# Patient Record
Sex: FEMALE | Race: WHITE | HISPANIC OR LATINO | Employment: OTHER | URBAN - METROPOLITAN AREA
[De-identification: names, ages, dates, MRNs, and addresses within clinical notes are randomized per-mention and may not be internally consistent; named-entity substitution may affect disease eponyms.]

---

## 2017-02-23 ENCOUNTER — MEDICAL CORRESPONDENCE (OUTPATIENT)
Dept: HEALTH INFORMATION MANAGEMENT | Facility: CLINIC | Age: 72
End: 2017-02-23

## 2017-09-28 ENCOUNTER — OFFICE VISIT (OUTPATIENT)
Dept: ENDOCRINOLOGY | Facility: CLINIC | Age: 72
End: 2017-09-28

## 2017-09-28 DIAGNOSIS — E11.69 TYPE 2 DIABETES MELLITUS WITH DIABETIC FOOT DEFORMITY (H): ICD-10-CM

## 2017-09-28 DIAGNOSIS — M20.42 HAMMER TOES OF BOTH FEET: ICD-10-CM

## 2017-09-28 DIAGNOSIS — M21.969 TYPE 2 DIABETES MELLITUS WITH DIABETIC FOOT DEFORMITY (H): ICD-10-CM

## 2017-09-28 DIAGNOSIS — L60.2 ONYCHAUXIS: Primary | ICD-10-CM

## 2017-09-28 DIAGNOSIS — B35.1 DERMATOPHYTOSIS OF NAIL: ICD-10-CM

## 2017-09-28 DIAGNOSIS — M20.41 HAMMER TOES OF BOTH FEET: ICD-10-CM

## 2017-09-28 RX ORDER — CICLOPIROX OLAMINE 7.7 MG/G
CREAM TOPICAL 2 TIMES DAILY
Qty: 90 G | Refills: 6 | Status: ON HOLD | OUTPATIENT
Start: 2017-09-28 | End: 2019-04-07

## 2017-09-28 NOTE — MR AVS SNAPSHOT
After Visit Summary   2017    Angelica James    MRN: [id number]           Patient Information     Date Of Birth          1945        Visit Information        Provider Department      2017 3:00 PM Courtney Rebollar; Adrian Vasquez DPM [facility]         Follow-ups after your visit        Your next 10 appointments already scheduled     2018  3:00 PM CST   (Arrive by 2:45 PM)   Return Visit with JITENDRA Francisco [facility] End[facility] ([facility])    [address] [place] [id number] [phone]              Who to contact     Please call your clinic at [phone] to:    Ask questions about your health    Make or cancel appointments    Discuss your medicines    Learn about your test results    Speak to your doctor   If you have compliments or concerns about an experience at your clinic, or if you wish to file a complaint, please contact [facility] Patient Relations at [phone] or email us at [email or web address]         Additional Information About Your Visit        MyChart Information     [employer] is an electronic gateway that provides easy, online access to your medical records. With [employer] you can request a clinic appointment, read your test results, renew a prescription or communicate with your care team.     To sign up for [employer] visit the website at [email or web address]   You will be asked to enter the access code listed below, as well as some personal information. Please follow the directions to create your username and password.     Your access code is: [email or web address]  Expires: 2017  3:37 PM     Your access code will  in 90 days. If you need help or a new code, please contact your [facility] or call [phone] for assistance.        Care EveryWhere ID     This is your Care EveryWhere ID. This could be  used by other organizations to access your [facility] medical records  [id number]         Blood Pressure from Last 3 Encounters:   11/11/14 126/75    Weight from Last 3 Encounters:   11/11/14 61.7 kg (136 lb)              Today, you had the following     No orders found for display       Primary Care Provider Office Phone # Fax #    Kamini Michael [phone]       [facility] [address] [facility] [address]        Equal Access to Services     EVA MURPHY : Hadii aad ku hadasho Soomaali, waaxda luqadaha, qaybta kaalmada adeegyada, waxay idiin hayaan adeeg kharaserena lademetrio . So [facility] [phone].    ATENCIÓN: Si habla español, tiene a villagomez disposición servicios gratuitos de asistencia lingüística. Ll[facility] [phone].    We comply with applicable federal civil rights laws and [place] laws. We do not discriminate on the basis of race, color, national origin, age, disability sex, sexual orientation or gender identity.            Thank you!     Thank you for choosing [facility]  for your care. Our goal is always to provide you with excellent care. Hearing back from our patients is one way we can continue to improve our services. Please take a few minutes to complete the written survey that you may receive in the mail after your visit with us. Thank you!             Your Updated Medication List - Protect others around you: Learn how to safely use, store and throw away your medicines at www.disposemymeds.org.      Notice  As of 9/28/2017  3:37 PM    You have not been prescribed any medications.

## 2017-09-28 NOTE — LETTER
9/28/2017       RE: Angelica James  [address]  [place] [address]     Dear Colleague,    Thank you for referring your patient, Angelica James, to the [facility] ENDOCRINOLOG[facility] at [facility]. Please see a copy of my visit note below.    Past Medical History:   Diagnosis Date     Diabetes (H)      Hypertension      Schizophrenia (H)      There is no problem list on file for this patient.    No past surgical history on file.  Social History     Social History     Marital status:      Spouse name: N/A     Number of children: N/A     Years of education: N/A     Occupational History     Not on file.     Social History Main Topics     Smoking status: Never Smoker     Smokeless tobacco: Not on file     Alcohol use No     Drug use: No     Sexual activity: Not on file     Other Topics Concern     Not on file     Social History Narrative     No narrative on file     No family history on file.  SUBJECTIVE:  A 72-year-old female presents from Kamini Michael NP, for diabetic toenail care.  She presents with her daughter and .  She relates no numbness or tingling in her feet.  No ulcers or sores in the past.  She does not wear diabetic shoes.  The toenails do not hurt.        PAST MEDICAL HISTORY:  Diabetes and hypertension.        PAST SURGICAL HISTORY:  Unremarkable.        REVIEW OF SYSTEMS:  No GI ulcers in the past.  No problems with Nsaids in the past.  No kidney disease and no liver disease.        FAMILY HISTORY:  Diabetes.      OBJECTIVE:  DP and PT 2/4 bilaterally.  She has a flexible flat foot, left greater than right.  She has dorsally contracted digits 1-5 bilaterally.  She has laterally deviated hallux bilaterally.  She has hyperkeratotic tissue buildup plantar medial hallux bilaterally with some cracking and drying.  There is no erythema, no edema, no drainage, no odor, no calor bilaterally.  Deep tendon reflexes are intact bilaterally.   Sharp dull is intact with 5.07 [place]-Nai monofilament bilaterally.  CFT is less than 3 seconds bilaterally.  She has incurvated long nails with some thickening and discoloration 1-5 bilaterally to differing degrees.  There are no gross tendon voids bilaterally.  Muscle strength is intact.        ASSESSMENT/PLAN:  Onychauxis with some onychomycosis changes bilaterally.  She is diabetic.  She has hammertoes present and a flexible flat foot.  Diagnosis and treatment options discussed with her.  All the nails were reduced bilaterally upon consent.  Prescription for diabetic shoes and insoles given.  She was given the phone number and address for the orthotics and prosthetics lab to pick those up.  Prescription for econazole cream given and use discussed with her.  She will return to clinic and see me in about 3-4 months.       Cream was switched to Loprox due to insurance coverage.         Again, thank you for allowing me to participate in the care of your patient.      Sincerely,    Adrian Vasquez DPM

## 2017-09-28 NOTE — PROGRESS NOTES
Past Medical History:   Diagnosis Date     Diabetes (H)      Hypertension      Schizophrenia (H)      There is no problem list on file for this patient.    No past surgical history on file.  Social History     Social History     Marital status:      Spouse name: N/A     Number of children: N/A     Years of education: N/A     Occupational History     Not on file.     Social History Main Topics     Smoking status: Never Smoker     Smokeless tobacco: Not on file     Alcohol use No     Drug use: No     Sexual activity: Not on file     Other Topics Concern     Not on file     Social History Narrative     No narrative on file     No family history on file.  SUBJECTIVE:  A 72-year-old female presents from Kamini Michael NP, for diabetic toenail care.  She presents with her daughter and .  She relates no numbness or tingling in her feet.  No ulcers or sores in the past.  She does not wear diabetic shoes.  The toenails do not hurt.        PAST MEDICAL HISTORY:  Diabetes and hypertension.        PAST SURGICAL HISTORY:  Unremarkable.        REVIEW OF SYSTEMS:  No GI ulcers in the past.  No problems with Nsaids in the past.  No kidney disease and no liver disease.        FAMILY HISTORY:  Diabetes.      OBJECTIVE:  DP and PT 2/4 bilaterally.  She has a flexible flat foot, left greater than right.  She has dorsally contracted digits 1-5 bilaterally.  She has laterally deviated hallux bilaterally.  She has hyperkeratotic tissue buildup plantar medial hallux bilaterally with some cracking and drying.  There is no erythema, no edema, no drainage, no odor, no calor bilaterally.  Deep tendon reflexes are intact bilaterally.  Sharp dull is intact with 5.07 [place]-Nai monofilament bilaterally.  CFT is less than 3 seconds bilaterally.  She has incurvated long nails with some thickening and discoloration 1-5 bilaterally to differing degrees.  There are no gross tendon voids bilaterally.  Muscle strength is  intact.        ASSESSMENT/PLAN:  Onychauxis with some onychomycosis changes bilaterally.  She is diabetic.  She has hammertoes present and a flexible flat foot.  Diagnosis and treatment options discussed with her.  All the nails were reduced bilaterally upon consent.  Prescription for diabetic shoes and insoles given.  She was given the phone number and address for the orthotics and prosthetics lab to pick those up.  Prescription for econazole cream given and use discussed with her.  She will return to clinic and see me in about 3-4 months.       Cream was switched to Loprox due to insurance coverage.

## 2018-11-06 ENCOUNTER — TRANSFERRED RECORDS (OUTPATIENT)
Dept: HEALTH INFORMATION MANAGEMENT | Facility: CLINIC | Age: 73
End: 2018-11-06

## 2018-11-23 ENCOUNTER — TRANSFERRED RECORDS (OUTPATIENT)
Dept: HEALTH INFORMATION MANAGEMENT | Facility: CLINIC | Age: 73
End: 2018-11-23

## 2018-11-29 ENCOUNTER — PATIENT OUTREACH (OUTPATIENT)
Dept: CARE COORDINATION | Facility: CLINIC | Age: 73
End: 2018-11-29

## 2018-12-07 ENCOUNTER — OFFICE VISIT (OUTPATIENT)
Dept: ORTHOPEDICS | Facility: CLINIC | Age: 73
End: 2018-12-07
Payer: MEDICARE

## 2018-12-07 ENCOUNTER — OFFICE VISIT (OUTPATIENT)
Dept: NEUROLOGY | Facility: CLINIC | Age: 73
End: 2018-12-07
Payer: MEDICARE

## 2018-12-07 ENCOUNTER — RADIANT APPOINTMENT (OUTPATIENT)
Dept: GENERAL RADIOLOGY | Facility: CLINIC | Age: 73
End: 2018-12-07
Attending: FAMILY MEDICINE
Payer: MEDICARE

## 2018-12-07 VITALS
DIASTOLIC BLOOD PRESSURE: 67 MMHG | BODY MASS INDEX: 21.09 KG/M2 | OXYGEN SATURATION: 95 % | TEMPERATURE: 96.5 F | WEIGHT: 119 LBS | HEIGHT: 63 IN | HEART RATE: 85 BPM | SYSTOLIC BLOOD PRESSURE: 109 MMHG

## 2018-12-07 VITALS
BODY MASS INDEX: 21.09 KG/M2 | HEART RATE: 85 BPM | DIASTOLIC BLOOD PRESSURE: 67 MMHG | HEIGHT: 63 IN | SYSTOLIC BLOOD PRESSURE: 109 MMHG | WEIGHT: 119 LBS

## 2018-12-07 DIAGNOSIS — R41.3 MEMORY LOSS: Primary | ICD-10-CM

## 2018-12-07 DIAGNOSIS — M25.561 CHRONIC PAIN OF RIGHT KNEE: ICD-10-CM

## 2018-12-07 DIAGNOSIS — M17.11 PRIMARY OSTEOARTHRITIS OF RIGHT KNEE: Primary | ICD-10-CM

## 2018-12-07 DIAGNOSIS — M25.561 RIGHT KNEE PAIN: ICD-10-CM

## 2018-12-07 DIAGNOSIS — G89.29 CHRONIC PAIN OF RIGHT KNEE: ICD-10-CM

## 2018-12-07 DIAGNOSIS — R41.3 MEMORY LOSS: ICD-10-CM

## 2018-12-07 LAB
HCV AB SERPL QL IA: NONREACTIVE
HIV 1+2 AB+HIV1 P24 AG SERPL QL IA: NONREACTIVE
T PALLIDUM AB SER QL: NONREACTIVE
T4 FREE SERPL-MCNC: 1.09 NG/DL (ref 0.76–1.46)
TSH SERPL DL<=0.005 MIU/L-ACNC: 2.52 MU/L (ref 0.4–4)
VIT B12 SERPL-MCNC: 949 PG/ML (ref 193–986)

## 2018-12-07 PROCEDURE — 86803 HEPATITIS C AB TEST: CPT | Performed by: PSYCHIATRY & NEUROLOGY

## 2018-12-07 PROCEDURE — 86780 TREPONEMA PALLIDUM: CPT | Performed by: PSYCHIATRY & NEUROLOGY

## 2018-12-07 PROCEDURE — 87389 HIV-1 AG W/HIV-1&-2 AB AG IA: CPT | Performed by: PSYCHIATRY & NEUROLOGY

## 2018-12-07 RX ORDER — FERROUS SULFATE 325(65) MG
TABLET ORAL
Refills: 2 | COMMUNITY
Start: 2018-10-07 | End: 2021-07-31

## 2018-12-07 RX ORDER — LISINOPRIL 10 MG/1
10 TABLET ORAL DAILY
Refills: 3 | Status: ON HOLD | COMMUNITY
Start: 2018-11-06 | End: 2019-07-31

## 2018-12-07 RX ORDER — ACETAMINOPHEN 160 MG
TABLET,DISINTEGRATING ORAL
Refills: 3 | COMMUNITY
Start: 2018-10-08 | End: 2021-07-31

## 2018-12-07 RX ORDER — PAROXETINE 40 MG/1
40 TABLET, FILM COATED ORAL EVERY EVENING
Refills: 5 | Status: ON HOLD | COMMUNITY
Start: 2018-10-29 | End: 2019-08-14

## 2018-12-07 RX ORDER — RISPERIDONE 1 MG/1
TABLET ORAL
Refills: 5 | COMMUNITY
Start: 2018-09-09 | End: 2021-07-31

## 2018-12-07 RX ORDER — RISPERIDONE 4 MG/1
TABLET ORAL
Refills: 5 | COMMUNITY
Start: 2018-11-11 | End: 2021-07-31

## 2018-12-07 ASSESSMENT — PAIN SCALES - GENERAL: PAINLEVEL: MODERATE PAIN (5)
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
## 2018-12-07 NOTE — MR AVS SNAPSHOT
After Visit Summary   12/7/2018    Angelica James    MRN: [id number]           Patient Information     Date Of Birth          1945        Visit Information        Provider Department      12/7/2018 8:15 AM Vandana Espana; Ad Arroyo MD [facility] Neurology        Today's Diagnoses     Memory loss    -  1    Chronic pain of right knee           Follow-ups after your visit        Additional Services     OCCUPATIONAL THERAPY REFERRAL       If you have not heard from the scheduling office within 2 business days, please call [phone] for all locations, with the exception of [place], please call [phone] and [place], please call [phone].safety [email or web address]    Please be aware that coverage of these services is subject to the terms and limitations of your health insurance plan.  Call member services at your health plan with any benefit or coverage questions.            ORTHOPEDICS ADULT REFERRAL       Your provider has referred you to: [facility]: [facility] [phone]   [email or web address]    Please be aware that coverage of these services is subject to the terms and limitations of your health insurance plan.  Call member services at your health plan with any benefit or coverage questions.      Please bring the following to your appointment:    >>   Any x-rays, CTs or MRIs which have been performed.  Contact the facility where they were done to arrange for  prior to your scheduled appointment.    >>   List of current medications   >>   This referral request   >>   Any documents/labs given to you for this referral                  Your next 10 appointments already scheduled     Dec 07, 2018 11:15 AM CST   LAB with  LAB    [facility] ([facility])    [address] [place] [id number] [phone]           Please do not eat 10-12 hours before your appointment if you  are coming in fasting for labs on lipids, cholesterol, or glucose (sugar). This does not apply to pregnant women. Water, hot tea and black coffee (with nothing added) are okay. Do not drink other fluids, diet soda or chew gum.            2019  3:30 PM CST   (Arrive by 3:15 PM)   Return Visit with Ad Arroyo MD   [facility] Neurology ([facility] [place])    [address] [place] [id number] [phone]              Future tests that were ordered for you today     Open Future Orders        Priority Expected Expires Ordered    Hepatitis C antibody Routine 2018    HIV Antigen Antibody Combo Routine 2018    T4 free Routine 2018    Vitamin B12 Routine 2018    Methylmalonic acid Routine 2018    TSH Routine 2018    Treponema Abs w Reflex to RPR and Titer Routine 2018    OCCUPATIONAL THERAPY REFERRAL Routine  2019            Who to contact     Please call your clinic at [phone] to:    Ask questions about your health    Make or cancel appointments    Discuss your medicines    Learn about your test results    Speak to your doctor            Additional Information About Your Visit        MyChart Information     [employer]x Therapeuticst is an electronic gateway that provides easy, online access to your medical records. With Arzeda, you can request a clinic appointment, read your test results, renew a prescription or communicate with your care team.     To sign up for [employer] visit the website at [email or web address]   You will be asked to enter the access code listed below, as well as some personal information. Please follow the directions to create your username and password.     Your access code is: [email or web address]  Expires: 2019  6:30 AM     Your access code will  in 90  "days. If you need help or a new code, please contact your [facility] or call [phone] for assistance.        Care EveryWhere ID     This is your Care EveryWhere ID. This could be used by other organizations to access your [place] medical records  [id number]        Your Vitals Were     Pulse Temperature Height Pulse Oximetry BMI (Body Mass Index)       85 96.5  F (35.8  C) (Oral) 1.6 m (5' 3\") 95% 21.08 kg/m2        Blood Pressure from Last 3 Encounters:   12/07/18 109/67   11/11/14 126/75    Weight from Last 3 Encounters:   12/07/18 54 kg (119 lb)   11/11/14 61.7 kg (136 lb)              We Performed the Following     ORTHOPEDICS ADULT REFERRAL        Primary Care Provider Office Phone # Fax #    Kamini MichaelSARTHAK [phone]       [facility] [address] [place] [address]        Equal Access to Services     EVA MURPHY : Hadii aad ku hadasho Soomaali, waaxda luqadaha, qaybta kaalmada adeegyada, waxay idiin hayaan noemyeg brittneyaraserena olivo . So [facility] [phone].    ATENCIÓN: Si habla español, tiene a villagomez disposición servicios gratuitos de asistencia lingüística. Llame al [phone].    We comply with applicable federal civil rights laws and [place] laws. We do not discriminate on the basis of race, color, national origin, age, disability, sex, sexual orientation, or gender identity.            Thank you!     Thank you for choosing [facility] NEUROLOGY  for your care. Our goal is always to provide you with excellent care. Hearing back from our patients is one way we can continue to improve our services. Please take a few minutes to complete the written survey that you may receive in the mail after your visit with us. Thank you!             Your Updated Medication List - Protect others around you: Learn how to safely use, store and throw away your medicines at www.disposemymeds.org.          This list is accurate as of 12/7/18 10:24 AM.  Always " use your most recent med list.                   Brand Name Dispense Instructions for use Diagnosis    calcium carbonate 500 MG tablet   Generic drug:  calcium carbonate 500 mg (elemental)      TAKE 1 TABLET BY MOUTH TWICE A DAY    Memory loss       ciclopirox 0.77 % cream    LOPROX    90 g    Apply topically 2 times daily To feet and toenails.    Dermatophytosis of nail       ferrous sulfate 325 (65 Fe) MG tablet    FEROSUL     TAKE 1 TABLET BY MOUTH TWO TIMES DAILY    Memory loss       lisinopril 10 MG tablet    PRINIVIL/ZESTRIL     Take 10 mg by mouth daily    Memory loss       metFORMIN 500 MG tablet    GLUCOPHAGE     TAKE 1 TABLET BY MOUTH DAILY.    Memory loss       PARoxetine 40 MG tablet    PAXIL     Take 40 mg by mouth daily    Memory loss       * risperiDONE 1 MG tablet    risperDAL     TAKE ONE-HALF TABLET BY MOUTH EVERY MORNING AND EARLY AFTERNOON.    Memory loss       * risperiDONE 4 MG tablet    risperDAL     TAKE 1 TABLET BY MOUTH NIGHTLY AT BEDTIME    Memory loss       vitamin D3 2000 units Caps      TAKE 2 CAPSULES BY MOUTH EVERY DAY    Memory loss       * Notice:  This list has 2 medication(s) that are the same as other medications prescribed for you. Read the directions carefully, and ask your doctor or other care provider to review them with you.

## 2018-12-07 NOTE — NURSING NOTE
[address] [address]  Dept: [phone]  ______________________________________________________________________________    Patient: Angelica James   : 1945   MRN: [id number]   2018    INVASIVE PROCEDURE SAFETY CHECKLIST    Date: 18   Procedure:Right knee kenalog injection  Patient Name: Angelica James  MRN: [id number]  YOB: 1945    Action: Complete sections as appropriate. Any discrepancy results in a HARD COPY until resolved.     PRE PROCEDURE:  Patient ID verified with 2 identifiers (name and  or MRN): Yes  Procedure and site verified with patient/designee (when able): Yes  Accurate consent documentation in medical record: Yes  H&P (or appropriate assessment) documented in medical record: Yes  H&P must be up to 20 days prior to procedure and updates within 24 hours of procedure as applicable: NA  Relevant diagnostic and radiology test results appropriately labeled and displayed as applicable: Yes  Procedure site(s) marked with provider initials: NA    TIMEOUT:  Time-Out performed immediately prior to starting procedure, including verbal and active participation of all team members addressing the following:Yes  * Correct patient identify  * Confirmed that the correct side and site are marked  * An accurate procedure consent form  * Agreement on the procedure to be done  * Correct patient position  * Relevant images and results are properly labeled and appropriately displayed  * The need to administer antibiotics or fluids for irrigation purposes during the procedure as applicable   * Safety precautions based on patient history or medication use    DURING PROCEDURE: Verification of correct person, site, and procedures any time the responsibility for care of the patient is transferred to another member of the care team.     The following medication was given:     MEDICATION:  Lidocaine without  epinephrine  ROUTE: intra-articular  SITE: right Knee  DOSE: 4 mL  LOT #: [id number]  : [employer]  EXPIRATION DATE: 06/22  NDC#: 74727-408-10   Was there drug waste? Yes  Amount of drug waste (mL): 26.  Reason for waste:  Single use vial    MEDICATION:  Kenalog 40 mg  ROUTE: intra-articular  SITE: right Knee  DOSE: 1 mL  LOT #: [id number]  : [employer]  EXPIRATION DATE: 04/2020  NDC#: [address]-9   Was there drug waste? No    Kyra Giles, JACK  December 7, 2018

## 2018-12-07 NOTE — MR AVS SNAPSHOT
After Visit Summary   12/7/2018    Angelica James    MRN: [id number]           Patient Information     Date Of Birth          1945        Visit Information        Provider Department      12/7/2018 11:10 AM Jaime Valencia MD [facility] Orthopa[facility]        Today's Diagnoses     Right knee pain    -  1      Care Instructions    Ice to knee today and then daily as needed  May use tylenol as needed  No pools or hot tubs for 48 hours  Follow up as needed  Return to clinic with severe pain, warmth from the joint, or redness, as these may be signs of infection after your injection.               Follow-ups after your visit        Your next 10 appointments already scheduled     Jan 18, 2019  3:30 PM CST   (Arrive by 3:15 PM)   Return Visit with Ad Arroyo MD   [facility] Neurology ([facility] [place])    [address] [place] [id number] [phone]              Future tests that were ordered for you today     Open Future Orders        Priority Expected Expires Ordered    OCCUPATIONAL THERAPY REFERRAL Routine  12/7/2019 12/7/2018            Who to contact     Please call your clinic at [phone] to:    Ask questions about your health    Make or cancel appointments    Discuss your medicines    Learn about your test results    Speak to your doctor            Additional Information About Your Visit        MyChart Information     BioHorizonst is an electronic gateway that provides easy, online access to your medical records. With [employer], you can request a clinic appointment, read your test results, renew a prescription or communicate with your care team.     To sign up for [employer] visit the website at [email or web address]   You will be asked to enter the access code listed below, as well as some personal information. Please follow the directions to create your username and password.     Your access code is:  [email or web address]  Expires: 2019  6:30 AM     Your access code will  in 90 days. If you need help or a new code, please contact your [facility] or call [phone] for assistance.        Care EveryWhere ID     This is your Care EveryWhere ID. This could be used by other organizations to access your [place] medical records  [id number]        Your Vitals Were     Pulse Height BMI (Body Mass Index)             85 1.6 m (5' 3\") 21.08 kg/m2          Blood Pressure from Last 3 Encounters:   18 109/67   18 109/67   14 126/75    Weight from Last 3 Encounters:   18 54 kg (119 lb)   18 54 kg (119 lb)   14 61.7 kg (136 lb)               Primary Care Provider Office Phone # Fax #    Kamini Valdovinos SARTHAK Michael [phone]       [facility]  [place] [address]        Equal Access to Services     [facility] : Hadii aad ku hadasho Soomaali, waaxda luqadaha, qaybta kaalmada adeegyada, waxay idiin hayezio olivo . So [facility] [phone].    ATENCIÓN: Si habla español, tiene a villagomez disposición servicios gratuitos de asistencia lingüística. Neyame al [phone].    We comply with applicable federal civil rights laws and [place] laws. We do not discriminate on the basis of race, color, national origin, age, disability, sex, sexual orientation, or gender identity.            Thank you!     Thank you for choosing [facility] SPORTS AND ORTHOPAEDIC WALK IN CLINIC  for your care. Our goal is always to provide you with excellent care. Hearing back from our patients is one way we can continue to improve our services. Please take a few minutes to complete the written survey that you may receive in the mail after your visit with us. Thank you!             Your Updated Medication List - Protect others around you: Learn how to safely use, store and throw away your medicines at www.disposemymeds.org.          This list is " accurate as of 12/7/18  1:57 PM.  Always use your most recent med list.                   Brand Name Dispense Instructions for use Diagnosis    calcium carbonate 500 MG tablet   Generic drug:  calcium carbonate 500 mg (elemental)      TAKE 1 TABLET BY MOUTH TWICE A DAY    Memory loss       ciclopirox 0.77 % cream    LOPROX    90 g    Apply topically 2 times daily To feet and toenails.    Dermatophytosis of nail       ferrous sulfate 325 (65 Fe) MG tablet    FEROSUL     TAKE 1 TABLET BY MOUTH TWO TIMES DAILY    Memory loss       lisinopril 10 MG tablet    PRINIVIL/ZESTRIL     Take 10 mg by mouth daily    Memory loss       metFORMIN 500 MG tablet    GLUCOPHAGE     TAKE 1 TABLET BY MOUTH DAILY.    Memory loss       PARoxetine 40 MG tablet    PAXIL     Take 40 mg by mouth daily    Memory loss       * risperiDONE 1 MG tablet    risperDAL     TAKE ONE-HALF TABLET BY MOUTH EVERY MORNING AND EARLY AFTERNOON.    Memory loss       * risperiDONE 4 MG tablet    risperDAL     TAKE 1 TABLET BY MOUTH NIGHTLY AT BEDTIME    Memory loss       vitamin D3 2000 units Caps      TAKE 2 CAPSULES BY MOUTH EVERY DAY    Memory loss       * Notice:  This list has 2 medication(s) that are the same as other medications prescribed for you. Read the directions carefully, and ask your doctor or other care provider to review them with you.

## 2018-12-07 NOTE — PROGRESS NOTES
[facility]  Orthopedics  Jaime Valencia MD  2018     Name: Angelica James  MRN: [id number]  Age: 73 year old  : 1945  Referring provider: No ref. provider found     Chief Complaint: Pain of the Right Knee     Date of Injury: approximately 5 days ago    History of Present Illness:   Angelica James is a 73 year old, female who presents with an  today for evaluation of right knee pain. The patient reports experiencing constant, aching pain in the posterior aspect of the right knee that onset approximately 5 days ago with no specific incident. Her pain is exacerbated with walking and her pain is alleviated with ice therapy and tylenol. She denies any radiation of pain, numbness or tingling. She voices no further concerns at this time.     Review of Systems:   A 10-point review of systems was obtained and is negative except for as noted in the HPI.     Medications:   Current Outpatient Prescriptions:      CALCIUM CARBONATE 500 MG tablet, TAKE 1 TABLET BY MOUTH TWICE A DAY, Disp: , Rfl: 5     Cholecalciferol (VITAMIN D3) 2000 units CAPS, TAKE 2 CAPSULES BY MOUTH EVERY DAY, Disp: , Rfl: 3     ciclopirox (LOPROX) 0.77 % cream, Apply topically 2 times daily To feet and toenails., Disp: 90 g, Rfl: 6     ferrous sulfate (FEROSUL) 325 (65 Fe) MG tablet, TAKE 1 TABLET BY MOUTH TWO TIMES DAILY, Disp: , Rfl: 2     lisinopril (PRINIVIL/ZESTRIL) 10 MG tablet, Take 10 mg by mouth daily, Disp: , Rfl: 3     metFORMIN (GLUCOPHAGE) 500 MG tablet, TAKE 1 TABLET BY MOUTH DAILY., Disp: , Rfl: 3     PARoxetine (PAXIL) 40 MG tablet, Take 40 mg by mouth daily, Disp: , Rfl: 5     risperiDONE (RISPERDAL) 1 MG tablet, TAKE ONE-HALF TABLET BY MOUTH EVERY MORNING AND EARLY AFTERNOON., Disp: , Rfl: 5     risperiDONE (RISPERDAL) 4 MG tablet, TAKE 1 TABLET BY MOUTH NIGHTLY AT BEDTIME, Disp: , Rfl: 5    Allergies:  Allergen Reactions     Penicillins Hives     Past Medical History:  Past Medical History:   Diagnosis Date      "Diabetes (H)      Hypertension      Schizophrenia (H)      Past Surgical History:  No past surgical history on file.     Social History:  Patient is retired. She denies smoking or alcohol use.     Social History     Marital status:      Spouse name: N/A     Number of children: N/A     Years of education: N/A     Social History Main Topics     Smoking status: Never Smoker     Smokeless tobacco: Never Used     Alcohol use No     Drug use: No     Sexual activity: Not on file     Family History:  No family history on file.    Physical Examination:  Blood pressure 109/67, pulse 85, height 1.6 m (5' 3\"), weight 54 kg (119 lb).  Patient is alert, No acute distress, pleasant and conversational.    Gait: nonantalgic. Normal heel toe gait.    Patient is able to perform two legged squat without difficulty.    right knee:   Skin intact. No erythema or ecchymosis.  No effusion or soft tissue swelling.    AROM: Zero to approximately 110  with pain on full flexion    Palpation: No medial or lateral facet joint tenderness.  No posterior medial or posterior lateral joint line tenderness.  Tenderness to palpation in the popliteal area.      Special Tests:  Negative bounce test, negative forced flexion and negative Melvin's.  No ligamentous laxity or pain with valgus or varus stress.  Negative Lachman's, Anterior Drawer and Posterior Drawer     Full Isometric quad strength, extensor mechanism in place     Neurovascularly intact in the lower extremity    Hip and Ankle with full AROM and nontender    Imaging:   Radiographs of the right knee - 3 views (12/7/2018)  Advanced osteoarthritis, worse in patellofemoral compartment. No acute fracture     I have independently reviewed the above imaging studies; the results were discussed with the patient.     Assessment:   73 year old, female with osteoarthritis of the right knee.     Plan:   1. Patient obtained an intraarticular right knee injection in clinic today  2. Patient will " start bracing her knee with the brace she owns at home.  3. Recommended she continue ice therapy and tylenol  4. Follow up with me PRN or if symptoms do not improve.     Procedure:  Right knee injection   The patient was apprised of the risks and the benefits of the procedure written consent was signed by the patient.   Landmarks were located on the anterior lateral knee and the area was marked and cleaned with chlorhexadine swab.   Using no touch technique, the skin was anesthetized with ethyl chloride spray, 40 mg of triamcinolone along with 4 mL of 1% lidocaine  was injected easily through a 1.5-inch 22-gauge needle in the knee.   There were no complications. The patient tolerated the procedure well. There was minimal bleeding.   The patient was instructed to ice the knee upon leaving clinic and refrain from overuse over the next 2 days.   The patient was instructed to go to the emergency room with any unusual pain, swelling, or redness occurred in the injected area.   Jaime Valencia MD          Scribe Disclosure:   IArthur, am serving as a scribe to document services personally performed by Jaime Valencia MD at this visit, based upon the provider's statements to me. All documentation has been reviewed by the aforementioned provider prior to being entered into the official medical record.

## 2018-12-07 NOTE — PROGRESS NOTES
SPORTS & ORTHOPEDIC WALK-IN VISIT 12/7/2018    Primary Care Physician: Dr. Michael    Reason for visit:     What part of your body is injured / painful?  right knee    What caused the injury /pain? No inciting event     How long ago did your injury occur or pain begin? a week ago    What are your most bothersome symptoms? Pain    How would you characterize your symptom?  aching    What makes your symptoms better? Rest, Ice and Tylenol    What makes your symptoms worse? Walking    Have you been previously seen for this problem? No    Medical History:    Any recent changes to your medical history? No    Any new medication prescribed since last visit? No    Have you had surgery on this body part before? No    Social History:    Occupation: Retired    Handedness: Right    Exercise: 2-3 days/week    Review of Systems:    Do you have fever, chills, weight loss? No    Do you have any vision problems? No    Do you have any chest pain or edema? No    Do you have any shortness of breath or wheezing?  No    Do you have stomach problems? No    Do you have any numbness or focal weakness? No    Do you have diabetes? Yes, Type 2    Do you have problems with bleeding or clotting? No    Do you have an rashes or other skin lesions? No

## 2018-12-07 NOTE — PATIENT INSTRUCTIONS
Ice to knee today and then daily as needed  May use tylenol as needed  No pools or hot tubs for 48 hours  Follow up as needed  Return to clinic with severe pain, warmth from the joint, or redness, as these may be signs of infection after your injection.

## 2018-12-07 NOTE — NURSING NOTE
Chief Complaint   Patient presents with     RECHECK     DEMENTIA       Preventive Care:    Colorectal Cancer Screening: During our visit today, we discussed that it is recommended you receive colorectal cancer screening. Please call or make an appointment with your primary care provider to discuss this. You may also call the [facility] scheduling line ([phone]) to set up a colonoscopy appointment.      Ceci Campbell MA

## 2018-12-09 LAB — METHYLMALONATE SERPL-SCNC: 0.17 UMOL/L (ref 0–0.4)
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
## 2018-12-11 ENCOUNTER — DOCUMENTATION ONLY (OUTPATIENT)
Dept: NEUROLOGY | Facility: CLINIC | Age: 73
End: 2018-12-11

## 2018-12-14 ENCOUNTER — TELEPHONE (OUTPATIENT)
Dept: NEUROLOGY | Facility: CLINIC | Age: 73
End: 2018-12-14

## 2018-12-14 NOTE — TELEPHONE ENCOUNTER
----- Message from Ad Arroyo MD sent at 12/14/2018 11:10 AM CST -----  Tell daughter all blood tests are fine.

## 2018-12-14 NOTE — TELEPHONE ENCOUNTER
Talked with patients daughter on the phone and told them the results that  Dr. Arroyo sent me via EPIC message.

## 2018-12-19 RX ORDER — TRIAMCINOLONE ACETONIDE 40 MG/ML
40 INJECTION, SUSPENSION INTRA-ARTICULAR; INTRAMUSCULAR ONCE
Qty: 1 ML | Refills: 0 | Status: ON HOLD | OUTPATIENT
Start: 2018-12-19 | End: 2019-04-07

## 2019-01-18 ENCOUNTER — OFFICE VISIT (OUTPATIENT)
Dept: NEUROLOGY | Facility: CLINIC | Age: 74
End: 2019-01-18
Payer: MEDICARE

## 2019-01-18 VITALS — OXYGEN SATURATION: 96 % | HEART RATE: 78 BPM | SYSTOLIC BLOOD PRESSURE: 126 MMHG | DIASTOLIC BLOOD PRESSURE: 83 MMHG

## 2019-01-18 DIAGNOSIS — R41.3 MEMORY LOSS: Primary | ICD-10-CM

## 2019-01-18 ASSESSMENT — PAIN SCALES - GENERAL: PAINLEVEL: NO PAIN (0)

## 2019-01-18 NOTE — LETTER
2019       RE: Angelica James  [address]  [place] [address]     Dear Colleague,    Thank you for referring your patient, Angelica James, to the [facility] NEUROLOGY at [facility]. Please see a copy of my visit note below.    Service Date: 2019      RE: Angelica James   MRN: [id number]   : 1945        Dear Dr. Michael:      This is in regard to followup on Angelica James.  The patient returned today with a chief complaint of dementia.      The patient was accompanied by her daughter, Анна, who is an excellent historian and also her .  The patient's daughter was able to get records from your office and I reviewed them with her.  I found these quite helpful from 12/10/2018.  The patient subsequently has been referred back to her psychiatrist according to her daughter and is scheduled to have what sounds like psychometric testing.  Unfortunately, the patient continues to wander.  Her daughter reviewed with me again her prior history of paranoid schizophrenia and episodes of juanito.  She said that it would be awful if her psychosis returned.  The patient and her daughter did review with me medications for memory loss including donepezil, rivastigmine, and Namenda.  At this point, I would be reluctant to recommend these medicines because of the risk of increased confusion and reactivation of her psychosis.  I did ask that if they wanted to consider them to review this with her psychiatrist.  The patient and her daughter also were apprised of the risk of nausea, dyspepsia and diarrhea up to 5%-10% and approximately 1% rate of bradycardia again.  I went over with them her tests that I have ordered.  I made sure they had copies.  She had nonreactive serology and her B12 level was quite adequate at 949 picograms and her MMA was 0.17.  Her TSH was 1.09.      I did talk with the patient's daughter again about her wandering.  I  "think this is extremely dangerous and I am quite concerned about the risk of injury including freezing.  She does have a monitor, but she does know how to use it.  The patient's daughter did discuss with me difficulties that they have had with trying to get someone to watch her.  She does need to have adult .  Unfortunately, she is not a [place] citizen and you did bring up issues related to this as she is then not eligible for senior  or assisted living until she becomes a citizen.  Her daughter said that in February they will be starting application process.  I have asked that they review this issue with her psychiatrist, but also for the patient's daughter to call the [facility] here in [place].  I think she hopefully will get some benefit from their advice.  I did suggest that they also see her .  The patient's daughter should buy the book called \"The 36-Hour Day\".  I reviewed the probable diagnosis of Alzheimer disease but also pointed out to them again that patients with schizophrenia do have an increased risk of dementia.  I think as to causation I am not certain of the etiology.  I am not certain there is much else I can offer her.  I told them that I would be happy to see her in the future here at [facility].  I have not made arrangements for neurologic followup now.  Today the patient was extremely pleasant and I did suggest to her that she should not go to the coffee shop.  She most recently became lost, according to her daughter.  I did point out to her daughter the importance of having some adult with her constantly.        I spent 20 minutes with the patient and her daughter today.  Over 50% of the time this involved counseling and coordination of care.         D: 2019   T: 2019   MT: AKA      Name:     LUDA BOSWELL   MRN:      [id number]        Account:      [id number]   :      1945           Service Date: 2019      Document: " [id number]       Again, thank you for allowing me to participate in the care of your patient.      Sincerely,    Ad Arroyo MD    CC:  Kamini Michael NP   [facility]    [address] [place] [id number]

## 2019-01-18 NOTE — NURSING NOTE
Chief Complaint   Patient presents with     RECHECK     [facility] RETURN 1 MO F/U POST LAB       Yelitza Souza, EMT

## 2019-01-19 NOTE — PROGRESS NOTES
Service Date: 2019      Kamini Michael NP   [facility] [place] [address]      RE: Angelica James   MRN: [id number]   : 1945        Dear Dr. Michael:      This is in regard to followup on Angelica James.  The patient returned today with a chief complaint of dementia.      The patient was accompanied by her daughter, Анна, who is an excellent historian and also her .  The patient's daughter was able to get records from your office and I reviewed them with her.  I found these quite helpful from 12/10/2018.  The patient subsequently has been referred back to her psychiatrist according to her daughter and is scheduled to have what sounds like psychometric testing.  Unfortunately, the patient continues to wander.  Her daughter reviewed with me again her prior history of paranoid schizophrenia and episodes of juanito.  She said that it would be awful if her psychosis returned.  The patient and her daughter did review with me medications for memory loss including donepezil, rivastigmine, and Namenda.  At this point, I would be reluctant to recommend these medicines because of the risk of increased confusion and reactivation of her psychosis.  I did ask that if they wanted to consider them to review this with her psychiatrist.  The patient and her daughter also were apprised of the risk of nausea, dyspepsia and diarrhea up to 5%-10% and approximately 1% rate of bradycardia again.  I went over with them her tests that I have ordered.  I made sure they had copies.  She had nonreactive serology and her B12 level was quite adequate at 949 picograms and her MMA was 0.17.  Her TSH was 1.09.      I did talk with the patient's daughter again about her wandering.  I think this is extremely dangerous and I am quite concerned about the risk of injury including freezing.  She does have a monitor, but she does know how to use it.  The patient's daughter did discuss  "with me difficulties that they have had with trying to get someone to watch her.  She does need to have adult .  Unfortunately, she is not a [place] citizen and you did bring up issues related to this as she is then not eligible for senior  or assisted living until she becomes a citizen.  Her daughter said that in February they will be starting application process.  I have asked that they review this issue with her psychiatrist, but also for the patient's daughter to call the [facility] here in [place].  I think she hopefully will get some benefit from their advice.  I did suggest that they also see her .  The patient's daughter should buy the book called \"The 36-Hour Day\".  I reviewed the probable diagnosis of Alzheimer disease but also pointed out to them again that patients with schizophrenia do have an increased risk of dementia.  I think as to causation I am not certain of the etiology.  I am not certain there is much else I can offer her.  I told them that I would be happy to see her in the future here at [facility].  I have not made arrangements for neurologic followup now.  Today the patient was extremely pleasant and I did suggest to her that she should not go to the coffee shop.  She most recently became lost, according to her daughter.  I did point out to her daughter the importance of having some adult with her constantly.      Thank you for allowing me to see this patient.       Sincerely yours,       Lesvia Arroyo MD      I spent 20 minutes with the patient and her daughter today.  Over 50% of the time this involved counseling and coordination of care.         LESVIA ARROYO MD             D: 2019   T: 2019   MT: AKA      Name:     LUDA BOSWELL   MRN:      [id number]        Account:      [id number]   :      1945           Service Date: 2019      Document: [id number]    "

## 2019-01-29 ENCOUNTER — HOSPITAL ENCOUNTER (OUTPATIENT)
Dept: OCCUPATIONAL THERAPY | Facility: CLINIC | Age: 74
Setting detail: THERAPIES SERIES
End: 2019-01-29
Attending: PSYCHIATRY & NEUROLOGY
Payer: MEDICARE

## 2019-01-29 PROCEDURE — 97166 OT EVAL MOD COMPLEX 45 MIN: CPT | Mod: GO | Performed by: OCCUPATIONAL THERAPIST

## 2019-01-29 PROCEDURE — 97535 SELF CARE MNGMENT TRAINING: CPT | Mod: GO | Performed by: OCCUPATIONAL THERAPIST

## 2019-01-29 PROCEDURE — 96125 COGNITIVE TEST BY HC PRO: CPT | Mod: GO | Performed by: OCCUPATIONAL THERAPIST

## 2019-01-29 NOTE — PROGRESS NOTES
01/29/19 0800   Quick Adds   Quick Adds Certification   Type of Visit Initial Outpatient Occupational Therapy Evaluation       Present Yes   Language Danish   General Information   Start Of Care Date 01/29/19   Referring Physician Dr. Ad Arroyo   Orders Evaluate and treat as indicated   Orders Date 12/07/19   Medical Diagnosis Memory Loss   Onset of Illness/Injury or Date of Surgery 12/07/18   Special Instructions Cognitive Assessment   Surgical/Medical History Reviewed Yes   Additional Occupational Profile Info/Pertinent History of Current Problem Patient referred to outpatient OT for cognitive assessment.  Please refer to Dr. Arroyo note on 12/7/18 and 1/19/19.  Patient recently got lost after walking home from the coffee shop, police found her and she did not recall where she lived or knew what information to give them (ID/information that she had on her).  Daughter also reports that she will come home from the coffee shop with more money than she left with.  She has some concerns with wandering at night   Patient does go to adult day care 1x/week.  She has life alert with GPS (but does not know how to use it).  Patient denies changes with her hearing but some changes with her vision (eyes watering, burning, and fatigue).  PmHx: paranoid schizophrenia and episodes of juanito   Comments/Observations Daughter present for session.  Patient unable to recall what she had for breakfast just an hour prior to appointment today.     Role/Living Environment   Current Community Support Emergency call system;Family/friend caregiver  (sometimes will not know what/how to use her life alert)   Patient role/Employment history Retired   Community/Avocational Activities Goes to coffee shop almost daily (2 blocks away), enjoys watching TV, listening to music   Current Living Environment Apartment/condo   Home/Community Accessibility Comments Patient walks to local coffee shop almost daily.  She does  "not drive.   Prior Level - Transfers Independent   Prior Level - Ambulation Independent   Prior Level - ADLS Assistive person  (reminders)   Prior Responsibilities - IADL Housekeeping   Prior Level Comments Daughter provides assistance with all IADLs, hands/gives patient her medications 2x/day (and hides the medications so patient does not take on her own).   Current Assistive Devices - Mobility (none)   Role/Living Environment Comments Patient has lived with her daughter for the past 12 years.  Patient is home alone during the day while her daughter works.  She will occasionally walk to the coffee shop 2 blocks away.  Otherwise, she will clean a bit and listen music/watch TV.   Patient/family Goals Statement \"I want to stay in my house and get a cup of coffee every once and a while.\"   Pain   Patient currently in pain No   Fall Risk Screen   Have you fallen 2 or more times in the past year? No   Have you fallen and had an injury in the past year? No   Cognitive Status Examination   Orientation Person   Level of Consciousness Alert;Confused   Follows Commands and Answers Questions 75% of the time;Able to follow single-step instructions   Personal Safety and Judgment At risk behaviors demonstrated  (impaired insight/awareness into her deficits)   Memory Impaired   Memory Comments Severe short term memory impairments, unable to recall what she ate, time of day it is, etc.   Attention Alternating attention impaired, difficulty shifting between tasks;Divided attention impaired, difficulty with simultaneous tasks   Organization/Problem Solving Problem solving impaired;Sequencing impaired;Categorization of information impaired;Prioritizing of information/tasks impaired   Executive Function Working memory impaired, decreased storage of information for performing tasks;Self awareness/monitoring impaired;Planning ability impaired   Cognitive Comment Wears her ID and life line around her neck.  See seperate note for Cognitive " "Performance Testing.   Visual Perception   Visual Perception No deficits were identified   Transfer Skill   Level of [place]: Transfers independent   Toilet Transfer   Toilet Transfer Comments Independent   Tub/Shower Transfer   Tub/Shower Transfer Comments Tub/shower transfer, no difficulties.  Daughter states that she does not recall when she has shower.  \"she thinks she has a routine but she doesn't do it.\"  Family has to \"force her\" to shower.   Bathing   Level of [place] - Bathing stand-by assist   Physical Assist/Nonphysical Assist - Bathing supervision   Upper Body Dressing   Level of [place]: Dress Upper Body independent   Upper Body Dressing Comments Will wear the same clothes for 3 days.  Will urinate in her clothes, dry them and then wear them again.  Occasionally will wear things inside out.     Lower Body Dressing   Level of [place]: Dress Lower Body independent   Lower Body Dressing Comments See above.   Toileting   Level of [place]: Toilet independent   Grooming   Grooming Comments Per daughter, patient forgets to brush her teeth, needs reminders to wash hands.  Daughter has tried checklists at home but patient refuses and does not follow.   Eating/Self-Feeding   Level of [place]: Eating independent   Physical Assist/Nonphysical Assist: Eating set-up required   Eating/Self Feeding Comments Has difficulities remembering if she ate and when she ate.  Daughter has lunch made in the fridge and patient does not eat it/forgets to eat.   Activity Tolerance   Activity Tolerance Patient reports sleeping well at night, will occasionally nap.  Daughter reports occasional nights that she does not sleep.  Daughter reports disorientation of day/night occasionally.    Planned Therapy Interventions   Planned Therapy Interventions Cognitive performance testing;Self care/Home management   Intervention Comments Cognitive Performance Testing recommended-see seperate report.   Adult OT Eval " Goals   OT Eval Goals (Adult) 1   OT Goal 1   Goal Identifier 1-CPT   Goal Description Patient and family to verbalize understanding of Cognitive Performance Test results and identify 3 strategies to increase patient's safety and independence in the home setting.   Target Date 01/29/19   Clinical Impression   Criteria for Skilled Therapeutic Interventions Met Yes, treatment indicated   OT Diagnosis decreased safety and independence with ADL/IADLs   Influenced by the following impairments severe cognitive impairments   Assessment of Occupational Performance 1-3 Performance Deficits   Identified Performance Deficits affects ADL/IADL, household/community mobility, recreational activities   Clinical Decision Making (Complexity) Moderate complexity   Therapy Frequency Eval and one treatment (completion of CPT and recommendations)   Risks and Benefits of Treatment have been explained. Yes   Patient, Family & other staff in agreement with plan of care Yes   Clinical Impression Comments Patient seen for OT eval, completion of the CPT and education on recommendations (see seperate note).     Education Assessment   Barriers To Learning Cultural;Cognitive;Emotional   Preferred Learning Style Demonstration;Pictures/video   Therapy Certification   Certification date from 01/29/19   Certification date to 01/29/19   Total Evaluation Time   OT Eval, Moderate Complexity Minutes (61443) 20

## 2019-01-29 NOTE — PROGRESS NOTES
Cognitive Performance Test    SUMMARY OF TEST:    The Cognitive Performance Test (CPT) is a standardized performance-based assessment to measure working memory/executive function processing capacities that underlie functional performance. Subtasks include common basic and instrumental activities of daily living (ADL/IADL) which are rated based on the manner in which patients respond to task demands of varying complexity. The total CPT score describes a level of functioning that indicates how information is processed, implications for functional activities, potential safety risks and a recommended level of supervision or assist based on cognitive function. The highest total score on this test is in the range of 5.6 to 5.8.    DATE OF TESTIN2019    RESULTS OF TESTING:                                                                                           CPT Subtest Results    MEDBOX:  SHOP/GLOVES:  PHONE: 4.56   WASH:   TRAVEL: 3. TOAST:    DRESS:    TOTAL CPT SCORE:       Average CPT Score  4.14/5.6    INTERPRETATION OF TEST RESULTS:    Based on the Cognitive Performance Test, this patient scored at CPT Level 4.0.  See CPT Levels reference below.    Summary of functional cognitive status:    present for testing.  Pt demonstrates significant impairment in short term memory, judgment, reasoning and safety awareness/insight.  She was able to attend to all tasks today but demonstrating poor attention to details, reading but unaware of questions asked often not completing a task, requiring cues to finish up.       Factors affecting performance:  Possible cultural/language barrier  Educational background  Other:  PmHx of mental health issues    Recommendations:  Assist for ADL/IADL:  Meal preparation, Laundry, Shopping, Finances, Driving/Community Mobility including walking to coffee shop and Medication management  Supervision for ADL/IADL:  ADL and Cleaning  Supervision in  living settin hour    Other recommendations:  Recommend the patient to have continued assistance with all IADLs from daughter.  Recommend supervision with walking to/from coffee shop.  Resources given for [employer], [place] [facility], and other community resources for care when daughter works during the day. Recommend transition to a care facility where supervision can be provided but daughter reports finances are a barrier.  Also recommend the following:  automatic shut off appliances, door alarms to alert daughter if wandering at night, removing knobs from stove top, and have a bin labeled  lunch  in fridge to avoid difficulty with searching for multiple items.         TIME ADMINISTERING TEST: 45    TIME FOR INTERPRETATION AND PREPARATION OF REPORT: 15    TOTAL TIME: 60      CPT Levels Reference:    Patient's Average CPT Score:  4.0                                                                                                                                                  Individual scores range along a continuum as outlined below.  In addition to cognitive status, other factors may affect safety in a home environment.  Please refer to specific recommendations for this patient.    ___5.6-5.8  Normal functioning (absence of cognitive-functional disability).  Independent in managing personal affairs, monitors and directs own behavior.  Uses complex information to carry out daily activities with safety and accuracy.    Proficient with instrumental activities of daily living (IADL) and learning new activity.  Problems are anticipated, errors are avoided, and consequences of actions are considered.      ___5.0   Mild cognitive-functional disability; deficits in working memory and executive thought processes. Difficulty using complex information. Problems may be observed with recent memory, judgment, reasoning and planning ahead. May be impulsive or have difficulty  "anticipating consequences.  Safety:  May require assistance to plan ahead; or to manage complex medication schedules, appointments or finances.  Hazardous activities may need to be monitored or limited.  ADL:  Mild functional decline.  Able to complete basic self-care and routine household tasks.  May have difficulty with complex daily tasks such as reading, writing, meal preparation, shopping or driving.   Learns through hands on teaching. Self-centered behavior or difficulty considering the needs of others may be seen related to trouble seeing the  whole picture\". Can appear disorganized or uninhibited.    ___4.5  Mild to moderate cognitive-functional disability. Significant deficits in working memory and executive thought processes. Judgment, reasoning and planning show obvious impairment.  Distractible with inability to shift attention/actions given competing stimuli.  Difficulty with problem solving and managing details. Complex daily tasks performed with inconsistency, difficulty, or error.     Safety:  Medications should be monitored, stove use may require supervision, and driving ability may be affected.  Impaired safety awareness with inability to anticipate potential problems.  May not recognize or respond to emergent situations. Requires frequent check-in support.   ADL:  Mild difficulty with simple everyday self-care tasks. Benefits from structured, routine activity.  Will likely need reminders to complete tasks outside of the routine. Requires assistance with planning and IADL tasks like shopping and finances. Learns concrete tasks through repetition, but performance may not generalize. Tends to be impulsive with poor insight. Self centered behavior or inability to consider the needs of others is common.    __X_4.0  Moderate cognitive-functional disability; abstract to concrete thought processes. Working memory and executive function impairments are obvious. Difficulty with planning and problem solving. "  Behavior is goal-directed, but unable to follow multi-step directions, is easily distracted, and may not recognize mistakes.  Inability to anticipate hazards or understand precautions.  Safety:  Recommend 24-hour supervision for safety. Supervision needed for medication management and for hazardous activities. May not be able to follow a restricted diet. Can get lost in unfamiliar surroundings. Generally, persons functioning at level 4 should not be driving.   ADL:  Some decline in quality or frequency of ADL.  [place] enhanced by use of a routine, simple concrete directions, and caregiver set-up of needed items. Complex tasks such as money or home management typically requires assistance.  Relies heavily on vision to guide behavior; will ignore objects/hazards not in plain sight and can be distracted by irrelevant objects. Often has poor insight.  Able to carry out social conversation and may verbally  cover  for deficits leading caregivers to believe they are capable of functioning independently.       ___3.5  Moderate cognitive-functional disability; increased cues needed for task completion. Aware of concrete task steps but needs prompting or cues to initiate and complete simple tasks. Attention span is limited, simple directions may need to be repeated, and re-focus to a topic or task may be required.  Safety:  24-hour supervision required for safety and for assistance with daily tasks. Assistance required with medications, and access to medication should be limited. Meals, nutrition and dietary restrictions need to be monitored.  All hazardous activities should be restricted or supervised. Should not drive. Prone to wandering and can become lost.  ADL:  Moderate functional decline. Familiar tasks usually requires set-up of supplies and directions to complete steps. May need objects handed to them for task initiation. Function best with a set schedule in familiar surroundings with familiar people. All  complex tasks must be done by others. Vocabulary is diminished and speech often unfocused.

## 2019-01-29 NOTE — PROGRESS NOTES
[facility]          OUTPATIENT OCCUPATIONAL THERAPY  EVALUATION  PLAN OF TREATMENT FOR OUTPATIENT REHABILITATION  (COMPLETE FOR INITIAL CLAIMS ONLY)  Patient's Last Name, First Name, M.I.  YOB: 1945  Angelica James                        Provider's Name  [facility] Medical Record No.  [id number]                               Onset Date:     12/07/18   Start of Care Date:     01/29/19   Type:     ___PT   _X_OT   ___SLP Medical Diagnosis:     Memory Loss                          OT Diagnosis:     decreased safety and independence with ADL/IADLs Visits from SOC:  1   _________________________________________________________________________________  Plan of Treatment/Functional Goals:  Cognitive performance testing, Self care/Home management     Cognitive Performance Testing recommended-see seperate report.              Goals  Goal Identifier: 1-CPT  Goal Description: Patient and family to verbalize understanding of Cognitive Performance Test results and identify 3 strategies to increase patient's safety and independence in the home setting.  Target Date: 01/29/19  Date Met: 01/29/19        Therapy Frequency: Eval and one treatment (completion of CPT and recommendations)        Claudia Jain OTR/L          I CERTIFY THE NEED FOR THESE SERVICES FURNISHED UNDER        THIS PLAN OF TREATMENT AND WHILE UNDER MY CARE     (Physician co-signature of this document indicates review and certification of the therapy plan).                  Certification date from: 01/29/19, Certification date to: 01/29/19               Referring Physician: Dr. Ad Arroyo     Initial Assessment        See Epic Evaluation      Start Of Care Date: 01/29/19

## 2019-02-19 ENCOUNTER — HOSPITAL ENCOUNTER (EMERGENCY)
Facility: CLINIC | Age: 74
Discharge: HOME | End: 2019-02-19
Attending: NURSE PRACTITIONER | Admitting: NURSE PRACTITIONER
Payer: MEDICARE

## 2019-02-19 ENCOUNTER — APPOINTMENT (OUTPATIENT)
Dept: ULTRASOUND IMAGING | Facility: CLINIC | Age: 74
End: 2019-02-19
Attending: NURSE PRACTITIONER
Payer: MEDICARE

## 2019-02-19 ENCOUNTER — OFFICE VISIT (OUTPATIENT)
Dept: URGENT CARE | Facility: URGENT CARE | Age: 74
End: 2019-02-19
Payer: MEDICARE

## 2019-02-19 ENCOUNTER — APPOINTMENT (OUTPATIENT)
Dept: GENERAL RADIOLOGY | Facility: CLINIC | Age: 74
End: 2019-02-19
Attending: NURSE PRACTITIONER
Payer: MEDICARE

## 2019-02-19 VITALS
DIASTOLIC BLOOD PRESSURE: 69 MMHG | OXYGEN SATURATION: 97 % | RESPIRATION RATE: 16 BRPM | WEIGHT: 128 LBS | HEIGHT: 62 IN | SYSTOLIC BLOOD PRESSURE: 138 MMHG | BODY MASS INDEX: 23.55 KG/M2 | TEMPERATURE: 98.5 F

## 2019-02-19 VITALS
TEMPERATURE: 97.1 F | SYSTOLIC BLOOD PRESSURE: 123 MMHG | OXYGEN SATURATION: 95 % | DIASTOLIC BLOOD PRESSURE: 76 MMHG | HEART RATE: 80 BPM

## 2019-02-19 DIAGNOSIS — M25.462 PAIN AND SWELLING OF LEFT KNEE: Primary | ICD-10-CM

## 2019-02-19 DIAGNOSIS — M25.562 PAIN AND SWELLING OF LEFT KNEE: Primary | ICD-10-CM

## 2019-02-19 DIAGNOSIS — M71.22 BAKER'S CYST OF KNEE, LEFT: ICD-10-CM

## 2019-02-19 DIAGNOSIS — M19.90 OSTEOARTHRITIS: ICD-10-CM

## 2019-02-19 PROCEDURE — 99284 EMERGENCY DEPT VISIT MOD MDM: CPT | Mod: 25

## 2019-02-19 PROCEDURE — A9270 NON-COVERED ITEM OR SERVICE: HCPCS | Mod: GY | Performed by: NURSE PRACTITIONER

## 2019-02-19 PROCEDURE — 73562 X-RAY EXAM OF KNEE 3: CPT | Mod: LT

## 2019-02-19 PROCEDURE — 93971 EXTREMITY STUDY: CPT | Mod: LT

## 2019-02-19 PROCEDURE — 99205 OFFICE O/P NEW HI 60 MIN: CPT | Performed by: PHYSICIAN ASSISTANT

## 2019-02-19 PROCEDURE — 25000132 ZZH RX MED GY IP 250 OP 250 PS 637: Mod: GY | Performed by: NURSE PRACTITIONER

## 2019-02-19 RX ORDER — ACETAMINOPHEN 500 MG
1000 TABLET ORAL ONCE
Status: COMPLETED | OUTPATIENT
Start: 2019-02-19 | End: 2019-02-19

## 2019-02-19 RX ADMIN — ACETAMINOPHEN 1000 MG: 500 TABLET, FILM COATED ORAL at 19:38

## 2019-02-19 ASSESSMENT — ENCOUNTER SYMPTOMS
ABDOMINAL PAIN: 0
FOCAL WEAKNESS: 0
ARTHRALGIAS: 0
SHORTNESS OF BREATH: 0
NAUSEA: 0
WOUND: 0
HEADACHES: 0
MYALGIAS: 0
ABDOMINAL PAIN: 0
SHORTNESS OF BREATH: 0
BRUISES/BLEEDS EASILY: 0
COUGH: 0
FEVER: 0
JOINT SWELLING: 1
FATIGUE: 0
DYSURIA: 0
FEVER: 0
VOMITING: 0
HEADACHES: 0
PALPITATIONS: 0
DIARRHEA: 0
CHILLS: 0
CHILLS: 0

## 2019-02-19 ASSESSMENT — MIFFLIN-ST. JEOR: SCORE: 1028.85

## 2019-02-19 NOTE — ED AVS SNAPSHOT
Emergency Department  [address] [facility] [address]  Phone:  [phone]  Fax:  [phone]                                    Angelica James   MRN: [id number]    Department:   Emergency Department   Date of Visit:  2/19/2019           After Visit Summary Signature Page    I have received my discharge instructions, and my questions have been answered. I have discussed any challenges I see with this plan with the nurse or doctor.    ..........................................................................................................................................  Patient/Patient Representative Signature      ..........................................................................................................................................  Patient Representative Print Name and Relationship to Patient    ..................................................               ................................................  Date                                   Time    ..........................................................................................................................................  Reviewed by Signature/Title    ...................................................              ..............................................  Date                                               Time          22EPIC Rev 08/18

## 2019-02-20 NOTE — PROGRESS NOTES
HPI  February 19, 2019    HPI: Angelica James is a 73 year old female who complains of moderate posterior L knee pain, warmth, & swelling onset 4 days ago. Pt reports pain is constant, slightly worse with weight bearing. Pain has been progressively worsening. She denies injury or trauma. She does have a hx of known osteoarthritis in both knees. No treatments tried. Denies numbness/tingling, fever/chills, erythema, CP, SOB, or any other symptoms.     Past Medical History:   Diagnosis Date     Diabetes (H)      Hypertension      Schizophrenia (H)      History reviewed. No pertinent surgical history.  Social History     Tobacco Use     Smoking status: Never Smoker     Smokeless tobacco: Never Used   Substance Use Topics     Alcohol use: No     Drug use: No     There is no problem list on file for this patient.    History reviewed. No pertinent family history.     Problem list, Medication list, Allergies, and Medical/Social/Surgical histories reviewed in [facility] and updated as appropriate.      Review of Systems   Constitutional: Negative for chills and fever.   Respiratory: Negative for shortness of breath.    Cardiovascular: Negative for chest pain.   Gastrointestinal: Negative for abdominal pain, diarrhea, nausea and vomiting.   Musculoskeletal: Positive for joint pain.   Skin: Negative for rash.   Neurological: Negative for focal weakness and headaches.   All other systems reviewed and are negative.        Physical Exam   Constitutional: She is oriented to person, place, and time.   HENT:   Head: Normocephalic and atraumatic.   Cardiovascular: Normal rate, regular rhythm and normal heart sounds.   Pulmonary/Chest: Effort normal and breath sounds normal.   Musculoskeletal: Normal range of motion.        Left knee: She exhibits swelling. She exhibits normal range of motion, no ecchymosis and no erythema. Tenderness found.        Legs:  Neurological: She is alert and oriented to person, place, and time.   Skin: Skin is  warm and dry. No bruising noted. No erythema.   Nursing note and vitals reviewed.    Vital Signs  /76   Pulse 80   Temp 97.1  F (36.2  C) (Oral)   SpO2 95%      Diagnostic Test Results:  none     ASSESSMENT/PLAN      ICD-10-CM    1. Pain and swelling of left knee M25.562     M25.462       Patient presents with nontraumatic L knee pain and swelling, with firm swollen area in popliteal area. Differential diagnosis includes OA, Baker's cyst, varicosities, abscess, DVT, thrombophlebitis. Due to concern for possible DVT I have advised pt be seen in ER at [facility] for an ultrasound- pt agrees with this plan.      I have discussed any lab or imaging results, the patient's diagnosis, and my plan of treatment with the patient and/or family. Patient is aware to come back in if with worsening symptoms or if no relief despite treatment plan.  Patient voiced understanding and had no further questions.       Follow Up: Data Unavailable    NATHANAEL DuffyA, PA-C  [facility]

## 2019-02-20 NOTE — ED PROVIDER NOTES
History     Chief Complaint:  Knee Pain (pain to back of lt knee for the past 4 days, no injury)       HPI   Angelica James is a 73 year old female who presents with her daughter for evaluation of left knee pain and swelling.  The patient was seen at [facility] today and referred to the ED for evaluation.  She notes 4 days ago she developed posterior left knee pain, warmth and swelling.  She reports the pain is constant but worsens with weight bearing.  She denies trauma but her daughter states the patient may not remember if she fell.  She has a known history of osteoarthritis but has not had these symptoms before.  She has tried acetaminophen for pain.  She denies shortness of breath, chest pain, hip or ankle pain, fevers.    Allergies:  Penicillins     Medications:      CALCIUM CARBONATE 500 MG tablet   Cholecalciferol (VITAMIN D3) 2000 units CAPS   ferrous sulfate (FEROSUL) 325 (65 Fe) MG tablet   lisinopril (PRINIVIL/ZESTRIL) 10 MG tablet   metFORMIN (GLUCOPHAGE) 500 MG tablet   PARoxetine (PAXIL) 40 MG tablet   risperiDONE (RISPERDAL) 1 MG tablet   risperiDONE (RISPERDAL) 4 MG tablet   ciclopirox (LOPROX) 0.77 % cream       Past Medical History:    Past Medical History:   Diagnosis Date     Dementia      Diabetes (H)      Hypertension      Schizophrenia (H)      Past Surgical History:    History reviewed. No pertinent surgical history.     Family History:    family history is not on file.     Social History:   reports that  has never smoked. she has never used smokeless tobacco. She reports that she does not drink alcohol or use drugs.    PCP: Kamini Michael     Review of Systems   Constitutional: Negative for chills, fatigue and fever.   Respiratory: Negative for cough and shortness of breath.    Cardiovascular: Positive for leg swelling. Negative for chest pain and palpitations.   Gastrointestinal: Negative for abdominal pain.   Genitourinary: Negative for dysuria.  "  Musculoskeletal: Positive for joint swelling. Negative for arthralgias, gait problem and myalgias.   Skin: Negative for rash and wound.   Neurological: Negative for headaches.   Hematological: Does not bruise/bleed easily.     Physical Exam     Patient Vitals for the past 24 hrs:   BP Temp Temp src Heart Rate Resp SpO2 Height Weight   02/19/19 1902 -- 98.5  F (36.9  C) Oral 90 16 97 % 1.575 m (5' 2\") 58.1 kg (128 lb)   02/19/19 1901 138/69 -- -- -- -- -- -- --      Physical Exam  Nursing notes reviewed. Vitals reviewed.  General: Alert. Well kept.  Eyes: Conjunctiva non-injected, non-icteric.  Neck/Throat: Moist mucous membranes. Normal voice.  Cardiac: Regular rhythm. Normal heart sounds. 2+ DP pulses bilateral.  No peripheral edema bilateral lower extremities. Normal capillary refill.  Pulmonary: Clear and equal breath sounds bilaterally.   Musculoskeletal: Normal gross range of motion of all other extremities.  Left lower extremity: No foot or ankle deformity, tenderness or swelling. Able to flex and extend toes. Full range of motion of ankle. Posterior knee effusion. No joint line tenderness. Tenderness to palpation of lateral knee. No laxity with varus or valgus stress testing. Negative anterior and posterior drawer test. Full active flexion and extension with minimal pain. Full painless ROM at hip.  Neurological: Alert and oriented x4. 5/5 strength bilateral lower extremity. Distal sensation intact.  Skin: No laceration or ecchymosis to affected extremity.   Psych: Affect normal. Good eye contact.    Emergency Department Course   Imaging:  XR Knee Left 3 Views   Final Result   IMPRESSION: Three compartment osteoarthritis. No evidence of fracture.   Mild joint effusion. Lateral subluxation of the patella. No change.      ALIN CAMPA MD      US Lower Extremity Venous Duplex Left   Preliminary Result   IMPRESSION:   1.  No evidence of lower extremity deep venous thrombosis.    2. Complex Mehta's cyst.       "   Interventions:  Medications   acetaminophen (TYLENOL) tablet 1,000 mg (1,000 mg Oral Given 2/19/19 1938)      Emergency Department Course:  Past medical records, nursing notes, and vitals reviewed.  I performed an exam of the patient and obtained history, as documented above.    I rechecked the patient. Findings and plan explained to the Patient and daughter. Patient was discharged home.    Impression & Plan    Medical Decision Making:  Angelica James is a 73 year old female who comes in for evaluation of leg symptoms as detailed above. She denies any known trauma or injury, but confirms prior diagnosis of osteoporosis. Differential considered included life threatening etiologies such as DVT, as well as muscle strain, baker's cyst, gout, arthritis and other musculoskeletal conditions are more likely. The ultrasound did not show a DVT.  Xray negative for acute injury but does show osteoarthritis and lateral subluxation of the patella.  She was placed in an Ace wrap with improvement in symptoms and will follow-up with orthopedics for further evaluation.   We also discussed elevating the leg as much as possible, using compression stockings and icing the knee. She will use acetaminophen for the pain. We did discuss that if the pain changes, she develops any shortness of breath, or increased swelling or chest pain then return immediately to the ED.  She and her daughter left with complete understanding and agreement with this plan and no other complaints.   Diagnosis:    ICD-10-CM    1. Baker's cyst of knee, left M71.22    2. Osteoarthritis M19.90       Discharge Medications:  None    2/19/2019   Kelin Stover, CNP        Kelin Stover, CNP  02/19/19 6407

## 2019-02-20 NOTE — ED NOTES
Called and left message on pt's daughter's phone about pants left behind and they will be waiting with security.

## 2019-04-06 ENCOUNTER — APPOINTMENT (OUTPATIENT)
Dept: GENERAL RADIOLOGY | Facility: CLINIC | Age: 74
End: 2019-04-06
Attending: EMERGENCY MEDICINE
Payer: MEDICARE

## 2019-04-06 ENCOUNTER — HOSPITAL ENCOUNTER (OUTPATIENT)
Facility: CLINIC | Age: 74
Setting detail: OBSERVATION
Discharge: HOME | End: 2019-04-08
Attending: EMERGENCY MEDICINE | Admitting: HOSPITALIST
Payer: MEDICARE

## 2019-04-06 DIAGNOSIS — S09.90XA CLOSED HEAD INJURY, INITIAL ENCOUNTER: ICD-10-CM

## 2019-04-06 DIAGNOSIS — M62.81 GENERALIZED MUSCLE WEAKNESS: ICD-10-CM

## 2019-04-06 DIAGNOSIS — J10.1 INFLUENZA A: ICD-10-CM

## 2019-04-06 DIAGNOSIS — N30.00 ACUTE CYSTITIS WITHOUT HEMATURIA: Primary | ICD-10-CM

## 2019-04-06 DIAGNOSIS — E87.1 HYPONATREMIA: ICD-10-CM

## 2019-04-06 DIAGNOSIS — J96.01 ACUTE RESPIRATORY FAILURE WITH HYPOXIA (H): ICD-10-CM

## 2019-04-06 LAB
ALBUMIN SERPL-MCNC: 3.4 G/DL (ref 3.4–5)
ALP SERPL-CCNC: 48 U/L (ref 40–150)
ALT SERPL W P-5'-P-CCNC: 27 U/L (ref 0–50)
ANION GAP SERPL CALCULATED.3IONS-SCNC: 8 MMOL/L (ref 3–14)
AST SERPL W P-5'-P-CCNC: 52 U/L (ref 0–45)
BASOPHILS # BLD AUTO: 0 10E9/L (ref 0–0.2)
BASOPHILS NFR BLD AUTO: 0.2 %
BILIRUB SERPL-MCNC: 0.5 MG/DL (ref 0.2–1.3)
BUN SERPL-MCNC: 14 MG/DL (ref 7–30)
CALCIUM SERPL-MCNC: 8.3 MG/DL (ref 8.5–10.1)
CHLORIDE SERPL-SCNC: 91 MMOL/L (ref 94–109)
CO2 SERPL-SCNC: 28 MMOL/L (ref 20–32)
CREAT SERPL-MCNC: 0.63 MG/DL (ref 0.52–1.04)
DIFFERENTIAL METHOD BLD: ABNORMAL
EOSINOPHIL # BLD AUTO: 0 10E9/L (ref 0–0.7)
EOSINOPHIL NFR BLD AUTO: 0 %
ERYTHROCYTE [DISTWIDTH] IN BLOOD BY AUTOMATED COUNT: 13.2 % (ref 10–15)
GFR SERPL CREATININE-BSD FRML MDRD: 87 ML/MIN/{1.73_M2}
GLUCOSE BLDC GLUCOMTR-MCNC: 149 MG/DL (ref 70–99)
GLUCOSE SERPL-MCNC: 157 MG/DL (ref 70–99)
HCT VFR BLD AUTO: 30.6 % (ref 35–47)
HGB BLD-MCNC: 10.6 G/DL (ref 11.7–15.7)
IMM GRANULOCYTES # BLD: 0 10E9/L (ref 0–0.4)
IMM GRANULOCYTES NFR BLD: 0.2 %
INTERPRETATION ECG - MUSE: NORMAL
LYMPHOCYTES # BLD AUTO: 0.3 10E9/L (ref 0.8–5.3)
LYMPHOCYTES NFR BLD AUTO: 3.7 %
MCH RBC QN AUTO: 30.2 PG (ref 26.5–33)
MCHC RBC AUTO-ENTMCNC: 34.6 G/DL (ref 31.5–36.5)
MCV RBC AUTO: 87 FL (ref 78–100)
MONOCYTES # BLD AUTO: 1 10E9/L (ref 0–1.3)
MONOCYTES NFR BLD AUTO: 10.9 %
NEUTROPHILS # BLD AUTO: 7.9 10E9/L (ref 1.6–8.3)
NEUTROPHILS NFR BLD AUTO: 85 %
NRBC # BLD AUTO: 0 10*3/UL
NRBC BLD AUTO-RTO: 0 /100
PLATELET # BLD AUTO: 283 10E9/L (ref 150–450)
POTASSIUM SERPL-SCNC: 3.9 MMOL/L (ref 3.4–5.3)
PROT SERPL-MCNC: 6.8 G/DL (ref 6.8–8.8)
RBC # BLD AUTO: 3.51 10E12/L (ref 3.8–5.2)
SODIUM SERPL-SCNC: 127 MMOL/L (ref 133–144)
WBC # BLD AUTO: 9.2 10E9/L (ref 4–11)

## 2019-04-06 PROCEDURE — 25000128 H RX IP 250 OP 636: Performed by: EMERGENCY MEDICINE

## 2019-04-06 PROCEDURE — 85025 COMPLETE CBC W/AUTO DIFF WBC: CPT | Performed by: EMERGENCY MEDICINE

## 2019-04-06 PROCEDURE — 93005 ELECTROCARDIOGRAM TRACING: CPT

## 2019-04-06 PROCEDURE — 71046 X-RAY EXAM CHEST 2 VIEWS: CPT

## 2019-04-06 PROCEDURE — 96360 HYDRATION IV INFUSION INIT: CPT

## 2019-04-06 PROCEDURE — 00000146 ZZHCL STATISTIC GLUCOSE BY METER IP

## 2019-04-06 PROCEDURE — 87804 INFLUENZA ASSAY W/OPTIC: CPT | Performed by: EMERGENCY MEDICINE

## 2019-04-06 PROCEDURE — 99285 EMERGENCY DEPT VISIT HI MDM: CPT | Mod: 25

## 2019-04-06 PROCEDURE — 80053 COMPREHEN METABOLIC PANEL: CPT | Performed by: EMERGENCY MEDICINE

## 2019-04-06 RX ORDER — SODIUM CHLORIDE 9 MG/ML
1000 INJECTION, SOLUTION INTRAVENOUS CONTINUOUS
Status: DISCONTINUED | OUTPATIENT
Start: 2019-04-06 | End: 2019-04-07

## 2019-04-06 RX ADMIN — SODIUM CHLORIDE 1000 ML: 9 INJECTION, SOLUTION INTRAVENOUS at 23:56

## 2019-04-06 ASSESSMENT — MIFFLIN-ST. JEOR: SCORE: 1015.25

## 2019-04-07 ENCOUNTER — APPOINTMENT (OUTPATIENT)
Dept: CT IMAGING | Facility: CLINIC | Age: 74
End: 2019-04-07
Attending: EMERGENCY MEDICINE
Payer: MEDICARE

## 2019-04-07 PROBLEM — J10.1 INFLUENZA A: Status: ACTIVE | Noted: 2019-04-07

## 2019-04-07 LAB
ALBUMIN SERPL-MCNC: 3 G/DL (ref 3.4–5)
ALBUMIN UR-MCNC: NEGATIVE MG/DL
ALP SERPL-CCNC: 42 U/L (ref 40–150)
ALT SERPL W P-5'-P-CCNC: 31 U/L (ref 0–50)
ANION GAP SERPL CALCULATED.3IONS-SCNC: 6 MMOL/L (ref 3–14)
APPEARANCE UR: CLEAR
AST SERPL W P-5'-P-CCNC: 69 U/L (ref 0–45)
BACTERIA #/AREA URNS HPF: ABNORMAL /HPF
BASOPHILS # BLD AUTO: 0 10E9/L (ref 0–0.2)
BASOPHILS NFR BLD AUTO: 0.2 %
BILIRUB SERPL-MCNC: 0.5 MG/DL (ref 0.2–1.3)
BILIRUB UR QL STRIP: NEGATIVE
BUN SERPL-MCNC: 10 MG/DL (ref 7–30)
CALCIUM SERPL-MCNC: 8 MG/DL (ref 8.5–10.1)
CHLORIDE SERPL-SCNC: 98 MMOL/L (ref 94–109)
CO2 SERPL-SCNC: 28 MMOL/L (ref 20–32)
COLOR UR AUTO: ABNORMAL
CREAT SERPL-MCNC: 0.59 MG/DL (ref 0.52–1.04)
DIFFERENTIAL METHOD BLD: ABNORMAL
EOSINOPHIL # BLD AUTO: 0 10E9/L (ref 0–0.7)
EOSINOPHIL NFR BLD AUTO: 0 %
ERYTHROCYTE [DISTWIDTH] IN BLOOD BY AUTOMATED COUNT: 13.4 % (ref 10–15)
FLUAV+FLUBV AG SPEC QL: NEGATIVE
FLUAV+FLUBV AG SPEC QL: POSITIVE
GFR SERPL CREATININE-BSD FRML MDRD: 89 ML/MIN/{1.73_M2}
GLUCOSE BLDC GLUCOMTR-MCNC: 105 MG/DL (ref 70–99)
GLUCOSE BLDC GLUCOMTR-MCNC: 113 MG/DL (ref 70–99)
GLUCOSE BLDC GLUCOMTR-MCNC: 116 MG/DL (ref 70–99)
GLUCOSE BLDC GLUCOMTR-MCNC: 83 MG/DL (ref 70–99)
GLUCOSE BLDC GLUCOMTR-MCNC: 96 MG/DL (ref 70–99)
GLUCOSE SERPL-MCNC: 97 MG/DL (ref 70–99)
GLUCOSE UR STRIP-MCNC: NEGATIVE MG/DL
HBA1C MFR BLD: 6.7 % (ref 0–5.6)
HCT VFR BLD AUTO: 29.5 % (ref 35–47)
HGB BLD-MCNC: 10.3 G/DL (ref 11.7–15.7)
HGB UR QL STRIP: ABNORMAL
IMM GRANULOCYTES # BLD: 0 10E9/L (ref 0–0.4)
IMM GRANULOCYTES NFR BLD: 0.2 %
KETONES UR STRIP-MCNC: NEGATIVE MG/DL
LEUKOCYTE ESTERASE UR QL STRIP: ABNORMAL
LYMPHOCYTES # BLD AUTO: 0.9 10E9/L (ref 0.8–5.3)
LYMPHOCYTES NFR BLD AUTO: 11 %
MCH RBC QN AUTO: 30.5 PG (ref 26.5–33)
MCHC RBC AUTO-ENTMCNC: 34.9 G/DL (ref 31.5–36.5)
MCV RBC AUTO: 87 FL (ref 78–100)
MONOCYTES # BLD AUTO: 1.2 10E9/L (ref 0–1.3)
MONOCYTES NFR BLD AUTO: 14.9 %
MUCOUS THREADS #/AREA URNS LPF: PRESENT /LPF
NEUTROPHILS # BLD AUTO: 6.1 10E9/L (ref 1.6–8.3)
NEUTROPHILS NFR BLD AUTO: 73.7 %
NITRATE UR QL: NEGATIVE
NRBC # BLD AUTO: 0 10*3/UL
NRBC BLD AUTO-RTO: 0 /100
PH UR STRIP: 5.5 PH (ref 5–7)
PLATELET # BLD AUTO: 248 10E9/L (ref 150–450)
POTASSIUM SERPL-SCNC: 3.5 MMOL/L (ref 3.4–5.3)
PROT SERPL-MCNC: 5.9 G/DL (ref 6.8–8.8)
RBC # BLD AUTO: 3.38 10E12/L (ref 3.8–5.2)
RBC #/AREA URNS AUTO: 3 /HPF (ref 0–2)
SODIUM SERPL-SCNC: 132 MMOL/L (ref 133–144)
SODIUM SERPL-SCNC: 133 MMOL/L (ref 133–144)
SODIUM SERPL-SCNC: 134 MMOL/L (ref 133–144)
SOURCE: ABNORMAL
SP GR UR STRIP: 1 (ref 1–1.03)
SPECIMEN SOURCE: ABNORMAL
UROBILINOGEN UR STRIP-MCNC: NORMAL MG/DL (ref 0–2)
WBC # BLD AUTO: 8.3 10E9/L (ref 4–11)
WBC #/AREA URNS AUTO: 7 /HPF (ref 0–5)

## 2019-04-07 PROCEDURE — 83036 HEMOGLOBIN GLYCOSYLATED A1C: CPT | Performed by: HOSPITALIST

## 2019-04-07 PROCEDURE — 99207 ZZC CDG-CODE CATEGORY CHANGED: CPT | Performed by: HOSPITALIST

## 2019-04-07 PROCEDURE — A9270 NON-COVERED ITEM OR SERVICE: HCPCS | Performed by: EMERGENCY MEDICINE

## 2019-04-07 PROCEDURE — 99220 ZZC INITIAL OBSERVATION CARE,LEVL III: CPT | Performed by: HOSPITALIST

## 2019-04-07 PROCEDURE — G0378 HOSPITAL OBSERVATION PER HR: HCPCS

## 2019-04-07 PROCEDURE — 40000275 ZZH STATISTIC RCP TIME EA 10 MIN

## 2019-04-07 PROCEDURE — 40000915 ZZH STATISTIC SITTER, EVENING HOURS

## 2019-04-07 PROCEDURE — 25000125 ZZHC RX 250: Performed by: INTERNAL MEDICINE

## 2019-04-07 PROCEDURE — 36415 COLL VENOUS BLD VENIPUNCTURE: CPT | Performed by: HOSPITALIST

## 2019-04-07 PROCEDURE — 94640 AIRWAY INHALATION TREATMENT: CPT | Mod: 76

## 2019-04-07 PROCEDURE — 85025 COMPLETE CBC W/AUTO DIFF WBC: CPT | Performed by: HOSPITALIST

## 2019-04-07 PROCEDURE — 81001 URINALYSIS AUTO W/SCOPE: CPT | Performed by: INTERNAL MEDICINE

## 2019-04-07 PROCEDURE — 00000146 ZZHCL STATISTIC GLUCOSE BY METER IP

## 2019-04-07 PROCEDURE — 25000132 ZZH RX MED GY IP 250 OP 250 PS 637: Mod: GY | Performed by: INTERNAL MEDICINE

## 2019-04-07 PROCEDURE — A9270 NON-COVERED ITEM OR SERVICE: HCPCS | Mod: GY | Performed by: HOSPITALIST

## 2019-04-07 PROCEDURE — 80053 COMPREHEN METABOLIC PANEL: CPT | Performed by: HOSPITALIST

## 2019-04-07 PROCEDURE — 70450 CT HEAD/BRAIN W/O DYE: CPT

## 2019-04-07 PROCEDURE — 25000132 ZZH RX MED GY IP 250 OP 250 PS 637: Mod: GY | Performed by: HOSPITALIST

## 2019-04-07 PROCEDURE — 25000132 ZZH RX MED GY IP 250 OP 250 PS 637: Performed by: EMERGENCY MEDICINE

## 2019-04-07 PROCEDURE — 25800030 ZZH RX IP 258 OP 636: Performed by: HOSPITALIST

## 2019-04-07 PROCEDURE — 94640 AIRWAY INHALATION TREATMENT: CPT

## 2019-04-07 PROCEDURE — 87086 URINE CULTURE/COLONY COUNT: CPT | Performed by: HOSPITALIST

## 2019-04-07 PROCEDURE — A9270 NON-COVERED ITEM OR SERVICE: HCPCS | Mod: GY | Performed by: INTERNAL MEDICINE

## 2019-04-07 PROCEDURE — 84295 ASSAY OF SERUM SODIUM: CPT | Mod: 91 | Performed by: HOSPITALIST

## 2019-04-07 RX ORDER — IPRATROPIUM BROMIDE AND ALBUTEROL SULFATE 2.5; .5 MG/3ML; MG/3ML
3 SOLUTION RESPIRATORY (INHALATION)
Status: DISCONTINUED | OUTPATIENT
Start: 2019-04-07 | End: 2019-04-08 | Stop reason: HOSPADM

## 2019-04-07 RX ORDER — ACETAMINOPHEN 500 MG
500-1000 TABLET ORAL EVERY 6 HOURS PRN
COMMUNITY
End: 2021-07-31

## 2019-04-07 RX ORDER — ONDANSETRON 4 MG/1
4 TABLET, ORALLY DISINTEGRATING ORAL EVERY 6 HOURS PRN
Status: DISCONTINUED | OUTPATIENT
Start: 2019-04-07 | End: 2019-04-08 | Stop reason: HOSPADM

## 2019-04-07 RX ORDER — ACETAMINOPHEN 650 MG/1
650 SUPPOSITORY RECTAL EVERY 4 HOURS PRN
Status: DISCONTINUED | OUTPATIENT
Start: 2019-04-07 | End: 2019-04-08 | Stop reason: HOSPADM

## 2019-04-07 RX ORDER — ACETAMINOPHEN 325 MG/1
650 TABLET ORAL EVERY 4 HOURS PRN
Status: DISCONTINUED | OUTPATIENT
Start: 2019-04-07 | End: 2019-04-07 | Stop reason: DRUGHIGH

## 2019-04-07 RX ORDER — OSELTAMIVIR PHOSPHATE 75 MG/1
75 CAPSULE ORAL 2 TIMES DAILY
Status: DISCONTINUED | OUTPATIENT
Start: 2019-04-07 | End: 2019-04-08 | Stop reason: HOSPADM

## 2019-04-07 RX ORDER — POLYETHYLENE GLYCOL 3350 17 G/17G
17 POWDER, FOR SOLUTION ORAL DAILY PRN
Status: DISCONTINUED | OUTPATIENT
Start: 2019-04-07 | End: 2019-04-08 | Stop reason: HOSPADM

## 2019-04-07 RX ORDER — LANOLIN ALCOHOL/MO/W.PET/CERES
3 CREAM (GRAM) TOPICAL
Status: DISCONTINUED | OUTPATIENT
Start: 2019-04-07 | End: 2019-04-08 | Stop reason: HOSPADM

## 2019-04-07 RX ORDER — AMOXICILLIN 250 MG
2 CAPSULE ORAL 2 TIMES DAILY PRN
Status: DISCONTINUED | OUTPATIENT
Start: 2019-04-07 | End: 2019-04-08 | Stop reason: HOSPADM

## 2019-04-07 RX ORDER — NITROFURANTOIN 25; 75 MG/1; MG/1
100 CAPSULE ORAL EVERY 12 HOURS SCHEDULED
Status: DISCONTINUED | OUTPATIENT
Start: 2019-04-07 | End: 2019-04-08 | Stop reason: HOSPADM

## 2019-04-07 RX ORDER — AMOXICILLIN 250 MG
1 CAPSULE ORAL 2 TIMES DAILY PRN
Status: DISCONTINUED | OUTPATIENT
Start: 2019-04-07 | End: 2019-04-08 | Stop reason: HOSPADM

## 2019-04-07 RX ORDER — OSELTAMIVIR PHOSPHATE 75 MG/1
75 CAPSULE ORAL ONCE
Status: COMPLETED | OUTPATIENT
Start: 2019-04-07 | End: 2019-04-07

## 2019-04-07 RX ORDER — ACETAMINOPHEN 500 MG
500-1000 TABLET ORAL EVERY 6 HOURS PRN
Status: DISCONTINUED | OUTPATIENT
Start: 2019-04-07 | End: 2019-04-08 | Stop reason: HOSPADM

## 2019-04-07 RX ORDER — NALOXONE HYDROCHLORIDE 0.4 MG/ML
.1-.4 INJECTION, SOLUTION INTRAMUSCULAR; INTRAVENOUS; SUBCUTANEOUS
Status: DISCONTINUED | OUTPATIENT
Start: 2019-04-07 | End: 2019-04-08 | Stop reason: HOSPADM

## 2019-04-07 RX ORDER — SODIUM CHLORIDE 9 MG/ML
INJECTION, SOLUTION INTRAVENOUS CONTINUOUS
Status: DISCONTINUED | OUTPATIENT
Start: 2019-04-07 | End: 2019-04-07

## 2019-04-07 RX ORDER — BISACODYL 10 MG
10 SUPPOSITORY, RECTAL RECTAL DAILY PRN
Status: DISCONTINUED | OUTPATIENT
Start: 2019-04-07 | End: 2019-04-08 | Stop reason: HOSPADM

## 2019-04-07 RX ORDER — ONDANSETRON 2 MG/ML
4 INJECTION INTRAMUSCULAR; INTRAVENOUS EVERY 6 HOURS PRN
Status: DISCONTINUED | OUTPATIENT
Start: 2019-04-07 | End: 2019-04-08 | Stop reason: HOSPADM

## 2019-04-07 RX ORDER — LIDOCAINE 40 MG/G
CREAM TOPICAL
Status: DISCONTINUED | OUTPATIENT
Start: 2019-04-07 | End: 2019-04-08 | Stop reason: HOSPADM

## 2019-04-07 RX ORDER — DEXTROSE MONOHYDRATE 25 G/50ML
25-50 INJECTION, SOLUTION INTRAVENOUS
Status: DISCONTINUED | OUTPATIENT
Start: 2019-04-07 | End: 2019-04-08 | Stop reason: HOSPADM

## 2019-04-07 RX ORDER — NICOTINE POLACRILEX 4 MG
15-30 LOZENGE BUCCAL
Status: DISCONTINUED | OUTPATIENT
Start: 2019-04-07 | End: 2019-04-08 | Stop reason: HOSPADM

## 2019-04-07 RX ADMIN — OSELTAMIVIR PHOSPHATE 75 MG: 75 CAPSULE ORAL at 05:39

## 2019-04-07 RX ADMIN — SODIUM CHLORIDE: 9 INJECTION, SOLUTION INTRAVENOUS at 05:40

## 2019-04-07 RX ADMIN — OSELTAMIVIR PHOSPHATE 75 MG: 75 CAPSULE ORAL at 09:27

## 2019-04-07 RX ADMIN — ACETAMINOPHEN 650 MG: 325 TABLET, FILM COATED ORAL at 06:00

## 2019-04-07 RX ADMIN — IPRATROPIUM BROMIDE AND ALBUTEROL SULFATE 3 ML: .5; 3 SOLUTION RESPIRATORY (INHALATION) at 15:44

## 2019-04-07 RX ADMIN — IPRATROPIUM BROMIDE AND ALBUTEROL SULFATE 3 ML: .5; 3 SOLUTION RESPIRATORY (INHALATION) at 19:57

## 2019-04-07 RX ADMIN — OSELTAMIVIR PHOSPHATE 75 MG: 75 CAPSULE ORAL at 22:30

## 2019-04-07 RX ADMIN — NITROFURANTOIN MONOHYDRATE/MACROCRYSTALLINE 100 MG: 25; 75 CAPSULE ORAL at 18:37

## 2019-04-07 ASSESSMENT — ENCOUNTER SYMPTOMS
ABDOMINAL PAIN: 0
COUGH: 1
VOMITING: 0
HEADACHES: 1
DIARRHEA: 0
FEVER: 0

## 2019-04-07 ASSESSMENT — ACTIVITIES OF DAILY LIVING (ADL): ADLS_ACUITY_SCORE: 11

## 2019-04-07 NOTE — PLAN OF CARE
DATE & TIME: 4/7/2019 1900  ORIENTATION: A&O x2, disoriented to time and situation.  Hx dementia. Impulsive.  ABNL VS/O2: VSS on RA  MOBILITY: SBA with gait belt  PAIN MANAGMENT: Denies pain  DIET: Mod carb  BOWEL/BLADDER: Continent,urinary frequency. UA positive. ABX started.   ABNL LAB/BG:   DRAIN/DEVICES: PIV SL  TELEMETRY RHYTHM: NSR  SKIN: Bruised, bump on forehead from fall  D/C DAY/GOALS/PLACE: Discharge pending once stable  OTHER IMPORTANT INFO: Tunisian speaking. LS coarse crackles

## 2019-04-07 NOTE — PROGRESS NOTES
SPIRITUAL HE[facility] SERVICES Progress Note  [facility]    Visited per request.     Interpretor present of conversation. SH introduced themselves and oriented Pt with SH services. Pt requested to see a .     SH will put in request for .     SH is available if further needs arise.      Garcia Stevenson  Chaplain Resident

## 2019-04-07 NOTE — DOWNTIME EVENT NOTE
The EMR was down for 3 hours on 4/7/2019.    Deonte MARTINS was responsible for completing the paper charting during this time period.     The following information was re-entered into the system by Deonte Tanner:    The following information will remain in the paper chart:     Deonte LLAMAS  4/7/2019

## 2019-04-07 NOTE — PROGRESS NOTES
Care Coordination:    Patient changed to observation status.  Met with patient with help of .  Reviewed observation status and brochure.  Pt voiced understanding.  Wanted me to update her dtr Jacky.  Attempted to call her but no answer.  Pt voices she lives with her daughter but is Indep in cares.  She hopes to return home.  PT/OT to see.        Yesica Cheng RN BSN  Care Coordinator

## 2019-04-07 NOTE — PROVIDER NOTIFICATION
MD Notification    Notified Person: MD    Notified Person Name: Mariela    Notification Date/Time: 4/7/2019 1443    Notification Interaction: Text    Purpose of Notification: Pt urinating every 10 minutes. Talked to daughter and he says patient gets UTIs frequently.    Orders Received: Need UA    Comments:

## 2019-04-07 NOTE — ED PROVIDER NOTES
History     Chief Complaint:  Fall    HPI:   The history is provided by a relative.      Angelica James is a 73 year old female with history of hypertension, type 2 diabetes, schizophrenia, and dementia who presents for evaluation after 2 falls today. The patient resides with her daughter, who states that she has been sick with congestion and cough for the past 2 days. The daughter notes that today the patient has been breathing heavier than normal and seemed weaker than usual. The patient had a fall this evening next to her bed, and the daughter states that she helped her up and she seemed fine. However, the daughter states that the patient then had an additional fall and bumped her head causing abrasions to her forehead. The daughter states the patient seemed weaker after this second fall and had a difficult time getting up even with help, prompting their evaluation. The daughter states that patient was able to ambulate to the car, but was much slower and had difficulty getting into the car, which is unusual for her. Here, the patient reports forehead pain and leg pain. The patient denies abdominal pain.      Allergies:  Penicillins      Medications:    Paxil  Nitroglycerin   Aspirin 81 mg  Simvastatin   Ferrous gluconate   Lisinopril   Metformin   Risperdal     Past Medical History:    Dementia  Type 2 diabetes  Hypertension  Peptic ulcer disease  Iron deficiency anemia  Schizophrenia   Generalized anxiety  Major depressive disorder    Past Surgical History:    Uterine myomas removal     Family History:    Diabetes: father     Social History:  The patient is accompanied to the ED by daughter  Resides at daughter's home  PCP: Kamini Michael   Marital Status:    Smoking status: never  Alcohol use: No      Review of Systems   Constitutional: Negative for fever.   HENT: Positive for congestion.    Respiratory: Positive for cough.    Gastrointestinal: Negative for abdominal pain, diarrhea and  "vomiting.   Musculoskeletal:        Leg pain bilateral   Neurological: Positive for headaches.   All other systems reviewed and are negative.      Physical Exam     Patient Vitals for the past 24 hrs:   BP Temp Temp src Pulse Resp SpO2 Height Weight   04/07/19 0500 -- -- -- -- -- 91 % -- --   04/07/19 0430 145/80 99  F (37.2  C) Oral 80 20 -- -- --   04/06/19 2316 138/68 -- -- 92 -- -- -- --   04/06/19 2235 -- 98.9  F (37.2  C) Oral -- -- -- -- --   04/06/19 2234 132/62 -- Oral 99 23 92 % -- --   04/06/19 2233 -- -- Oral -- -- -- 1.575 m (5' 2\") 56.7 kg (125 lb)        Physical Exam  General:  Elderly woman lying supine with increased work of breathing but otherwise alert and cooperative.      Eye:  Pupils are equal, round, and reactive.  Extraocular movements intact.    ENT:  No rhinorrhea.  Moist mucus membranes.  Normal tongue and tonsil.    Cardiac:  Regular rate and rhythm.  No murmurs, gallops, or rubs.    Pulmonary: Infrequent dry cough.  Respiratory rate in the low 20s with oxygenation in the low 90s.    Abdomen:  Positive bowel sounds.  Abdomen is soft and non-distended, without focal tenderness.    Musculoskeletal:  Normal movement of all extremities without evidence for deficit.  No midline tenderness to the neck or back.  No evidence of extremity trauma.  There is bruising to the right forehead, but no evidence of depression.    Skin:  Warm and dry without rashes.  Bruising noted to the right forehead with small hematoma.  No bleeding or suturable laceration.    Neurologic: Cranial nerves II through XII intact.  Non-focal exam without asymmetric weakness or numbness.     Psychiatric:  Normal affect with appropriate interaction with examiner, at baseline per daughter.      Emergency Department Course       Imaging:  Radiographic findings were communicated with the patient who voiced understanding of the findings.    Head CT w/o contrast  Impression:   1. No evidence of acute intracranial trauma.  2. A " contusion and small hematoma are present in the scalp overlying  the right frontal bone.  As read by Radiology.     XR Chest 2 Views  Impression: No evidence of active cardiopulmonary disease.  As read by Radiology.     Laboratory:  Influenza A/B antigen: A Positive  CBC: HGB 10.6, o/w WNL (WBC 9.2, )   CMP: , chloride 91, glucose 157, calcium 8.3, AST 52, o/w WNL (Creatinine 0.63)  Glucose by meter: 149    Interventions:  2332: NS 1L IV Bolus   0400: Tamiflu 75 mg, PO    Emergency Department Course:  Past medical records, nursing notes, and vitals reviewed.  2325: I performed an exam of the patient and obtained history, as documented above.  IV started and blood drawn for laboratory testing. Results are as above.    The patient was sent for CT and X-ray imaging while in the emergency department, findings above.       0155: I discussed the patient with Dr. Reyes, who will accept the patient for admission.    0215: I rechecked the patient. Explained findings to the patient.     Findings and plan explained to the Patient who consents to admission.     Discussed the patient with Dr. Reyes, who will admit the patient to a med bed for further monitoring, evaluation, and treatment.      Impression & Plan      Medical Decision Making:  This delightful elderly woman presents with her daughter after suffering 2 falls today.  Family noted that she was coughing the last 2 days and had increased work of breathing with any exertion.  With her increased weakness tonight and 2 falls, they brought her in for evaluation.  I did notice her tachypnea and cough and immediately ordered an x-ray, flu swab, and labs.  Her influenza is positive.  Chest x-ray is clear.  CT of the head was obtained because of her age and clear evidence of closed head injury and this is negative.  She has not been eating and drinking well the last 2 days with this illness and has become significantly hyponatremic.  She is tachypneic at baseline  with oxygen levels that will drop into the 80s.  With all of these issues, she will require admission to the hospital.  I spoke with Dr. Ortega of the hospitalist service who agrees to accept care of the patient to a medical inpatient bed for resuscitation and continued cares for her influenza related issues.    Diagnosis:    ICD-10-CM    1. Hyponatremia E87.1    2. Acute respiratory failure with hypoxia (H) J96.01    3. Influenza A J10.1    4. Closed head injury, initial encounter S09.90XA    5. Generalized muscle weakness M62.81        Disposition:  Admitted to hospital, med bed    I, Megan Beh, am serving as a scribe at 11:25 PM on 4/6/2019 to document services personally performed by Trierweiler, Chad A, MD based on my observations and the provider's statements to me.      Megan Beh  4/6/2019    EMERGENCY DEPARTMENT       Trierweiler, Chad A, MD  04/07/19 0719

## 2019-04-07 NOTE — PROGRESS NOTES
Patient is primarily Ukrainian speaking. A&O self and place. History of dementia. VSS on RA. Denies pain. Up SBA. Mod carb diet. Tolerating well. PIV SL. . LS coarse crackles. Infrequent productive cough. Nebs ordered. Tele NSR. BS active, + Flatus. Voiding frequently. UA obtained and positive for UTI. ABX ordered. Patient is very impulsive. Sitter at bedside. VPM ordered. Progressing toward plan of care.

## 2019-04-07 NOTE — PHARMACY-ADMISSION MEDICATION HISTORY
Admission medication history interview status for the 4/6/2019  admission is complete. See EPIC admission navigator for prior to admission medications     Medication history source reliability:Good    Actions taken by pharmacist (provider contacted, etc): Chart review and discussed with daughter     Additional medication history information not noted on PTA med list :None    Medication reconciliation/reorder completed by provider prior to medication history? Yes    Time spent in this activity: 15 minutes    Prior to Admission medications    Medication Sig Last Dose Taking? Auth Provider   acetaminophen (TYLENOL) 500 MG tablet Take 500-1,000 mg by mouth every 6 hours as needed for mild pain prn Yes Unknown, Entered By History   CALCIUM CARBONATE 500 MG tablet TAKE 1 TABLET BY MOUTH TWICE A DAY 4/6/2019 at Unknown time Yes Reported, Patient   Cholecalciferol (VITAMIN D3) 2000 units CAPS TAKE 2 CAPSULES BY MOUTH EVERY DAY 4/6/2019 at Unknown time Yes Reported, Patient   ferrous sulfate (FEROSUL) 325 (65 Fe) MG tablet TAKE 1 TABLET BY MOUTH TWO TIMES DAILY 4/5/2019 Yes Reported, Patient   lisinopril (PRINIVIL/ZESTRIL) 10 MG tablet Take 10 mg by mouth daily 4/6/2019 at Unknown time Yes Reported, Patient   metFORMIN (GLUCOPHAGE) 500 MG tablet TAKE 1 TABLET BY MOUTH DAILY. 4/6/2019 at Unknown time Yes Reported, Patient   PARoxetine (PAXIL) 40 MG tablet Take 40 mg by mouth daily 4/6/2019 at Unknown time Yes Reported, Patient   risperiDONE (RISPERDAL) 1 MG tablet TAKE ONE-HALF TABLET BY MOUTH EVERY MORNING AND EARLY AFTERNOON. 4/6/2019 at Unknown time Yes Reported, Patient   risperiDONE (RISPERDAL) 4 MG tablet TAKE 1 TABLET BY MOUTH NIGHTLY AT BEDTIME 4/6/2019 at Unknown time Yes Reported, Patient

## 2019-04-07 NOTE — PROGRESS NOTES
Pt seen and examined. Seen by my colleague earlier in the morning. H/P, labs, vital signs and orders reviewed. Agree with his A/P.  Patient presenting with productive cough, weakness and falls and was found to have influenza A.  Also complaining of increased frequency of urination, check urine     On Exam  Awake alert not in any distress.  Has bruise on her face and arms secondary to recent fall  Chest bilateral equal air entry however coarse sounds and wheezes heard  -No edema  Plan    Continue Tamiflu, breathing treatments   Macrobid initiated for normal urine, cultures pending  PT OT evaluation and treatment    Reassess in AM

## 2019-04-07 NOTE — UTILIZATION REVIEW
"  Admission Status; Secondary Review Determination         Under the authority of the Utilization Management Committee, the utilization review process indicated a secondary review on the above patient.  The review outcome is based on review of the medical records, discussions with staff, and applying clinical experience noted on the date of the review.          (x) Observation Status Appropriate - This patient does not meet hospital inpatient criteria and is placed in observation status. If this patient's primary payer is Medicare and was admitted as an inpatient, Condition Code 44 should be used and patient status changed to \"observation\".     RATIONALE FOR DETERMINATION   73 year old woman with chronic dementia, chronic paranoid schizoaffective disorder, Type 2 Diabetes mellitus and PUD who presents with productive cough, weakness and falls and is found to have symptomatic hyponatremia due to acute Influenza A. Admission sodium was 127, repeat this morning 132, there is no evidence of severe sepsis, pneumonia requiring inpatient care, or severe hypoxia.  The severity of illness, intensity of service provided, expected LOS and risk for adverse outcome make the care appropriate for further observation; however, doesn't meet criteria for hospital inpatient admission. Dr Sierra notified of this determination.    This document was produced using voice recognition software.      The information on this document is developed by the utilization review team in order for the business office to ensure compliance.  This only denotes the appropriateness of proper admission status and does not reflect the quality of care rendered.         The definitions of Inpatient Status and Observation Status used in making the determination above are those provided in the CMS Coverage Manual, Chapter 1 and Chapter 6, section 70.4.      Sincerely,     SINAN DEMARCO MD    System Medical Director  Utilization Management  [facility] " Services.

## 2019-04-07 NOTE — PLAN OF CARE
OT and PT: Patient just admitted, dx with influenza A. Holding evals today to allow for medications to take effect, allow time for natural recovery and improve performance before assessing for discharge.

## 2019-04-07 NOTE — PLAN OF CARE
DATE & TIME: 4/7/2019 1417  ORIENTATION: A&O x2, disoriented to time and situation, impulsive, confused, hx of dementia  BEHAVIOR & AGGRESSION TOOL COLOR: Green  ABNL VS/O2: Weaned from 2L NC to RA sating 94%  MOBILITY: SBA with gait belt  PAIN MANAGMENT: Denies pain  DIET: Mod carb  BOWEL/BLADDER: Continent, uses bathroom frequently, dribbles  ABNL LAB/BG: BGM 83, 105. Na 132  DRAIN/DEVICES: IVF running at 125mL/hr  TELEMETRY RHYTHM: NSR  SKIN: Bruised, bump on forehead from fall  D/C DAY/GOALS/PLACE: Discharge pending once stable  OTHER IMPORTANT INFO: Yi speaking,  present, LS crackles with expiratory wheezes, up to chair for meals

## 2019-04-07 NOTE — H&P
[facility]    History and Physical  Hospitalist       Date of Admission:  4/6/2019  Date of Service (when I saw the patient): 04/07/19    ASSESSMENT  Ms. James is a markedly pleasant 73 year old woman with chronic dementia, chronic paranoid schizoaffective disorder, Type 2 Diabetes mellitus and PUD who presents with productive cough, weakness and falls and is found to have symptomatic hyponatremia due to acute Influenza A.     PLAN    1) Symptomatic hyponatremia due to dehydration and acute Influenza A:     -- Inpatient.  ml/hour IV fluid; q 6 Na checks. Switch to 1/2 NS or D5W if over-correction occurs (aiming for Na 131 to 133 in the next 24 hours, then normalized 24 hours after that)     -- Tamiflu ordered. Monitor oxygenation. Robitussin prn.    2) Chronic anemia: Monitor while hospitalized; resume iron at discharge    3) Chronic dementia and schizoaffective disorder: Resume Risperdal and Paxil when verified. Consult Psychiatry if she develops delirium or agitation    4) Diabetes: Hold Metformin and use ISS insulin while hospitalized     5) PUD: Reportedly esophagitis seen on EGD in 2010. Monitor while hospitalized    Chief Complaint   Cough    History is obtained from the patient, her daughter at the bedside, and the ED physician whom I have spoken with    History of Present Illness   Ms. James is a markedly pleasant 73 year old Pashto speaking woman who wishes her daughter at the bedside to interpret for her; she presents for evaluation of 2-3 days of worsening productive cough and chest congestion, associated with progressive generalized weakness which led this evening to two falls at home; on the latter she bruised her face and elbows. She denies noticing fever at home, or chills, or sweats, or abdominal pain, diarrhea, nausea, or other acute complaints.     In the ED, T 98.9, pulse 92, /68, sats as low as 88% on room air.     CBC notable for HGB 10.6 (9.4 in 2011). CMP was done  "and notable for Na 127, Cl 91, Glucose 157, and AST 52 with remainder of her labs within normal reference range. Influenza A was positive. CXR did not show acute pathology, nor did ead CT without contrast reportedly. She was given 2 liters IV fluid.    PHYSICAL EXAM  Blood pressure 138/68, pulse 92, temperature 98.9  F (37.2  C), temperature source Oral, resp. rate 23, height 1.575 m (5' 2\"), weight 56.7 kg (125 lb), SpO2 92 %.  Constitutional: Sleepy but arousable; no apparent distress  HEENT: ecchymosis over the forehead, moist mucus membranes  Respiratory: lungs have minimal inspiratory crackles to auscultation bilaterally  Cardiovascular: regular S1 S2 3/6 ramy at upper sternum  GI: abdomen soft non tender non distended bowel sounds positive  Lymph/Hematologic: pale, no cervical lymphadenopathy  Skin: no rash, good turgor  Musculoskeletal: no clubbing, cyanosis or edema  Neurologic: extra-ocular muscles intact; moves all four extremities     DVT Prophylaxis: Pneumatic Compression Devices  Code Status: Full Code    Disposition: Expected discharge in 2-3 days    Greg Ortega MD    Past Medical History    I have reviewed this patient's medical history and updated it with pertinent information if needed.   Past Medical History:   Diagnosis Date     Dementia      Diabetes (H)      Hypertension      Iron deficiency anemia      PUD (peptic ulcer disease)      Schizophrenia (H)        Past Surgical History   I have reviewed this patient's surgical history and updated it with pertinent information if needed.  Past Surgical History:   Procedure Laterality Date     colonoscopy       UPPER GI ENDOSCOPY         Prior to Admission Medications   Prior to Admission Medications   Prescriptions Last Dose Informant Patient Reported? Taking?   CALCIUM CARBONATE 500 MG tablet   Yes No   Sig: TAKE 1 TABLET BY MOUTH TWICE A DAY   Cholecalciferol (VITAMIN D3) 2000 units CAPS   Yes No   Sig: TAKE 2 CAPSULES BY MOUTH EVERY DAY "   PARoxetine (PAXIL) 40 MG tablet   Yes No   Sig: Take 40 mg by mouth daily   ciclopirox (LOPROX) 0.77 % cream   No No   Sig: Apply topically 2 times daily To feet and toenails.   ferrous sulfate (FEROSUL) 325 (65 Fe) MG tablet   Yes No   Sig: TAKE 1 TABLET BY MOUTH TWO TIMES DAILY   lisinopril (PRINIVIL/ZESTRIL) 10 MG tablet   Yes No   Sig: Take 10 mg by mouth daily   metFORMIN (GLUCOPHAGE) 500 MG tablet   Yes No   Sig: TAKE 1 TABLET BY MOUTH DAILY.   risperiDONE (RISPERDAL) 1 MG tablet   Yes No   Sig: TAKE ONE-HALF TABLET BY MOUTH EVERY MORNING AND EARLY AFTERNOON.   risperiDONE (RISPERDAL) 4 MG tablet   Yes No   Sig: TAKE 1 TABLET BY MOUTH NIGHTLY AT BEDTIME   triamcinolone (KENALOG) 40 MG/ML injection   No No   Si mL (40 mg) by INTRA-ARTICULAR route once for 1 dose      Facility-Administered Medications: None     Allergies   Allergies   Allergen Reactions     Penicillins Hives       Social History   I have reviewed this patient's social history and updated it with pertinent information if needed. Angelica James  reports that she has never smoked. She has never used smokeless tobacco. She reports that she does not drink alcohol or use drugs.    Family History   Family history assessed and, except as above, is non-contributory.    Review of Systems   The 10 point Review of Systems is negative other than noted in the HPI or here.     Primary Care Physician   Kamini Michael    Data   Labs Ordered and Resulted from Time of ED Arrival Up to the Time of Departure from the ED   CBC WITH PLATELETS DIFFERENTIAL - Abnormal; Notable for the following components:       Result Value    RBC Count 3.51 (*)     Hemoglobin 10.6 (*)     Hematocrit 30.6 (*)     Absolute Lymphocytes 0.3 (*)     All other components within normal limits   COMPREHENSIVE METABOLIC PANEL - Abnormal; Notable for the following components:    Sodium 127 (*)     Chloride 91 (*)     Glucose 157 (*)     Calcium 8.3 (*)     AST 52 (*)     All  other components within normal limits   GLUCOSE BY METER - Abnormal; Notable for the following components:    Glucose 149 (*)     All other components within normal limits   INFLUENZA A/B ANTIGEN - Abnormal; Notable for the following components:    Influenza A Positive (*)     All other components within normal limits   CARDIAC CONTINUOUS MONITORING   PULSE OXIMETRY NURSING   PERIPHERAL IV CATHETER       Data reviewed today:  I personally reviewed the chest x-ray image(s) showing no acute pathology.    Recent Results (from the past 24 hour(s))   XR Chest 2 Views    Narrative    CHEST 2 VIEWS   4/6/2019 11:00 PM     HISTORY: Cough.    COMPARISON: None.    FINDINGS: The lungs are clear. Normal size cardiac silhouette.  Atherosclerotic calcification in the thoracic aorta. Moderate size  hiatal hernia.      Impression    IMPRESSION: No evidence of active cardiopulmonary disease.    BHASKAR ELDER MD

## 2019-04-07 NOTE — PLAN OF CARE
Pt A&O to self, place, situation, hx of dementia, forgetful. Cameroonian speaker, communicates ok in english, daughter requests an  for more complex communications. VSS on 2L of O2, cough and congestion, LS coarse. Pt is Ax1 to get out of bed but ambulates SBA, voiding in BR. Dribbles and replaces briefs. IVF running but IV in AC, pt not remembering to keep her arm straight--will attempt to place a different IV. Contusion and ecchymosis on the forehead from her fall yesterday (at home). Bed alarm on. Pt on droplet precautions, she tested positive for Flu A. Pt c/o pain in her neck/shoulders, gave PRN tylenol and heat pack. Discharge TBD.

## 2019-04-08 ENCOUNTER — APPOINTMENT (OUTPATIENT)
Dept: PHYSICAL THERAPY | Facility: CLINIC | Age: 74
End: 2019-04-08
Attending: INTERNAL MEDICINE
Payer: MEDICARE

## 2019-04-08 VITALS
HEIGHT: 62 IN | OXYGEN SATURATION: 95 % | DIASTOLIC BLOOD PRESSURE: 42 MMHG | WEIGHT: 114.64 LBS | HEART RATE: 90 BPM | RESPIRATION RATE: 18 BRPM | BODY MASS INDEX: 21.1 KG/M2 | TEMPERATURE: 98.4 F | SYSTOLIC BLOOD PRESSURE: 142 MMHG

## 2019-04-08 LAB
BACTERIA SPEC CULT: NORMAL
GLUCOSE BLDC GLUCOMTR-MCNC: 100 MG/DL (ref 70–99)
GLUCOSE BLDC GLUCOMTR-MCNC: 161 MG/DL (ref 70–99)
GLUCOSE BLDC GLUCOMTR-MCNC: 189 MG/DL (ref 70–99)
GLUCOSE BLDC GLUCOMTR-MCNC: 99 MG/DL (ref 70–99)
Lab: NORMAL
SODIUM SERPL-SCNC: 135 MMOL/L (ref 133–144)
SODIUM SERPL-SCNC: 136 MMOL/L (ref 133–144)
SODIUM SERPL-SCNC: 138 MMOL/L (ref 133–144)
SPECIMEN SOURCE: NORMAL

## 2019-04-08 PROCEDURE — 40000275 ZZH STATISTIC RCP TIME EA 10 MIN

## 2019-04-08 PROCEDURE — 25000125 ZZHC RX 250: Performed by: INTERNAL MEDICINE

## 2019-04-08 PROCEDURE — G0378 HOSPITAL OBSERVATION PER HR: HCPCS

## 2019-04-08 PROCEDURE — 00000146 ZZHCL STATISTIC GLUCOSE BY METER IP

## 2019-04-08 PROCEDURE — 97161 PT EVAL LOW COMPLEX 20 MIN: CPT | Mod: GP

## 2019-04-08 PROCEDURE — 97110 THERAPEUTIC EXERCISES: CPT | Mod: GP

## 2019-04-08 PROCEDURE — 99217 ZZC OBSERVATION CARE DISCHARGE: CPT | Performed by: HOSPITALIST

## 2019-04-08 PROCEDURE — 94640 AIRWAY INHALATION TREATMENT: CPT | Mod: 76

## 2019-04-08 PROCEDURE — A9270 NON-COVERED ITEM OR SERVICE: HCPCS | Mod: GY | Performed by: HOSPITALIST

## 2019-04-08 PROCEDURE — 99207 ZZC CDG-CODE CATEGORY CHANGED: CPT | Performed by: HOSPITALIST

## 2019-04-08 PROCEDURE — A9270 NON-COVERED ITEM OR SERVICE: HCPCS | Mod: GY | Performed by: INTERNAL MEDICINE

## 2019-04-08 PROCEDURE — 36415 COLL VENOUS BLD VENIPUNCTURE: CPT | Performed by: HOSPITALIST

## 2019-04-08 PROCEDURE — 25000132 ZZH RX MED GY IP 250 OP 250 PS 637: Mod: GY | Performed by: HOSPITALIST

## 2019-04-08 PROCEDURE — 84295 ASSAY OF SERUM SODIUM: CPT | Performed by: HOSPITALIST

## 2019-04-08 PROCEDURE — 94640 AIRWAY INHALATION TREATMENT: CPT

## 2019-04-08 PROCEDURE — 25000132 ZZH RX MED GY IP 250 OP 250 PS 637: Mod: GY | Performed by: INTERNAL MEDICINE

## 2019-04-08 RX ORDER — NITROFURANTOIN 25; 75 MG/1; MG/1
100 CAPSULE ORAL EVERY 12 HOURS
Qty: 6 CAPSULE | Refills: 0 | Status: SHIPPED | OUTPATIENT
Start: 2019-04-08 | End: 2019-04-11

## 2019-04-08 RX ORDER — OSELTAMIVIR PHOSPHATE 75 MG/1
75 CAPSULE ORAL 2 TIMES DAILY
Qty: 8 CAPSULE | Refills: 0 | Status: SHIPPED | OUTPATIENT
Start: 2019-04-08 | End: 2019-04-12

## 2019-04-08 RX ADMIN — IPRATROPIUM BROMIDE AND ALBUTEROL SULFATE 3 ML: .5; 3 SOLUTION RESPIRATORY (INHALATION) at 07:50

## 2019-04-08 RX ADMIN — OSELTAMIVIR PHOSPHATE 75 MG: 75 CAPSULE ORAL at 09:00

## 2019-04-08 RX ADMIN — NITROFURANTOIN MONOHYDRATE/MACROCRYSTALLINE 100 MG: 25; 75 CAPSULE ORAL at 17:04

## 2019-04-08 RX ADMIN — IPRATROPIUM BROMIDE AND ALBUTEROL SULFATE 3 ML: .5; 3 SOLUTION RESPIRATORY (INHALATION) at 11:31

## 2019-04-08 RX ADMIN — IPRATROPIUM BROMIDE AND ALBUTEROL SULFATE 3 ML: .5; 3 SOLUTION RESPIRATORY (INHALATION) at 15:05

## 2019-04-08 RX ADMIN — NITROFURANTOIN MONOHYDRATE/MACROCRYSTALLINE 100 MG: 25; 75 CAPSULE ORAL at 07:01

## 2019-04-08 ASSESSMENT — MIFFLIN-ST. JEOR: SCORE: 968.25

## 2019-04-08 NOTE — PROGRESS NOTES
Discharge    Patient discharged to home via car with daughter    Listed belongings gathered and returned to patient. Yes  Care Plan and Patient education resolved: Yes  Prescriptions if needed, hard copies sent with patient  NA  Home and hospital acquired medications returned to patient: Yes  Medication Bin checked and emptied on discharge Yes  Follow up appointment made for patient: No

## 2019-04-08 NOTE — PLAN OF CARE
04/07/19 2113 Anamaria Guillen, RN - Registered Nurse]   DATE & TIME: 4/7/2019 2300  ORIENTATION: A&O x2, disoriented to time and situation, impulsive, confused, hx of dementia  BEHAVIOR & AGGRESSION TOOL COLOR: Green  ABNL VS/O2:  RA sating 94%-96%  MOBILITY: SBA with gait belt  PAIN MANAGMENT: Denies pain  DIET: Mod carb  BOWEL/BLADDER: Continent, uses bathroom frequently, dribbles  ABNL LAB/BG: BGM  Na 132  DRAIN/DEVICES: SL  TELEMETRY RHYTHM: D/C  SKIN: Bruised, bump on forehead from fall  D/C DAY/GOALS/PLACE: Discharge pending once stable  OTHER IMPORTANT INFO: Belarusian speaking, bedside attendant +VPM

## 2019-04-08 NOTE — PLAN OF CARE
MD Notification: Jacinda    Notified Person: MD    Notified Person Name:     Notification Date/Time:    Notification Interaction: on call MD    Purpose of Notification: tele order clerify    Orders Received: yes    Comments: disconnect tele

## 2019-04-08 NOTE — PLAN OF CARE
PT:  Discharge Planner PT   Patient plan for discharge: Return home with daughter  Current status: Orders received, eval completed, treatment initiated.  present. Pt admitted under observation status with hyponatremia, influenza A and falls at home. Prior to admit pt was living with her daughter in an apartment, no AD use and is independent with mobility. Daughter does the cooking, cleaning, med set up and driving but pt is independent with basic bathroom ADLs, showering and dressing. Currently requires SBA for gait of 200 ft with no AD with steady balance, is independent with bed mobility and transfers. Pt states she feels weak and like walking is more difficult right now. Pt demonstrates dec strength and difficulty ambulating and would benefit from skilled PT services in order to improve this.  Barriers to return to prior living situation: None  Recommendations for discharge: Return home with home PT  Rationale for recommendations: Pt is moving well and safe to return home with her daughter. Would benefit from home PT for strengthening but pt refusing this. Agreeable to LE strengthening home exercise program and this was issued to patient and explained through .       Entered by: Laura Barber 04/08/2019 11:06 AM     Pt has met PT goals, will discharge from PT.    Physical Therapy Discharge Summary    Reason for therapy discharge:    All goals and outcomes met, no further needs identified.    Progress towards therapy goal(s). See goals on Care Plan [facility] electronic health record for goal details.  Goals met    Therapy recommendation(s):    Continued therapy is recommended.  Rationale/Recommendations:  home PT for strengthening, pt refusing this.

## 2019-04-08 NOTE — PLAN OF CARE
PT alert oriented Malay speaking vss on RA up with 1 impulsive at times VPM+  bedside attendant VSS denies pain LS crackle BS present frequent voiding on abx for UTI, paged MD to clarify tele order pass the report to night RN to follow up,  plan continue to monitor

## 2019-04-08 NOTE — PLAN OF CARE
DATE & TIME: 4/8/2019 1310  ORIENTATION: A&O x2, disoriented to time and situation, confused, dementia  BEHAVIOR & AGGRESSION TOOL COLOR: Green  ABNL VS/O2: VSS on RA  MOBILITY: SBA with gait belt and walker  PAIN MANAGMENT: Denies pain  DIET: Mod carb diet  BOWEL/BLADDER: Incontinent at times, dribbling  ABNL LAB/BG: , 161  DRAIN/DEVICES: IV SL  SKIN: Bruised, bump on forehead from fall at home  D/C DAY/GOALS/PLACE: Discharging home with daughter @ 1800, discharge meds are in discharge medication drawer  OTHER IMPORTANT INFO: LS diminished, on scheduled nebs, urinating frequently, walked in henry with staff. Droplet precautions in place for positive influenza, on Tamiflu

## 2019-04-08 NOTE — PROGRESS NOTES
"   04/08/19 1056   Quick Adds   Type of Visit Initial PT Evaluation       Present yes   Language Djiboutian   Living Environment   Lives With child(yahir), adult  (daughter)   Living Arrangements apartment   Home Accessibility stairs to enter home   Number of Stairs, Main Entrance   (\"a few\")   Stair Railings, Main Entrance none   Self-Care   Usual Activity Tolerance moderate   Current Activity Tolerance moderate   Regular Exercise No   Equipment Currently Used at Home none   Activity/Exercise/Self-Care Comment Daughter drives, cooks, cleans, does med set up. Pt is able to shower and dress herself, do basic bathroom ADLs independently.    Functional Level Prior   Ambulation 0-->independent   Transferring 0-->independent   Fall history within last six months yes   Number of times patient has fallen within last six months 2   General Information   Onset of Illness/Injury or Date of Surgery - Date 04/07/19   Referring Physician Chiara Sierra MD   Patient/Family Goals Statement Return home with daughter   Pertinent History of Current Problem (include personal factors and/or comorbidities that impact the POC) Pt admitted under observation status with hyponatremia, influenza A and falls at home. PMH: chronic anemia, dementia, schizoaffective disorder, DM2.   Precautions/Limitations fall precautions   Weight-Bearing Status - LLE full weight-bearing   Weight-Bearing Status - RLE full weight-bearing   General Observations sitter and VPM present   Cognitive Status Examination   Orientation person;place   Level of Consciousness alert   Follows Commands and Answers Questions 100% of the time;able to follow single-step instructions   Pain Assessment   Patient Currently in Pain No   Posture    Posture Forward head position;Protracted shoulders   Range of Motion (ROM)   ROM Quick Adds No deficits were identified   Strength   Strength Comments B hip flex 3+/5, knee ext 4+/5, DF 4/5   Bed Mobility   Bed Mobility " "Comments Supine to/from sit independent   Transfer Skills   Transfer Comments Sit to/from stand with SBA   Gait   Gait Comments Pt amb 200 ft with no AD and SBA, steady balance. Pt denies feeling off balance but states she is feeling weaker and it is harder to walk than normal.   Balance   Balance Comments Balance steady with gait and transfers   Sensory Examination   Sensory Perception Comments Denies any numbness or tingling   General Therapy Interventions   Planned Therapy Interventions strengthening   Clinical Impression   Criteria for Skilled Therapeutic Intervention yes, treatment indicated   PT Diagnosis Difficulty ambulating   Influenced by the following impairments Dec strength, activity tolerance   Functional limitations due to impairments Difficulty ambulating   Clinical Presentation Stable/Uncomplicated   Clinical Presentation Rationale medically stable   Clinical Decision Making (Complexity) Low complexity   Therapy Frequency` daily   Predicted Duration of Therapy Intervention (days/wks) 1 day   Anticipated Discharge Disposition Home with Home Therapy   Risk & Benefits of therapy have been explained Yes   Patient, Family & other staff in agreement with plan of care Yes   [facility] TM \"6 Clicks\"   2016, Trustees of [facility], under license to boarding pass.  All rights reserved.   6 Clicks Short Forms Basic Mobility Inpatient Short Form   [facility]  \"6 Clicks\" V.2 Basic Mobility Inpatient Short Form   1. Turning from your back to your side while in a flat bed without using bedrails? 4 - None   2. Moving from lying on your back to sitting on the side of a flat bed without using bedrails? 4 - None   3. Moving to and from a bed to a chair (including a wheelchair)? 4 - None   4. Standing up from a chair using your arms (e.g., wheelchair, or bedside chair)? 4 - None   5. To walk in hospital room? 4 - None   6. Climbing 3-5 steps with a railing? 4 - None   Basic Mobility " Raw Score (Score out of 24.Lower scores equate to lower levels of function) 24

## 2019-04-08 NOTE — PLAN OF CARE
Discharge Planner OT   Patient plan for discharge: Return home with daughter  Current status: OT orders received, chart reviewed, spoke with PT. Pt admitted under observation status and is planning to discharge home today. PT reports Pt is I with basic ADL and has assistance from her daughter with IADL. No acute IP OT needs identified. Will complete OT orders.  Barriers to return to prior living situation: None  Recommendations for discharge: Return home with home PT   Rationale for recommendations: Per PT recommendations        Entered by: Julia Pierson 04/08/2019 11:24 AM

## 2019-04-08 NOTE — PLAN OF CARE
DATE & TIME: 4/7/19, 9208- 2710  ORIENTATION: A&O x 2. Disoriented to time and situation. Nauruan speaking but understands and speak some English  BEHAVIOR & AGGRESSION TOOL COLOR: Green  CIWA SCORE: N/a  ABNL VS/O2: VSS on room air  MOBILITY: Up with SBA and gait belt  PAIN MANAGMENT: Denied  DIET: Mod carb  BOWEL/BLADDER: Continent with urinary frequency  ABNL LAB/BG: N/a  DRAIN/DEVICES: PIV SL  TELEMETRY RHYTHM: Tele was discontinued  SKIN: Bruises, abrasion, hematoma to forehead  TESTS/PROCEDURES: Discharge pending progress  D/C DAY/GOALS/PLACE: Sitter present at bedside. VPM on at bedside.  OTHER IMPORTANT INFO:

## 2019-04-08 NOTE — DISCHARGE SUMMARY
[facility]  Hospitalist Discharge Summary       Date of Admission:  4/6/2019  Date of Discharge:  4/8/2019  Discharging Provider: Felix Duong MD      Discharge Diagnoses   1.  Influenza A  2.  Hyponatremia  3.  UTI    Follow-ups Needed After Discharge   Follow-up Appointments     Follow-up and recommended labs and tests       Follow up with primary care provider, Kamini Michael, within 7 days   to evaluate medication change, to evaluate treatment change, for hospital   follow- up and to follow up on results.  The following labs/tests are   recommended: BMP.         {Additional follow-up instructions/to-do's for PCP: Repeat BMP    Unresulted Labs Ordered in the Past 30 Days of this Admission     Date and Time Order Name Status Description    4/7/2019 1540 Urine Culture Aerobic Bacterial Preliminary           Discharge Disposition   Discharged to home with daughter  Condition at discharge: Stable    Hospital Course   Ms. James is a markedly pleasant 73 year old woman with chronic dementia, chronic paranoid schizoaffective disorder, Type 2 Diabetes mellitus and PUD who presents with productive cough, weakness and falls and is found to have symptomatic hyponatremia due to acute Influenza A.      Symptomatic hyponatremia due to dehydration:   Patient is admitted as an inpatient.  She was initiated on normal saline.  Her sodium improved with IV hydration.  Suspect secondary to dehydration as already noted.  Patient will be encouraged to take in good p.o.  Recommend follow-up BMP with primary care physician.    Influenza A infection: Patient initiated on Tamiflu.  Patient has had a total of one day treatment.  Will complete treatment with for further days.  Patient was weaned off of oxygen and had tolerated well on discharge.  Did not qualify for home oxygen.    Urinary tract infection: Urinalysis with positive leukocyte esterase low number of WBCs in the context of generalized weakness,  concern for possible infection.  Initiated on Macrobid.  Urine cultures pending.  She is clinically improving.  We'll treat for three further days.  This can be followed up in clinic.    Generalized weakness: Secondary to above reasons.  Patient has done well in the hospital.  She has 24-hour care at home between  in with her daughter at night.  Likely unable to go back to  over the next two days or if any were not for the daughter to help take care of her mother.    Chronic anemia: Continue empiric treatment apparent.    Chronic dementia and schizoaffective disorder: Baseline.  Current sitter here but has 24-hour care at home.  We'll continue on PTA treatment with Risperdal and Paxil.  She is at baseline.    Diabetes: Held metformin in the hospital continue her on discharge.  Number next      Consultations This Hospital Stay   PHYSICAL THERAPY ADULT IP CONSULT  OCCUPATIONAL THERAPY ADULT IP CONSULT    Code Status   Full Code    Time Spent on this Encounter   I, Felix Duong, personally saw the patient today and spent less than or equal to 30 minutes discharging this patient.       Felix Duong MD  [facility]  ______________________________________________________________________    Physical Exam   Vital Signs: Temp: 98.4  F (36.9  C) Temp src: Oral BP: 138/79 Pulse: 76   Resp: 18 SpO2: 91 % O2 Device: None (Room air)    Weight: 114 lbs 10.23 oz    Constitutional: alert,  HEENT: ecchymosis over the forehead, moist mucus membranes  Respiratory: lungs have minimal inspiratory crackles to auscultation bilaterally  Cardiovascular: regular S1 S2 3/6 ramy at upper sternum  GI: abdomen soft non tender non distended bowel sounds positive  Lymph/Hematologic: pale, no cervical lymphadenopathy  Skin: no rash, good turgor  Musculoskeletal: no clubbing, cyanosis or edema  Neurologic: extra-ocular muscles intact; moves all four extremities               Primary Care Physician   Kamini  Aruna Michael    Immunizations       Discharge Orders      Reason for your hospital stay    He was admitted for influenza a and low sodium.  He has been initiated on Tamiflu which she will continue for another four days.  Please push fluids to keep your sodium at appropriate levels.  I would like you to follow-up with her primary care physician as well.  We'll send you an antibiotic for suspected urinary tract infection pending urine culture.     Follow-up and recommended labs and tests     Follow up with primary care provider, Kamini Michael, within 7 days to evaluate medication change, to evaluate treatment change, for hospital follow- up and to follow up on results.  The following labs/tests are recommended: BMP.     Activity    Your activity upon discharge: activity as tolerated     Full Code     Diet    Follow this diet upon discharge: Orders Placed This Encounter      Combination Diet Regular Diet Adult; 4308-0396 Calories: Moderate Consistent CHO (4-6 CHO units/meal)       Significant Results and Procedures   Most Recent 3 BMP's:  Recent Labs   Lab Test 04/08/19  0550 04/08/19  0014 04/07/19  1755  04/07/19  0637 04/06/19  2249    135 133   < > 132* 127*   POTASSIUM  --   --   --   --  3.5 3.9   CHLORIDE  --   --   --   --  98 91*   CO2  --   --   --   --  28 28   BUN  --   --   --   --  10 14   CR  --   --   --   --  0.59 0.63   ANIONGAP  --   --   --   --  6 8   SHERI  --   --   --   --  8.0* 8.3*   GLC  --   --   --   --  97 157*    < > = values in this interval not displayed.     CHEST 2 VIEWS   4/6/2019 11:00 PM      HISTORY: Cough.     COMPARISON: None.     FINDINGS: The lungs are clear. Normal size cardiac silhouette.  Atherosclerotic calcification in the thoracic aorta. Moderate size  hiatal hernia.                                                                      IMPRESSION: No evidence of active cardiopulmonary disease.       CT HEAD WITHOUT CONTRAST   4/7/2019 4:26 AM      HISTORY:  Closed head injury.     COMPARISON: 11/2/2015.     TECHNIQUE: Without intravenous contrast, helical sections were  acquired through the brain. Coronal reconstructions were generated.  Radiation dose for this scan was reduced using automated exposure  control, adjustment of the mA and/or kV according to the patient's  size, or iterative reconstruction technique.     FINDINGS: Moderate to severe diffuse cerebral atrophy. Marked  periventricular white matter low attenuation, likely relating to  chronic small vessel ischemic disease. No intra-axial mass, mass  effect or midline shift. Normal gray-white matter differentiation. No  visualized acute intra-axial hemorrhage. The cerebral ventricles are  normal in caliber. The basal cisterns are patent. No extra-axial fluid  collection. The visualized portions of the paranasal sinuses and  mastoid air cells are unremarkable. A contusion and 1.2 cm hematoma  are present in the scalp overlying the right frontal bone.                                                                      IMPRESSION:   1. No evidence of acute intracranial trauma.  2. A contusion and small hematoma are present in the scalp overlying  the right frontal bone.         Discharge Medications   Current Discharge Medication List      START taking these medications    Details   nitroFURantoin macrocrystal-monohydrate (MACROBID) 100 MG capsule Take 1 capsule (100 mg) by mouth every 12 hours for 3 days  Qty: 6 capsule, Refills: 0    Associated Diagnoses: Acute cystitis without hematuria      oseltamivir (TAMIFLU) 75 MG capsule Take 1 capsule (75 mg) by mouth 2 times daily for 4 days  Qty: 8 capsule, Refills: 0    Associated Diagnoses: Influenza A         CONTINUE these medications which have NOT CHANGED    Details   acetaminophen (TYLENOL) 500 MG tablet Take 500-1,000 mg by mouth every 6 hours as needed for mild pain      CALCIUM CARBONATE 500 MG tablet TAKE 1 TABLET BY MOUTH TWICE A DAY  Refills: 5     Associated Diagnoses: Memory loss      Cholecalciferol (VITAMIN D3) 2000 units CAPS TAKE 2 CAPSULES BY MOUTH EVERY DAY  Refills: 3    Associated Diagnoses: Memory loss      ferrous sulfate (FEROSUL) 325 (65 Fe) MG tablet TAKE 1 TABLET BY MOUTH TWO TIMES DAILY  Refills: 2    Associated Diagnoses: Memory loss      lisinopril (PRINIVIL/ZESTRIL) 10 MG tablet Take 10 mg by mouth daily  Refills: 3    Associated Diagnoses: Memory loss      metFORMIN (GLUCOPHAGE) 500 MG tablet TAKE 1 TABLET BY MOUTH DAILY.  Refills: 3    Associated Diagnoses: Memory loss      PARoxetine (PAXIL) 40 MG tablet Take 40 mg by mouth daily  Refills: 5    Associated Diagnoses: Memory loss      !! risperiDONE (RISPERDAL) 1 MG tablet TAKE ONE-HALF TABLET BY MOUTH EVERY MORNING AND EARLY AFTERNOON.  Refills: 5    Associated Diagnoses: Memory loss      !! risperiDONE (RISPERDAL) 4 MG tablet TAKE 1 TABLET BY MOUTH NIGHTLY AT BEDTIME  Refills: 5    Associated Diagnoses: Memory loss       !! - Potential duplicate medications found. Please discuss with provider.        Allergies   Allergies   Allergen Reactions     Penicillins Hives

## 2019-04-10 NOTE — LETTER
[facility] [address] [facility]  [address]  [place] [address]  [phone]          April 8, 2019    RE:  Angelica James                                                                                                                                                       [address]   [place] [address]            To whom it may concern:    Angelica James is under my professional care for multiple medical diagnosis.      Her daughter needs to take care of her. Please excuse as need over the next couple of days (4/9/2018 and 4/10/2019) for medical assistance.  Thank-you.        Sincerely,           Felix Duong MD     Palliative Care Outpatient Clinic Consultation Note    Patient:  Juan Choudhary    O2 88% on room air while at rest    Chief Complaint:   Juan Choudhary 72 year old male who is presenting to the palliative medicine clinic today at the request of Dr Girish Pelayo for a palliative care consultation secondary to non-small cell lung cancer.   The patient's primary care provider is:  Andrea Chau.     History of Present Illness:  This pleasant gentleman presented to clinic along with his wife.  He has been dealing with lung cancer since 2011.  He has been through multiple treatment programs.  He says his cancer has been steadily progressing throughout that time.  He has now exhausted all significant treatments and has no curative treatments planned.  Has been told that he may have 6-12 months to live and that the cancer will progress.    Distressing Symptom/s:  His only significant symptom now is dyspnea with exertion.  He is using supplemental oxygen which helps.  He is having very minimal pain, not enough to even use any medications.    Patient's Disease Understanding: He understands he has a terminal disease    Coping: He is coping quite well though he is a bit frustrated that he can get chores done and feels he has a lot to do and the shortness of breath limits his ability.    Social History  Living Situation: He lives in a split-level home and [place] with his wife  Children: 4 grown children, 2 live nearby  Actual/Potential Caregiver(s): Wife  Support System: Family and some friends  Occupation: Retired   Hobbies: Motorcycle racing  Substance Use/History of misuse: None  Financial Concerns: None  Spiritual Background:   Spiritual Concerns/Needs:   Social History     Tobacco Use     Smoking status: Never Smoker     Smokeless tobacco: Never Used   Substance Use Topics     Alcohol use: Yes     Comment: socially     Drug use: No       Family History  Family History   Problem Relation Age of Onset      Cancer Mother         lung     Heart Disease Father      Heart Disease Sister      Prostate Cancer Brother      Melanoma No family hx of      Patient's Involvement with Prior History of Serious Illness in Family:     Advance Care Planning:  Advance Directive:      Where is written copy located:   Health Care Agent Contact Information:   POLST:       Allergies   Allergen Reactions     Azithromycin Hives and Diarrhea     Sulfa Drugs Other (See Comments)     Over used in childhood     Penicillins Rash     Mild rash, cephlaosporins okay     Current Outpatient Medications   Medication Sig Dispense Refill     albuterol (PROAIR HFA/PROVENTIL HFA/VENTOLIN HFA) 108 (90 Base) MCG/ACT inhaler Inhale 2 puffs into the lungs every 6 hours 8.5 g 11     ASPIRIN EC PO Take 81 mg by mouth daily       Cholecalciferol (VITAMIN D PO) Take 1,000 Units by mouth daily        Cyanocobalamin (B-12) 1000 MCG CAPS Take 1,000 mcg by mouth daily       dexamethasone (DECADRON) 2 MG tablet Take 2 mg by mouth daily.       Multiple Vitamins-Minerals (CENTRUM SILVER) per tablet Take 1 tablet by mouth daily (with lunch)        NEW MED Take 1 oz by mouth daily (with lunch) Tart cherry juice  - mixed with water       Omega-3-acid Ethyl Esters (FISH OIL OMEGA-3 FATTY ACID) 320MG/ML oral suspension        omeprazole (PRILOSEC) 20 MG DR capsule Take 20 mg by mouth daily       predniSONE 5 MG/5ML solution Take 10 mg by mouth daily       Past Medical History:   Diagnosis Date     Esophageal reflux     Gastroesophageal reflux     Past Surgical History:   Procedure Laterality Date     HERNIA REPAIR, INGUINAL RT/LT  2004     LOBECTOMY  2/11    right lower lobe, for adenoca     SURGICAL HISTORY OF -   1/11    Bronchoscopy ([facility])     SURGICAL HISTORY OF -   2/10    Bronchoscopy [facility]     VASECTOMY  1980       REVIEW OF SYSTEMS:   ROS: 10 point ROS neg other than the symptoms noted above in the HPI and here:  Palliative Symptom Review (0=no symptom/no concern,  "1=mild, 2=moderate, 3=severe):      Pain: 0      Fatigue: 0      Nausea: 0      Constipation: 0      Diarrhea: 0      Depressive Symptoms: 0      Anxiety: 0      Drowsiness: 0      Poor Appetite: 0      Shortness of Breath: 2      Insomnia: 0      Other: 0      Overall (0 good/no concerns, 3 very poor):  0      /80 (BP Location: Right arm, Patient Position: Sitting, Cuff Size: Adult Regular)   Pulse 100   Temp 96.8  F (36  C) (Tympanic)   Resp 24   Ht 1.88 m (6' 2.02\")   Wt 81 kg (178 lb 9.6 oz)   SpO2 (P) 92%   BMI 22.92 kg/m    LUNGS: Scattered rhonchi, worse on the left than the right  CV: RRR without murmur  ABD: BS+, soft, nontender, no masses, no hepatosplenomegaly  EXTREMITIES: without joint tenderness, swelling or erythema.  No muscle tenderness or abnormality.  SKIN: No rashes or abnormalities  NEURO:non focal exam      Impressions:  Palliative Performance Score:  90  Decision Making Capacity:  intact        Recommendations & Counseling:    Adenocarcinoma of lung, right (H)    We discussed his palliative care needs and possible future needs.  Right now he is doing well with supplemental oxygen and inhalers.    He will recheck here in 6-8 weeks, sooner if new symptoms develop    We did discuss hospice and I explained to him that he is eligible for that and he feels he is not mentally ready for that at this point but he is aware of that option.        "

## 2019-07-22 ENCOUNTER — OFFICE VISIT (OUTPATIENT)
Dept: URGENT CARE | Facility: URGENT CARE | Age: 74
End: 2019-07-22
Payer: MEDICARE

## 2019-07-22 VITALS
HEART RATE: 73 BPM | TEMPERATURE: 97.2 F | SYSTOLIC BLOOD PRESSURE: 127 MMHG | DIASTOLIC BLOOD PRESSURE: 81 MMHG | OXYGEN SATURATION: 96 % | WEIGHT: 114.9 LBS | BODY MASS INDEX: 21.02 KG/M2

## 2019-07-22 DIAGNOSIS — R82.90 NONSPECIFIC FINDING ON EXAMINATION OF URINE: ICD-10-CM

## 2019-07-22 DIAGNOSIS — N10 PYELONEPHRITIS, ACUTE: Primary | ICD-10-CM

## 2019-07-22 DIAGNOSIS — R50.9 FEVER AND CHILLS: ICD-10-CM

## 2019-07-22 DIAGNOSIS — M54.50 ACUTE LEFT-SIDED LOW BACK PAIN WITHOUT SCIATICA: ICD-10-CM

## 2019-07-22 DIAGNOSIS — R53.83 OTHER FATIGUE: ICD-10-CM

## 2019-07-22 LAB
ALBUMIN UR-MCNC: NEGATIVE MG/DL
ANION GAP SERPL CALCULATED.3IONS-SCNC: 4 MMOL/L (ref 3–14)
APPEARANCE UR: CLEAR
BASOPHILS # BLD AUTO: 0 10E9/L (ref 0–0.2)
BASOPHILS NFR BLD AUTO: 0.3 %
BILIRUB UR QL STRIP: NEGATIVE
BUN SERPL-MCNC: 14 MG/DL (ref 7–30)
CALCIUM SERPL-MCNC: 8.7 MG/DL (ref 8.5–10.1)
CHLORIDE SERPL-SCNC: 102 MMOL/L (ref 94–109)
CO2 SERPL-SCNC: 33 MMOL/L (ref 20–32)
COLOR UR AUTO: YELLOW
CREAT SERPL-MCNC: 0.67 MG/DL (ref 0.52–1.04)
DIFFERENTIAL METHOD BLD: ABNORMAL
EOSINOPHIL # BLD AUTO: 0 10E9/L (ref 0–0.7)
EOSINOPHIL NFR BLD AUTO: 0.4 %
ERYTHROCYTE [DISTWIDTH] IN BLOOD BY AUTOMATED COUNT: 13.7 % (ref 10–15)
GFR SERPL CREATININE-BSD FRML MDRD: 85 ML/MIN/{1.73_M2}
GLUCOSE SERPL-MCNC: 111 MG/DL (ref 70–99)
GLUCOSE UR STRIP-MCNC: NEGATIVE MG/DL
HCT VFR BLD AUTO: 34 % (ref 35–47)
HGB BLD-MCNC: 11.3 G/DL (ref 11.7–15.7)
HGB UR QL STRIP: ABNORMAL
KETONES UR STRIP-MCNC: NEGATIVE MG/DL
LEUKOCYTE ESTERASE UR QL STRIP: ABNORMAL
LYMPHOCYTES # BLD AUTO: 1.3 10E9/L (ref 0.8–5.3)
LYMPHOCYTES NFR BLD AUTO: 17.9 %
MCH RBC QN AUTO: 29.4 PG (ref 26.5–33)
MCHC RBC AUTO-ENTMCNC: 33.2 G/DL (ref 31.5–36.5)
MCV RBC AUTO: 89 FL (ref 78–100)
MONOCYTES # BLD AUTO: 0.7 10E9/L (ref 0–1.3)
MONOCYTES NFR BLD AUTO: 9.3 %
NEUTROPHILS # BLD AUTO: 5.4 10E9/L (ref 1.6–8.3)
NEUTROPHILS NFR BLD AUTO: 72.1 %
NITRATE UR QL: NEGATIVE
PH UR STRIP: 6.5 PH (ref 5–7)
PLATELET # BLD AUTO: 292 10E9/L (ref 150–450)
POTASSIUM SERPL-SCNC: 3.7 MMOL/L (ref 3.4–5.3)
RBC # BLD AUTO: 3.84 10E12/L (ref 3.8–5.2)
RBC #/AREA URNS AUTO: ABNORMAL /HPF
SODIUM SERPL-SCNC: 139 MMOL/L (ref 133–144)
SOURCE: ABNORMAL
SP GR UR STRIP: 1.01 (ref 1–1.03)
UROBILINOGEN UR STRIP-ACNC: 0.2 EU/DL (ref 0.2–1)
WBC # BLD AUTO: 7.4 10E9/L (ref 4–11)
WBC #/AREA URNS AUTO: ABNORMAL /HPF

## 2019-07-22 PROCEDURE — 81001 URINALYSIS AUTO W/SCOPE: CPT | Performed by: FAMILY MEDICINE

## 2019-07-22 PROCEDURE — 87086 URINE CULTURE/COLONY COUNT: CPT | Performed by: FAMILY MEDICINE

## 2019-07-22 PROCEDURE — 36415 COLL VENOUS BLD VENIPUNCTURE: CPT | Performed by: FAMILY MEDICINE

## 2019-07-22 PROCEDURE — 80048 BASIC METABOLIC PNL TOTAL CA: CPT | Performed by: FAMILY MEDICINE

## 2019-07-22 PROCEDURE — 85025 COMPLETE CBC W/AUTO DIFF WBC: CPT | Performed by: FAMILY MEDICINE

## 2019-07-22 PROCEDURE — 99214 OFFICE O/P EST MOD 30 MIN: CPT | Performed by: FAMILY MEDICINE

## 2019-07-22 RX ORDER — CEFDINIR 300 MG/1
300 CAPSULE ORAL 2 TIMES DAILY
Qty: 20 CAPSULE | Refills: 0 | Status: ON HOLD | OUTPATIENT
Start: 2019-07-22 | End: 2019-08-02

## 2019-07-22 NOTE — PROGRESS NOTES
SUBJECTIVE: Angelica James is a 74 year old female presenting with a chief complaint of feeling warm this am and fatigue and left sided low back pain.  Onset of symptoms was this am.  Course of illness is same.    Severity moderate  Current and Associated symptoms: none  Treatment measures tried include None tried.  Predisposing factors include None.    Past Medical History:   Diagnosis Date     Dementia      Diabetes (H)      Hypertension      Iron deficiency anemia      PUD (peptic ulcer disease)      Schizophrenia (H)        Past Surgical History:   Procedure Laterality Date     colonoscopy       UPPER GI ENDOSCOPY         No family history on file.    Social History     Tobacco Use     Smoking status: Never Smoker     Smokeless tobacco: Never Used   Substance Use Topics     Alcohol use: No        Allergies   Allergen Reactions     Penicillins Hives     ROS:  SKIN: no rash  No cough/st  GI: no vomiting/diarrhea/abd pain    OBJECTIVE:  /81   Pulse 73   Temp 97.2  F (36.2  C) (Oral)   Wt 52.1 kg (114 lb 14.4 oz)   SpO2 96%   BMI 21.02 kg/m     GENERAL APPEARANCE: healthy, alert and no distress  EYES: EOMI,  PERRL, conjunctiva clear  HENT: ear canals and TM's normal.  Nose and mouth without ulcers, erythema or lesions  NECK: supple, nontender, no lymphadenopathy  RESP: lungs clear to auscultation - no rales, rhonchi or wheezes  CV: regular rates and rhythm, normal S1 S2, no murmur noted  ABDOMEN:  soft, nontender, no HSM or masses and bowel sounds normal  SKIN: no suspicious lesions or rashes  CVA pain      ICD-10-CM    1. Pyelonephritis, acute N10 cefdinir (OMNICEF) 300 MG capsule   2. Fever and chills R50.9 CBC with platelets differential     UA with Microscopic reflex to Culture     Basic metabolic panel   3. Acute left-sided low back pain without sciatica M54.5 CBC with platelets differential     UA with Microscopic reflex to Culture     Basic metabolic panel   4. Other fatigue R53.83 CBC with  platelets differential     UA with Microscopic reflex to Culture     Basic metabolic panel   5. Nonspecific finding on examination of urine R82.90 Urine Culture Aerobic Bacterial       Fluids/Rest, f/u if worse/not any better

## 2019-07-23 LAB
BACTERIA SPEC CULT: NORMAL
SPECIMEN SOURCE: NORMAL

## 2019-07-30 ENCOUNTER — HOSPITAL ENCOUNTER (OUTPATIENT)
Dept: GENERAL RADIOLOGY | Facility: CLINIC | Age: 74
Discharge: HOME | DRG: 371 | End: 2019-07-30
Attending: NURSE PRACTITIONER | Admitting: NURSE PRACTITIONER
Payer: MEDICARE

## 2019-07-30 DIAGNOSIS — R58 ECCHYMOSIS: ICD-10-CM
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
## 2019-07-30 DIAGNOSIS — R52 PAIN: ICD-10-CM

## 2019-07-30 PROCEDURE — 73560 X-RAY EXAM OF KNEE 1 OR 2: CPT | Mod: RT

## 2019-07-31 ENCOUNTER — HOSPITAL ENCOUNTER (INPATIENT)
Facility: CLINIC | Age: 74
LOS: 24 days | Discharge: NURSING, REHAB OR LONG-TERM CARE | DRG: 371 | End: 2019-08-24
Attending: HOSPITALIST | Admitting: INTERNAL MEDICINE
Payer: MEDICARE

## 2019-07-31 DIAGNOSIS — F33.1 MODERATE EPISODE OF RECURRENT MAJOR DEPRESSIVE DISORDER (H): Primary | ICD-10-CM

## 2019-07-31 DIAGNOSIS — F03.91 DEMENTIA WITH BEHAVIORAL DISTURBANCE, UNSPECIFIED DEMENTIA TYPE: ICD-10-CM

## 2019-07-31 DIAGNOSIS — F51.05 INSOMNIA DUE TO OTHER MENTAL DISORDER: ICD-10-CM

## 2019-07-31 DIAGNOSIS — A04.72 COLITIS DUE TO CLOSTRIDIUM DIFFICILE: ICD-10-CM

## 2019-07-31 DIAGNOSIS — F99 INSOMNIA DUE TO OTHER MENTAL DISORDER: ICD-10-CM

## 2019-07-31 PROBLEM — R19.7 DIARRHEA: Status: ACTIVE | Noted: 2019-07-31

## 2019-07-31 LAB
ALBUMIN SERPL-MCNC: 3 G/DL (ref 3.4–5)
ALBUMIN UR-MCNC: 10 MG/DL
ALP SERPL-CCNC: 50 U/L (ref 40–150)
ALT SERPL W P-5'-P-CCNC: 132 U/L (ref 0–50)
ANION GAP SERPL CALCULATED.3IONS-SCNC: 6 MMOL/L (ref 3–14)
APPEARANCE UR: ABNORMAL
AST SERPL W P-5'-P-CCNC: 295 U/L (ref 0–45)
BACTERIA #/AREA URNS HPF: ABNORMAL /HPF
BILIRUB SERPL-MCNC: 0.3 MG/DL (ref 0.2–1.3)
BILIRUB UR QL STRIP: NEGATIVE
BUN SERPL-MCNC: 10 MG/DL (ref 7–30)
CALCIUM SERPL-MCNC: 8.4 MG/DL (ref 8.5–10.1)
CAOX CRY #/AREA URNS HPF: ABNORMAL /HPF
CHLORIDE SERPL-SCNC: 102 MMOL/L (ref 94–109)
CK SERPL-CCNC: 8770 U/L (ref 30–225)
CO2 SERPL-SCNC: 30 MMOL/L (ref 20–32)
COLOR UR AUTO: YELLOW
CREAT SERPL-MCNC: 0.61 MG/DL (ref 0.52–1.04)
ERYTHROCYTE [DISTWIDTH] IN BLOOD BY AUTOMATED COUNT: 13.6 % (ref 10–15)
GFR SERPL CREATININE-BSD FRML MDRD: 88 ML/MIN/{1.73_M2}
GLUCOSE BLDC GLUCOMTR-MCNC: 101 MG/DL (ref 70–99)
GLUCOSE SERPL-MCNC: 117 MG/DL (ref 70–99)
GLUCOSE UR STRIP-MCNC: NEGATIVE MG/DL
HCT VFR BLD AUTO: 33.1 % (ref 35–47)
HGB BLD-MCNC: 10.4 G/DL (ref 11.7–15.7)
HGB UR QL STRIP: ABNORMAL
KETONES UR STRIP-MCNC: NEGATIVE MG/DL
LACTATE BLD-SCNC: 1.1 MMOL/L (ref 0.7–2)
LEUKOCYTE ESTERASE UR QL STRIP: ABNORMAL
MAGNESIUM SERPL-MCNC: 1.9 MG/DL (ref 1.6–2.3)
MCH RBC QN AUTO: 28.2 PG (ref 26.5–33)
MCHC RBC AUTO-ENTMCNC: 31.4 G/DL (ref 31.5–36.5)
MCV RBC AUTO: 90 FL (ref 78–100)
MUCOUS THREADS #/AREA URNS LPF: PRESENT /LPF
NITRATE UR QL: NEGATIVE
PH UR STRIP: 6 PH (ref 5–7)
PHOSPHATE SERPL-MCNC: 3.4 MG/DL (ref 2.5–4.5)
PLATELET # BLD AUTO: 277 10E9/L (ref 150–450)
POTASSIUM SERPL-SCNC: 2.8 MMOL/L (ref 3.4–5.3)
PROT SERPL-MCNC: 6.4 G/DL (ref 6.8–8.8)
RBC # BLD AUTO: 3.69 10E12/L (ref 3.8–5.2)
RBC #/AREA URNS AUTO: 18 /HPF (ref 0–2)
SODIUM SERPL-SCNC: 139 MMOL/L (ref 133–144)
SOURCE: ABNORMAL
SP GR UR STRIP: 1.01 (ref 1–1.03)
SQUAMOUS #/AREA URNS AUTO: 2 /HPF (ref 0–1)
UROBILINOGEN UR STRIP-MCNC: NORMAL MG/DL (ref 0–2)
WBC # BLD AUTO: 12.2 10E9/L (ref 4–11)
WBC #/AREA URNS AUTO: 57 /HPF (ref 0–5)

## 2019-07-31 PROCEDURE — 82550 ASSAY OF CK (CPK): CPT | Performed by: PHYSICIAN ASSISTANT

## 2019-07-31 PROCEDURE — 87186 SC STD MICRODIL/AGAR DIL: CPT | Performed by: INTERNAL MEDICINE

## 2019-07-31 PROCEDURE — 40000141 ZZH STATISTIC PERIPHERAL IV START W/O US GUIDANCE

## 2019-07-31 PROCEDURE — 83735 ASSAY OF MAGNESIUM: CPT | Performed by: PHYSICIAN ASSISTANT

## 2019-07-31 PROCEDURE — 80053 COMPREHEN METABOLIC PANEL: CPT | Performed by: PHYSICIAN ASSISTANT

## 2019-07-31 PROCEDURE — 84100 ASSAY OF PHOSPHORUS: CPT | Performed by: PHYSICIAN ASSISTANT

## 2019-07-31 PROCEDURE — 87086 URINE CULTURE/COLONY COUNT: CPT | Performed by: INTERNAL MEDICINE

## 2019-07-31 PROCEDURE — 99207 ZZC APP CREDIT; MD BILLING SHARED VISIT: CPT | Performed by: PHYSICIAN ASSISTANT

## 2019-07-31 PROCEDURE — 12000001 ZZH R&B MED SURG/OB UMMC

## 2019-07-31 PROCEDURE — 00000146 ZZHCL STATISTIC GLUCOSE BY METER IP

## 2019-07-31 PROCEDURE — 87088 URINE BACTERIA CULTURE: CPT | Performed by: INTERNAL MEDICINE

## 2019-07-31 PROCEDURE — 83605 ASSAY OF LACTIC ACID: CPT

## 2019-07-31 PROCEDURE — 25800030 ZZH RX IP 258 OP 636: Performed by: PHYSICIAN ASSISTANT

## 2019-07-31 PROCEDURE — 36415 COLL VENOUS BLD VENIPUNCTURE: CPT | Performed by: HOSPITALIST

## 2019-07-31 PROCEDURE — 83605 ASSAY OF LACTIC ACID: CPT | Performed by: HOSPITALIST

## 2019-07-31 PROCEDURE — 36415 COLL VENOUS BLD VENIPUNCTURE: CPT | Performed by: PHYSICIAN ASSISTANT

## 2019-07-31 PROCEDURE — 99223 1ST HOSP IP/OBS HIGH 75: CPT | Mod: AI | Performed by: INTERNAL MEDICINE

## 2019-07-31 PROCEDURE — 85027 COMPLETE CBC AUTOMATED: CPT | Performed by: PHYSICIAN ASSISTANT

## 2019-07-31 PROCEDURE — 25000128 H RX IP 250 OP 636: Performed by: PHYSICIAN ASSISTANT

## 2019-07-31 PROCEDURE — 81001 URINALYSIS AUTO W/SCOPE: CPT | Performed by: PHYSICIAN ASSISTANT

## 2019-07-31 PROCEDURE — 25000132 ZZH RX MED GY IP 250 OP 250 PS 637: Mod: GY | Performed by: PHYSICIAN ASSISTANT

## 2019-07-31 RX ORDER — ONDANSETRON 2 MG/ML
4 INJECTION INTRAMUSCULAR; INTRAVENOUS EVERY 6 HOURS PRN
Status: DISCONTINUED | OUTPATIENT
Start: 2019-07-31 | End: 2019-08-24 | Stop reason: HOSPADM

## 2019-07-31 RX ORDER — POTASSIUM CHLORIDE 1.5 G/1.58G
20-40 POWDER, FOR SOLUTION ORAL
Status: DISCONTINUED | OUTPATIENT
Start: 2019-07-31 | End: 2019-08-01

## 2019-07-31 RX ORDER — POTASSIUM CHLORIDE 7.45 MG/ML
10 INJECTION INTRAVENOUS
Status: DISCONTINUED | OUTPATIENT
Start: 2019-07-31 | End: 2019-08-01

## 2019-07-31 RX ORDER — MULTIVITAMIN,THERAPEUTIC
1 TABLET ORAL EVERY EVENING
COMMUNITY

## 2019-07-31 RX ORDER — LIDOCAINE 40 MG/G
CREAM TOPICAL
Status: DISCONTINUED | OUTPATIENT
Start: 2019-07-31 | End: 2019-08-24 | Stop reason: HOSPADM

## 2019-07-31 RX ORDER — ACETAMINOPHEN 500 MG
500-1000 TABLET ORAL EVERY 6 HOURS PRN
Status: DISCONTINUED | OUTPATIENT
Start: 2019-07-31 | End: 2019-07-31

## 2019-07-31 RX ORDER — POTASSIUM CHLORIDE 750 MG/1
20-40 TABLET, EXTENDED RELEASE ORAL
Status: DISCONTINUED | OUTPATIENT
Start: 2019-07-31 | End: 2019-08-01

## 2019-07-31 RX ORDER — RISPERIDONE 0.5 MG/1
1 TABLET ORAL EVERY MORNING
Status: DISCONTINUED | OUTPATIENT
Start: 2019-08-01 | End: 2019-08-24 | Stop reason: HOSPADM

## 2019-07-31 RX ORDER — POTASSIUM CHLORIDE 29.8 MG/ML
20 INJECTION INTRAVENOUS
Status: DISCONTINUED | OUTPATIENT
Start: 2019-07-31 | End: 2019-08-01

## 2019-07-31 RX ORDER — POLYETHYLENE GLYCOL 3350 17 G/17G
17 POWDER, FOR SOLUTION ORAL DAILY PRN
Status: DISCONTINUED | OUTPATIENT
Start: 2019-07-31 | End: 2019-08-24 | Stop reason: HOSPADM

## 2019-07-31 RX ORDER — RISPERIDONE 2 MG/1
4 TABLET ORAL AT BEDTIME
Status: DISCONTINUED | OUTPATIENT
Start: 2019-07-31 | End: 2019-08-08

## 2019-07-31 RX ORDER — NALOXONE HYDROCHLORIDE 0.4 MG/ML
.1-.4 INJECTION, SOLUTION INTRAMUSCULAR; INTRAVENOUS; SUBCUTANEOUS
Status: DISCONTINUED | OUTPATIENT
Start: 2019-07-31 | End: 2019-08-24 | Stop reason: HOSPADM

## 2019-07-31 RX ORDER — POTASSIUM CL/LIDO/0.9 % NACL 10MEQ/0.1L
10 INTRAVENOUS SOLUTION, PIGGYBACK (ML) INTRAVENOUS
Status: DISCONTINUED | OUTPATIENT
Start: 2019-07-31 | End: 2019-08-01

## 2019-07-31 RX ORDER — CEFTRIAXONE 1 G/1
1 INJECTION, POWDER, FOR SOLUTION INTRAMUSCULAR; INTRAVENOUS EVERY 24 HOURS
Status: COMPLETED | OUTPATIENT
Start: 2019-07-31 | End: 2019-08-02

## 2019-07-31 RX ORDER — ONDANSETRON 4 MG/1
4 TABLET, ORALLY DISINTEGRATING ORAL EVERY 6 HOURS PRN
Status: DISCONTINUED | OUTPATIENT
Start: 2019-07-31 | End: 2019-08-24 | Stop reason: HOSPADM

## 2019-07-31 RX ORDER — SODIUM CHLORIDE 9 MG/ML
INJECTION, SOLUTION INTRAVENOUS CONTINUOUS
Status: DISCONTINUED | OUTPATIENT
Start: 2019-07-31 | End: 2019-07-31

## 2019-07-31 RX ORDER — SODIUM CHLORIDE, SODIUM LACTATE, POTASSIUM CHLORIDE, CALCIUM CHLORIDE 600; 310; 30; 20 MG/100ML; MG/100ML; MG/100ML; MG/100ML
INJECTION, SOLUTION INTRAVENOUS CONTINUOUS
Status: DISCONTINUED | OUTPATIENT
Start: 2019-07-31 | End: 2019-08-02

## 2019-07-31 RX ORDER — PAROXETINE 40 MG/1
40 TABLET, FILM COATED ORAL DAILY
Status: DISCONTINUED | OUTPATIENT
Start: 2019-08-01 | End: 2019-08-06

## 2019-07-31 RX ADMIN — RISPERIDONE 4 MG: 2 TABLET ORAL at 23:22

## 2019-07-31 RX ADMIN — CEFTRIAXONE 1 G: 1 INJECTION, POWDER, FOR SOLUTION INTRAMUSCULAR; INTRAVENOUS at 22:14

## 2019-07-31 RX ADMIN — SODIUM CHLORIDE, POTASSIUM CHLORIDE, SODIUM LACTATE AND CALCIUM CHLORIDE: 600; 310; 30; 20 INJECTION, SOLUTION INTRAVENOUS at 23:21

## 2019-07-31 RX ADMIN — POTASSIUM CHLORIDE 40 MEQ: 10 TABLET, EXTENDED RELEASE ORAL at 23:23

## 2019-07-31 ASSESSMENT — ACTIVITIES OF DAILY LIVING (ADL)
TRANSFERRING: 1-->ASSISTIVE EQUIPMENT
WHICH_OF_THE_ABOVE_FUNCTIONAL_RISKS_HAD_A_RECENT_ONSET_OR_CHANGE?: COGNITION;FALL HISTORY
COGNITION: 1 - ATTENTION OR MEMORY DEFICITS
FALL_HISTORY_WITHIN_LAST_SIX_MONTHS: YES
DRESS: 0-->INDEPENDENT
BATHING: 1-->ASSISTIVE EQUIPMENT
SWALLOWING: 0-->SWALLOWS FOODS/LIQUIDS WITHOUT DIFFICULTY
TOILETING: 1-->ASSISTIVE EQUIPMENT
RETIRED_EATING: 0-->INDEPENDENT
AMBULATION: 1-->ASSISTIVE EQUIPMENT
RETIRED_COMMUNICATION: 2-->DIFFICULTY UNDERSTANDING (NOT RELATED TO LANGUAGE BARRIER)
NUMBER_OF_TIMES_PATIENT_HAS_FALLEN_WITHIN_LAST_SIX_MONTHS: 3

## 2019-07-31 NOTE — LETTER
Health Information Management Services               Recipient:  [facility]        Sender:  HAN Olguin, [facility]  5th Floor   Pager [phone]          Date: August 24, 2019  Patient Name:  Angelica James  Routing Message:    Discharge orders        The documents accompanying this notice contain confidential information belonging to the sender.  This information is intended only for the use of the individual or entity named above.  The authorized recipient of this information is prohibited from disclosing this information to any other party and is required to destroy the information after its stated need has been fulfilled, unless otherwise required by state law.      If you are not the intended recipient, you are hereby notified that any disclosure, copy, distribution or action taken in reliance on the contents of these documents is strictly prohibited.  If you have received this document in error, please return it by fax to [phone] with a note on the cover sheet explaining why you are returning it (e.g. not your patient, not your provider, etc.).  If you need further assistance, please call [employer] Centralized Transcription at [phone].  Documents may also be returned by mail to [employer], , [facility] [address], [place], [place] [address].

## 2019-07-31 NOTE — PROGRESS NOTES
[facility]  Transfer Triage Note    Date of call: 07/31/19  Time of call: 1:58 PM    Reason for Transfer:Procedure can be done here and not at referring hospital  Diagnosis: Failure to Thrive    Outside Records: Not available  Additional records requested to be faxed to [phone].    Stability of Patient: Patient is vitally stable, with no critical labs, and will likely remain stable throughout the transfer process    Expected Time of Arrival for Transfer: 0-8 hours    Recommendations for Management and Stabilization: Not needed    Additional Comments   PMhx:  Dementia, Mild anemia, Schizophrenia- Risperidal    Reason for admission: FTT  Recently was seen in urgent  7/22/19  Last week, Kidney infection-- Cefdinir  for 10days  7/29- called the [facility] clinic with diarrhea  7/30- Was seen in the clinic and did not get checked for C.diff as the diarrhea was better    Labs done on 7/30   CBC:Hgb 11.8, WBC-10.5, platelets-304  BMP: Na 137  K 2.9- Creat 0.69   (new)/ ALT 79, albumin 3.2  UA/Ucx pending- Nitrate +, granular casts in urine  TSH- normal  A1c 5.9  Weight trend according to [facility]   Dec 120lbs June 112lbs, July 109lbs    Requesting admission for FTT   Plan  - Patient will be admitted to medicine team.   - Upon arrival, she will need labs with special attention to K, C.Diff given her recent hx of Abx and diarrhea  - Keep pt on tele for hypokalemia  - Advised referring provider to send pt to ED if urgent eval is deemed necessary as we have no ETA on bed availability. Pt is currently at home    Please call triage MD listed on [facility]COM. Writer might have changed shift    Gino Monte MD       13-Apr-2017 05:26 13-Apr-2017 08:46 13-Apr-2017 10:12

## 2019-08-01 ENCOUNTER — APPOINTMENT (OUTPATIENT)
Dept: OCCUPATIONAL THERAPY | Facility: CLINIC | Age: 74
DRG: 371 | End: 2019-08-01
Attending: PHYSICIAN ASSISTANT
Payer: MEDICARE

## 2019-08-01 ENCOUNTER — OFFICE VISIT (OUTPATIENT)
Dept: INTERPRETER SERVICES | Facility: CLINIC | Age: 74
End: 2019-08-01

## 2019-08-01 ENCOUNTER — APPOINTMENT (OUTPATIENT)
Dept: PHYSICAL THERAPY | Facility: CLINIC | Age: 74
DRG: 371 | End: 2019-08-01
Attending: PHYSICIAN ASSISTANT
Payer: MEDICARE

## 2019-08-01 PROBLEM — A04.72 COLITIS DUE TO CLOSTRIDIUM DIFFICILE: Status: ACTIVE | Noted: 2019-08-01

## 2019-08-01 PROBLEM — F03.918 DEMENTIA WITH BEHAVIORAL DISTURBANCE (H): Status: ACTIVE | Noted: 2019-08-01

## 2019-08-01 PROBLEM — R62.7 FAILURE TO THRIVE IN ADULT: Status: ACTIVE | Noted: 2019-08-01

## 2019-08-01 LAB
ALBUMIN SERPL-MCNC: 2.8 G/DL (ref 3.4–5)
ALP SERPL-CCNC: 49 U/L (ref 40–150)
ALT SERPL W P-5'-P-CCNC: 130 U/L (ref 0–50)
ANION GAP SERPL CALCULATED.3IONS-SCNC: 7 MMOL/L (ref 3–14)
AST SERPL W P-5'-P-CCNC: 264 U/L (ref 0–45)
BILIRUB SERPL-MCNC: 0.5 MG/DL (ref 0.2–1.3)
BUN SERPL-MCNC: 9 MG/DL (ref 7–30)
C DIFF TOX B STL QL: POSITIVE
CALCIUM SERPL-MCNC: 8.2 MG/DL (ref 8.5–10.1)
CHLORIDE SERPL-SCNC: 107 MMOL/L (ref 94–109)
CK SERPL-CCNC: 7577 U/L (ref 30–225)
CO2 SERPL-SCNC: 29 MMOL/L (ref 20–32)
CREAT SERPL-MCNC: 0.58 MG/DL (ref 0.52–1.04)
ERYTHROCYTE [DISTWIDTH] IN BLOOD BY AUTOMATED COUNT: 13.6 % (ref 10–15)
GFR SERPL CREATININE-BSD FRML MDRD: 89 ML/MIN/{1.73_M2}
GLUCOSE BLDC GLUCOMTR-MCNC: 137 MG/DL (ref 70–99)
GLUCOSE SERPL-MCNC: 84 MG/DL (ref 70–99)
HCT VFR BLD AUTO: 33 % (ref 35–47)
HGB BLD-MCNC: 10.2 G/DL (ref 11.7–15.7)
MCH RBC QN AUTO: 27.9 PG (ref 26.5–33)
MCHC RBC AUTO-ENTMCNC: 30.9 G/DL (ref 31.5–36.5)
MCV RBC AUTO: 90 FL (ref 78–100)
PLATELET # BLD AUTO: 288 10E9/L (ref 150–450)
POTASSIUM SERPL-SCNC: 4 MMOL/L (ref 3.4–5.3)
PROT SERPL-MCNC: 6 G/DL (ref 6.8–8.8)
RBC # BLD AUTO: 3.66 10E12/L (ref 3.8–5.2)
SODIUM SERPL-SCNC: 143 MMOL/L (ref 133–144)
SPECIMEN SOURCE: ABNORMAL
WBC # BLD AUTO: 11.9 10E9/L (ref 4–11)

## 2019-08-01 PROCEDURE — 25000128 H RX IP 250 OP 636: Performed by: INTERNAL MEDICINE

## 2019-08-01 PROCEDURE — 99233 SBSQ HOSP IP/OBS HIGH 50: CPT | Performed by: INTERNAL MEDICINE

## 2019-08-01 PROCEDURE — 97162 PT EVAL MOD COMPLEX 30 MIN: CPT | Mod: GP | Performed by: PHYSICAL THERAPIST

## 2019-08-01 PROCEDURE — 97530 THERAPEUTIC ACTIVITIES: CPT | Mod: GO

## 2019-08-01 PROCEDURE — 25000132 ZZH RX MED GY IP 250 OP 250 PS 637: Mod: GY | Performed by: HOSPITALIST

## 2019-08-01 PROCEDURE — 25800030 ZZH RX IP 258 OP 636: Performed by: PHYSICIAN ASSISTANT

## 2019-08-01 PROCEDURE — 97165 OT EVAL LOW COMPLEX 30 MIN: CPT | Mod: GO

## 2019-08-01 PROCEDURE — 97535 SELF CARE MNGMENT TRAINING: CPT | Mod: GO

## 2019-08-01 PROCEDURE — 82550 ASSAY OF CK (CPK): CPT | Performed by: PHYSICIAN ASSISTANT

## 2019-08-01 PROCEDURE — 25000132 ZZH RX MED GY IP 250 OP 250 PS 637: Mod: GY | Performed by: INTERNAL MEDICINE

## 2019-08-01 PROCEDURE — 97116 GAIT TRAINING THERAPY: CPT | Mod: GP | Performed by: PHYSICAL THERAPIST

## 2019-08-01 PROCEDURE — 85027 COMPLETE CBC AUTOMATED: CPT | Performed by: PHYSICIAN ASSISTANT

## 2019-08-01 PROCEDURE — 97530 THERAPEUTIC ACTIVITIES: CPT | Mod: GP | Performed by: PHYSICAL THERAPIST

## 2019-08-01 PROCEDURE — 80053 COMPREHEN METABOLIC PANEL: CPT | Performed by: PHYSICIAN ASSISTANT

## 2019-08-01 PROCEDURE — 25000132 ZZH RX MED GY IP 250 OP 250 PS 637: Mod: GY | Performed by: PHYSICIAN ASSISTANT

## 2019-08-01 PROCEDURE — 87493 C DIFF AMPLIFIED PROBE: CPT | Performed by: PHYSICIAN ASSISTANT

## 2019-08-01 PROCEDURE — 00000146 ZZHCL STATISTIC GLUCOSE BY METER IP

## 2019-08-01 PROCEDURE — 12000001 ZZH R&B MED SURG/OB UMMC

## 2019-08-01 PROCEDURE — 36415 COLL VENOUS BLD VENIPUNCTURE: CPT | Performed by: PHYSICIAN ASSISTANT

## 2019-08-01 PROCEDURE — T1013 SIGN LANG/ORAL INTERPRETER: HCPCS | Mod: U3

## 2019-08-01 RX ORDER — PHENAZOPYRIDINE HYDROCHLORIDE 100 MG/1
100 TABLET, FILM COATED ORAL
Status: DISCONTINUED | OUTPATIENT
Start: 2019-08-01 | End: 2019-08-04

## 2019-08-01 RX ORDER — VANCOMYCIN HYDROCHLORIDE 50 MG/ML
125 KIT ORAL 4 TIMES DAILY
Status: DISCONTINUED | OUTPATIENT
Start: 2019-08-01 | End: 2019-08-14

## 2019-08-01 RX ADMIN — VANCOMYCIN HYDROCHLORIDE 125 MG: KIT at 10:50

## 2019-08-01 RX ADMIN — PAROXETINE HYDROCHLORIDE 40 MG: 40 TABLET, FILM COATED ORAL at 10:48

## 2019-08-01 RX ADMIN — SODIUM CHLORIDE, POTASSIUM CHLORIDE, SODIUM LACTATE AND CALCIUM CHLORIDE: 600; 310; 30; 20 INJECTION, SOLUTION INTRAVENOUS at 19:00

## 2019-08-01 RX ADMIN — VANCOMYCIN HYDROCHLORIDE 125 MG: KIT at 13:35

## 2019-08-01 RX ADMIN — Medication 1 MG: at 01:11

## 2019-08-01 RX ADMIN — VANCOMYCIN HYDROCHLORIDE 125 MG: KIT at 19:46

## 2019-08-01 RX ADMIN — VANCOMYCIN HYDROCHLORIDE 125 MG: KIT at 16:24

## 2019-08-01 RX ADMIN — RISPERIDONE 1 MG: 0.5 TABLET ORAL at 10:48

## 2019-08-01 RX ADMIN — POTASSIUM CHLORIDE 40 MEQ: 10 TABLET, EXTENDED RELEASE ORAL at 01:10

## 2019-08-01 RX ADMIN — PHENAZOPYRIDINE HYDROCHLORIDE 100 MG: 100 TABLET, FILM COATED ORAL at 13:35

## 2019-08-01 RX ADMIN — SODIUM CHLORIDE, POTASSIUM CHLORIDE, SODIUM LACTATE AND CALCIUM CHLORIDE: 600; 310; 30; 20 INJECTION, SOLUTION INTRAVENOUS at 13:35

## 2019-08-01 RX ADMIN — PHENAZOPYRIDINE HYDROCHLORIDE 100 MG: 100 TABLET, FILM COATED ORAL at 19:46

## 2019-08-01 RX ADMIN — Medication 2.5 MG: at 06:17

## 2019-08-01 RX ADMIN — CEFTRIAXONE 1 G: 1 INJECTION, POWDER, FOR SOLUTION INTRAMUSCULAR; INTRAVENOUS at 22:04

## 2019-08-01 RX ADMIN — RISPERIDONE 4 MG: 2 TABLET ORAL at 22:04

## 2019-08-01 ASSESSMENT — ACTIVITIES OF DAILY LIVING (ADL)
ADLS_ACUITY_SCORE: 19
ADLS_ACUITY_SCORE: 12.5
ADLS_ACUITY_SCORE: 23
PREVIOUS_RESPONSIBILITIES: MEAL PREP;HOUSEKEEPING
ADLS_ACUITY_SCORE: 22
ADLS_ACUITY_SCORE: 22
ADLS_ACUITY_SCORE: 19

## 2019-08-01 NOTE — PROGRESS NOTES
08/01/19 1600   Quick Adds   Type of Visit Initial Occupational Therapy Evaluation   Living Environment   Lives With child(yahir), adult   Living Arrangements apartment   Home Accessibility stairs to enter home   Transportation Anticipated family or friend will provide   Living Environment Comment Per PT, pt daughter works during the day with pt home alone throughout the day. Tub shower at home.   Self-Care   Usual Activity Tolerance moderate   Current Activity Tolerance fair   Equipment Currently Used at Home shower chair   Activity/Exercise/Self-Care Comment Pt reports that she uses a shower chair at home.   Functional Level   Ambulation 0-->independent   Transferring 0-->independent   Toileting 0-->independent   Bathing 1-->assistive equipment   Dressing 0-->independent   Eating 0-->independent   Communication 2-->difficulty understanding (not related to language barrier)   Swallowing 0-->swallows foods/liquids without difficulty   Cognition 1 - attention or memory deficits   Fall history within last six months yes   Number of times patient has fallen within last six months 3   Which of the above functional risks had a recent onset or change? ambulation;transferring;toileting;bathing;dressing;fall history   Prior Functional Level Comment Pt reports that she is IND at baseline with ADLs. Unsure how IND 2/2 to pt baseline dementia.       Present yes   General Information   Onset of Illness/Injury or Date of Surgery - Date 07/31/19   Referring Physician Montserrat Beckham PA-C   Additional Occupational Profile Info/Pertinent History of Current Problem 75 YO woman with bipolar disorder, paranoid schizophrenia, dementia who was admitted with mild rhabdomyolysis and C Diff diarrhea.    Precautions/Limitations fall precautions   Cognitive Status Examination   Orientation place;person   Level of Consciousness alert   Follows Commands (Cognition) 50-74% accuracy;follows one step  commands;delayed response/completion;increased processing time needed;verbal cues/prompting required;physical/tactile prompts required   Memory impaired   Cognitive Comment Pt stating that it is April and that Darrius is president   Visual Perception   Visual Perception No deficits were identified   Posture   Posture forward head position   Range of Motion (ROM)   ROM Comment WFL   Strength   Strength Comments WFL   Transfer Skill: Sit to Stand   Level of [place]: Sit/Stand contact guard   Transfer Skill: Toilet Transfer   Level of [place]: Toilet contact guard   Lower Body Dressing   Level of [place]: Dress Lower Body stand-by assist   Toileting   Level of [place]: Toilet contact guard   Grooming   Level of [place]: Grooming stand-by assist   Instrumental Activities of Daily Living (IADL)   Previous Responsibilities meal prep;housekeeping   IADL Comments Pt reports that she A with cooking and cleaning at baseline with daughter A with the rest of IADLs.   Activities of Daily Living Analysis   Impairments Contributing to Impaired Activities of Daily Living balance impaired;cognition impaired;strength decreased   General Therapy Interventions   Planned Therapy Interventions ADL retraining;IADL retraining;strengthening;transfer training;risk factor education;progressive activity/exercise   Clinical Impression   Criteria for Skilled Therapeutic Interventions Met yes, treatment indicated   OT Diagnosis Decreased abiltiy to safely complete ADLs   Influenced by the following impairments cognition, medical status, weakness   Assessment of Occupational Performance 3-5 Performance Deficits   Identified Performance Deficits bathing, dressing, toileting, transfers,amb   Clinical Decision Making (Complexity) Low complexity   Therapy Frequency 5x/week   Predicted Duration of Therapy Intervention (days/wks) 1 week   Anticipated Discharge Disposition Transitional Care Facility   Risks and Benefits of  "Treatment have been explained. Yes   Patient, Family & other staff in agreement with plan of care Other (comments)  (Pt with baseline dementia and unsure pt understanding of POC)   Clinical Impression Comments Pt would benefit from continued skilled OT to progress IND with ADLs   [facility] AM-PAC  \"6 Clicks\" Daily Activity Inpatient Short Form   1. Putting on and taking off regular lower body clothing? 3 - A Little   2. Bathing (including washing, rinsing, drying)? 3 - A Little   3. Toileting, which includes using toilet, bedpan or urinal? 3 - A Little   4. Putting on and taking off regular upper body clothing? 3 - A Little   5. Taking care of personal grooming such as brushing teeth? 4 - None   6. Eating meals? 4 - None   Daily Activity Raw Score (Score out of 24.Lower scores equate to lower levels of function) 20   Total Evaluation Time   Total Evaluation Time (Minutes) 5     "

## 2019-08-01 NOTE — PROVIDER NOTIFICATION
Aundrea MOSER at [address] 5[address]224 EUNICE Veliz RN [phone]  at bedside until 1pm for MD rounds.     MD called writer back and said he already rounded on pt-cont with current poc. Writer informed MD pt declined lovenox inj this am despite pt education. Pt has been up and walking with PT/OT and sitting in chair.

## 2019-08-01 NOTE — PROGRESS NOTES
[facility], [place]    Medicine Progress Note - Hospitalist Service, Gold Nesha       Date of Admission:  7/31/2019    73 YO woman with bipolar disorder, paranoid schizophrenia, dementia who was admitted with mild rhabdomyolysis and C Diff diarrhea.     Assessment & Plan     #. Rhabdomyolysis  #. Falls  #. Failure to Thrive  #. Paranoid schizophrenia  #. Dementia  - Admitted after being found on the ground by family and multiple falls.   - Per daughter, patient requires 24/7 care beyond what family can provide at this point.   [] PT/OT/nutrition consults pending, anticipated acute rehab or long term placement  [] Repeat CK tomorrow, continue with IVF hydration w/ LR at 150 ml/hour  [] Continue with paxil 40 mg qday, Risperdal 0.5 mg qam and qnoon, 4 mg at bedtime    #C Diff Diarrhea  - Admitted with several days of diarrhea after antibiotic  - C Diff assay (+) with mild leukocytosis   [] Continue with PO Vancomycin for 14 days (07/)    #. Hypokalemia: Normalized after replacement, discontinued telemetry     #. Abnormal transaminases:  - Admitted with mildly elevated enzymes. Previous US 2014 was unremarkable.   [] Repeat LFT tomorrow  [] Holding on US for now. -     #DM2: PTA maintained on metformin 500 mg qday, daughter notes NP at clinic told her to hold. A1C 6.7 in 04/2019.   [] Agree with holding metformin while inpatient; consider discontinuing metformin at discharge  [] BG BID       #HTN: Not on meds PTA. BP stable on admission.   #CALROS: PTA maintained on iron BID. Hgb stabel at 10.4 w/ MCV of 90. Hold iron for now.   . Daughter denies recent mood change. Continue home medications.   #PUD: [facility] EGD in 2010 w/ small healing ulcer in gastric antrum, no stigmata of recent bleeding. Hgb stable, not on PPI. Continue OP f/u.     Diet: Regular Diet Adult  Snacks/Supplements Adult: Magic Cup; Between Meals    DVT Prophylaxis: Enoxaparin (Lovenox) SQ  Darby Catheter: not present  Code  Status: Full Code      Disposition Plan   Expected discharge: 2 - 3 days, recommended to transitional care unit once mental status at baseline.  Entered: Salas Beckford MD 08/01/2019, 2:37 PM       The patient's care was discussed with the Patient's Family (Daugther).    Salas Beckford MD  Hospitalist Service, [address], [place]  Pager: [phone]  Please see sticky note for cross cover information  ______________________________________________________________________    Interval History   Patient reports diarrhea has improved without abdominal pain, fever/chills, shortness of breath    Data reviewed today: I reviewed all medications, new labs and imaging results over the last 24 hours. I personally reviewed no images or EKG's today.    Physical Exam   Vital Signs: Temp: 98.4  F (36.9  C) Temp src: Oral BP: (!) 144/64 Pulse: 88   Resp: 20 SpO2: 93 % O2 Device: None (Room air)    Weight: 0 lbs 0 oz  General: nontoxic  HEENT: no scleral icterus, normal conjunctiva, no facial rash  Chest: normal effort, clear lungs to auscultation  Cardiac: regula rhythm, no appreciable murmur  Abdomen: non-distended, nontender  Extremities: no lower extremity edema  Skin: no rash  Neuro: no facial droop, normal speech, no focal deficitis  Psych: alert, cooperative      Data   Recent Labs   Lab 08/01/19 0527 07/31/19 2013   WBC 11.9* 12.2*   HGB 10.2* 10.4*   MCV 90 90    277    139   POTASSIUM 4.0 2.8*   CHLORIDE 107 102   CO2 29 30   BUN 9 10   CR 0.58 0.61   ANIONGAP 7 6   SHERI 8.2* 8.4*   GLC 84 117*   ALBUMIN 2.8* 3.0*   PROTTOTAL 6.0* 6.4*   BILITOTAL 0.5 0.3   ALKPHOS 49 50   * 132*   * 295*   CK>7500

## 2019-08-01 NOTE — SIGNIFICANT EVENT
"SPIRITUAL HEALTH SERVICES  SPIRITUAL ASSESSMENT Progress Note  [facility] ([place]) 5B     REFERRAL SOURCE: Consult    Through an  I visited with pt.    The pt said, \"I am [employer]ah[employer]ism. I like to pray.\"     She wanted to talk, pray and receive Communion. She was also open to anointing.    \"I have a daughter and 3 grandchildren\" who live locally.      PATIENT ANOINTED and given Communion by Father Sukh Goetz 8/1/2019.     I told her I would try to visit her once per week.    PLAN: Will visit weekly.    Sukh Goetz M.Div.     Pager [phone]    "

## 2019-08-01 NOTE — PROGRESS NOTES
"CLINICAL NUTRITION SERVICES - ASSESSMENT NOTE     Nutrition Prescription    RECOMMENDATIONS FOR MDs/PROVIDERS TO ORDER:  Encourage meal ordering and small frequent meals     Malnutrition Status:    Severe malnutrition in the context of chronic illness    Recommendations already ordered by Registered Dietitian (RD):  Magic cup (vanilla) at 2 pm and HS    Future/Additional Recommendations:  1. Monitor acceptance of nutrition supplements  2. Monitor PO and wt trends   3. Continue to encourage small frequent meals      REASON FOR ASSESSMENT  Angelica James is a/an 74 year old female assessed by the dietitian for Admission Nutrition Risk Screen for unintentional loss of 10# or more in the past two months and Provider Order - poor PO intake, concern for weight loss, optimize diet    PMH: dementia, paranoid schizophrenia, HTN, DM2, CARLOS admitted on 7/31/2019 for FTT, recent UTI, concern for diarrhea    NUTRITION HISTORY  Angelica reports having a good appetite and that she ate most of her breakfast. Pt unable to recall how her appetite and intake were prior to admission.     GI: C. Diff positive     CURRENT NUTRITION ORDERS  Diet: Regular  Intake/Tolerance: Reports that she ate her breakfast but not lunch yet. Said she was feeling hungry, writer ordered lunch for pt.     LABS  Labs reviewed  K 4 (WNL- improved w/ replacement)   BG 7/31-8/1:   Ketones 7/31: Negative    MEDICATIONS  Medications reviewed    ANTHROPOMETRICS  Height: 5' 2\"  Most Recent Weight:  Wt Readings from Last 1 Encounters:   07/22/19 52.1 kg (114 lb 14.4 oz)   IBW: 50 kg (104%)   BMI: Normal BMI  Weight History: Wt loss of 6 kg (10%) over the past 5 months.   Wt Readings from Last 5 Encounters:   07/22/19 52.1 kg (114 lb 14.4 oz)   04/08/19 52 kg (114 lb 10.2 oz)   02/19/19 58.1 kg (128 lb)   12/07/18 54 kg (119 lb)   12/07/18 54 kg (119 lb)     Dosing Weight: 52 kg (actual)     ASSESSED NUTRITION NEEDS  Estimated Energy Needs: 2900-1547 kcals/day " (30 - 35 kcals/kg )  Justification: Repletion  Estimated Protein Needs: 63-78 grams protein/day (1.2 - 1.5 grams of pro/kg)  Justification: Repletion  Estimated Fluid Needs: (1 mL/kcal)   Justification: Maintenance    PHYSICAL FINDINGS  See malnutrition section below.    MALNUTRITION  % Intake: Unable to assess- pt unable to provide reliable history   % Weight Loss: > 10% in 6 months (severe)  Subcutaneous Fat Loss: Facial region:  Mild  Muscle Loss: Temporal:  Mild, Upper leg (quadricep, hamstring):  Moderate and Posterior calf:  Moderate   Fluid Accumulation/Edema: None noted  Malnutrition Diagnosis: Severe malnutrition in the context of chronic illness    NUTRITION DIAGNOSIS  Unintended weight loss related to likely poor PO intake 2/2 dementia and now C. Diff positive as evidenced by wt loss of 10% over the past 5 months.       INTERVENTIONS  Implementation  Nutrition Education: Discussed current intake and appetite, pt said she thought her intake was currently good. Encouraged adding high calorie snacks to increase normal intake. Provided handout- High-Calorie, High Protein Nutrition Therapy (Ugandan).    Ordered lunch for pt: Chicken, mashed potatoes, gravy, wheat roll, butter, green tea, and vanilla ice cream.   Medical food supplement therapy- Trial of vanilla magic cup      Goals  Patient to consume % of nutritionally adequate meal trays TID, or the equivalent with supplements/snacks.     Monitoring/Evaluation  Progress toward goals will be monitored and evaluated per protocol.    Clarissa Lee RD, LD  5A/5B RD pager [phone]

## 2019-08-01 NOTE — PLAN OF CARE
PT 5B: Evaluation completed, treatment initiated.     Discharge Planner PT   Patient plan for discharge: not discussed today  Current status: Oriented to self only. Required min assist to transfer in/out of bed.  Ambulated 80' with 1 hand on IV pole and min assist.  Mobility limited by pain and impaired balance.   Barriers to return to prior living situation: assistance needed for safe mobility, falls risk.   Recommendations for discharge: TCU  Rationale for recommendations: dependence with functional mobility.        Entered by: Jose Alberto Jones 08/01/2019 12:21 PM

## 2019-08-01 NOTE — SUMMARY OF CARE
Patient arrives to the unit 5B with the following belongings: clothing, shoes, and a lifeline call necklace.

## 2019-08-01 NOTE — PROGRESS NOTES
08/01/19 1000   Quick Adds   Type of Visit Initial PT Evaluation       Present yes   Language Greek   Living Environment   Lives With child(yahir), adult   Living Arrangements apartment   Home Accessibility stairs to enter home   Number of Stairs, Main Entrance other (see comments)  (> 10)   Stair Railings, Main Entrance other (see comments)  (1 rail)   Transportation Anticipated family or friend will provide   Living Environment Comment daughter works during the day, patient states she is alone during that time   Self-Care   Usual Activity Tolerance moderate   Current Activity Tolerance fair   Equipment Currently Used at Home none   Functional Level Prior   Ambulation 0-->independent   Transferring 0-->independent   Toileting 0-->independent   Fall history within last six months yes   Number of times patient has fallen within last six months 3   Prior Functional Level Comment independent with functional mobility, patient states she was able to walk community distances prior to admit   General Information   Onset of Illness/Injury or Date of Surgery - Date 07/31/19   Referring Physician Montserrat Ro PA-C   Patient/Family Goals Statement return home   Pertinent History of Current Problem (include personal factors and/or comorbidities that impact the POC) Angelica James is a 74 year old female with a past medical history of dementia, paranoid schizophrenia, HTN, DM2, CARLOS admitted on 7/31/2019 for FTT, recent UTI, concern for diarrhea.    Cognitive Status Examination   Orientation person   Level of Consciousness alert   Follows Commands and Answers Questions 100% of the time   Personal Safety and Judgment intact   Memory impaired   Pain Assessment   Patient Currently in Pain Yes, see Vital Sign flowsheet   Integumentary/Edema   Integumentary/Edema Comments visible bruising R knee due to recent fall   Posture    Posture Forward head position;Protracted shoulders   Range of Motion  "(ROM)   ROM Comment B LE grossly WNL   Strength   Strength Comments B LE grossly 4/5   Bed Mobility   Bed Mobility Comments supine > sit with min assist   Transfer Skills   Transfer Comments sit > stand with FWW and min assist.   Gait   Gait Comments Ambulated in room with 2 hands on IV pole and min assist.    Balance   Balance Comments required UE support to maintain standing balance   Sensory Examination   Sensory Perception no deficits were identified   General Therapy Interventions   Planned Therapy Interventions balance training;bed mobility training;gait training;transfer training;progressive activity/exercise   Clinical Impression   Criteria for Skilled Therapeutic Intervention yes, treatment indicated   PT Diagnosis impaired functional mobility in setting of UTI and s/p fall   Influenced by the following impairments pain, impaired balance, decreased activity tolerance   Functional limitations due to impairments impaired bed mobility, impaired transfers, impaired gait   Clinical Presentation Evolving/Changing   Clinical Presentation Rationale comorbidities   Clinical Decision Making (Complexity) Moderate complexity   Therapy Frequency 5x/week   Predicted Duration of Therapy Intervention (days/wks) 1 week   Anticipated Discharge Disposition Transitional Care Facility   Risk & Benefits of therapy have been explained Yes   Patient, Family & other staff in agreement with plan of care Yes   [facility] AM-PAC  \"6 Clicks\" V.2 Basic Mobility Inpatient Short Form   1. Turning from your back to your side while in a flat bed without using bedrails? 4 - None   2. Moving from lying on your back to sitting on the side of a flat bed without using bedrails? 4 - None   3. Moving to and from a bed to a chair (including a wheelchair)? 3 - A Little   4. Standing up from a chair using your arms (e.g., wheelchair, or bedside chair)? 3 - A Little   5. To walk in hospital room? 3 - A Little   6. Climbing 3-5 steps with a " railing? 2 - A Lot   Basic Mobility Raw Score (Score out of 24.Lower scores equate to lower levels of function) 19   Total Evaluation Time   Total Evaluation Time (Minutes) 9

## 2019-08-01 NOTE — PLAN OF CARE
Pt d/o to time but otherwise alert and active-walked in [place] with PT and OT with gait belt and walker, SBA. Denies pain. Right knee with abrasions and bruising from fall at home.  Forgetful, VPM and bed/chair alarm in place for safety. Urinary frequency.  Pyridium given for bladder spasms. Tele discontinued. Cont to monitor and with poc.

## 2019-08-01 NOTE — PROVIDER NOTIFICATION
Paged gold MD at [address] [facility] 5B 5-224 I.KINA Veliz RN [id number]  at bedside until 11am for MD rounds.

## 2019-08-01 NOTE — PLAN OF CARE
Discharge Planner OT   Patient plan for discharge: Did not state  Current status: OT eval completed and tx initiated. SBA for sindhu cares and LB dressing, VC/TC throughout for pt to use 2WW, CGA toilet transfer, oriented to self and place, amb ~50ft with 2WW and CGA  Barriers to return to prior living situation: baseline dementia, medical status  Recommendations for discharge: TCU  Rationale for recommendations: Pt would benefit from TCU stay to safely progress IND with ADLs and functional mobility.        Entered by: Heather Lomax 08/01/2019 4:44 PM

## 2019-08-01 NOTE — PROVIDER NOTIFICATION
Provider pager #[phone] notified regarding pt complaint of pain behind left knee, nodule also noted on palpation. Requesting pain medication from provider. Will continue to monitor & follow POC.     Real JIMENEZ returned page, will order pain medication. Will continue to monitor and follow POC.     @[phone] provider pager #[phone] notified w/ critical lab result - pt tested positive for c-diff.   **provider returned page, will start on vancomycin.**    @[phone] provider pager #[phone] notified regarding pt's post void residual of 11cc.Requesting medication that can help. Will continue to monitor & follow POC.

## 2019-08-01 NOTE — PROVIDER NOTIFICATION
Paged gold cross cover MD at [address]: 5B 5-224 [place] RN [phone] FYI Critical high result CK= 7,577 per lab.       Aware of result. Mitch Beckford MD

## 2019-08-01 NOTE — PLAN OF CARE
Admitted to unit 5B from home. Arrived after 1930. Pt ambulates short distances with SBA and walker. C/o right knee pain. Right knee with abrasions and bruising from fall at home. Pt alert but confused. Unaware of time, situation. Forgetful. Inconsistently follows commands. Urinary frequency. Smear of BM. Stool appears dark brown/black. Potassium replacement per protocol. Needs additional 40mEq 0100 and then recheck level per protocol. Needs telemetry set up. Bed alarm engaged. NPO after midnight for abdominal US. Collect stool sample when available.

## 2019-08-01 NOTE — PLAN OF CARE
Reason for admission: FTT, UTI, Diarrhea  Status: VPM monitoring started as pt will attempt to get out of bed without assistance. Bed alarm also on for safety. Potassium replacement finished overnight, recheck this AM.   VS: VSS.   Pain: C/o bilateral knee pain. One time dose of oxycodone ordered & administered w/ some relief.   Neuro: Pt only oriented to self. Is very forgetful & has baseline dementia.   Respiratory: Breathing adequately on RA. Denies SOB/CP.   Cardiac: Ont tele for hypokalemia, NSR.   Skin: Blanchable redness on bottom. Abrasion on right knee due to fall prior to admission. Cool extremities noted.   IV/Drains: PIV infusing LR at 125cc/hr.   GI/: 3 loose BMs overnight. Stool sample sent to lab, pt tested positive for c-diff. Denies abdominal pain. Pt also has urinary frequency/urgency. Pt up to void about q20 minutes when awake, only voiding very small amts. Pt reports feeling constant sensation of having to pee. Bladder scan revealed 11. Pt compliant w/ using commode overnight, but prefers to ambulate to bathroom w/ walker.   Diet: Regular diet, denies nausea. Has been NPO (besides water w/ meds) since midnight for potential abdominal US today.    Activity: Up w/ ax1. Up very frequently overnight to commode/bathroom.   Plan of care: Pt to start vanco today for c-diff. Follow up w/ provider about potential medication to help w/ urinary frequency/urgency. Pt to have abdominal US today. Continue to monitor and follow POC.       Problem: Adult Inpatient Plan of Care  Goal: Optimal Comfort and Wellbeing  8/1/2019 0619 by Korina Kilpatrick, RN  Outcome: Declining  8/1/2019 0014 by Celina Connors, ELIEZER  Outcome: No Change     Problem: Memory Impairment Comorbidity  Goal: Memory Impairment Comorbidity  Description  Patient comorbidity will be monitored for signs and symptoms of Memory Impairment condition.  Problems will be absent, minimized or managed by discharge/transition of care.  8/1/2019 0619 by  Korina Kilpatrick, RN  Outcome: Declining  8/1/2019 0014 by Celina Connors, ELIEZER  Outcome: No Change

## 2019-08-01 NOTE — H&P
General acute hospital, [place]    History and Physical - Hospitalist Service, [facility] Evening       Date of Admission:  7/31/2019    Assessment & Plan   Angelica James is a 74 year old female with a past medical history of dementia, paranoid schizophrenia, HTN, DM2, CARLOS admitted on 7/31/2019 for FTT, recent UTI, concern for diarrhea.     #Rhabdomyolysis  #Falls  #Weakness  #Leukocytosis, w/ concern for UTI, c diff  Per daughter, multiple falls since Saturday requiring EMS assistance to get patient up. Patient w/o current complaints but difficult to interview due to dementia, poor cognition. Per prior provider, UA at OSH w/ nitrites, granular casts, recent culture w/ 50-100k mixed urogenital melany. Diarrhea x4 days w/ leukocytosis. Renal function stable. CK elevated at 8770. DDx includes rhabdo, also considering UTI, Cdiff, hypokalemia, progressive dementia.   -PT/OT/nutrition consults  -SW/CC consults  -IVF hydration w/ LR at 150 ml/hour  -UA ordered, Rocephin tonight given positive nitrites on UA at OSH  -Cdiff  -Replace K  -Repeat CMP & CK in AM  Wt Readings from Last 4 Encounters:   07/22/19 52.1 kg (114 lb 14.4 oz)   04/08/19 52 kg (114 lb 10.2 oz)   02/19/19 58.1 kg (128 lb)   12/07/18 54 kg (119 lb)     #Diarrhea: 4 days, stooling 4x daily, usually immediately after meals per daughter. Recently on cefdinir for UTI. Denies abdominal pain, afebrile but noted leukocytosis.   -Cdiff  -Consider enteric panel if diarrhea persists & c diff is negative  -IVF hydration w/ NS at 75 ml/hour    #Hypokalemia: K of 2.8, poor PO intake and noted diarrhea.  -Replace per protocol  -Tele    #Abnormal transaminases: ALT of 132, AST of 295 w/ normal T bili and alk phos. Denies abdominal pain. H/O transaminitis- US in 11/2014 w/ normal sonographic evaluation of the liver without focal hepatic mass- the previously characterized lesion in the right hepatic lobe is not visualized. Daughter denies significant tylenol  ingestion- using PRN, hides so would not have access to it.  -CK   -Repeat in AM  -Hold tylenol  -RUQ US    #DM2: PTA maintained on metformin 500 mg qday, daughter notes NP at clinic told her to hold. A1C 6.7 in 04/2019.   -Agree with holding metformin while inpatient  -BG BID     #HTN: Not on meds PTA. BP stable on admission.   #CARLOS: PTA maintained on iron BID. Hgb stabel at 10.4 w/ MCV of 90. Hold iron for now.   #Paranoid schizophrenia: PTA maintained on paxil 40 mg qday, Risperdal 0.5 mg qam and qnoon, 4 mg at bedtime. Daughter denies recent mood change. Continue home medications.   #PUD: [facility] EGD in 2010 w/ small healing ulcer in gastric antrum, no stigmata of recent bleeding. Hgb stable, not on PPI. Continue OP f/u.  #Dementia: Evaluated by neuro 01/2019. MMA, B12, TSH normal in 12/2018. No further recs. OT consult.      Diet: Regular  DVT Prophylaxis: Pneumatic Compression Devices and Ambulate every shift  Darby Catheter: not present  Code Status: FULL tonight- will need to discuss with  and daughter in AM    Disposition Plan   Expected discharge: Tomorrow vs friday, recommended to prior living arrangement vs TCU once safe disposition plan/ TCU bed available and electrolytes stable, diarrhea improved.  Entered: Montserrat Ro PA-C 07/31/2019, 7:33 PM     The patient's care was discussed with the Attending Physician, Dr. Ken, Bedside Nurse and Patient.    Montserrat Ro PA-C  [facility], [place]  Pager: [phone]  Please see sticky note for cross cover information  ______________________________________________________________________    Chief Complaint   Weakness    History is obtained from the patient, daughter    History of Present Illness   Angelica James is a 74 year old female with a past medical history of dementia, paranoid schizophrenia, HTN, DM2, CARLOS admitted on 7/31/2019 for FTT, recent UTI, concern for diarrhea.     Per daughter, patient  has been declining for quite some time with cognition and dementia. She is usually never alone, goes to adult day program when daughter is at work. Last Monday was seen in  for not feeling well, left sided low back pain- diagnosed with pyelo and sent home on cefdinir. Daughter notes poor hygiene after toileting. On Saturday was left alone for some time, daughter came home to find mom on ground. Unsure how long she had been down, took assist of 2 to get her up. Since then, has been more weak, not eating much, poor PO intake. For last 4 days she has been having at least 4 loose BMs/day, usually right after eating per daughter. She was brought in yesterday to re [facility]. NP at clinic noted low k and planned for admit. Presents today for admission. Patient currently without complaints, feels hungry. Notes knee pain. No other pain, no fevers.     Review of Systems    The 10 point Review of Systems is negative other than noted in the HPI or here.     Past Medical History    I have reviewed this patient's medical history and updated it with pertinent information if needed.   Past Medical History:   Diagnosis Date     Dementia      Diabetes (H)      Hypertension      Iron deficiency anemia      PUD (peptic ulcer disease)      Schizophrenia (H)        Past Surgical History   I have reviewed this patient's surgical history and updated it with pertinent information if needed.  Past Surgical History:   Procedure Laterality Date     colonoscopy       UPPER GI ENDOSCOPY         Social History   I have reviewed this patient's social history and updated it with pertinent information if needed.  Social History     Tobacco Use     Smoking status: Never Smoker     Smokeless tobacco: Never Used   Substance Use Topics     Alcohol use: No     Drug use: No   Lives in home with daughter & granddaughter.    Family History   I have reviewed this patient's family history and updated it with pertinent information if needed.     Prior to  Admission Medications   Prior to Admission Medications   Prescriptions Last Dose Informant Patient Reported? Taking?   CALCIUM CARBONATE 500 MG tablet  Daughter Yes No   Sig: TAKE 1 TABLET BY MOUTH TWICE A DAY   Cholecalciferol (VITAMIN D3) 2000 units CAPS  Daughter Yes No   Sig: TAKE 2 CAPSULES BY MOUTH EVERY DAY   PARoxetine (PAXIL) 40 MG tablet  Daughter Yes No   Sig: Take 40 mg by mouth daily   acetaminophen (TYLENOL) 500 MG tablet  Daughter Yes No   Sig: Take 500-1,000 mg by mouth every 6 hours as needed for mild pain   cefdinir (OMNICEF) 300 MG capsule   No No   Sig: Take 1 capsule (300 mg) by mouth 2 times daily for 10 days   ferrous sulfate (FEROSUL) 325 (65 Fe) MG tablet  Daughter Yes No   Sig: TAKE 1 TABLET BY MOUTH TWO TIMES DAILY   lisinopril (PRINIVIL/ZESTRIL) 10 MG tablet  Daughter Yes No   Sig: Take 10 mg by mouth daily   metFORMIN (GLUCOPHAGE) 500 MG tablet  Daughter Yes No   Sig: TAKE 1 TABLET BY MOUTH DAILY.   risperiDONE (RISPERDAL) 1 MG tablet  Daughter Yes No   Sig: TAKE ONE-HALF TABLET BY MOUTH EVERY MORNING AND EARLY AFTERNOON.   risperiDONE (RISPERDAL) 4 MG tablet  Daughter Yes No   Sig: TAKE 1 TABLET BY MOUTH NIGHTLY AT BEDTIME      Facility-Administered Medications: None     Allergies   Allergies   Allergen Reactions     Penicillins Hives       Physical Exam   Blood pressure 130/73, pulse 88, temperature 98.1  F (36.7  C), temperature source Oral, resp. rate 20, SpO2 94 %.  GENERAL: Alert, lying comfortably in bed.   HEENT: Anicteric sclera. Mucous membranes dry and without lesions.   CV: RRR. S1, S2. No murmurs appreciated.   RESPIRATORY: Effort normal on RA. Lungs CTAB with no wheezing, rales, rhonchi.   GI: Abdomen soft and non distended, bowel sounds present. No tenderness, rebound, guarding.   MUSCULOSKELETAL: No joint swelling or tenderness. Moves all extremities.   NEUROLOGICAL: No focal deficits.   EXTREMITIES: No peripheral edema.  SKIN: No jaundice. No rashes.       Data   Data  reviewed today: I reviewed all medications, new labs and imaging results over the last 24 hours. I personally reviewed no images or EKG's today.    Reviewed no labs, reviewed triage note w/ OSH labs

## 2019-08-02 ENCOUNTER — OFFICE VISIT (OUTPATIENT)
Dept: INTERPRETER SERVICES | Facility: CLINIC | Age: 74
End: 2019-08-02
Payer: MEDICARE

## 2019-08-02 ENCOUNTER — APPOINTMENT (OUTPATIENT)
Dept: OCCUPATIONAL THERAPY | Facility: CLINIC | Age: 74
DRG: 371 | End: 2019-08-02
Attending: HOSPITALIST
Payer: MEDICARE

## 2019-08-02 ENCOUNTER — APPOINTMENT (OUTPATIENT)
Dept: PHYSICAL THERAPY | Facility: CLINIC | Age: 74
DRG: 371 | End: 2019-08-02
Attending: HOSPITALIST
Payer: MEDICARE

## 2019-08-02 LAB
ALBUMIN SERPL-MCNC: 2.6 G/DL (ref 3.4–5)
ALP SERPL-CCNC: 47 U/L (ref 40–150)
ALT SERPL W P-5'-P-CCNC: 121 U/L (ref 0–50)
ANION GAP SERPL CALCULATED.3IONS-SCNC: 5 MMOL/L (ref 3–14)
AST SERPL W P-5'-P-CCNC: 182 U/L (ref 0–45)
BILIRUB DIRECT SERPL-MCNC: <0.1 MG/DL (ref 0–0.2)
BILIRUB SERPL-MCNC: 0.3 MG/DL (ref 0.2–1.3)
BUN SERPL-MCNC: 9 MG/DL (ref 7–30)
CALCIUM SERPL-MCNC: 8.1 MG/DL (ref 8.5–10.1)
CHLORIDE SERPL-SCNC: 105 MMOL/L (ref 94–109)
CK SERPL-CCNC: 4095 U/L (ref 30–225)
CO2 SERPL-SCNC: 32 MMOL/L (ref 20–32)
CREAT SERPL-MCNC: 0.49 MG/DL (ref 0.52–1.04)
GFR SERPL CREATININE-BSD FRML MDRD: >90 ML/MIN/{1.73_M2}
GLUCOSE BLDC GLUCOMTR-MCNC: 120 MG/DL (ref 70–99)
GLUCOSE BLDC GLUCOMTR-MCNC: 134 MG/DL (ref 70–99)
GLUCOSE BLDC GLUCOMTR-MCNC: 166 MG/DL (ref 70–99)
GLUCOSE SERPL-MCNC: 96 MG/DL (ref 70–99)
POTASSIUM SERPL-SCNC: 3.4 MMOL/L (ref 3.4–5.3)
PROT SERPL-MCNC: 5.8 G/DL (ref 6.8–8.8)
SODIUM SERPL-SCNC: 141 MMOL/L (ref 133–144)

## 2019-08-02 PROCEDURE — 82550 ASSAY OF CK (CPK): CPT | Performed by: INTERNAL MEDICINE

## 2019-08-02 PROCEDURE — 80048 BASIC METABOLIC PNL TOTAL CA: CPT | Performed by: INTERNAL MEDICINE

## 2019-08-02 PROCEDURE — 97530 THERAPEUTIC ACTIVITIES: CPT | Mod: GP

## 2019-08-02 PROCEDURE — 97116 GAIT TRAINING THERAPY: CPT | Mod: GP

## 2019-08-02 PROCEDURE — 25000132 ZZH RX MED GY IP 250 OP 250 PS 637: Mod: GY | Performed by: INTERNAL MEDICINE

## 2019-08-02 PROCEDURE — 97110 THERAPEUTIC EXERCISES: CPT | Mod: GP

## 2019-08-02 PROCEDURE — 12000001 ZZH R&B MED SURG/OB UMMC

## 2019-08-02 PROCEDURE — 25000128 H RX IP 250 OP 636: Performed by: INTERNAL MEDICINE

## 2019-08-02 PROCEDURE — 40000556 ZZH STATISTIC PERIPHERAL IV START W US GUIDANCE

## 2019-08-02 PROCEDURE — T1013 SIGN LANG/ORAL INTERPRETER: HCPCS | Mod: U3

## 2019-08-02 PROCEDURE — 80076 HEPATIC FUNCTION PANEL: CPT | Performed by: INTERNAL MEDICINE

## 2019-08-02 PROCEDURE — 36415 COLL VENOUS BLD VENIPUNCTURE: CPT | Performed by: INTERNAL MEDICINE

## 2019-08-02 PROCEDURE — 25000132 ZZH RX MED GY IP 250 OP 250 PS 637: Mod: GY | Performed by: PHYSICIAN ASSISTANT

## 2019-08-02 PROCEDURE — 00000146 ZZHCL STATISTIC GLUCOSE BY METER IP

## 2019-08-02 PROCEDURE — 97110 THERAPEUTIC EXERCISES: CPT | Mod: GO

## 2019-08-02 PROCEDURE — 99232 SBSQ HOSP IP/OBS MODERATE 35: CPT | Performed by: INTERNAL MEDICINE

## 2019-08-02 RX ORDER — CALCIUM CARBONATE 500(1250)
500 TABLET ORAL DAILY
Status: DISCONTINUED | OUTPATIENT
Start: 2019-08-02 | End: 2019-08-24 | Stop reason: HOSPADM

## 2019-08-02 RX ADMIN — CEFTRIAXONE 1 G: 1 INJECTION, POWDER, FOR SOLUTION INTRAMUSCULAR; INTRAVENOUS at 21:42

## 2019-08-02 RX ADMIN — MELATONIN 1000 UNITS: at 18:16

## 2019-08-02 RX ADMIN — ENOXAPARIN SODIUM 30 MG: 30 INJECTION SUBCUTANEOUS at 11:54

## 2019-08-02 RX ADMIN — VANCOMYCIN HYDROCHLORIDE 125 MG: KIT at 11:51

## 2019-08-02 RX ADMIN — PHENAZOPYRIDINE HYDROCHLORIDE 100 MG: 100 TABLET, FILM COATED ORAL at 11:52

## 2019-08-02 RX ADMIN — RISPERIDONE 4 MG: 2 TABLET ORAL at 21:46

## 2019-08-02 RX ADMIN — VANCOMYCIN HYDROCHLORIDE 125 MG: KIT at 16:20

## 2019-08-02 RX ADMIN — VANCOMYCIN HYDROCHLORIDE 125 MG: KIT at 14:15

## 2019-08-02 RX ADMIN — CALCIUM 500 MG: 500 TABLET ORAL at 18:16

## 2019-08-02 RX ADMIN — PHENAZOPYRIDINE HYDROCHLORIDE 100 MG: 100 TABLET, FILM COATED ORAL at 18:17

## 2019-08-02 RX ADMIN — RISPERIDONE 1 MG: 0.5 TABLET ORAL at 11:52

## 2019-08-02 RX ADMIN — PAROXETINE HYDROCHLORIDE 40 MG: 40 TABLET, FILM COATED ORAL at 11:52

## 2019-08-02 RX ADMIN — Medication 1 MG: at 02:52

## 2019-08-02 RX ADMIN — VANCOMYCIN HYDROCHLORIDE 125 MG: KIT at 20:26

## 2019-08-02 ASSESSMENT — ACTIVITIES OF DAILY LIVING (ADL)
ADLS_ACUITY_SCORE: 12.5

## 2019-08-02 NOTE — PROGRESS NOTES
[facility] Progress Note - Hospitalist Service, [facility] [place]       Date of Admission:  7/31/2019    75 YO woman with bipolar disorder, paranoid schizophrenia, dementia who was admitted with mild rhabdomyolysis and C Diff diarrhea.     Assessment & Plan     #. Rhabdomyolysis  #. Falls  #. Failure to Thrive  #. Paranoid schizophrenia  #. Dementia  - Admitted after being found on the ground by family and multiple falls. CK on admission >7000, now down to 4K after 48 hours of fluids  - Per daughter, patient requires 24/7 care beyond what family can provide at this point.   [] Appreciated PT/OT consults, sending referral to acute rehab and/or long term care  [] Deescalate sitter to video monitoring  [] Stopping IVF and CK checks given downtrend without adequate PO intake  [] Continue with paxil 40 mg qday, Risperdal 0.5 mg qam and qnoon, 4 mg at bedtime    #. C Diff Diarrhea  #. Multiorganism UTI  - Admitted with several days of diarrhea after antibiotic  - C Diff assay (+) with mild leukocytosis   - Positive UA with UCx growing out enterobacter and pseudomonas  [] Continue with PO Vancomycin for 14 days (07/)  [] Following Urine CX for sensitivities, treating empirically with IV ceftriaxone     #. Abnormal transaminases:  - Admitted with mildly elevated enzymes. Previous US 2014 was unremarkable. Stable without abdominal pain or change in tbili to suggest obstruction  [] Holding on US for now. Repeat CMP if any clinical change.      #. DM2: PTA maintained on metformin 500 mg qday, daughter notes NP at clinic told her to hold. A1C 6.7 in 04/2019.   [] Agree with holding metformin while inpatient; consider discontinuing metformin at discharge  [] BG BID        #. Hypokalemia: Normalized after replacement, discontinued telemetry   #. HTN: Not on meds PTA. BP stable on admission.   #. IronDA: PTA maintained on iron BID. Hgb stabel at 10.4 w/ MCV of 90. Hold iron for now.   .  Daughter denies recent mood change. Continue home medications.   #PUD: [facility] EGD in 2010 w/ small healing ulcer in gastric antrum, no stigmata of recent bleeding. Hgb stable, not on PPI.      Diet: Regular Diet Adult  Snacks/Supplements Adult: Magic Cup; Between Meals    DVT Prophylaxis: Enoxaparin (Lovenox) SQ  Darby Catheter: not present  Code Status: Full Code      Disposition Plan   Expected discharge: 2 - 3 days, recommended to transitional care unit once mental status at baseline.  Entered: Salas Beckford MD 08/02/2019, 5:48 PM       The patient's care was discussed with the Patient's Family (Kirkther).    Salas Beckford MD  Hospitalist Service, [address], [place]  Pager: [phone]  Please see sticky note for cross cover information  ______________________________________________________________________    Interval History   Patient reports diarrhea has improved without abdominal pain, fever/chills, shortness of breath    Data reviewed today: I reviewed all medications, new labs and imaging results over the last 24 hours. I personally reviewed no images or EKG's today.    Physical Exam   Vital Signs: Temp: 99.2  F (37.3  C) Temp src: Oral BP: (!) 141/84 Pulse: 100   Resp: 16 SpO2: 96 % O2 Device: None (Room air)    Weight: 0 lbs 0 oz  General: nontoxic  HEENT: no scleral icterus, normal conjunctiva, no facial rash  Chest: normal effort, clear lungs to auscultation  Cardiac: regula rhythm, no appreciable murmur  Abdomen: non-distended, nontender  Extremities: no lower extremity edema  Skin: no rash  Neuro: no facial droop, normal speech, no focal deficitis  Psych: alert, cooperative      Data   Recent Labs   Lab 08/02/19 0531 08/01/19 0527 07/31/19 2013   WBC  --  11.9* 12.2*   HGB  --  10.2* 10.4*   MCV  --  90 90   PLT  --  288 277    143 139   POTASSIUM 3.4 4.0 2.8*   CHLORIDE 105 107 102   CO2 32 29 30   BUN 9 9 10   CR 0.49* 0.58 0.61   ANIONGAP 5 7 6   SHERI  8.1* 8.2* 8.4*   GLC 96 84 117*   ALBUMIN 2.6* 2.8* 3.0*   PROTTOTAL 5.8* 6.0* 6.4*   BILITOTAL 0.3 0.5 0.3   ALKPHOS 47 49 50   * 130* 132*   * 264* 295*   CK>7500

## 2019-08-02 NOTE — PLAN OF CARE
Discharge Planner OT   Patient plan for discharge: Home  Current status:  c/o of fatigue throughout session, SBA for supine <> sit and CGA/SBA for amb to chair with 2WW completed 1 x 10 of 3 BUE exercises with pt perseverating on that she would only be in the hospital x1 day and her fatigue, session ended early 2/2 to lack of participation  Barriers to return to prior living situation: baseline dementia, weakness, medical status  Recommendations for discharge: AISHA  Rationale for recommendations: Pt appears to be doing well with ADLs and per PT, pt is at baseline for functional mobility. Due to pt cognitive deficits, pt would benefit from A[facility]HA to ensure safety.        Entered by: Heather Lomax 08/02/2019 11:06 AM

## 2019-08-02 NOTE — PHARMACY-ADMISSION MEDICATION HISTORY
Admission medication history interview status for the 7/31/2019 admission is complete. See Epic admission navigator for allergy information, pharmacy, prior to admission medications and immunization status.     Medication history interview sources: Patient and reviewed outside med list on her chart.    Changes made to PTA medication list (reason)  Added: None  Deleted: None  Changed: None    Additional medication history information   -The patients daughter help the patient with her medication.    -The patient is not taking lisinopril 20 mg tablet ( her provider stopped)    Prior to Admission medications    Medication Sig Last Dose Taking? Auth Provider   acetaminophen (TYLENOL) 500 MG tablet Take 500-1,000 mg by mouth every 6 hours as needed for mild pain 7/30/2019 at PM Yes Unknown, Entered By History   CALCIUM CARBONATE 500 MG tablet TAKE 1 TABLET BY MOUTH TWICE A DAY 7/31/2019 at Unknown time Yes Reported, Patient   Cholecalciferol (VITAMIN D3) 2000 units CAPS TAKE 2 CAPSULES BY MOUTH EVERY DAY 7/31/2019 at Unknown time Yes Reported, Patient   ferrous sulfate (FEROSUL) 325 (65 Fe) MG tablet TAKE 1 TABLET BY MOUTH TWO TIMES DAILY 7/31/2019 at Unknown time Yes Reported, Patient   metFORMIN (GLUCOPHAGE) 500 MG tablet TAKE 1 TABLET BY MOUTH DAILY. 7/31/2019 at Unknown time Yes Reported, Patient   multivitamin, therapeutic (THERA-VIT) TABS tablet Take 1 tablet by mouth daily 7/31/2019 at Unknown time Yes Reported, Patient   PARoxetine (PAXIL) 40 MG tablet Take 40 mg by mouth every evening  7/30/2019 at Unknown time Yes Reported, Patient   risperiDONE (RISPERDAL) 1 MG tablet TAKE ONE TABLET BY MOUTH EVERY MORNING 7/31/2019 at Unknown  Reported, Patient   risperiDONE (RISPERDAL) 4 MG tablet TAKE 1 TABLET BY MOUTH NIGHTLY AT BEDTIME 7/30/2019 at Unknown  Reported, Patient         Medication history completed by: Shoaib Velasquez, student pharmacist    I attest that the information provided in this note by the pharmacy  student is complete and accurate    Ru Moya, PharmD  PGY-1 Resident Pharmacist

## 2019-08-02 NOTE — PLAN OF CARE
Reason for admission: Rhabdomyolysis, C-diff   Status: Sitter continues w/ pt due to pt impulsivity/frequency of getting out of bed & pt being a fall risk. No c/o pain. Pt disoriented to time. Hx of baseline dementia. Breathing adequately on RA. Abrasion on knee from previous fall, bilateral knees noted to be swollen. PIV infusing LR at 150cc/hr. Infusion stopped for about 2 hours as pt lost IV access and was waiting for new PIV. New PIV in place and infusing in right arm. Up to void about every 20 minutes when awake. Regular diet, denies nausea. Up w/ ax1.   Plan of care: Continue w/ IVF and encourage oral intake. Continue to monitor and follow POC.

## 2019-08-02 NOTE — PROGRESS NOTES
[id number]    D: Admitted 7/31 for failure to thrive and UTI.  PMH of dementia, schizophrenia, HTN, DMII, and anemia.     I/A: A0x4. VSS. RA. Afebrile. Urinating adequately. Bedside sitter remains for high fall risk and impulsiveness, assist x1. Regular diet. Deneis pain, SOB, N/V. Yakut speaking. Urine continues to be orange from medication.     P: Continue to monitor Pt status and report changes to Gold 8. Continue antibiotics.

## 2019-08-02 NOTE — PLAN OF CARE
BP (!) 154/66 (BP Location: Right arm)   Pulse 85   Temp 96.7  F (35.9  C) (Oral)   Resp 18   SpO2 97%     Pt VSS on room air, BP has been little high, continue to check as needed. Pt is A&Ox3 (disoriented to time) and is confused. Pt is up to the bathroom every 5 minutes or less. She urinates frequently in small amounts, large watery bowel movements. Pt moves well but needs help ambulating due to confusion. Sitter in the room due to frequency of urine and stool. Continue with plan of care.

## 2019-08-02 NOTE — PLAN OF CARE
Discharge Planner PT 5B  Patient plan for discharge: Home  Current status: Pt ambulated 2x80 ft with FWW and SBA with a rest break between walks. Pt completed several sit to stands IND throughout therapy. Pt used bathroom with min A for managing robe. Seated LE strengthening exercises completed.  Barriers to return to prior living situation: Cognition, limited support at home  Recommendations for discharge: Home with 24-hour assist, HH therapy vs. memory care  Rationale for recommendations: Functional mobility is sufficient for navigating home, but pt not safe to be home alone due to poor cognition.       Entered by: Gabriella Mehta 08/02/2019 12:00 PM

## 2019-08-02 NOTE — PLAN OF CARE
Writer assumed cares from [id number]. Pt continues to have bedside attendant impulsivity/frequency of getting out of bed & pt being a fall risk. No c/o pain.  MIVF discontinued. Regular diet, denies nausea. Up w/ assist of 1. Worked with PT/OT today. Cont with poc.

## 2019-08-02 NOTE — PROGRESS NOTES
Social Work: Assessment with Discharge Plan    Patient Name:  Angelica James  :  1945  Age:  74 year old  MRN:  [id number]  Risk/Complexity Score:  Filed Complexity Screen Score: 7  Completed assessment with:  Pt's shannon Jj via phone    Presenting Information   Reason for Referral:  Discharge plan  Date of Intake:  2019  Referral Source:  Chart Review  Decision Maker:  Pt at baseline though significant cognitive deficits noted  Alternate Decision Maker:  NOK - Pt's shannon Jj  Living Situation:  Apartment w/ 1 flight of stairs.  Previous Functional Status:  Assistance with all IADLs including meals, medications, cooking, housekeeping, finances, laundry.   Patient and family understanding of hospitalization:  Pt's daughter reports Pt had multiple falls at home and is unsafe at home.  Cultural/Language/Spiritual Considerations:  Pt is a 73 yo Iranian speaking female with cognitive deficits.  Adjustment to Illness:  Not assessed.     Physical Health  Reason for Admission:  No diagnosis found.  Services Needed/Recommended:  TCU vs LTC vs Memory Care penitentiary.    Mental Health/Chemical Dependency  Diagnosis:  Dementia with behavioral disturbance F03.91  Support/Services in Place:  Unclear if on psychotropic medications  Services Needed/Recommended:  Controlled environment with supportive services recommended.    Support System  Significant relationship at present time:  Pt's shannon Jj  Family of origin is available for support:  Pt lives w/ shannon Jj  Other support available:  [facility] services (Adult Day Care M-F)  Gaps in support system:  Pt requiring 24/7 care and Pt does not have this at home.   Patient is caregiver to:  None     Provider Information   Primary Care Physician:  Kamini Michael   [phone]   Clinic:  [facility]  [facility] [address]      :  Alternative Care Waiver Israel Coker (P: [phone])    Financial    Income Source:  Social Security  Financial Concerns:  Pt is in need of Medical Assistance for LTC vs Memory Care AISHA as home is not safe for Patient.   Insurance:    Payor/Plan Subscriber Name Rel Member # Group #   MEDICARE - MEDICARE ANGELICA BOSWELL  [id number]       ATTN CLAIMS, [address]       Discharge Plan   Patient and family discharge goal:  Per PT/OT rec and dtr's assessment, Pt is unsafe at home and requires 24/7 that dtr cannot provide.   Provided education on discharge plan:  YES  Patient agreeable to discharge plan:  YES  A list of Medicare Certified Facilities was provided to the patient and/or family to encourage patient choice. Patient's choices for facility are:    -[facility]  -[facility]  -[facility]  -[facility]  -[facility] provide Skilled rehabilitation or complex medical:  YES  General information regarding anticipated insurance coverage and possible out of pocket cost was discussed. Patient and patient's family are aware patient may incur the cost of transportation to the facility, pending insurance payment: YES  Barriers to discharge:  Medical stability, MA application, Pt must be off sitter(1:1) 24 hrs prior to discharge to SNF    Discharge Recommendations   Anticipated Disposition:  Facility:  TBD  Transportation Needs:  Medical:  Wheelchair  Name of Transportation Company and Phone:  "[employer] Transportation (Ph: [phone]) if needed.    Additional comments   Angelica Boswell is a 74 year old female with a past medical history of dementia, paranoid schizophrenia, HTN, DM2, CARLOS admitted on 7/31/2019 for FTT, recent UTI, concern for diarrhea.     SW spoke w/ Pt's dtr Анна via phone. Patient has been living with her dtr and granddaughters with the support of [facility] via the Alternative Care Waiver (Pt went to Adult Day Care M-F). Pt's daughter reports she cannot provide  24/7 care for her mother as she is caring for her own children. Pt's daughter expressed significant concern with her mother being home alone and does not feel that the ACS supportive services are enough care to meet her mom's needs in the community. SW discussed 24/7 care recommendation and TCU vs LTC vs Memory Care correction. Pt's dtr reports Pt's ACS  recommended Pt's daughter apply for Medical Assistance. Dtr reports Pt does not have any assets, only receives social security (directly deposited into the dtr's acct) and is familiar with the spenddown process as Pt has one currently. SW made referral to  Financial Counseling to assist Pt's daughter (will follow up Monday). SW discussed the Medicare.gov resource and reviewed TCUs per geographical area of preference (would like to try to have Pt as close to home as possible to visit). Pt's daughter gave SW consent to send referrals to the below TCUs and she will review Medicare.gov for further preferences. SW left VM for Pt's ACS  and discussed overall dispo plan with Pt's primary team as well. Pt currently has a sitter/1:1 for impulsivity, Pt must be stable off sitter for 24 hrs prior to discharge - SW to send referrals when Pt is stable off of the sitter.     TCU/SNF:   -[facility]  -[facility]  -[facility]  -[facility]    HAN Ag, STEPHANESW  7C Surgical/Oncology Unit   Phone: [phone]  Pager: [phone]

## 2019-08-03 ENCOUNTER — OFFICE VISIT (OUTPATIENT)
Dept: INTERPRETER SERVICES | Facility: CLINIC | Age: 74
End: 2019-08-03
Payer: MEDICARE

## 2019-08-03 PROBLEM — N30.00 ACUTE CYSTITIS WITHOUT HEMATURIA: Status: ACTIVE | Noted: 2019-08-03

## 2019-08-03 LAB
GLUCOSE BLDC GLUCOMTR-MCNC: 113 MG/DL (ref 70–99)
GLUCOSE BLDC GLUCOMTR-MCNC: 120 MG/DL (ref 70–99)
GLUCOSE BLDC GLUCOMTR-MCNC: 120 MG/DL (ref 70–99)

## 2019-08-03 PROCEDURE — 00000146 ZZHCL STATISTIC GLUCOSE BY METER IP

## 2019-08-03 PROCEDURE — T1013 SIGN LANG/ORAL INTERPRETER: HCPCS | Mod: U3

## 2019-08-03 PROCEDURE — 25000132 ZZH RX MED GY IP 250 OP 250 PS 637: Mod: GY | Performed by: INTERNAL MEDICINE

## 2019-08-03 PROCEDURE — 99232 SBSQ HOSP IP/OBS MODERATE 35: CPT | Performed by: INTERNAL MEDICINE

## 2019-08-03 PROCEDURE — 25000132 ZZH RX MED GY IP 250 OP 250 PS 637: Mod: GY | Performed by: PHYSICIAN ASSISTANT

## 2019-08-03 PROCEDURE — 12000001 ZZH R&B MED SURG/OB UMMC

## 2019-08-03 RX ORDER — CIPROFLOXACIN 500 MG/1
500 TABLET, FILM COATED ORAL EVERY 12 HOURS SCHEDULED
Status: COMPLETED | OUTPATIENT
Start: 2019-08-03 | End: 2019-08-06

## 2019-08-03 RX ADMIN — RISPERIDONE 4 MG: 2 TABLET ORAL at 21:57

## 2019-08-03 RX ADMIN — VANCOMYCIN HYDROCHLORIDE 125 MG: KIT at 16:28

## 2019-08-03 RX ADMIN — PHENAZOPYRIDINE HYDROCHLORIDE 100 MG: 100 TABLET, FILM COATED ORAL at 10:31

## 2019-08-03 RX ADMIN — CIPROFLOXACIN HYDROCHLORIDE 500 MG: 500 TABLET, FILM COATED ORAL at 20:05

## 2019-08-03 RX ADMIN — VANCOMYCIN HYDROCHLORIDE 125 MG: KIT at 20:05

## 2019-08-03 RX ADMIN — PHENAZOPYRIDINE HYDROCHLORIDE 100 MG: 100 TABLET, FILM COATED ORAL at 18:19

## 2019-08-03 RX ADMIN — MELATONIN 1000 UNITS: at 10:31

## 2019-08-03 RX ADMIN — RISPERIDONE 1 MG: 0.5 TABLET ORAL at 10:31

## 2019-08-03 RX ADMIN — CALCIUM 500 MG: 500 TABLET ORAL at 10:31

## 2019-08-03 RX ADMIN — PAROXETINE HYDROCHLORIDE 40 MG: 40 TABLET, FILM COATED ORAL at 10:31

## 2019-08-03 RX ADMIN — VANCOMYCIN HYDROCHLORIDE 125 MG: KIT at 10:30

## 2019-08-03 ASSESSMENT — ACTIVITIES OF DAILY LIVING (ADL)
ADLS_ACUITY_SCORE: 19
ADLS_ACUITY_SCORE: 12.5
ADLS_ACUITY_SCORE: 19

## 2019-08-03 NOTE — PLAN OF CARE
Problem: Adult Inpatient Plan of Care  Goal: Plan of Care Review  8/2/2019 2206 by Leanne Kelly, RN  Outcome: No Change      Temp: 99.2  F (37.3  C) Temp src: Oral BP: (!) 141/84 Pulse: 100   Resp: 16 SpO2: 96 % O2 Device: None (Room air)          -disoriented to time  -frequent urination and impulsiveness  -urine is orange in color; on scheduled Pyridium  -also on scheduled Ceftriaxone  -has a 1:1 sitter  -tolerating Regular diet with good appetite  -denies pain  -speaks Greenlandic but knows some English    Continue with plan of care

## 2019-08-03 NOTE — PROGRESS NOTES
[id number] Shift  Temp: 97.7  F (36.5  C) Temp src: Oral BP: (!) 157/82 Pulse: 84   Resp: 16 SpO2: 96 % O2 Device: None (Room air)           -disoriented to time and place  -frequent urination and impulsiveness  -urine is orange in color; on scheduled Pyridium, LBM 8/2  -also on scheduled Ceftriaxone  -has a 1:1 sitter  -0200   -denies pain  -speaks Zimbabwean but knows some English     Continue with plan of care

## 2019-08-03 NOTE — PROGRESS NOTES
[facility], [facility] Progress Note - Hospitalist Service, Gold 8       Date of Admission:  7/31/2019    73 YO woman with bipolar disorder, paranoid schizophrenia, dementia who was admitted with mild rhabdomyolysis and C Diff diarrhea.     Assessment & Plan     #. Rhabdomyolysis  #. Falls  #. Failure to Thrive  #. Paranoid schizophrenia  #. Dementia  - Admitted after being found on the ground by family and multiple falls. CK on admission >7000, now down to 4K after 48 hours of fluids  - Per daughter, patient requires 24/7 care beyond what family can provide at this point.   [] Appreciated PT/OT consults, sending referral to acute rehab and/or long term care  [] Deescalate sitter to video monitoring when appropriate  [] Stopping IVF and CK checks given downtrend without adequate PO intake  [] Continue with paxil 40 mg qday, Risperdal 0.5 mg qam and qnoon, 4 mg at bedtime    #. C Diff Diarrhea  #. Multiorganism UTI  - Admitted with several days of diarrhea after antibiotic  - C Diff assay (+) with mild leukocytosis   - Positive UA with UCx growing out enterobacter and pseudomonas resistant to ceftriaxone administered (7/31-8/2)  [] Continue with PO Vancomycin for 14 days (07/)  [] Switching to ciprofloxacin 500 mg BID for 5 days     #. Abnormal transaminases:  - Admitted with mildly elevated enzymes. Previous US 2014 was unremarkable. Stable without abdominal pain or change in tbili to suggest obstruction  [] Holding on US for now. Repeat CMP if any clinical change.      #. DM2: PTA maintained on metformin 500 mg qday, daughter notes NP at clinic told her to hold. A1C 6.7 in 04/2019.   [] Agree with holding metformin while inpatient; consider discontinuing metformin at discharge  [] BG BID      #. Hypokalemia: Normalized after replacement, discontinued telemetry   #. HTN: Not on meds PTA. BP stable on admission.   #. IronDA: PTA maintained on iron BID. Hgb stabel at 10.4 w/  MCV of 90. Hold iron for now.   . Daughter denies recent mood change. Continue home medications.   #PUD[place] [facility] [place] [place] w/ small healing ulcer in gastric antrum, no stigmata of recent bleeding. Hgb stable, not on PPI.      Diet: Regular Diet Adult  Snacks/Supplements Adult: Magic Cup; Between Meals    DVT Prophylaxis: Enoxaparin (Lovenox) SQ  Darby Catheter: not present  Code Status: Full Code      Disposition Plan   Expected discharge: 2 - 3 days, recommended to transitional care unit once mental status at baseline.  Entered: Salas Beckford MD 08/03/2019, 2:28 PM       The patient's care was discussed with the Patient and Patient's Family (Daugther).    Salas Beckford MD  Hospitalist Service, [address], [place]  Pager: [phone]  Please see sticky note for cross cover information  ______________________________________________________________________    Interval History   Patient reports diarrhea has improved without abdominal pain, fever/chills, shortness of breath    Data reviewed today: I reviewed all medications, new labs and imaging results over the last 24 hours. I personally reviewed no images or EKG's today.    Physical Exam   Vital Signs: Temp: 96.5  F (35.8  C) Temp src: Oral BP: (!) 158/87 Pulse: 96   Resp: 16 SpO2: 94 % O2 Device: None (Room air)    Weight: 117 lbs 3.2 oz  General: nontoxic  HEENT: no scleral icterus, normal conjunctiva, no facial rash  Chest: normal effort, clear lungs to auscultation  Cardiac: regula rhythm, no appreciable murmur  Abdomen: non-distended, nontender  Extremities: no lower extremity edema  Skin: no rash  Neuro: no facial droop, normal speech, no focal deficitis  Psych: alert, cooperative      Data   Recent Labs   Lab 08/02/19 0531 08/01/19  0527 07/31/19 2013   WBC  --  11.9* 12.2*   HGB  --  10.2* 10.4*   MCV  --  90 90   PLT  --  288 277    143 139   POTASSIUM 3.4 4.0 2.8*   CHLORIDE 105 107 102   CO2 32 29 30   BUN 9  9 10   CR 0.49* 0.58 0.61   ANIONGAP 5 7 6   SHERI 8.1* 8.2* 8.4*   GLC 96 84 117*   ALBUMIN 2.6* 2.8* 3.0*   PROTTOTAL 5.8* 6.0* 6.4*   BILITOTAL 0.3 0.5 0.3   ALKPHOS 47 49 50   * 130* 132*   * 264* 295*   CK>7500

## 2019-08-03 NOTE — PLAN OF CARE
Disoriented to time. Bedside attendant continues for frequent urination and impulsiveness. Urine is orange in color; on scheduled Pyridium. Pt declined afternoon pyridium and vanco stating she will take it when her daughter comes. Tolerating Regular diet with good appetite. denies pain. Speaks enough english to understand nursing cares. Continue with plan of care

## 2019-08-04 ENCOUNTER — OFFICE VISIT (OUTPATIENT)
Dept: INTERPRETER SERVICES | Facility: CLINIC | Age: 74
End: 2019-08-04
Payer: MEDICARE

## 2019-08-04 PROBLEM — N32.89 BLADDER SPASM: Status: ACTIVE | Noted: 2019-08-04

## 2019-08-04 LAB
BACTERIA SPEC CULT: ABNORMAL
BACTERIA SPEC CULT: ABNORMAL
SPECIMEN SOURCE: ABNORMAL

## 2019-08-04 PROCEDURE — 25000132 ZZH RX MED GY IP 250 OP 250 PS 637: Mod: GY | Performed by: INTERNAL MEDICINE

## 2019-08-04 PROCEDURE — T1013 SIGN LANG/ORAL INTERPRETER: HCPCS | Mod: U3

## 2019-08-04 PROCEDURE — 25000132 ZZH RX MED GY IP 250 OP 250 PS 637: Mod: GY | Performed by: PHYSICIAN ASSISTANT

## 2019-08-04 PROCEDURE — 12000001 ZZH R&B MED SURG/OB UMMC

## 2019-08-04 PROCEDURE — 99232 SBSQ HOSP IP/OBS MODERATE 35: CPT | Performed by: INTERNAL MEDICINE

## 2019-08-04 RX ORDER — OXYBUTYNIN CHLORIDE 5 MG/1
5 TABLET ORAL 3 TIMES DAILY
Status: DISCONTINUED | OUTPATIENT
Start: 2019-08-04 | End: 2019-08-05

## 2019-08-04 RX ORDER — LANOLIN ALCOHOL/MO/W.PET/CERES
3 CREAM (GRAM) TOPICAL
Status: DISCONTINUED | OUTPATIENT
Start: 2019-08-04 | End: 2019-08-24 | Stop reason: HOSPADM

## 2019-08-04 RX ADMIN — OXYBUTYNIN CHLORIDE 5 MG: 5 TABLET ORAL at 13:05

## 2019-08-04 RX ADMIN — VANCOMYCIN HYDROCHLORIDE 125 MG: KIT at 13:05

## 2019-08-04 RX ADMIN — CALCIUM 500 MG: 500 TABLET ORAL at 07:57

## 2019-08-04 RX ADMIN — MELATONIN TAB 3 MG 3 MG: 3 TAB at 21:49

## 2019-08-04 RX ADMIN — OXYBUTYNIN CHLORIDE 5 MG: 5 TABLET ORAL at 20:32

## 2019-08-04 RX ADMIN — VANCOMYCIN HYDROCHLORIDE 125 MG: KIT at 20:32

## 2019-08-04 RX ADMIN — CIPROFLOXACIN HYDROCHLORIDE 500 MG: 500 TABLET, FILM COATED ORAL at 07:57

## 2019-08-04 RX ADMIN — PAROXETINE HYDROCHLORIDE 40 MG: 40 TABLET, FILM COATED ORAL at 07:57

## 2019-08-04 RX ADMIN — OXYBUTYNIN CHLORIDE 5 MG: 5 TABLET ORAL at 17:13

## 2019-08-04 RX ADMIN — VANCOMYCIN HYDROCHLORIDE 125 MG: KIT at 17:14

## 2019-08-04 RX ADMIN — VANCOMYCIN HYDROCHLORIDE 125 MG: KIT at 07:57

## 2019-08-04 RX ADMIN — CIPROFLOXACIN HYDROCHLORIDE 500 MG: 500 TABLET, FILM COATED ORAL at 20:32

## 2019-08-04 RX ADMIN — RISPERIDONE 1 MG: 0.5 TABLET ORAL at 07:57

## 2019-08-04 RX ADMIN — MELATONIN 1000 UNITS: at 07:57

## 2019-08-04 RX ADMIN — RISPERIDONE 4 MG: 2 TABLET ORAL at 21:49

## 2019-08-04 ASSESSMENT — ACTIVITIES OF DAILY LIVING (ADL)
ADLS_ACUITY_SCORE: 19

## 2019-08-04 NOTE — PROGRESS NOTES
[facility], [facility] Progress Note - Hospitalist Service, Gold Nesha       Date of Admission:  7/31/2019    75 YO woman with bipolar disorder, paranoid schizophrenia, dementia who was admitted with mild rhabdomyolysis and C Diff diarrhea.     Assessment & Plan     #. Rhabdomyolysis  #. Falls  #. Failure to Thrive  #. Paranoid schizophrenia  #. Dementia  - Admitted after being found on the ground by family and multiple falls. CK on admission >7000, now down to 4K after 48 hours of fluids  - Per daughter, patient requires 24/7 care beyond what family can provide at this point.   - Patient attempts to get up to bathroom near hourly  [] Deescalate sitter to video monitoring daily; barrier to discharge, ~24 hours without monitoring before she could be accepted at nursing facility  [] Bladder control below  [] Continue with paxil 40 mg qday, Risperdal 0.5 mg qam and qnoon, 4 mg at bedtime    #. C Diff Diarrhea  #. Multiorganism UTI  #. Dysuria  - Admitted with several days of diarrhea after antibiotic  - C Diff assay (+) with mild leukocytosis   - Positive UA with UCx growing out enterobacter and pseudomonas resistant to ceftriaxone administered (7/31-8/2)  - Still with frequent dysuria and polyuria (low amount)  [] Continue with PO Vancomycin for 14 days (07/)  [] Switching to ciprofloxacin 500 mg BID for 5 days (8/02-8/06)  [] Stopped pyridium due to ineffectiveness, switching to oxybutynin 5 mg TID  [] Bladder scan     #. Abnormal transaminases:  - Admitted with mildly elevated enzymes. Previous US 2014 was unremarkable. Stable without abdominal pain or change in tbili to suggest obstruction  [] Holding on US for now. Repeat CMP if any clinical change.      #. DM2: PTA maintained on metformin 500 mg qday, daughter notes NP at clinic told her to hold. A1C 6.7 in 04/2019.   [] Agree with holding metformin while inpatient; consider discontinuing metformin at discharge  [] BG BID       #. Hypokalemia: Normalized after replacement, discontinued telemetry   #. HTN: Not on meds PTA. BP stable on admission.   #. IronDA: PTA maintained on iron BID. Hgb stabel at 10.4 w/ MCV of 90. Hold iron for now.   . Daughter denies recent mood change. Continue home medications.   #PUD[place] [facility] [place] [place] w/ small healing ulcer in gastric antrum, no stigmata of recent bleeding. Hgb stable, not on PPI.      Diet: Regular Diet Adult  Snacks/Supplements Adult: Magic Cup; Between Meals    DVT Prophylaxis: Enoxaparin (Lovenox) SQ  Darby Catheter: not present  Code Status: Full Code      Disposition Plan   Expected discharge: 2 - 3 days, recommended to transitional care unit once mental status at baseline.  Entered: Salas Beckford MD 08/04/2019, 8:49 AM       The patient's care was discussed with the Patient and Patient's Family (Daugther).    aSlas Beckford MD  Hospitalist Service, [address]  Pager: [phone]  Please see sticky note for cross cover information  ______________________________________________________________________    Interval History   Patient reports diarrhea has improved without abdominal pain, fever/chills, shortness of breath    Data reviewed today: I reviewed all medications, new labs and imaging results over the last 24 hours. I personally reviewed no images or EKG's today.    Physical Exam   Vital Signs: Temp: 97.8  F (36.6  C) Temp src: Oral BP: 139/67 Pulse: 77   Resp: 16 SpO2: 96 % O2 Device: None (Room air)    Weight: 118 lbs 0 oz  General: nontoxic  HEENT: no scleral icterus, normal conjunctiva, no facial rash  Chest: normal effort, clear lungs to auscultation  Cardiac: regula rhythm, no appreciable murmur  Abdomen: non-distended, nontender  Extremities: no lower extremity edema  Skin: no rash  Neuro: no facial droop, normal speech, no focal deficitis  Psych: alert, cooperative      Data   Recent Labs   Lab 08/02/19  0531 08/01/19  0527  07/31/19 2013   WBC  --  11.9* 12.2*   HGB  --  10.2* 10.4*   MCV  --  90 90   PLT  --  288 277    143 139   POTASSIUM 3.4 4.0 2.8*   CHLORIDE 105 107 102   CO2 32 29 30   BUN 9 9 10   CR 0.49* 0.58 0.61   ANIONGAP 5 7 6   SHERI 8.1* 8.2* 8.4*   GLC 96 84 117*   ALBUMIN 2.6* 2.8* 3.0*   PROTTOTAL 5.8* 6.0* 6.4*   BILITOTAL 0.3 0.5 0.3   ALKPHOS 47 49 50   * 130* 132*   * 264* 295*   CK>7500

## 2019-08-04 NOTE — PLAN OF CARE
VSS on RA. A&Ox3, disoriented to time. Denies pain. Denies nausea. Appetite fair. No BM on shift. Voiding adequately w/ some episodes of urgency and frequency. Attendant present at bedside throughout shift. Rested well w/ no acute events. Will continue to monitor and follow POC.

## 2019-08-04 NOTE — PLAN OF CARE
D/o to time. Bedside attendant discontinued this am and VPM in place for confusion. Pt 1 assist to bathroom for frequent voiding. Pyridium discontinued and oxybutynin started to help  urine frequency. Declined lovenox injection this am stating she doesn't need despite pt eduction that lovenox prevents blood clots. Barrier cream to coccyx for blanchable redness. Voiding frequently. Soft stool X1.  Cont to monitor and with poc.

## 2019-08-05 ENCOUNTER — APPOINTMENT (OUTPATIENT)
Dept: PHYSICAL THERAPY | Facility: CLINIC | Age: 74
DRG: 371 | End: 2019-08-05
Attending: HOSPITALIST
Payer: MEDICARE

## 2019-08-05 ENCOUNTER — APPOINTMENT (OUTPATIENT)
Dept: OCCUPATIONAL THERAPY | Facility: CLINIC | Age: 74
DRG: 371 | End: 2019-08-05
Attending: HOSPITALIST
Payer: MEDICARE

## 2019-08-05 LAB
GLUCOSE BLDC GLUCOMTR-MCNC: 108 MG/DL (ref 70–99)
GLUCOSE BLDC GLUCOMTR-MCNC: 143 MG/DL (ref 70–99)
GLUCOSE BLDC GLUCOMTR-MCNC: 167 MG/DL (ref 70–99)

## 2019-08-05 PROCEDURE — 97535 SELF CARE MNGMENT TRAINING: CPT | Mod: GO

## 2019-08-05 PROCEDURE — 00000146 ZZHCL STATISTIC GLUCOSE BY METER IP

## 2019-08-05 PROCEDURE — 97116 GAIT TRAINING THERAPY: CPT | Mod: GP

## 2019-08-05 PROCEDURE — 25000132 ZZH RX MED GY IP 250 OP 250 PS 637: Mod: GY | Performed by: PHYSICIAN ASSISTANT

## 2019-08-05 PROCEDURE — 97530 THERAPEUTIC ACTIVITIES: CPT | Mod: GP

## 2019-08-05 PROCEDURE — 25000132 ZZH RX MED GY IP 250 OP 250 PS 637: Mod: GY | Performed by: INTERNAL MEDICINE

## 2019-08-05 PROCEDURE — 12000001 ZZH R&B MED SURG/OB UMMC

## 2019-08-05 PROCEDURE — 99232 SBSQ HOSP IP/OBS MODERATE 35: CPT | Performed by: INTERNAL MEDICINE

## 2019-08-05 RX ORDER — OXYBUTYNIN CHLORIDE 5 MG/1
10 TABLET ORAL 3 TIMES DAILY
Status: DISCONTINUED | OUTPATIENT
Start: 2019-08-05 | End: 2019-08-07

## 2019-08-05 RX ADMIN — PAROXETINE HYDROCHLORIDE 40 MG: 40 TABLET, FILM COATED ORAL at 09:36

## 2019-08-05 RX ADMIN — MELATONIN 1000 UNITS: at 09:37

## 2019-08-05 RX ADMIN — OXYBUTYNIN CHLORIDE 10 MG: 5 TABLET ORAL at 19:21

## 2019-08-05 RX ADMIN — CIPROFLOXACIN HYDROCHLORIDE 500 MG: 500 TABLET, FILM COATED ORAL at 09:35

## 2019-08-05 RX ADMIN — VANCOMYCIN HYDROCHLORIDE 125 MG: KIT at 16:11

## 2019-08-05 RX ADMIN — RISPERIDONE 1 MG: 0.5 TABLET ORAL at 09:38

## 2019-08-05 RX ADMIN — OXYBUTYNIN CHLORIDE 10 MG: 5 TABLET ORAL at 14:43

## 2019-08-05 RX ADMIN — CALCIUM 500 MG: 500 TABLET ORAL at 09:37

## 2019-08-05 RX ADMIN — CIPROFLOXACIN HYDROCHLORIDE 500 MG: 500 TABLET, FILM COATED ORAL at 19:22

## 2019-08-05 RX ADMIN — VANCOMYCIN HYDROCHLORIDE 125 MG: KIT at 19:21

## 2019-08-05 RX ADMIN — VANCOMYCIN HYDROCHLORIDE 125 MG: KIT at 09:34

## 2019-08-05 RX ADMIN — RISPERIDONE 4 MG: 2 TABLET ORAL at 22:33

## 2019-08-05 RX ADMIN — VANCOMYCIN HYDROCHLORIDE 125 MG: KIT at 13:05

## 2019-08-05 RX ADMIN — OXYBUTYNIN CHLORIDE 5 MG: 5 TABLET ORAL at 09:39

## 2019-08-05 ASSESSMENT — ACTIVITIES OF DAILY LIVING (ADL)
ADLS_ACUITY_SCORE: 19
ADLS_ACUITY_SCORE: 20
ADLS_ACUITY_SCORE: 19

## 2019-08-05 NOTE — PLAN OF CARE
Discharge Planner PT 5B  Patient plan for discharge: Home  Current status: Pt completed transfers with SBA. Ambulated 2 x 250 ft with CGA and ascended/descended 2 x 3 stairs with CGA and railing on the L with hand hold on the R. VSS on RA.   Barriers to return to prior living situation: medical status, cognition, stairs  Recommendations for discharge: Home with 24/7 care vs memory care  Rationale for recommendations: Pt is close to baseline for mobility but is not safe to be home alone with cognitive status.       Entered by: Gabriella Mehta 08/05/2019 2:27 PM

## 2019-08-05 NOTE — PLAN OF CARE
VSS on room air. Alert and oriented to person & place. Indonesian speaking, interpretor present for much of morning. Patient worked w/ PT & OT. Good appetite eating majority of meal trays. BG check 143. Patient continues w/ urinary frequency & urgency. PVR= 4 ml. Frequently setting off bed/chair alarm & VPM. Education provided regarding utilizing call light. Will report to oncoming RN.

## 2019-08-05 NOTE — PLAN OF CARE
VSS on RA. A&Ox3, disoriented to time. Denies pain. Denies nausea. No BM on shift, but presents w/ frequency and urgency of urination. Out of bed frequently and not calling appropriately for help. VPM and bed alarm in place. Will continue to monitor and follow POC.

## 2019-08-05 NOTE — PLAN OF CARE
Discharge Planner OT   Patient plan for discharge: did not discuss  Current status: SBA for amb to/from bathroom with intermittent cues for use of 2WW, IND with sindhu cares, SBA for toilet transfer, pt able to answer 2/3 safety questions appropriately  Barriers to return to prior living situation: baseline dementia and mental health needs  Recommendations for discharge: home with 24/7 A or memory care facility  Rationale for recommendations: Pt daughter works full-time and is not home with pt during the day. Due to dementia/mental health needs pt will require 24/7 A or a memory care facility to ensure safety.        Entered by: Heather Lomax 08/05/2019 11:49 AM         Occupational Therapy Discharge Summary    Reason for therapy discharge:    No further expectations of functional progress.    Progress towards therapy goal(s). See goals on Care Plan in [facility] electronic health record for goal details.  Goals met    Therapy recommendation(s):    No further therapy is recommended.

## 2019-08-05 NOTE — PLAN OF CARE
Shift: 3027-3159  VS: Temp: 98.9  F (37.2  C) Temp src: Oral BP: 127/46 Pulse: 63   Resp: 16 SpO2: 96 % O2 Device: None (Room air)    Pain: Pt c/o of pain at L knee, ice applied  Neuro: Pt forgetful at baseline  Cardiac: wnl    Respiratory: LS clear, no SOB  GI/Diet/Appetite: Regular diet, fair appetite  : wnl ex frequency    LDA's: PIV SL  Skin: Ecchymotic, intact  Activity: SBA  Pertinent Labs/Lab Collection:      Plan: TCU

## 2019-08-06 ENCOUNTER — APPOINTMENT (OUTPATIENT)
Dept: PHYSICAL THERAPY | Facility: CLINIC | Age: 74
DRG: 371 | End: 2019-08-06
Attending: HOSPITALIST
Payer: MEDICARE

## 2019-08-06 ENCOUNTER — OFFICE VISIT (OUTPATIENT)
Dept: INTERPRETER SERVICES | Facility: CLINIC | Age: 74
End: 2019-08-06

## 2019-08-06 LAB
CK SERPL-CCNC: 343 U/L (ref 30–225)
GLUCOSE BLDC GLUCOMTR-MCNC: 119 MG/DL (ref 70–99)
GLUCOSE BLDC GLUCOMTR-MCNC: 120 MG/DL (ref 70–99)
GLUCOSE BLDC GLUCOMTR-MCNC: 165 MG/DL (ref 70–99)

## 2019-08-06 PROCEDURE — 25000132 ZZH RX MED GY IP 250 OP 250 PS 637: Mod: GY | Performed by: PEDIATRICS

## 2019-08-06 PROCEDURE — 82550 ASSAY OF CK (CPK): CPT | Performed by: PEDIATRICS

## 2019-08-06 PROCEDURE — 36415 COLL VENOUS BLD VENIPUNCTURE: CPT | Performed by: PEDIATRICS

## 2019-08-06 PROCEDURE — 97530 THERAPEUTIC ACTIVITIES: CPT | Mod: GP

## 2019-08-06 PROCEDURE — 12000001 ZZH R&B MED SURG/OB UMMC

## 2019-08-06 PROCEDURE — 25000132 ZZH RX MED GY IP 250 OP 250 PS 637: Mod: GY | Performed by: INTERNAL MEDICINE

## 2019-08-06 PROCEDURE — 25000132 ZZH RX MED GY IP 250 OP 250 PS 637: Mod: GY | Performed by: PHYSICIAN ASSISTANT

## 2019-08-06 PROCEDURE — 99222 1ST HOSP IP/OBS MODERATE 55: CPT | Performed by: PSYCHIATRY & NEUROLOGY

## 2019-08-06 PROCEDURE — 99232 SBSQ HOSP IP/OBS MODERATE 35: CPT | Performed by: PEDIATRICS

## 2019-08-06 PROCEDURE — 00000146 ZZHCL STATISTIC GLUCOSE BY METER IP

## 2019-08-06 PROCEDURE — 25000128 H RX IP 250 OP 636: Performed by: INTERNAL MEDICINE

## 2019-08-06 PROCEDURE — 97116 GAIT TRAINING THERAPY: CPT | Mod: GP

## 2019-08-06 PROCEDURE — T1013 SIGN LANG/ORAL INTERPRETER: HCPCS | Mod: U3

## 2019-08-06 RX ORDER — PAROXETINE 20 MG/1
20 TABLET, FILM COATED ORAL DAILY
Status: DISCONTINUED | OUTPATIENT
Start: 2019-08-07 | End: 2019-08-10

## 2019-08-06 RX ORDER — IBUPROFEN 200 MG
200 TABLET ORAL EVERY 6 HOURS PRN
Status: DISCONTINUED | OUTPATIENT
Start: 2019-08-06 | End: 2019-08-24 | Stop reason: HOSPADM

## 2019-08-06 RX ORDER — ACETAMINOPHEN 325 MG/1
325 TABLET ORAL EVERY 6 HOURS PRN
Status: DISCONTINUED | OUTPATIENT
Start: 2019-08-06 | End: 2019-08-24 | Stop reason: HOSPADM

## 2019-08-06 RX ADMIN — RISPERIDONE 4 MG: 2 TABLET ORAL at 22:58

## 2019-08-06 RX ADMIN — RISPERIDONE 1 MG: 0.5 TABLET ORAL at 08:41

## 2019-08-06 RX ADMIN — PAROXETINE HYDROCHLORIDE 40 MG: 40 TABLET, FILM COATED ORAL at 08:41

## 2019-08-06 RX ADMIN — OXYBUTYNIN CHLORIDE 10 MG: 5 TABLET ORAL at 13:52

## 2019-08-06 RX ADMIN — CALCIUM 500 MG: 500 TABLET ORAL at 08:41

## 2019-08-06 RX ADMIN — ACETAMINOPHEN 325 MG: 325 TABLET, FILM COATED ORAL at 11:41

## 2019-08-06 RX ADMIN — VANCOMYCIN HYDROCHLORIDE 125 MG: KIT at 16:46

## 2019-08-06 RX ADMIN — MELATONIN TAB 3 MG 3 MG: 3 TAB at 01:17

## 2019-08-06 RX ADMIN — CIPROFLOXACIN HYDROCHLORIDE 500 MG: 500 TABLET, FILM COATED ORAL at 20:52

## 2019-08-06 RX ADMIN — ENOXAPARIN SODIUM 30 MG: 30 INJECTION SUBCUTANEOUS at 08:41

## 2019-08-06 RX ADMIN — OXYBUTYNIN CHLORIDE 10 MG: 5 TABLET ORAL at 20:52

## 2019-08-06 RX ADMIN — VANCOMYCIN HYDROCHLORIDE 125 MG: KIT at 11:41

## 2019-08-06 RX ADMIN — CIPROFLOXACIN HYDROCHLORIDE 500 MG: 500 TABLET, FILM COATED ORAL at 08:41

## 2019-08-06 RX ADMIN — VANCOMYCIN HYDROCHLORIDE 125 MG: KIT at 20:52

## 2019-08-06 RX ADMIN — IBUPROFEN 200 MG: 200 TABLET, FILM COATED ORAL at 13:52

## 2019-08-06 RX ADMIN — OXYBUTYNIN CHLORIDE 10 MG: 5 TABLET ORAL at 08:41

## 2019-08-06 RX ADMIN — MELATONIN 1000 UNITS: at 08:41

## 2019-08-06 RX ADMIN — VANCOMYCIN HYDROCHLORIDE 125 MG: KIT at 08:41

## 2019-08-06 RX ADMIN — MELATONIN TAB 3 MG 3 MG: 3 TAB at 22:58

## 2019-08-06 ASSESSMENT — ACTIVITIES OF DAILY LIVING (ADL)
ADLS_ACUITY_SCORE: 19
ADLS_ACUITY_SCORE: 15
ADLS_ACUITY_SCORE: 14

## 2019-08-06 NOTE — CONSULTS
Consult Date:  08/06/2019      REQUESTING SOURCE:  Gold A Team.      IDENTIFYING DATA:  This 74-year-old woman was seen today for psychiatric consultation to weigh in on whether her psychiatric medications may be complicating her memory difficulties.  Since the patient is a very poor historian, I was fortunate to reach her daughter who is able to provide me with collateral information.      HISTORY OF PRESENT ILLNESS:  Ms. James has a history of paranoid schizophrenia and recent memory decline.  She was admitted due to frequent falling and progressive cognitive decline.  She does have a diagnosis of paranoid schizophrenia and receives psychiatric care at the [facility] where she has been seen for many years.  According to her daughter, she has been on the same dose of Risperdal for quite some time without apparent side effects.  Apparently in the remote past, she has been hospitalized for psychiatric problems including paranoia, delusions and even visual hallucinations.  Her daughter feels she has been very stable recently with no evidence of thought disorder symptoms.  She would be hesitant to have us lower that dose of antipsychotics, however.      Today, Ms. James was a very pleasant and cooperative.  She had no complaints of pain or other distress.  It was very clear that she had significant cognitive impairment.  She was unable to provide me with any meaningful historical information.  I also had one of our resident doctors interviewing her about 20 minutes before I came in and she did not remember her.  According to her bedside attendant, she has been fairly well behaved, but has been needing to go to the bathroom quite frequently.      PAST PSYCHIATRIC HISTORY:  No psychiatric hospitalizations for many years.  She receives psychiatric care at [facility].      CHEMICAL USE HISTORY:  No history of excessive use of alcohol.  No drug or tobacco use.      PAST MEDICAL HISTORY:  Diabetes, hypertension, iron deficiency  "anemia and peptic ulcer disease.      PAST SURGICAL HISTORY:  She has a colonoscopy and upper GI endoscopy.      ALLERGIES:  ALLERGIC TO PENICILLINS.      OUTPATIENT PSYCHIATRIC MEDICATIONS:  Risperdal 1 mg every morning and 4 mg at bedtime and Paxil 40 mg in the evening.      REVIEW OF SYSTEMS:  A 10-point today is negative except for feeling a bit warm this morning.      FAMILY HISTORY:  According to her daughter, Angelica has a paternal uncle with some mental illness.      SOCIAL HISTORY:  She is a native of [place] and has 4 children.  She moved to the [place] around 20 years ago.  When the children were younger, she was  from them due to her mental illness per her ex-'s wishes.  She has been living with her daughter and 7-year-old grandchild in a home in [place].  Her daughter works all day is unable to safely keep her at home at this point.      MENTAL STATUS EXAMINATION:  She was seen in the bedside chair and is well groomed, pleasant, and cooperative.  She spoke with a slight accent, but for the most part seemed to have an excellent understanding of English.  There is no rigidity or cogwheeling of her upper extremities.  Speech is fluent.  Thought process direct.  Associations tight.  Denies delusions or hallucinations.  Described her mood as \"good.\"  Affect is somewhat restricted.  She was oriented to being in the hospital, but did not know the name of it.  Also, she did not know the day of the week or date or even the month we are in.  Recent and remote memory, attention span and concentration, use of language, fund of knowledge appear within normal limits.  Insight and judgment poor.      VITAL SIGNS:  Blood pressure 120/52, pulse 73, temperature 96.3.      DIAGNOSES:  Paranoid schizophrenia and unspecified neurocognitive disorder.      IMPRESSION:  The dose of Risperdal is probably on the high side for her age.  I suspect that she needs this dose to control her thought disorder " symptoms.  She has been on Paxil, I believe, long-term.  My concern at this point is that of all the SSRIs, it is the only one that has anticholinergic effects.  This certainly could complicate her cognitive status.   She receives psychiatric care at [facility].      RECOMMENDATIONS:   1.  I would continue the Risperdal at 1 mg in the morning and 4 mg at bedtime.  I think it would be best if we reduced the Paxil dose to 20 mg to lessen its anticholinergic effects.   2.  It seems reasonable to continue melatonin.   3.  Reconsult Psychiatry as needed.         MIGUEL CRISTINA MD             D: 2019   T: 2019   MT: ALEXANDRE      Name:     LUDA BOSWELL   MRN:      [id number]        Account:       [id number]   :      1945           Consult Date:  2019      Document: [id number]

## 2019-08-06 NOTE — PROGRESS NOTES
[facility], [place]    Medicine Progress Note - Hospitalist Service, Gold 8       Date of Admission:  7/31/2019  Assessment & Plan  74 year old female with a past medical history of dementia, paranoid schizophrenia, HTN, DM2, CARLOS admitted on 7/31/2019 for FTT, recent UTI, and found to have c. Diff now on vanco and improving, also with rhabdo now improved but with continued frequency of urination, with dementia and inability to safely care for her at home.         #. Rhabdomyolysis  #. Falls  #. Failure to Thrive  #. Paranoid schizophrenia  #. Dementia  Admitted after being found on the ground by family and multiple falls. CK on admission >7000, now down to 4K after 48 hours of fluids. Per daughter, patient requires 24/7 care beyond what family can provide at this point.  Patient attempts to get up to bathroom near hourly. . Daughter denies recent mood change. Continue home medications.  -- attempted to move to [facility] from sitter, but needing >q1h assist to bathroom  -- Continue with paxil 40 mg qday, Risperdal 0.5 mg qam and qnoon, 4 mg at bedtime  -- pain in legs: tylenol and ibuprofen available  -- rechecking CK - 343    #. C Diff Diarrhea  Admitted with several days of diarrhea after antibiotic. C Diff assay (+) with mild leukocytosis.   -- Continue with PO Vancomycin for 14 days (07/)    #. Multiorganism UTI  #. Dysuria   Positive UA with UCx growing out enterobacter and pseudomonas resistant to ceftriaxone administered (7/31-8/2). Still with frequent dysuria and polyuria (low amount), no response to pyridium.  -- Ceftriaxone changed to ciprofloxacin 500 mg BID for 5 days (8/02-8/06)  -- Increasing oxybutynin 10 mg TID - no effect as of yet  -- will consider urology consult     #. Abnormal transaminases:  Admitted with mildly elevated enzymes. Previous US 2014 was unremarkable. Stable without abdominal pain or change in tbili to suggest obstruction  -- Holding on US for now.  Repeat CMP if any clinical change.   -- will monitor with giving tylenol and ibuprofen as above     #. DM2: PTA maintained on metformin 500 mg qday, daughter notes NP at clinic told her to hold. A1C 6.7 in 04/2019.   -- Agree with holding metformin while inpatient; consider discontinuing metformin at discharge  -- BG BID      #. Hypokalemia: Normalized after replacement, discontinued telemetry   #. HTN: Not on meds PTA. BP stable on admission.   #. IronDA: PTA maintained on iron BID. Hgb stabel at 10.4 w/ MCV of 90. Hold iron for now.   #PUD: [facility] EGD in 2010 w/ small healing ulcer in gastric antrum, no stigmata of recent bleeding. Hgb stable, not on PPI.      Diet: Regular Diet Adult  Snacks/Supplements Adult: Magic Cup; Between Meals    DVT Prophylaxis: Enoxaparin (Lovenox) SQ  Darby Catheter: not present  Code Status: Full Code       Disposition Plan   Expected discharge: 2 - 3 days, recommended to transitional care unit once safe disposition plan/ TCU bed available.  Entered: Tita Burgess MD 08/06/2019, 10:17 AM       The patient's care was discussed with the Patient.    Tita Burgess MD  Hospitalist Service, [address], [place]  Pager: [phone]  Please see sticky note for cross cover information  ______________________________________________________________________    Interval History   Slept well overnight.  She continues to need to urinate more often than hourly.  She is walking with a stand by assist.  She knows she is at the hospital because she fell.  However, she cannot remember how often she urinates, stools or that the  speaks Lao.      Data reviewed today: I reviewed all medications, new labs and imaging results over the last 24 hours. I personally reviewed no images or EKG's today.    Physical Exam   Vital Signs: Temp: 98  F (36.7  C) Temp src: Oral BP: 135/64 Pulse: 82   Resp: 16 SpO2: 96 % O2 Device: None (Room air)    Weight: 117 lbs  3.2 oz  General: appears well, thin  HEENT: no scleral icterus, normal conjunctiva, no facial rash  Chest: normal effort, clear lungs to auscultation  Cardiac: regula rhythm, no appreciable murmur  Abdomen: non-distended, nontender, BS +   Extremities: no lower extremity edema  Skin: no rash, has bruises on the backs of hands  Neuro: no facial droop, normal speech, no focal deficits: unable to remember her stool pattern, frequency of urination or that the  speaks Senegalese.  Psych: alert, cooperative,       Data   Recent Labs   Lab 08/02/19  0531 08/01/19  0527 07/31/19 2013   WBC  --  11.9* 12.2*   HGB  --  10.2* 10.4*   MCV  --  90 90   PLT  --  288 277    143 139   POTASSIUM 3.4 4.0 2.8*   CHLORIDE 105 107 102   CO2 32 29 30   BUN 9 9 10   CR 0.49* 0.58 0.61   ANIONGAP 5 7 6   SHERI 8.1* 8.2* 8.4*   GLC 96 84 117*   ALBUMIN 2.6* 2.8* 3.0*   PROTTOTAL 5.8* 6.0* 6.4*   BILITOTAL 0.3 0.5 0.3   ALKPHOS 47 49 50   * 130* 132*   * 264* 295*

## 2019-08-06 NOTE — PROGRESS NOTES
"Social Work Services Progress Note    Hospital Day: 6  Date of Initial Social Work Evaluation: 8/2/2019  Collaborated with:  Financial counseling, medical team    Data:  Pt is a 74 year old female admitted to [facility] on 7/31/2019 with failure to thrive and dementia w/ behavioral disturbance.     Dtr can no longer care for the pt at home and is seeking TCU/MC/LTC for the pt upon discharge.     Intervention:  SW participated in interdisciplinary rounds this morning to assess for needs and discharge planning. Pt is still on a 1:1 sitter due to impulsivity and attempts to get out of bed w/o assist. Pt's daughter is working with financial counseling to secure [place] insurance for LTC placement.     SW received an update from Damaris in financial counseling yesterday. Damaris stated, \"Started [place] Certain Populations application with pt's daughter as pt is anticipated to discharge to TCU then potentially LTC following. FC (financial counseling) discussed MA then Request for Payment of LTC if needed. FC discussed verifications needed and will meet with pt on 8-6-2019 to obtain signatures. Pt's daughter to provide verifications to FC by 8-6-2019.\"       Assessment:  pt not seen for this intervention    Plan:    Anticipated Disposition:  Facility:  TCU/MC/LTC    Barriers to d/c plan:  Insurance; 1:1 sitter; placement options    Follow Up:  SW will remain available and continue to follow, assess for needs, and assist in discharge planning.     HAN Spicer, LGSW   5B   Pager [phone]  Phone [phone]        "

## 2019-08-06 NOTE — CONSULTS
Patient seen and chart reviewed. Note dictated (initial)   RECOMMENDATIONS:  Would continue Risperdal 1 mg am and 4 mg HS   Reduce Paxil from 40 to 20 mg; I have concern over the anticholingeric effects of this.  Consider starting Aricept.   Re-consult psychiatry as needed.

## 2019-08-06 NOTE — PROGRESS NOTES
[facility], [facility] Progress Note - Hospitalist Service, Gold Nesha       Date of Admission:  7/31/2019    75 YO woman with bipolar disorder, paranoid schizophrenia, dementia who was admitted with mild rhabdomyolysis and C Diff diarrhea.     Assessment & Plan     #. Rhabdomyolysis  #. Falls  #. Failure to Thrive  #. Paranoid schizophrenia  #. Dementia  - Admitted after being found on the ground by family and multiple falls. CK on admission >7000, now down to 4K after 48 hours of fluids  - Per daughter, patient requires 24/7 care beyond what family can provide at this point.   - Patient attempts to get up to bathroom near hourly  [] Deescalate sitter to video monitoring daily; barrier to discharge, ~24 hours without monitoring before she could be accepted at nursing facility  [] Bladder control below  [] Continue with paxil 40 mg qday, Risperdal 0.5 mg qam and qnoon, 4 mg at bedtime    #. C Diff Diarrhea  #. Multiorganism UTI  #. Dysuria  - Admitted with several days of diarrhea after antibiotic  - C Diff assay (+) with mild leukocytosis   - Positive UA with UCx growing out enterobacter and pseudomonas resistant to ceftriaxone administered (7/31-8/2)  - Still with frequent dysuria and polyuria (low amount), no response to pyridium  [] Continue with PO Vancomycin for 14 days (07/)  [] Switching to ciprofloxacin 500 mg BID for 5 days (8/02-8/06)  [] Increasing oxybutynin 5 mg TID  [] Bladder scan     #. Abnormal transaminases:  - Admitted with mildly elevated enzymes. Previous US 2014 was unremarkable. Stable without abdominal pain or change in tbili to suggest obstruction  [] Holding on US for now. Repeat CMP if any clinical change.      #. DM2: PTA maintained on metformin 500 mg qday, daughter notes NP at clinic told her to hold. A1C 6.7 in 04/2019.   [] Agree with holding metformin while inpatient; consider discontinuing metformin at discharge  [] BG BID      #.  Hypokalemia: Normalized after replacement, discontinued telemetry   #. HTN: Not on meds PTA. BP stable on admission.   #. IronDA: PTA maintained on iron BID. Hgb stabel at 10.4 w/ MCV of 90. Hold iron for now.   . Daughter denies recent mood change. Continue home medications.   #PUD: [facility] [place] in [place] w/ small healing ulcer in gastric antrum, no stigmata of recent bleeding. Hgb stable, not on PPI.      Diet: Regular Diet Adult  Snacks/Supplements Adult: Magic Cup; Between Meals    DVT Prophylaxis: Enoxaparin (Lovenox) SQ  Darby Catheter: not present  Code Status: Full Code      Disposition Plan   Expected discharge: 2 - 3 days, recommended to transitional care unit once mental status at baseline.  Entered: Salas Beckford MD 08/05/2019, 11:51 PM       The patient's care was discussed with the Patient and Patient's Family (Daugther).    Salas Beckford MD  Hospitalist Service, [address]  Pager: [phone]  Please see sticky note for cross cover information  ______________________________________________________________________    Interval History   Patient reports diarrhea has improved without abdominal pain, fever/chills, shortness of breath    Data reviewed today: I reviewed all medications, new labs and imaging results over the last 24 hours. I personally reviewed no images or EKG's today.    Physical Exam   Vital Signs: Temp: 95.7  F (35.4  C) Temp src: Oral BP: 123/76 Pulse: 77   Resp: 16 SpO2: 95 % O2 Device: None (Room air)    Weight: 117 lbs 3.2 oz  General: nontoxic  HEENT: no scleral icterus, normal conjunctiva, no facial rash  Chest: normal effort, clear lungs to auscultation  Cardiac: regula rhythm, no appreciable murmur  Abdomen: non-distended, nontender  Extremities: no lower extremity edema  Skin: no rash  Neuro: no facial droop, normal speech, no focal deficitis  Psych: alert, cooperative      Data   Recent Labs   Lab 08/02/19  0531 08/01/19  0527  07/31/19 2013   WBC  --  11.9* 12.2*   HGB  --  10.2* 10.4*   MCV  --  90 90   PLT  --  288 277    143 139   POTASSIUM 3.4 4.0 2.8*   CHLORIDE 105 107 102   CO2 32 29 30   BUN 9 9 10   CR 0.49* 0.58 0.61   ANIONGAP 5 7 6   SHEIR 8.1* 8.2* 8.4*   GLC 96 84 117*   ALBUMIN 2.6* 2.8* 3.0*   PROTTOTAL 5.8* 6.0* 6.4*   BILITOTAL 0.3 0.5 0.3   ALKPHOS 47 49 50   * 130* 132*   * 264* 295*   CK>7500

## 2019-08-06 NOTE — PLAN OF CARE
D/o to time. VPM discontinued this evening and bed side attendant re-implemented for confusion and pt frequently setting off bed alarm to void.  Pt 1 assist to bathroom for frequent voiding, pt will sometimes sit on toilet with no void. Oxybutynin dose increased to help  urine frequency.  Barrier cream to coccyx for blanchable redness. Large loost stool X1.  Cont to monitor and with poc.

## 2019-08-06 NOTE — PLAN OF CARE
A&O to self/place, VSS on RA.  Pt experiencing urinary frequency and urgency, has sitter for impulsivity and is not redirectable.  Up to use bathroom at least q30 min, sometimes goes to the toilet but does not void.  Loose BM x3.  Pt c/o sore L knee, pain improved with tylenol, ibprofen and heat packs.  Good appetite.  Worked c PT/OT.  SBA.  Continue to monitor and follow POC.

## 2019-08-06 NOTE — PLAN OF CARE
Reason for admission: Rhabdomyolysis, C-diff  Status: Pt continues w/ 1:1 sitter due to impulsivity & pt's attempts to frequently get out of bed w/o assistance. VSS. C/o pain in bilateral knees, hot/cold packs applied w/ good relief. Pt complained of not being able to fall asleep, melatonin administered w/ good relief.  Pt disoriented to time. Breathing adequately on RA. Some blanchable redness on bottom, abrasion on right knee, scabbed  & BILLY. PIV SL, flushes well. Regular diet, denies nausea. Good oral fluid intake. Up w/ SBA & walker in room. Voiding small amts, about q20 minues when awake. Continue w/ fall risk precautions, hx of recent falls and weak on feet.   Plan of care: Pt to discontinue off sitter prior to discharge, likely to TCU facility. Continue to monitor and follow POC.

## 2019-08-07 ENCOUNTER — APPOINTMENT (OUTPATIENT)
Dept: PHYSICAL THERAPY | Facility: CLINIC | Age: 74
DRG: 371 | End: 2019-08-07
Attending: HOSPITALIST
Payer: MEDICARE

## 2019-08-07 LAB
ALBUMIN SERPL-MCNC: 3.1 G/DL (ref 3.4–5)
ALP SERPL-CCNC: 47 U/L (ref 40–150)
ALT SERPL W P-5'-P-CCNC: 70 U/L (ref 0–50)
ANION GAP SERPL CALCULATED.3IONS-SCNC: 7 MMOL/L (ref 3–14)
AST SERPL W P-5'-P-CCNC: 30 U/L (ref 0–45)
BILIRUB SERPL-MCNC: 0.2 MG/DL (ref 0.2–1.3)
BUN SERPL-MCNC: 23 MG/DL (ref 7–30)
CALCIUM SERPL-MCNC: 8.6 MG/DL (ref 8.5–10.1)
CHLORIDE SERPL-SCNC: 103 MMOL/L (ref 94–109)
CO2 SERPL-SCNC: 29 MMOL/L (ref 20–32)
CREAT SERPL-MCNC: 0.67 MG/DL (ref 0.52–1.04)
ERYTHROCYTE [DISTWIDTH] IN BLOOD BY AUTOMATED COUNT: 14 % (ref 10–15)
GFR SERPL CREATININE-BSD FRML MDRD: 85 ML/MIN/{1.73_M2}
GLUCOSE BLDC GLUCOMTR-MCNC: 112 MG/DL (ref 70–99)
GLUCOSE BLDC GLUCOMTR-MCNC: 139 MG/DL (ref 70–99)
GLUCOSE BLDC GLUCOMTR-MCNC: 97 MG/DL (ref 70–99)
GLUCOSE SERPL-MCNC: 102 MG/DL (ref 70–99)
HCT VFR BLD AUTO: 33.8 % (ref 35–47)
HGB BLD-MCNC: 10.5 G/DL (ref 11.7–15.7)
MCH RBC QN AUTO: 28.5 PG (ref 26.5–33)
MCHC RBC AUTO-ENTMCNC: 31.1 G/DL (ref 31.5–36.5)
MCV RBC AUTO: 92 FL (ref 78–100)
PLATELET # BLD AUTO: 332 10E9/L (ref 150–450)
POTASSIUM SERPL-SCNC: 4.1 MMOL/L (ref 3.4–5.3)
PROT SERPL-MCNC: 6.6 G/DL (ref 6.8–8.8)
RBC # BLD AUTO: 3.69 10E12/L (ref 3.8–5.2)
SODIUM SERPL-SCNC: 139 MMOL/L (ref 133–144)
WBC # BLD AUTO: 9.8 10E9/L (ref 4–11)

## 2019-08-07 PROCEDURE — 25000132 ZZH RX MED GY IP 250 OP 250 PS 637: Mod: GY | Performed by: PEDIATRICS

## 2019-08-07 PROCEDURE — 25000132 ZZH RX MED GY IP 250 OP 250 PS 637: Mod: GY | Performed by: PHYSICIAN ASSISTANT

## 2019-08-07 PROCEDURE — 25000132 ZZH RX MED GY IP 250 OP 250 PS 637: Mod: GY | Performed by: INTERNAL MEDICINE

## 2019-08-07 PROCEDURE — T1013 SIGN LANG/ORAL INTERPRETER: HCPCS | Mod: U3

## 2019-08-07 PROCEDURE — 80053 COMPREHEN METABOLIC PANEL: CPT | Performed by: PEDIATRICS

## 2019-08-07 PROCEDURE — 85027 COMPLETE CBC AUTOMATED: CPT | Performed by: PEDIATRICS

## 2019-08-07 PROCEDURE — 25000128 H RX IP 250 OP 636: Performed by: INTERNAL MEDICINE

## 2019-08-07 PROCEDURE — 99232 SBSQ HOSP IP/OBS MODERATE 35: CPT | Performed by: PEDIATRICS

## 2019-08-07 PROCEDURE — 36415 COLL VENOUS BLD VENIPUNCTURE: CPT | Performed by: PEDIATRICS

## 2019-08-07 PROCEDURE — 97116 GAIT TRAINING THERAPY: CPT | Mod: GP

## 2019-08-07 PROCEDURE — 12000001 ZZH R&B MED SURG/OB UMMC

## 2019-08-07 PROCEDURE — 97530 THERAPEUTIC ACTIVITIES: CPT | Mod: GP

## 2019-08-07 PROCEDURE — 00000146 ZZHCL STATISTIC GLUCOSE BY METER IP

## 2019-08-07 RX ADMIN — ACETAMINOPHEN 325 MG: 325 TABLET, FILM COATED ORAL at 06:59

## 2019-08-07 RX ADMIN — ACETAMINOPHEN 325 MG: 325 TABLET, FILM COATED ORAL at 17:54

## 2019-08-07 RX ADMIN — VANCOMYCIN HYDROCHLORIDE 125 MG: KIT at 21:26

## 2019-08-07 RX ADMIN — CALCIUM 500 MG: 500 TABLET ORAL at 08:03

## 2019-08-07 RX ADMIN — ACETAMINOPHEN 325 MG: 325 TABLET, FILM COATED ORAL at 12:01

## 2019-08-07 RX ADMIN — MELATONIN 1000 UNITS: at 08:02

## 2019-08-07 RX ADMIN — VANCOMYCIN HYDROCHLORIDE 125 MG: KIT at 16:28

## 2019-08-07 RX ADMIN — RISPERIDONE 1 MG: 0.5 TABLET ORAL at 08:02

## 2019-08-07 RX ADMIN — RISPERIDONE 4 MG: 2 TABLET ORAL at 21:26

## 2019-08-07 RX ADMIN — MELATONIN TAB 3 MG 3 MG: 3 TAB at 21:26

## 2019-08-07 RX ADMIN — ENOXAPARIN SODIUM 30 MG: 30 INJECTION SUBCUTANEOUS at 08:02

## 2019-08-07 RX ADMIN — VANCOMYCIN HYDROCHLORIDE 125 MG: KIT at 12:01

## 2019-08-07 RX ADMIN — VANCOMYCIN HYDROCHLORIDE 125 MG: KIT at 08:02

## 2019-08-07 RX ADMIN — PAROXETINE HYDROCHLORIDE HEMIHYDRATE 20 MG: 20 TABLET, FILM COATED ORAL at 08:02

## 2019-08-07 RX ADMIN — OXYBUTYNIN CHLORIDE 10 MG: 5 TABLET ORAL at 08:02

## 2019-08-07 ASSESSMENT — ACTIVITIES OF DAILY LIVING (ADL)
ADLS_ACUITY_SCORE: 19
ADLS_ACUITY_SCORE: 13
ADLS_ACUITY_SCORE: 19
ADLS_ACUITY_SCORE: 15

## 2019-08-07 NOTE — PLAN OF CARE
Discharge Planner PT   Patient plan for discharge: Home  Current status: Pt completed transfers IND and ambulated 450 ft IND. Pt reports she has been on 3 walks today and has no concerns about mobility. VSS on RA. Pt met all goals, will complete order.  Barriers to return to prior living situation: memory/cognition  Recommendations for discharge: Home with 24/7 supervision or memory care  Rationale for recommendations: Pt at baseline for functional mobility but not safe to return home due to impaired cognition/memory.       Entered by: Gabriella Mehta 08/07/2019 3:18 PM         Pt would benefit from ambulating 3x daily with RN staff/supervision to maintain activity tolerance.       Physical Therapy Discharge Summary    Reason for therapy discharge:    All goals and outcomes met, no further needs identified.    Progress towards therapy goal(s). See goals on Care Plan in [facility] electronic health record for goal details.  Goals met    Therapy recommendation(s):    Continue ambulating with supervision.

## 2019-08-07 NOTE — CONSULTS
Urology Consult History and Physical    Name: Angelica James    MRN: [id number]   YOB: 1945       We were asked to see Angelica James at the request of Dr. Burgess for evaluation and treatment of the following chief complaint:          Chief Complaint:   Frequent urination    History is obtained from patient, medicine team, chart          History of Present Illness:   Angelica James is a 74 year old female, admitted with failure to thrive, with PMHx dementia, schizophrenia, HTN, T2DM, peptic ulcer disease, recently treated with 5 days cipro for possible UTI, and no known urologic history. She has a significant history of dementia. Per team, patient reports frequent need to urinate (every 10-15 minutes) and will forget that she has recently urinated. Initially this symptom was attributed to her possible UTI so patient tried pyridium, which didn't help. Next the team started the patient on oxybutynin 5 mg without relief, so went up to 10 mg. She has taken this for three days and team does not feel this is helping. Patient has had several falls, and per team the goal is to obtain better control her possible bladder spasms to assist in walking and rehab.    Patient reports she just went to the bathroom. She says that she feels her bladder is empty after she pees. She denies any pain with urination, blood in her urine, or incontinence. Urine is light yellow/clear. She states she feels well. She drinks coffee in the morning and afternoon, and water throughout the day. She lives with her daughter.           Past Medical History:     Past Medical History:   Diagnosis Date     Dementia      Diabetes (H)      Hypertension      Iron deficiency anemia      PUD (peptic ulcer disease)      Schizophrenia (H)             Past Surgical History:     Past Surgical History:   Procedure Laterality Date     colonoscopy       UPPER GI ENDOSCOPY              Social History:     Social History     Tobacco Use      Smoking status: Never Smoker     Smokeless tobacco: Never Used   Substance Use Topics     Alcohol use: No            Family History:   No family history on file.         Allergies:     Allergies   Allergen Reactions     Penicillins Hives            Medications:     Current Facility-Administered Medications   Medication     acetaminophen (TYLENOL) tablet 325 mg     calcium carbonate 500 mg (elemental) (OSCAL;OYSTER SHELL CALCIUM) tablet 500 mg     enoxaparin (LOVENOX) injection 30 mg     ibuprofen (ADVIL/MOTRIN) tablet 200 mg     lidocaine (LMX4) cream     lidocaine 1 % 0.1-1 mL     melatonin tablet 3 mg     naloxone (NARCAN) injection 0.1-0.4 mg     ondansetron (ZOFRAN-ODT) ODT tab 4 mg    Or     ondansetron (ZOFRAN) injection 4 mg     oxybutynin (DITROPAN) tablet 10 mg     PARoxetine (PAXIL) tablet 20 mg     polyethylene glycol (MIRALAX/GLYCOLAX) Packet 17 g     risperiDONE (risperDAL) tablet 1 mg     risperiDONE (risperDAL) tablet 4 mg     sodium chloride (PF) 0.9% PF flush 3 mL     sodium chloride (PF) 0.9% PF flush 3 mL     vancomycin (FIRVANQ) oral solution 125 mg     vitamin D3 (CHOLECALCIFEROL) 1000 units (25 mcg) tablet 1,000 Units             Review of Systems:    ROS: See HPI for pertinent details.  Remainder of 10-point ROS negative.         Physical Exam:   VS:  T: 97.3    HR: 63    BP: 120/59    RR: 18   GEN:  AOx3.  NAD.  Pleasant.  HEENT:  Sclerae anicteric.  Conjunctivae pink.  Moist mucous membranes  NECK:  Supple.  No lymphadenopathy.  CV:  RRR  LUNGS: Non-labored breathing.  BACK:  No costoverterbral tenderness.  ABD:  Soft.  Mild suprapubic tenderness, nontender in remainder of abdomen.  ND.  No rebound or guarding.  No surgical scars. No masses.    EXT:  Warm, well perfused.  Trace lower extremity edema bilaterally. Swelling of medial right knee.  SKIN:  Warm.  Dry.  No rashes.  NEURO:  CN grossly intact.  Slow    URINE: midstream urine culture 7/22 50-100K mixed micrograms melany                UA 7/31 57 WBCs, negative nitrites, large LE, few bacteria               Cath urine culture 7/31 <10K e. Cloacae and pseudomonas          Data:   All laboratory data reviewed:  Recent Labs   Lab 08/07/19 0831 08/01/19 0527 07/31/19 2013   WBC 9.8 11.9* 12.2*   HGB 10.5* 10.2* 10.4*    288 277     Recent Labs   Lab 08/07/19 0831 08/02/19 0531 08/01/19 0527 07/31/19 2013    141 143 139   POTASSIUM 4.1 3.4 4.0 2.8*   CHLORIDE 103 105 107 102   CO2 29 32 29 30   BUN 23 9 9 10   CR 0.67 0.49* 0.58 0.61   * 96 84 117*   SHERI 8.6 8.1* 8.2* 8.4*   MAG  --   --   --  1.9   PHOS  --   --   --  3.4     Recent Labs   Lab 07/31/19  2200   COLOR Yellow   APPEARANCE Slightly Cloudy   URINEGLC Negative   URINEBILI Negative   URINEKETONE Negative   SG 1.011   URINEPH 6.0   PROTEIN 10*   NITRITE Negative   LEUKEST Large*   RBCU 18*   WBCU 57*     Results for orders placed or performed during the hospital encounter of 07/31/19   Urine Culture Aerobic Bacterial   Result Value Ref Range    Specimen Description Catheterized Urine     Culture Micro (A)      <10,000 colonies/mL  Enterobacter cloacae complex      Culture Micro <10,000 colonies/mL  Pseudomonas aeruginosa   (A)        Susceptibility    Enterobacter cloacae complex - DONG     CEFAZOLIN* >=64 Resistant ug/mL      * Cefazolin DONG breakpoints are for the treatment of uncomplicated urinary tract infections.  For the treatment of systemic infections, please contact the laboratory for additional testing.Intrinsically Resistant     CEFOXITIN* >=64 Resistant ug/mL      * Intrinsically Resistant     CEFTAZIDIME >=64 Resistant ug/mL     CEFTRIAXONE >=64 Resistant ug/mL     CIPROFLOXACIN <=0.25 Sensitive ug/mL     GENTAMICIN <=1 Sensitive ug/mL     LEVOFLOXACIN <=0.12 Sensitive ug/mL     NITROFURANTOIN 64 Intermediate ug/mL     TOBRAMYCIN <=1 Sensitive ug/mL     Trimethoprim/Sulfa <=1/19 Sensitive ug/mL     Piperacillin/Tazo 32 Intermediate ug/mL     AMIKACIN  <=2 Sensitive ug/mL     CEFEPIME <=1 Sensitive ug/mL     MEROPENEM <=0.25 Sensitive ug/mL    Pseudomonas aeruginosa - DONG     AMIKACIN <=2 Sensitive ug/mL     CEFEPIME <=1 Sensitive ug/mL     CEFTAZIDIME <=1 Sensitive ug/mL     CIPROFLOXACIN <=0.25 Sensitive ug/mL     GENTAMICIN <=1 Sensitive ug/mL     LEVOFLOXACIN 0.25 Sensitive ug/mL     Piperacillin/Tazo <=4 Sensitive ug/mL     TOBRAMYCIN <=1 Sensitive ug/mL     MEROPENEM <=0.25 Sensitive ug/mL   Results for orders placed or performed in visit on 07/22/19   Urine Culture Aerobic Bacterial   Result Value Ref Range    Specimen Description Midstream Urine     Culture Micro       50,000 to 100,000 colonies/mL  mixed urogenital melany  Susceptibility testing not routinely done                Impression and Plan:   Impression / Plan:   Angelica James is a 74 year old female with significant dementia, recent falls, possible recent UTI, and now with frequent urination, likely secondary to incomplete emptying, likely secondary to diabetes and dementia. Post void residual per RN was 300 cc, likely in part due to oxybutynin. Pt endorses some suprapubic tenderness on palpation but has been treated with appropriate antibiotics for suspected urinary tract infection. No acute urologic intervention is indicated at this time.    - Recommend checking post void residual on next few voids, as well as q shift to ensure adequate emptying.   - Recommend repeat UA/UCx to confirm resolution of UTI  - Anti-cholinergic medications such as oxybutynin may contribute to pt's instability and mild urinary retention. Recommend avoiding these if possible, however trospium may be a better option as it has less central CNS activity. Please keep in mind that these medications can take up to 6 weeks for full effect.  - Recommend behavioral management - timed voids, reminders to void, aggressive management of constipation, crede maneuver.  - Avoid narcotic medications, antihistamines, and other meds  that can contribute to urinary retention.  - If symptoms continue with behavioral management and off oxybutynin, would recommend outpatient follow up with urology.      Urology will sign off at this time. Please contact us with any questions or concerns.    Discussed with chief resident Dr. Lopez, who will discuss with staff Dr. Sylvester.    Thank you for the opportunity to participate in the care of Angelica James.     Laura Edmonds MD  PGY1  [phone]

## 2019-08-07 NOTE — PROGRESS NOTES
[facility], [place]    Medicine Progress Note - Hospitalist Service, Gold 8       Date of Admission:  7/31/2019  Assessment & Plan  74 year old female with a past medical history of dementia, paranoid schizophrenia, HTN, DM2, CARLOS admitted on 7/31/2019 for FTT, recent UTI, and found to have c. Diff now on vanco and improving, also with rhabdo now improved but with continued frequency of urination, with dementia and inability to safely care for her at home.       CHANGES TODAY  - urology consult to assist with frequent urination    #. Rhabdomyolysis  #. Falls  #. Failure to Thrive  #. Paranoid schizophrenia  #. Dementia  Admitted after being found on the ground by family and multiple falls. CK on admission >7000, now down to 4K after 48 hours of fluids. Most recent . Per daughter, patient requires 24/7 care beyond what family can provide at this point.  Patient attempts to get up to bathroom near hourly. . Daughter denies recent mood change. Continue home medications.  -- attempted to move to [facility] from sitter, but needing >q1h monitoring for walk to bathroom, patient able to walk unassisted, but falls history  -- Continue with paxil 40 mg qday, Risperdal 0.5 mg qam and qnoon, 4 mg at bedtime  -- pain in legs: tylenol and ibuprofen available      #. C Diff Diarrhea  Admitted with several days of diarrhea after antibiotic. C Diff assay (+) with mild leukocytosis.   -- Continue with PO Vancomycin for 14 days (07/)    #. Multiorganism UTI  #. Dysuria   Positive UA with UCx growing out enterobacter and pseudomonas resistant to ceftriaxone administered (7/31-8/2). Still with frequent dysuria and polyuria (low amount), no response to pyridium. Finished ceftriaxone/ciprofloxacin 500 mg BID for 5 days (8/02-8/06) for presumed UTI.   -- continuing oxybutynin 10 mg TID - no effect as of yet  -- urology consult     #. Abnormal transaminases:  Admitted with mildly elevated enzymes. Previous  US 2014 was unremarkable. Stable without abdominal pain or change in tbili to suggest obstruction  -- Holding on US for now. Repeat CMP if any clinical change.   -- will monitor with giving tylenol and ibuprofen as above     #. DM2: PTA maintained on metformin 500 mg qday, daughter notes NP at clinic told her to hold. A1C 6.7 in 04/2019.   -- Agree with holding metformin while inpatient; consider discontinuing metformin at discharge  -- BG BID      #. Hypokalemia: Normalized after replacement, discontinued telemetry   #. HTN: Not on meds PTA. BP stable on admission.   #. IronDA: PTA maintained on iron BID. Hgb stabel at 10.4 w/ MCV of 90. Hold iron for now.   #PUD: [facility] [place] in [place] w/ small healing ulcer in gastric antrum, no stigmata of recent bleeding. Hgb stable, not on PPI.      Diet: Regular Diet Adult  Snacks/Supplements Adult: Magic Cup; Between Meals    DVT Prophylaxis: Enoxaparin (Lovenox) SQ  Darby Catheter: not present  Code Status: Full Code       Disposition Plan   Expected discharge: 2 - 3 days, recommended to transitional care unit once safe disposition plan/ TCU bed available.  Entered: Tita Burgess MD 08/07/2019, 9:39 AM       The patient's care was discussed with the Patient.    Tita Burgess MD  Hospitalist Service, [address], [place]  Pager: [phone]  Please see sticky note for cross cover information  ______________________________________________________________________    Interval History   Slept well overnight.  Today she notes that she doesn't urinate often, but the aid states she gets up every 15 minutes.       Data reviewed today: I reviewed all medications, new labs and imaging results over the last 24 hours. I personally reviewed no images or EKG's today.    Physical Exam   Vital Signs: Temp: 97.3  F (36.3  C) Temp src: Oral BP: 120/59 Pulse: 63   Resp: 18 SpO2: 94 % O2 Device: None (Room air)    Weight: 121 lbs 9.6 oz  General: appears  well, thin  HEENT: no scleral icterus, normal conjunctiva, no facial rash  Chest: normal effort, clear lungs to auscultation  Cardiac: regula rhythm, systolic murmur best heard and RUSB  Abdomen: non-distended, nontender, BS +   Extremities: no lower extremity edema  Skin: no rash, has bruises on the backs of hands  Neuro: no facial droop, normal speech, no focal deficits: unable to remember her stool pattern, frequency of urination or that the  speaks [place].  Psych: alert, cooperative, unhappy at the idea of going to a facility       Data   Recent Labs   Lab 08/07/19  0831 08/02/19  0531 08/01/19  0527 07/31/19 2013   WBC 9.8  --  11.9* 12.2*   HGB 10.5*  --  10.2* 10.4*   MCV 92  --  90 90     --  288 277    141 143 139   POTASSIUM 4.1 3.4 4.0 2.8*   CHLORIDE 103 105 107 102   CO2 29 32 29 30   BUN 23 9 9 10   CR 0.67 0.49* 0.58 0.61   ANIONGAP 7 5 7 6   SHERI 8.6 8.1* 8.2* 8.4*   * 96 84 117*   ALBUMIN 3.1* 2.6* 2.8* 3.0*   PROTTOTAL 6.6* 5.8* 6.0* 6.4*   BILITOTAL 0.2 0.3 0.5 0.3   ALKPHOS 47 47 49 50   ALT 70* 121* 130* 132*   AST 30 182* 264* 295*

## 2019-08-07 NOTE — PLAN OF CARE
Time of care: 2254-3243  Neuro: AOx2. Disoriented to time & situation.   Activity: SBA.   Vitals: VSS on RA.   LDAS: LPIV - CDI - saline locked.  Cardiac: WDL  Respiratory: WDL  GI/: Urge to void frequently w/little output. Residuals documented & provider notified. No BM this shift.   Skin: WDL   Pain: C/o bilateral knee pain & right arm pain. Tylenol x1 given w/little relief.   Diet: Regular, tolerating well.   Plan: BG 97. Continue to monitor urinary retention & pain. Continue to monitor & update MD evans changes.

## 2019-08-07 NOTE — PROGRESS NOTES
[id number]: Pt disoriented to time. Bedside attendant for impulsiveness and needs for frequently getting out of bed without calling for help. Uneventful night. PIV SL. Denies pain. Up frequently with SBA to the bathroom to void. C/o leg pain, PRN Tylenol given. Regular diet, denies nausea. Continue to monitor and follow POC.

## 2019-08-07 NOTE — PLAN OF CARE
VSS on room air. Alert and oriented to person & place. Continues with attendant for safety. Good appetite eating most of dinner tray. BG check 165. Patient continues w/ urinary frequency & urgency. At times utilizes the toilet but does not actually void. Up ambulating in halls with staff this evening. Denies pain. Spent time visiting with daughter. PRN melatonin given prior to HS. Will report to oncoming RN.

## 2019-08-07 NOTE — PROGRESS NOTES
Social Work Services Progress Note     Hospital Day: 7  Date of Initial Social Work Evaluation: 8/2/2019  Collaborated with:  Pt's daughter Анна (ph [phone])     Data:  Pt is a 74 year old female admitted to [facility] on 7/31/2019 with failure to thrive and dementia w/ behavioral disturbance.      Dtr can no longer care for the pt at home and is seeking TCU/MC/LTC for the pt upon discharge.      Intervention:  Spoke with pt's daughter Анна via phone. Анна looked over list of facilities and has selected 3 she is interested in for placement for pt. Pt's dtr [facility]errals to [facility] (states pt goes here for adult day program currently), [facility] in [place], and [facility]. Pt's daughter states ultimately she wants long term placement for pt but understands pts often start out on TCU with transition to LTC so is ok with TCU placement.     Pt currently has straight Medicare with [place] pending- financial counseling working with dtr on [place] verifications.    Referrals not yet sent as pt on 1:1 attendant- must be stable off 1:1 for 24 hours prior to d/c to facility.     Assessment:  Obtained referral choices from pt's daughter     Plan:    Anticipated Disposition:  Facility:  TCU/MC/LTC    Barriers to d/c plan:  Insurance; 1:1 attendant; placement    Follow Up:  SW will remain available and continue to follow, assess for needs, and assist in discharge planning.      HAN Olguin, LGSW  5th Floor   Pager [phone]  Phone [phone]

## 2019-08-08 ENCOUNTER — OFFICE VISIT (OUTPATIENT)
Dept: INTERPRETER SERVICES | Facility: CLINIC | Age: 74
End: 2019-08-08
Payer: MEDICARE

## 2019-08-08 LAB
ALBUMIN SERPL-MCNC: 3.2 G/DL (ref 3.4–5)
ALBUMIN UR-MCNC: NEGATIVE MG/DL
ALP SERPL-CCNC: 53 U/L (ref 40–150)
ALT SERPL W P-5'-P-CCNC: 56 U/L (ref 0–50)
ANION GAP SERPL CALCULATED.3IONS-SCNC: 5 MMOL/L (ref 3–14)
APPEARANCE UR: CLEAR
AST SERPL W P-5'-P-CCNC: 26 U/L (ref 0–45)
BILIRUB DIRECT SERPL-MCNC: <0.1 MG/DL (ref 0–0.2)
BILIRUB SERPL-MCNC: 0.2 MG/DL (ref 0.2–1.3)
BILIRUB UR QL STRIP: NEGATIVE
BUN SERPL-MCNC: 15 MG/DL (ref 7–30)
CALCIUM SERPL-MCNC: 8.5 MG/DL (ref 8.5–10.1)
CHLORIDE SERPL-SCNC: 104 MMOL/L (ref 94–109)
CO2 SERPL-SCNC: 30 MMOL/L (ref 20–32)
COLOR UR AUTO: ABNORMAL
CREAT SERPL-MCNC: 0.7 MG/DL (ref 0.52–1.04)
ERYTHROCYTE [DISTWIDTH] IN BLOOD BY AUTOMATED COUNT: 14.3 % (ref 10–15)
GFR SERPL CREATININE-BSD FRML MDRD: 83 ML/MIN/{1.73_M2}
GLUCOSE BLDC GLUCOMTR-MCNC: 134 MG/DL (ref 70–99)
GLUCOSE BLDC GLUCOMTR-MCNC: 178 MG/DL (ref 70–99)
GLUCOSE BLDC GLUCOMTR-MCNC: 92 MG/DL (ref 70–99)
GLUCOSE BLDC GLUCOMTR-MCNC: 93 MG/DL (ref 70–99)
GLUCOSE BLDC GLUCOMTR-MCNC: 99 MG/DL (ref 70–99)
GLUCOSE SERPL-MCNC: 148 MG/DL (ref 70–99)
GLUCOSE UR STRIP-MCNC: NEGATIVE MG/DL
HCT VFR BLD AUTO: 33.2 % (ref 35–47)
HGB BLD-MCNC: 10.5 G/DL (ref 11.7–15.7)
HGB UR QL STRIP: ABNORMAL
KETONES UR STRIP-MCNC: NEGATIVE MG/DL
LEUKOCYTE ESTERASE UR QL STRIP: ABNORMAL
MCH RBC QN AUTO: 28.3 PG (ref 26.5–33)
MCHC RBC AUTO-ENTMCNC: 31.6 G/DL (ref 31.5–36.5)
MCV RBC AUTO: 90 FL (ref 78–100)
NITRATE UR QL: NEGATIVE
PH UR STRIP: 6.5 PH (ref 5–7)
PLATELET # BLD AUTO: 334 10E9/L (ref 150–450)
POTASSIUM SERPL-SCNC: 3.8 MMOL/L (ref 3.4–5.3)
PROT SERPL-MCNC: 6.5 G/DL (ref 6.8–8.8)
RBC # BLD AUTO: 3.71 10E12/L (ref 3.8–5.2)
SODIUM SERPL-SCNC: 140 MMOL/L (ref 133–144)
SOURCE: ABNORMAL
SP GR UR STRIP: 1.01 (ref 1–1.03)
UROBILINOGEN UR STRIP-MCNC: NORMAL MG/DL (ref 0–2)
WBC # BLD AUTO: 8.3 10E9/L (ref 4–11)

## 2019-08-08 PROCEDURE — 25000132 ZZH RX MED GY IP 250 OP 250 PS 637: Mod: GY | Performed by: INTERNAL MEDICINE

## 2019-08-08 PROCEDURE — 80048 BASIC METABOLIC PNL TOTAL CA: CPT | Performed by: PEDIATRICS

## 2019-08-08 PROCEDURE — 85027 COMPLETE CBC AUTOMATED: CPT | Performed by: PEDIATRICS

## 2019-08-08 PROCEDURE — 25000132 ZZH RX MED GY IP 250 OP 250 PS 637: Mod: GY | Performed by: PHYSICIAN ASSISTANT

## 2019-08-08 PROCEDURE — 12000001 ZZH R&B MED SURG/OB UMMC

## 2019-08-08 PROCEDURE — 99232 SBSQ HOSP IP/OBS MODERATE 35: CPT | Performed by: PSYCHIATRY & NEUROLOGY

## 2019-08-08 PROCEDURE — 80076 HEPATIC FUNCTION PANEL: CPT | Performed by: PEDIATRICS

## 2019-08-08 PROCEDURE — 81003 URINALYSIS AUTO W/O SCOPE: CPT | Performed by: PEDIATRICS

## 2019-08-08 PROCEDURE — 25000128 H RX IP 250 OP 636: Performed by: INTERNAL MEDICINE

## 2019-08-08 PROCEDURE — 25000132 ZZH RX MED GY IP 250 OP 250 PS 637: Mod: GY | Performed by: PSYCHIATRY & NEUROLOGY

## 2019-08-08 PROCEDURE — 87086 URINE CULTURE/COLONY COUNT: CPT | Performed by: PEDIATRICS

## 2019-08-08 PROCEDURE — T1013 SIGN LANG/ORAL INTERPRETER: HCPCS | Mod: U3

## 2019-08-08 PROCEDURE — 36415 COLL VENOUS BLD VENIPUNCTURE: CPT | Performed by: PEDIATRICS

## 2019-08-08 PROCEDURE — 99233 SBSQ HOSP IP/OBS HIGH 50: CPT | Performed by: PEDIATRICS

## 2019-08-08 PROCEDURE — 00000146 ZZHCL STATISTIC GLUCOSE BY METER IP

## 2019-08-08 PROCEDURE — 25000132 ZZH RX MED GY IP 250 OP 250 PS 637: Mod: GY | Performed by: PEDIATRICS

## 2019-08-08 RX ORDER — CIPROFLOXACIN 500 MG/1
500 TABLET, FILM COATED ORAL EVERY 12 HOURS SCHEDULED
Status: DISCONTINUED | OUTPATIENT
Start: 2019-08-08 | End: 2019-08-10

## 2019-08-08 RX ADMIN — PAROXETINE HYDROCHLORIDE HEMIHYDRATE 20 MG: 20 TABLET, FILM COATED ORAL at 08:50

## 2019-08-08 RX ADMIN — CALCIUM 500 MG: 500 TABLET ORAL at 08:50

## 2019-08-08 RX ADMIN — VANCOMYCIN HYDROCHLORIDE 125 MG: KIT at 16:37

## 2019-08-08 RX ADMIN — VANCOMYCIN HYDROCHLORIDE 125 MG: KIT at 08:50

## 2019-08-08 RX ADMIN — MELATONIN 1000 UNITS: at 08:50

## 2019-08-08 RX ADMIN — VANCOMYCIN HYDROCHLORIDE 125 MG: KIT at 13:34

## 2019-08-08 RX ADMIN — RISPERIDONE 1 MG: 0.5 TABLET ORAL at 08:50

## 2019-08-08 RX ADMIN — RISPERIDONE 3 MG: 2 TABLET ORAL at 21:03

## 2019-08-08 RX ADMIN — VANCOMYCIN HYDROCHLORIDE 125 MG: KIT at 20:58

## 2019-08-08 RX ADMIN — CIPROFLOXACIN HYDROCHLORIDE 500 MG: 500 TABLET, FILM COATED ORAL at 22:46

## 2019-08-08 RX ADMIN — ENOXAPARIN SODIUM 30 MG: 30 INJECTION SUBCUTANEOUS at 08:50

## 2019-08-08 RX ADMIN — ACETAMINOPHEN 325 MG: 325 TABLET, FILM COATED ORAL at 14:57

## 2019-08-08 ASSESSMENT — ACTIVITIES OF DAILY LIVING (ADL)
ADLS_ACUITY_SCORE: 12.5
ADLS_ACUITY_SCORE: 19

## 2019-08-08 NOTE — PROGRESS NOTES
Social Work Services Progress Note    Hospital Day: 8  Date of Initial Social Work Evaluation: 8/2/2019  Collaborated with:  Medical team, TCU's    Data:  Pt is a 74 year old female admitted to [facility] on 7/31/2019 w/ failure to thrive and dementia w/ behavioral disturbances.     Intervention:  SW participated in interdisciplinary rounds this morning to assess for needs and discharge planning. Team expressed pt is currently off 1:1 and is medically ready for discharge.     SW sent the following TCU referrals upon pt's dtr's request per GRAY Chu's phone conversation yesterday - goal of TCU w/ transition to MC/TCU:    1) [facility] (ph: [phone]; f: [phone])    2) [facility] (ph: [phone]; f: [phone])    3) [facility] (ph: [phone]; f: [phone])      Assessment:  pt not seen for this intervention    Plan:    Anticipated Disposition:  Facility:  TCU/MC/LTC    Barriers to d/c plan:  placement    Follow Up:  SW will remain available and continue to follow, assess for needs, and assist in discharge planning.     HAN Spicer, [address]   Pager [phone]  Phone [phone]    Addendum 1:35p: SW heard from [facility], Rohini. Rohini stated they would not consider the pt for TCU placement, but that they do have a LTC/MC bed opening soon and would consider the pt for LTC/MC placement. Rohini stated this would be a skilled bed, so pt's stay would be covered through the medicare benefit. Rohini expressed this would be a bed that would be long-term, not just for rehab and wanted this writer to make sure family was aware. Rohini also stated the pt needs to be off VPM for 24 hours before they will accept her.     This writer called pt's dtr Анна and informed her of [facility] offer. Анна would like to move forward with the LTC/MC bed that will be opening as she feels her mother needs the 24 hour  care. This writer explained that she would keep her updated. At time of discharge, Анна would like for this writer to set up w/c transport.     A call was placed to [facility] to begin coordinating care. A VM was left for Rohini in admissions.    HAN Spicer, [facility]   5B   Pager [phone]  Phone [phone]    Addendum 2:45p: GRAY received a call from Rohini in admissions from [facility]. Rohini stated that they have changed their mind and cannot take the pt for their LTC/MC placement as pt's first language is Divehi. This writer questioned what she meant, and again [facility] stated they cannot accept the pt as an  would have to be present at all times and 'we do not do that'.     This writer called pt's dtr Анна and informed her that [facility] had reviewed the pt's chart a bit more and decided they could not accommodate the pt's needs. This writer expressed we will move forward on the referral process.     HAN Spicer, [facility]   5B   Pager [phone]  Phone [phone]

## 2019-08-08 NOTE — PLAN OF CARE
Time of care: 9852-8092  Neuro: AOx2 (disoriented to time & situation)  Activity: SBA, some weakness.   Vitals: VSS on RA.   LDAS: Right PIV - saline locked, CDI.   Cardiac: WDL  Respiratory: WDL  GI/: Able to void spontaneously, voiding frequently with output between 50-200mL.   Skin: BM x1, loose.   Pain: C/o leg & knee pain. Tylenol offered & x1.   Diet: Regular diet, tolerating well. ?   Plan: BG 99. Attempted to wean off of 1:1 but pt continued to set off bed alarm to toilet herself. Sitter is now back at bedside. Continue to follow plan of care, monitor & update MD with changes.

## 2019-08-08 NOTE — PROGRESS NOTES
CLINICAL NUTRITION SERVICES - REASSESSMENT NOTE     Nutrition Prescription    RECOMMENDATIONS FOR MDs/PROVIDERS TO ORDER:  None at this time     Malnutrition Status:    Severe malnutrition in the context of chronic illness    Recommendations already ordered by Registered Dietitian (RD):  Continue nutrition supplements as ordered     Future/Additional Recommendations:  1. Monitor PO and wt trends   2. Continue to encourage small frequent meals      EVALUATION OF THE PROGRESS TOWARD GOALS   Diet: Regular, room service appropriate w/ assist  Magic cup (vanilla) at 2 pm and HS    Intake: Per RN flowsheet good intake (mostly 100%, one 50% and 75% documented). Ordering 3 adequate meals daily per meal ordering system.      NEW FINDINGS   Meds: [place], vit D3    Wt trends: 55.2 kg- wt up from admit wt of 53.2 kg (8/2/19)     MALNUTRITION  % Intake: Decreased intake does not meet criteria  % Weight Loss: > 10% in 6 months (severe)- PTA  Subcutaneous Fat Loss: Facial region:  Mild  Muscle Loss: Temporal:  Mild, Upper leg (quadricep, hamstring):  Moderate and Posterior calf:  Moderate   Fluid Accumulation/Edema: None noted  Malnutrition Diagnosis: Severe malnutrition in the context of chronic illness    Previous Goals   Patient to consume % of nutritionally adequate meal trays TID, or the equivalent with supplements/snacks.  Evaluation: Met    Previous Nutrition Diagnosis  Unintended weight loss related to likely poor PO intake 2/2 dementia and now C. Diff positive as evidenced by wt loss of 10% over the past 5 months.     Evaluation: No change    CURRENT NUTRITION DIAGNOSIS   Unintended weight loss related to likely poor PO intake 2/2 dementia and now C. Diff positive as evidenced by wt loss of 10% over the past 5 months PTA, now with improving intake.      INTERVENTIONS  Implementation  Medical food supplement therapy- continue as ordered     Goals  Patient to consume % of nutritionally adequate meal trays TID, or  the equivalent with supplements/snacks.    Monitoring/Evaluation  Progress toward goals will be monitored and evaluated per protocol.    Clarissa Lee RD, LD  5A/5B RD pager [phone]

## 2019-08-08 NOTE — PLAN OF CARE
Patient oriented  to self. Denies pain and slept most of the night. Up to the BR and voided x 2. Blood glucose was 92. Sitter at bedside.

## 2019-08-08 NOTE — CONSULTS
Psychiatry Consultation; Follow up              Reason for Consult, requesting source:    Concern for EPSE due to Risperdal. I had initially seen her on the 6th.   Requesting source: Tita Burgess                 Interim history:    's woman was admitted due to frequent falls and concern over rapid cognitive decline.  She does have a history of paranoid schizophrenia, but according to her daughter had been quite stable on her Risperdal and Paxil.  Due to concern over the anticholinergic effects of Paxil I recommended we decrease it from 40 mg to 20 mg/day.  She says she feels a little more anxious today.  She had been noted to be quite restless during rounds this morning, so there was concern about extrapyramidal side effects from risperidone.  I do not see much in the way of abnormal movements today although she does have a coarse tremor of her hands. Per the sitter she has not been particularly restless but is going to the bathroom a lot.         Medications:     Current Facility-Administered Medications   Medication     acetaminophen (TYLENOL) tablet 325 mg     calcium carbonate 500 mg (elemental) (OSCAL;OYSTER SHELL CALCIUM) tablet 500 mg     enoxaparin (LOVENOX) injection 30 mg     ibuprofen (ADVIL/MOTRIN) tablet 200 mg     lidocaine (LMX4) cream     lidocaine 1 % 0.1-1 mL     melatonin tablet 3 mg     naloxone (NARCAN) injection 0.1-0.4 mg     ondansetron (ZOFRAN-ODT) ODT tab 4 mg    Or     ondansetron (ZOFRAN) injection 4 mg     PARoxetine (PAXIL) tablet 20 mg     polyethylene glycol (MIRALAX/GLYCOLAX) Packet 17 g     risperiDONE (risperDAL) tablet 1 mg     risperiDONE (risperDAL) tablet 4 mg     sodium chloride (PF) 0.9% PF flush 3 mL     sodium chloride (PF) 0.9% PF flush 3 mL     vancomycin (FIRVANQ) oral solution 125 mg     vitamin D3 (CHOLECALCIFEROL) 1000 units (25 mcg) tablet 1,000 Units            MSE:     Today she is lying in bed, hand tremor noted. She is well groomed, pleasant, and  "cooperative.  She spoke with a slight accent, but for the most part seemed to have an excellent understanding of English.  There is no rigidity, but perhaps slight cogwheeling of her upper extremities.  Speech is fluent.  Thought process direct.  Associations tight.  Denies delusions or hallucinations.  Described her mood as \"good.\"  Affect is somewhat restricted.  She was oriented to being in the hospital, but did not know the name of it.  Also, she did not know the day of the week or date or even the month we are in.  Recent and remote memory, attention span and concentration, use of language, fund of knowledge appear within normal limits.  Insight and judgment poor.     Vital signs:  Temp: 96.3  F (35.7  C) Temp src: Oral BP: (!) 160/69 Pulse: 76   Resp: 16 SpO2: 96 % O2 Device: None (Room air)     Weight: 55.2 kg (121 lb 9.6 oz)  Estimated body mass index is 22.24 kg/m  as calculated from the following:    Height as of 4/6/19: 1.575 m (5' 2\").    Weight as of this encounter: 55.2 kg (121 lb 9.6 oz).         DSM-5 Diagnosis:   Paranoid schizophrenia, unspecified neurocognitive disorder.           Assessment:   It is conceivable that she is having some extrapyramidal side effects from the Risperdal, especially since it is a pretty hefty dose for her size and age.  She said she is anxious today; hopefully this is not due to the decrease in Paxil.          Summary of Recommendations:   I have reduced her bedtime Risperdal to 3 mg but we will continue with 1 mg in the morning.  Due to her history of significant thought disorder to be I am reluctant to reduce it much more, but we will need to keep an eye out for EPSE.   Will continue Paxil at 20 mg/day  Re-consult psychiatry as needed.      \"This dictation was performed with voice recognition software and may contain errors,  omissions and inadvertent word substitution.\"        "

## 2019-08-08 NOTE — PROGRESS NOTES
[facility] [place]    Medicine Progress Note - Hospitalist Service, Gold 8       Date of Admission:  7/31/2019  Assessment & Plan  74 year old female with a past medical history of dementia, paranoid schizophrenia, HTN, DM2, CARLOS admitted on 7/31/2019 for FTT, recent UTI, and found to have c. Diff now on vanco and improving, also with rhabdo now improved but with continued frequency of urination, with dementia and inability to safely care for her at home.       CHANGES TODAY  - worsening fatigue today  - continued urinary retention  - sent for UA/UC  - tremors concerning for extrapyramidal effects  - consulted psych for changing/decreasing medications    #. Rhabdomyolysis  #. Falls  #. Failure to Thrive  #. Paranoid schizophrenia  #. Dementia  Admitted after being found on the ground by family and multiple falls. CK on admission >7000, now down to 4K after 48 hours of fluids. Most recent . Per daughter, patient requires 24/7 care beyond what family can provide at this point. Attempted to move to [facility] from sitter, but needing >q1h monitoring for walk to bathroom, patient able to walk unassisted, but has significant falls history.  More tired and anxious today.  -- Continue with Risperdal 0.5 mg qam and qnoon, 4 mg at bedtime  -- decreased paxil from 40 mg to 20 mg 8/7  -- psych consult for possible extrapyramidal movements  -- pain in legs: tylenol and ibuprofen available    #. C Diff Diarrhea: improving  Admitted with several days of diarrhea after antibiotic. C Diff assay (+) with mild leukocytosis.   -- Continue with PO Vancomycin for 14 days (07/)    #. Multiorganism UTI  #. Dysuria   Positive UA with UCx growing out enterobacter and pseudomonas resistant to ceftriaxone administered (7/31-8/2). Still with frequent dysuria and polyuria (low amount), no response to pyridium. Finished ceftriaxone/ciprofloxacin 500 mg BID for 5 days (8/02-8/06) for presumed UTI.  She now  urinates >1x per hour but has poor voiding.  -- stopped oxybutynin 10 mg TID  -- urology consult - please see there note  -- will recheck for UTI as she has continued to retain     #. Abnormal transaminases:  Admitted with mildly elevated enzymes. Previous US 2014 was unremarkable. Stable without abdominal pain or change in tbili to suggest obstruction  -- Holding on US for now. Repeat CMP if any clinical change.   -- will monitor with giving tylenol and ibuprofen as above     #. DM2: PTA maintained on metformin 500 mg qday, daughter notes NP at clinic told her to hold. A1C 6.7 in 04/2019.   -- Agree with holding metformin while inpatient; consider discontinuing metformin at discharge  -- BG BID      #. Hypokalemia: Normalized after replacement, discontinued telemetry   #. HTN: Not on meds PTA. BP stable on admission.   #. IronDA: PTA maintained on iron BID. Hgb stabel at 10.4 w/ MCV of 90. Hold iron for now.   #PUD: [facility] [place] in [place] w/ small healing ulcer in gastric antrum, no stigmata of recent bleeding. Hgb stable, not on PPI.      Diet: Regular Diet Adult  Snacks/Supplements Adult: Magic Cup; Between Meals    DVT Prophylaxis: Enoxaparin (Lovenox) SQ  Darby Catheter: not present  Code Status: Full Code       Disposition Plan   Expected discharge: 2 - 3 days, recommended to transitional care unit once safe disposition plan/ TCU bed available.  Entered: Tita Burgess MD 08/08/2019, 5:52 PM       The patient's care was discussed with the Patient.    Tita Burgess MD  Hospitalist Service, [address], [place]  Pager: [phone]  Please see sticky note for cross cover information  ______________________________________________________________________    Interval History   She is very pleasant on exam but keeps stating that she is fatigued.  Her hands and feet seem to roll as we talk and she is unable to hold her tongue still.         Data reviewed today: I reviewed all  medications, new labs and imaging results over the last 24 hours. I personally reviewed no images or EKG's today.    Physical Exam   Vital Signs: Temp: 96.3  F (35.7  C) Temp src: Oral BP: (!) 160/69 Pulse: 76   Resp: 16 SpO2: 96 % O2 Device: None (Room air)    Weight: 121 lbs 9.6 oz  General: appears well, thin  HEENT: no scleral icterus, normal conjunctiva, no facial rash  Chest: normal effort, clear lungs to auscultation  Cardiac: regula rhythm, systolic murmur best heard and RUSB  Abdomen: non-distended, nontender, BS +   Extremities: no lower extremity edema  Skin: no rash, has bruises on the backs of hands  Neuro: no facial droop, normal speech, no focal deficits: unable to remember me day to day.  Her hands and feet seem to roll as we talk and she is unable to hold her tongue still.    Psych: alert, cooperative, unhappy at the idea of going to a facility       Data   Recent Labs   Lab 08/08/19  1613 08/07/19  0831 08/02/19  0531   WBC 8.3 9.8  --    HGB 10.5* 10.5*  --    MCV 90 92  --     332  --     139 141   POTASSIUM 3.8 4.1 3.4   CHLORIDE 104 103 105   CO2 30 29 32   BUN 15 23 9   CR 0.70 0.67 0.49*   ANIONGAP 5 7 5   SHERI 8.5 8.6 8.1*   * 102* 96   ALBUMIN  --  3.1* 2.6*   PROTTOTAL  --  6.6* 5.8*   BILITOTAL  --  0.2 0.3   ALKPHOS  --  47 47   ALT  --  70* 121*   AST  --  30 182*

## 2019-08-08 NOTE — PLAN OF CARE
D/I  Uneventful shift.  Patient remains disoriented to time & situation, thinks she is at her house.   Bedside attendant  in room for safety & impulsiveness, but patient is now more steady on her feet.   Frequently ambulates independently  to the bathroom to void.  PIV SL. C/O pain in both legs or a Head ache.   PRN Tylenol given. Regular diet.   Will continue to monitor per POC and notify MD of any acute changes.

## 2019-08-09 ENCOUNTER — OFFICE VISIT (OUTPATIENT)
Dept: INTERPRETER SERVICES | Facility: CLINIC | Age: 74
End: 2019-08-09
Payer: MEDICARE

## 2019-08-09 LAB
BACTERIA SPEC CULT: NORMAL
GLUCOSE BLDC GLUCOMTR-MCNC: 106 MG/DL (ref 70–99)
GLUCOSE BLDC GLUCOMTR-MCNC: 98 MG/DL (ref 70–99)
Lab: NORMAL
SPECIMEN SOURCE: NORMAL

## 2019-08-09 PROCEDURE — 25000132 ZZH RX MED GY IP 250 OP 250 PS 637: Mod: GY | Performed by: PHYSICIAN ASSISTANT

## 2019-08-09 PROCEDURE — 25000128 H RX IP 250 OP 636: Performed by: INTERNAL MEDICINE

## 2019-08-09 PROCEDURE — 25000132 ZZH RX MED GY IP 250 OP 250 PS 637: Mod: GY | Performed by: INTERNAL MEDICINE

## 2019-08-09 PROCEDURE — 12000001 ZZH R&B MED SURG/OB UMMC

## 2019-08-09 PROCEDURE — 25000132 ZZH RX MED GY IP 250 OP 250 PS 637: Mod: GY | Performed by: PSYCHIATRY & NEUROLOGY

## 2019-08-09 PROCEDURE — 25000132 ZZH RX MED GY IP 250 OP 250 PS 637: Mod: GY | Performed by: PEDIATRICS

## 2019-08-09 PROCEDURE — 00000146 ZZHCL STATISTIC GLUCOSE BY METER IP

## 2019-08-09 PROCEDURE — 99232 SBSQ HOSP IP/OBS MODERATE 35: CPT | Performed by: PEDIATRICS

## 2019-08-09 PROCEDURE — T1013 SIGN LANG/ORAL INTERPRETER: HCPCS | Mod: U3

## 2019-08-09 RX ADMIN — MELATONIN 1000 UNITS: at 09:50

## 2019-08-09 RX ADMIN — VANCOMYCIN HYDROCHLORIDE 125 MG: KIT at 20:19

## 2019-08-09 RX ADMIN — IBUPROFEN 200 MG: 200 TABLET, FILM COATED ORAL at 11:40

## 2019-08-09 RX ADMIN — RISPERIDONE 1 MG: 0.5 TABLET ORAL at 09:50

## 2019-08-09 RX ADMIN — CALCIUM 500 MG: 500 TABLET ORAL at 12:52

## 2019-08-09 RX ADMIN — CIPROFLOXACIN HYDROCHLORIDE 500 MG: 500 TABLET, FILM COATED ORAL at 09:50

## 2019-08-09 RX ADMIN — CIPROFLOXACIN HYDROCHLORIDE 500 MG: 500 TABLET, FILM COATED ORAL at 20:19

## 2019-08-09 RX ADMIN — VANCOMYCIN HYDROCHLORIDE 125 MG: KIT at 12:53

## 2019-08-09 RX ADMIN — PAROXETINE HYDROCHLORIDE HEMIHYDRATE 20 MG: 20 TABLET, FILM COATED ORAL at 09:49

## 2019-08-09 RX ADMIN — VANCOMYCIN HYDROCHLORIDE 125 MG: KIT at 16:44

## 2019-08-09 RX ADMIN — VANCOMYCIN HYDROCHLORIDE 125 MG: KIT at 09:49

## 2019-08-09 RX ADMIN — ENOXAPARIN SODIUM 30 MG: 30 INJECTION SUBCUTANEOUS at 09:50

## 2019-08-09 RX ADMIN — RISPERIDONE 3 MG: 2 TABLET ORAL at 22:23

## 2019-08-09 RX ADMIN — ACETAMINOPHEN 325 MG: 325 TABLET, FILM COATED ORAL at 20:19

## 2019-08-09 ASSESSMENT — ACTIVITIES OF DAILY LIVING (ADL)
ADLS_ACUITY_SCORE: 12.5
ADLS_ACUITY_SCORE: 11.5
ADLS_ACUITY_SCORE: 11.5
ADLS_ACUITY_SCORE: 12.5
ADLS_ACUITY_SCORE: 12.5
ADLS_ACUITY_SCORE: 11.5

## 2019-08-09 NOTE — PLAN OF CARE
No significant changes to status. Continues with 1:1 for safety and impulsivity. Patient alert however continues with disorientation. Vitally stable on ra. Up with sba. Spontaneously voiding. Sleeping through the night. Continue with poc.

## 2019-08-09 NOTE — PROGRESS NOTES
Social Work Services Progress Note    Hospital Day: 9  Date of Initial Social Work Evaluation: 8/2/2019  Collaborated with:  Medical team, pt's daughter KAIT Jj's    Data:  Pt is a 74 year old female admitted to [facility] on 7/31/2019 w/ failure to thrive and dementia w/ behavioral disturbances.     LTC/MC recommended upon discharge.     Intervention:  SW participated in interdisciplinary rounds this morning to assess for needs and discharge planning. Team expressed pt will need MC/LTC placement. Pt is currently on VPM and will need to be off this for 24 hours prior to discharge.     SW made referrals to the following facilities yesterday and VM's were left, still awaiting call backs:    1) [facility] (ph: [phone]; f: [phone]) - VM left for admissions     2) [facility] (ph: [phone]; f: [phone]) - A VM was left for Rohini in admissions to discuss the 'language barrier' again.      3) [facility] (ph: [phone]; f: [phone]) - VM left for admissions.     SW spoke w/ Анна over the phone. Анна is wanting a reason as to why [facility] not accept the pt - this writer explained that SW planned to call them again today for more verification - SW will provide Анна with a clear update/explanation after discussing more thoroughly w/ [facility].    Assessment:  Pt not seen for this intervention    Plan:    Anticipated Disposition:  Facility:  LTC/MC    Barriers to d/c plan:  Pt currently on VPM - accepting facility    Follow Up:  SW will remain available and continue to follow, assess for needs, and assist in discharge planning.     HAN Spicer, [facility]   5B   Pager [phone]  Phone [phone]

## 2019-08-09 NOTE — PLAN OF CARE
Pt is AxO to self. VSS on RA. Pt is SBA. Pt reported knee pain and was given PRN ibuprofen. Pt denies nausea. Pt continues urinary urgency and frequency. Pt reported nervousness and anxiety. Writer paged MD about PRN anti-anxiety meds. Pt took walk with bedside attendant to help with anxiety. MD called writer encouraging use of non-pharmacological anxiety relief such as essential oils and walking, as anti-anxiety meds could cause pt deterioration. Weaning off the bedside attendant was not successful, continue to assess for potential to wean off 1:1. Continue to monitor and follow plan of care.

## 2019-08-09 NOTE — PROGRESS NOTES
[facility], [place]    Medicine Progress Note - Hospitalist Service, Gold 8       Date of Admission:  7/31/2019  Assessment & Plan  74 year old female with a past medical history of dementia, paranoid schizophrenia, HTN, DM2, CARLOS admitted on 7/31/2019 for FTT, recent UTI, and found to have c. Diff now on vanco and improving, also with rhabdo now improved but with continued frequency of urination, with dementia and inability to safely care for her at home.       CHANGES TODAY  - Risperdole reduced to 3 mg at night, still 1 mg in am and q noon  - started on cipro    #. Rhabdomyolysis  #. Falls  #. Failure to Thrive  #. Paranoid schizophrenia  #. Dementia  Admitted after being found on the ground by family and multiple falls. CK on admission >7000, now down to 4K after 48 hours of fluids. Most recent . Per daughter, patient requires 24/7 care beyond what family can provide at this point. Attempted to move to [facility] from sitter, but needing >q1h monitoring for walk to bathroom, patient able to walk unassisted, but has significant falls history.  More tired and anxious today.  -- Continue with Risperdal 0.5 mg qam and qnoon,  -- decreased bedtime risperdole to 3 mg at  -- decreased paxil from 40 mg to 20 mg 8/7  -- pain in legs: tylenol and ibuprofen available    #. C Diff Diarrhea: improving  Admitted with several days of diarrhea after antibiotic. C Diff assay (+) with mild leukocytosis.   -- Continue with PO Vancomycin for 14 days (07/)    #. Multiorganism UTI  #. Dysuria   Positive UA with UCx growing out enterobacter and pseudomonas resistant to ceftriaxone administered (7/31-8/2). Still with frequent dysuria and polyuria (low amount), no response to pyridium. Finished ceftriaxone/ciprofloxacin 500 mg BID for 5 days (8/02-8/06) for presumed UTI.  She now urinates >1x per hour but has poor voiding. Tried piridium and oxybutin without effect in the short term and stopped  Both.    -- urology consult - please see there note  -- will recheck for UTI as she has continued to retain  - restarted on Cipro with dirty UA, UC pending     #. Abnormal transaminases: Admitted with mildly elevated enzymes. Improved     #. DM2: PTA maintained on metformin 500 mg qday, daughter notes NP at clinic told her to hold. A1C 6.7 in 04/2019.   -- Agree with holding metformin while inpatient; consider discontinuing metformin at discharge  -- BG BID      #. Severe malnutrition: from dementia, providing snacks and supplements.  #. Hypokalemia: Normalized after replacement, discontinued telemetry   #. HTN: Not on meds PTA. BP stable on admission.   #. IronDA: PTA maintained on iron BID. Hgb stabel at 10.4 w/ MCV of 90. Hold iron for now.   [place]PUD[place] [facility] [place] w/ small healing ulcer in gastric antrum, no stigmata of recent bleeding. Hgb stable, not on PPI.      Diet: Regular Diet Adult  Snacks/Supplements Adult: Magic Cup; Between Meals    DVT Prophylaxis: Enoxaparin (Lovenox) SQ  Darby Catheter: not present  Code Status: Full Code       Disposition Plan   Expected discharge: 2 - 3 days, recommended to transitional care unit once safe disposition plan/ TCU bed available.  Entered: Tita Burgess MD 08/09/2019, 5:04 PM       The patient's care was discussed with the Patient.    Tita Burgess MD  Hospitalist Service, [address], [place]  Pager: [phone]  Please see sticky note for cross cover information  ______________________________________________________________________    Interval History   She is very pleasant on exam.  She was able to follow instructions with trying to urinate when scheduled and not all the time, but also became anxious.  The anxiety calmed with walking.         Data reviewed today: I reviewed all medications, new labs and imaging results over the last 24 hours. I personally reviewed no images or EKG's today.    Physical Exam   Vital Signs:  Temp: 98  F (36.7  C) Temp src: Oral BP: 130/50 Pulse: 84   Resp: 16 SpO2: 96 % O2 Device: None (Room air)    Weight: 121 lbs 0 oz  General: appears well, thin  HEENT: no scleral icterus, normal conjunctiva, no facial rash  Chest: normal effort, clear lungs to auscultation  Cardiac: regula rhythm, systolic murmur best heard and RUSB  Abdomen: non-distended, nontender, BS +   Extremities: no lower extremity edema  Skin: no rash, has bruises on the backs of hands  Neuro: no facial droop, normal speech, no focal deficits: unable to remember me day to day.  No tremor today  Psych: alert, cooperative, unhappy at the idea of going to a facility       Data   Recent Labs   Lab 08/08/19  1613 08/07/19  0831   WBC 8.3 9.8   HGB 10.5* 10.5*   MCV 90 92    332    139   POTASSIUM 3.8 4.1   CHLORIDE 104 103   CO2 30 29   BUN 15 23   CR 0.70 0.67   ANIONGAP 5 7   SHERI 8.5 8.6   * 102*   ALBUMIN 3.2* 3.1*   PROTTOTAL 6.5* 6.6*   BILITOTAL 0.2 0.2   ALKPHOS 53 47   ALT 56* 70*   AST 26 30

## 2019-08-09 NOTE — PLAN OF CARE
VSS. PIV in right arm. Pain in right knee managed with cold pack. New order for PO cipro. UA/UC sent. Bedside attendant. Up independently with standby assist. Regular diet. Voids spontaneously. Continue to monitor and notify MD with changes.

## 2019-08-10 LAB
GLUCOSE BLDC GLUCOMTR-MCNC: 111 MG/DL (ref 70–99)
GLUCOSE BLDC GLUCOMTR-MCNC: 120 MG/DL (ref 70–99)
GLUCOSE BLDC GLUCOMTR-MCNC: 123 MG/DL (ref 70–99)
GLUCOSE BLDC GLUCOMTR-MCNC: 138 MG/DL (ref 70–99)

## 2019-08-10 PROCEDURE — 00000146 ZZHCL STATISTIC GLUCOSE BY METER IP

## 2019-08-10 PROCEDURE — 25000132 ZZH RX MED GY IP 250 OP 250 PS 637: Mod: GY | Performed by: PEDIATRICS

## 2019-08-10 PROCEDURE — 25000132 ZZH RX MED GY IP 250 OP 250 PS 637: Mod: GY | Performed by: PHYSICIAN ASSISTANT

## 2019-08-10 PROCEDURE — 25000132 ZZH RX MED GY IP 250 OP 250 PS 637: Mod: GY | Performed by: PSYCHIATRY & NEUROLOGY

## 2019-08-10 PROCEDURE — 25000132 ZZH RX MED GY IP 250 OP 250 PS 637: Mod: GY | Performed by: INTERNAL MEDICINE

## 2019-08-10 PROCEDURE — 25000128 H RX IP 250 OP 636: Performed by: PEDIATRICS

## 2019-08-10 PROCEDURE — 12000001 ZZH R&B MED SURG/OB UMMC

## 2019-08-10 PROCEDURE — 99232 SBSQ HOSP IP/OBS MODERATE 35: CPT | Performed by: PEDIATRICS

## 2019-08-10 PROCEDURE — 25000128 H RX IP 250 OP 636: Performed by: INTERNAL MEDICINE

## 2019-08-10 RX ORDER — LORAZEPAM 2 MG/ML
0.5 INJECTION INTRAMUSCULAR ONCE
Status: COMPLETED | OUTPATIENT
Start: 2019-08-10 | End: 2019-08-10

## 2019-08-10 RX ORDER — PAROXETINE 30 MG/1
30 TABLET, FILM COATED ORAL DAILY
Status: DISCONTINUED | OUTPATIENT
Start: 2019-08-10 | End: 2019-08-24 | Stop reason: HOSPADM

## 2019-08-10 RX ORDER — LORAZEPAM 2 MG/ML
0.5 INJECTION INTRAMUSCULAR EVERY 6 HOURS PRN
Status: DISCONTINUED | OUTPATIENT
Start: 2019-08-10 | End: 2019-08-11

## 2019-08-10 RX ADMIN — MELATONIN 1000 UNITS: at 09:31

## 2019-08-10 RX ADMIN — LORAZEPAM 0.5 MG: 2 INJECTION INTRAMUSCULAR; INTRAVENOUS at 21:12

## 2019-08-10 RX ADMIN — RISPERIDONE 3 MG: 2 TABLET ORAL at 21:19

## 2019-08-10 RX ADMIN — VANCOMYCIN HYDROCHLORIDE 125 MG: KIT at 09:31

## 2019-08-10 RX ADMIN — CALCIUM 500 MG: 500 TABLET ORAL at 13:36

## 2019-08-10 RX ADMIN — PAROXETINE HYDROCHLORIDE 30 MG: 30 TABLET, FILM COATED ORAL at 09:31

## 2019-08-10 RX ADMIN — ENOXAPARIN SODIUM 30 MG: 30 INJECTION SUBCUTANEOUS at 09:31

## 2019-08-10 RX ADMIN — VANCOMYCIN HYDROCHLORIDE 125 MG: KIT at 16:37

## 2019-08-10 RX ADMIN — VANCOMYCIN HYDROCHLORIDE 125 MG: KIT at 21:12

## 2019-08-10 RX ADMIN — LORAZEPAM 0.5 MG: 2 INJECTION INTRAMUSCULAR; INTRAVENOUS at 03:08

## 2019-08-10 RX ADMIN — RISPERIDONE 1 MG: 0.5 TABLET ORAL at 09:31

## 2019-08-10 RX ADMIN — VANCOMYCIN HYDROCHLORIDE 125 MG: KIT at 13:34

## 2019-08-10 RX ADMIN — CIPROFLOXACIN HYDROCHLORIDE 500 MG: 500 TABLET, FILM COATED ORAL at 09:31

## 2019-08-10 ASSESSMENT — ACTIVITIES OF DAILY LIVING (ADL)
ADLS_ACUITY_SCORE: 12.5
ADLS_ACUITY_SCORE: 11.5
ADLS_ACUITY_SCORE: 12.5
ADLS_ACUITY_SCORE: 19

## 2019-08-10 NOTE — PROGRESS NOTES
[facility], [place]    Medicine Progress Note - Hospitalist Service, Gold 8       Date of Admission:  7/31/2019  Assessment & Plan  74 year old female with a past medical history of dementia, paranoid schizophrenia, HTN, DM2, CARLOS admitted on 7/31/2019 for FTT, recent UTI, and found to have c. Diff now on vanco and improving, also with rhabdo now improved but with continued frequency of urination, with dementia and inability to safely care for her at home.       CHANGES TODAY  - increased paxil from 20 mg to 30 mg (home dose was 40 mg decreased for anticholinergic properties that might affect bladder  - ativan x1 for anxiety with good effect    #. Rhabdomyolysis  #. Falls  #. Failure to Thrive  #. Paranoid schizophrenia  #. Dementia  Admitted after being found on the ground by family and multiple falls. CK on admission >7000, now down to 4K after 48 hours of fluids. Most recent . Per daughter, patient requires 24/7 care beyond what family can provide at this point. Attempted to move to [facility] from sitter, but needing >q1h monitoring for walk to bathroom, patient able to walk unassisted, but has significant falls history.  More tired and anxious today.  -- Continue with Risperdal 0.5 mg qam and qnoon,  -- continue bedtime risperdole to 3 mg (home dose was 4 mg, decreased due to extrapyramidal effects. Still tremulous now, has been in the past as well per daughter)  -- decreased paxil from 40 mg to 20 mg 8/7 - now increasing to 30 mg as worsening anxiety  -- pain in legs: tylenol and ibuprofen available    #. C Diff Diarrhea: improving  Admitted with several days of diarrhea after antibiotic. C Diff assay (+) with mild leukocytosis.   -- Continue with PO Vancomycin for 14 days (07/)    #. Multiorganism UTI  #. Dysuria  #. Bladder spasms/urinary frequency  #. Urinary retention   Positive UA with UCx growing out enterobacter and pseudomonas resistant to ceftriaxone administered  (7/31-8/2). Still with frequent dysuria and polyuria (low amount), no response to pyridium. Finished ceftriaxone/ciprofloxacin 500 mg BID for 5 days (8/02-8/06) for presumed UTI.  She now urinates >1x per hour but has poor voiding. Tried piridium and oxybutin without effect in the short term and stopped noth. Restarted cipro 8/8-8/10, stopped because UCx no growth  -- urology consult - please see there note 8/8     #. Abnormal transaminases: Admitted with mildly elevated enzymes. Improved     #. DM2: PTA maintained on metformin 500 mg qday, daughter notes NP at clinic told her to hold. A1C 6.7 in 04/2019.   -- Agree with holding metformin while inpatient; consider discontinuing metformin at discharge  -- BG BID      #. Severe malnutrition: from dementia, providing snacks and supplements.  #. Hypokalemia: Normalized after replacement, discontinued telemetry   #. HTN: Not on meds PTA. BP stable on admission.   #. IronDA: PTA maintained on iron BID. Hgb stabel at 10.4 w/ MCV of 90. Hold iron for now.   #. PUD: [facility] [place] EGD in 2010 w/ small healing ulcer in gastric antrum, no stigmata of recent bleeding. Hgb stable, not on PPI.      Diet: Regular Diet Adult  Snacks/Supplements Adult: Magic Cup; Between Meals    DVT Prophylaxis: Enoxaparin (Lovenox) SQ  Darby Catheter: not present  Code Status: Full Code       Disposition Plan   Expected discharge: 2 - 3 days, recommended to transitional care unit once safe disposition plan/ TCU bed available. Memory care preferable.   Entered: Tita Burgess MD 08/10/2019, 10:01 AM       The patient's care was discussed with the Patient.    Tita Burgess MD  Hospitalist Service, [address], [place]  Pager: [phone]  Please see sticky note for cross cover information  ______________________________________________________________________    Interval History   She is very pleasant on exam.  She was up x2 to urinate during the 10 minutes I was in  her room.  She was very anxious and refusing meds overnight.  Ativan helped.  She denies urinary frequency (aid says went 5x in the 25 minutes since waking), denies pain, denies anxiety or any events overnight.     Data reviewed today: I reviewed all medications, new labs and imaging results over the last 24 hours. I personally reviewed no images or EKG's today.    Physical Exam   Vital Signs: Temp: 98.9  F (37.2  C) Temp src: Oral BP: (!) 164/76 Pulse: 83   Resp: 18 SpO2: 95 % O2 Device: None (Room air)    Weight: 121 lbs 0 oz  General: appears well, thin  HEENT: no scleral icterus, normal conjunctiva, no facial rash  Chest: normal effort, clear lungs to auscultation  Cardiac: regula rhythm, systolic murmur best heard and RUSB  Abdomen: non-distended, nontender, BS +   Extremities: no lower extremity edema  Skin: no rash, has bruises on the backs of hands  Neuro: no facial droop, normal speech, no focal deficits: unable to remember me day to day.  Mild tremor of right hand today.  Psych: alert, cooperative, unhappy at the idea of going to a facility       Data   Recent Labs   Lab 08/08/19  1613 08/07/19  0831   WBC 8.3 9.8   HGB 10.5* 10.5*   MCV 90 92    332    139   POTASSIUM 3.8 4.1   CHLORIDE 104 103   CO2 30 29   BUN 15 23   CR 0.70 0.67   ANIONGAP 5 7   SHERI 8.5 8.6   * 102*   ALBUMIN 3.2* 3.1*   PROTTOTAL 6.5* 6.6*   BILITOTAL 0.2 0.2   ALKPHOS 53 47   ALT 56* 70*   AST 26 30

## 2019-08-10 NOTE — PROGRESS NOTES
Pt has become agitated with RN staff. Not wanting bedside attendant in room. Pacing in room and telling her to get out. Tried different attendant without any change. Pt telling us she is going to report us. Has tried to dial 911, did talk with daughter. Was able to settle her down for a very short time. Daughter states this is how she is when she doesn't get her meds. Pt got lowered doses of paxil and resperidone . Daughter feels this is the issue. This RN called Dr Oreilly and informed her of pts agitation and what daughter said about her meds. IV Ativan ordered.

## 2019-08-10 NOTE — PLAN OF CARE
D: Patient cooperative with cares and ate both meals well. Took medications well also. She slept between cares and answered ques. Appropriatly with some confusion noted. Family here this afternoon to visit. Continues with frequent urination, bladder scan done once with no urine noted.

## 2019-08-10 NOTE — PLAN OF CARE
Pt agitated tonight not wanting any staff in room with her. Pacing around room and into the hallway.  Using bathroom squatting over toilet.Unable to explain to her need for someone to keep her safe. Pt threatening to call the police.  Behavior escalated and called daughter. She was able to calm her for a short time. She stated pts meds had been decreased and this is how she gets if this is done. Unable to take blood sugar, VS and do bladder scan due to pt refusing any cares.  MD aware of agitation and psych med changes. To discuss with day team. Ativan IV given at 0300. Pt  finally asleep at 0500.  Continue with plan of care.

## 2019-08-11 ENCOUNTER — OFFICE VISIT (OUTPATIENT)
Dept: INTERPRETER SERVICES | Facility: CLINIC | Age: 74
End: 2019-08-11
Payer: MEDICARE

## 2019-08-11 LAB
GLUCOSE BLDC GLUCOMTR-MCNC: 110 MG/DL (ref 70–99)
GLUCOSE BLDC GLUCOMTR-MCNC: 139 MG/DL (ref 70–99)

## 2019-08-11 PROCEDURE — 25000132 ZZH RX MED GY IP 250 OP 250 PS 637: Mod: GY | Performed by: PEDIATRICS

## 2019-08-11 PROCEDURE — 25000132 ZZH RX MED GY IP 250 OP 250 PS 637: Mod: GY | Performed by: PHYSICIAN ASSISTANT

## 2019-08-11 PROCEDURE — 25000132 ZZH RX MED GY IP 250 OP 250 PS 637: Mod: GY | Performed by: INTERNAL MEDICINE

## 2019-08-11 PROCEDURE — T1013 SIGN LANG/ORAL INTERPRETER: HCPCS | Mod: U3

## 2019-08-11 PROCEDURE — 99232 SBSQ HOSP IP/OBS MODERATE 35: CPT

## 2019-08-11 PROCEDURE — 25000128 H RX IP 250 OP 636: Performed by: INTERNAL MEDICINE

## 2019-08-11 PROCEDURE — 00000146 ZZHCL STATISTIC GLUCOSE BY METER IP

## 2019-08-11 PROCEDURE — 99232 SBSQ HOSP IP/OBS MODERATE 35: CPT | Performed by: PEDIATRICS

## 2019-08-11 PROCEDURE — 12000001 ZZH R&B MED SURG/OB UMMC

## 2019-08-11 RX ORDER — RISPERIDONE 2 MG/1
4 TABLET ORAL AT BEDTIME
Status: DISCONTINUED | OUTPATIENT
Start: 2019-08-11 | End: 2019-08-24 | Stop reason: HOSPADM

## 2019-08-11 RX ORDER — LORAZEPAM 0.5 MG/1
0.25 TABLET ORAL EVERY 4 HOURS PRN
Status: DISCONTINUED | OUTPATIENT
Start: 2019-08-11 | End: 2019-08-11

## 2019-08-11 RX ADMIN — VANCOMYCIN HYDROCHLORIDE 125 MG: KIT at 22:39

## 2019-08-11 RX ADMIN — CALCIUM 500 MG: 500 TABLET ORAL at 14:13

## 2019-08-11 RX ADMIN — RISPERIDONE 4 MG: 2 TABLET ORAL at 22:39

## 2019-08-11 RX ADMIN — PAROXETINE HYDROCHLORIDE 30 MG: 30 TABLET, FILM COATED ORAL at 10:14

## 2019-08-11 RX ADMIN — VANCOMYCIN HYDROCHLORIDE 125 MG: KIT at 10:14

## 2019-08-11 RX ADMIN — VANCOMYCIN HYDROCHLORIDE 125 MG: KIT at 18:56

## 2019-08-11 RX ADMIN — MELATONIN 1000 UNITS: at 10:14

## 2019-08-11 RX ADMIN — RISPERIDONE 1 MG: 0.5 TABLET ORAL at 10:14

## 2019-08-11 RX ADMIN — ENOXAPARIN SODIUM 30 MG: 30 INJECTION SUBCUTANEOUS at 10:14

## 2019-08-11 RX ADMIN — VANCOMYCIN HYDROCHLORIDE 125 MG: KIT at 14:13

## 2019-08-11 RX ADMIN — IBUPROFEN 200 MG: 200 TABLET, FILM COATED ORAL at 16:56

## 2019-08-11 ASSESSMENT — ACTIVITIES OF DAILY LIVING (ADL)
ADLS_ACUITY_SCORE: 19
ADLS_ACUITY_SCORE: 12.5

## 2019-08-11 NOTE — PROGRESS NOTES
[facility], [place]    Medicine Progress Note - Hospitalist Service, Gold 8       Date of Admission:  7/31/2019  Assessment & Plan  74 year old female with a past medical history of dementia, paranoid schizophrenia, HTN, DM2, CARLOS admitted on 7/31/2019 for FTT, recent UTI, and found to have c. Diff now on vanco and improving, also with rhabdo now improved but with continued frequency of urination, with dementia and inability to safely care for her at home.       CHANGES TODAY  - changed ativan to oral  - repeat PVR x3 to assess     #. Rhabdomyolysis  #. Falls  Admitted after being found on the ground by family and multiple falls. CK on admission >7000, now down to 4K after 48 hours of fluids. Most recent . -- pain in legs: tylenol and ibuprofen available    #. Failure to Thrive  #. Paranoid schizophrenia  #. Dementia  #. Anxiety  Per daughter, patient requires 24/7 care beyond what family can provide at this point. Attempted to move to [facility] from sitter, but needing >q1h monitoring for walk to bathroom, patient able to walk unassisted, but has significant falls history.  More tired and anxious today.  -- Continue with Risperdal 0.5 mg qam and qnoon,  -- continue bedtime risperdole to 3 mg (home dose was 4 mg, decreased due to extrapyramidal effects. Still tremulous now, has been in the past as well per daughter)  -- decreased paxil from 40 mg to 20 mg 8/7 - now increasing to 30 mg as worsening anxiety  - repeat consult psychiatry for anxiety and tremors, with the attempt to keep bladder spasms limited    #. C Diff Diarrhea: improved  Admitted with several days of diarrhea after antibiotic. C Diff assay (+) with mild leukocytosis.   -- Continue with PO Vancomycin for 14 days (07/)    #. Multiorganism UTI  #. Dysuria  #. Bladder spasms/urinary frequency  #. Urinary retention  Positive UA with UCx growing out enterobacter and pseudomonas resistant to ceftriaxone administered  (7/31-8/2). Still with frequent dysuria and polyuria (low amount), no response to pyridium. Finished ceftriaxone/ciprofloxacin 500 mg BID for 5 days (8/02-8/06) for presumed UTI.  She now urinates >1x per hour but has poor voiding. Tried piridium and oxybutin without effect in the short term and stopped noth. Restarted cipro 8/8-8/10, stopped because UCx no growth  -- urology consult - please see there note 8/8     #. Abnormal transaminases: Admitted with mildly elevated enzymes. Improved     #. DM2: PTA maintained on metformin 500 mg qday, daughter notes NP at clinic told her to hold. A1C 6.7 in 04/2019.   -- Agree with holding metformin while inpatient; consider discontinuing metformin at discharge  -- BG BID      #. Severe malnutrition: from dementia, providing snacks and supplements.  #. Hypokalemia: Normalized after replacement, discontinued telemetry   #. HTN: Not on meds PTA. BP stable on admission.   #. IronDA: PTA maintained on iron BID. Hgb stabel at 10.4 w/ MCV of 90. Hold iron for now.   #. PUD: [facility] [place] EGD in 2010 w/ small healing ulcer in gastric antrum, no stigmata of recent bleeding. Hgb stable, not on PPI.      Diet: Regular Diet Adult  Snacks/Supplements Adult: Magic Cup; Between Meals    DVT Prophylaxis: Enoxaparin (Lovenox) SQ  Darby Catheter: not present  Code Status: Full Code       Disposition Plan   Expected discharge: 2 - 3 days, recommended to transitional care unit once safe disposition plan/ TCU bed available. Memory care preferable.   Entered: Tita Burgess MD 08/11/2019, 10:10 AM       The patient's care was discussed with the Patient.    Tita Burgess MD  Hospitalist Service, [address], [place]  Pager: [phone]  Please see sticky note for cross cover information  ______________________________________________________________________    Interval History   She is very pleasant on exam. No complaints. No pain.     Data reviewed today: I  reviewed all medications, new labs and imaging results over the last 24 hours. I personally reviewed no images or EKG's today.    Physical Exam   Vital Signs: Temp: 99  F (37.2  C) Temp src: Oral BP: 132/60 Pulse: 88   Resp: 18 SpO2: 96 % O2 Device: None (Room air)    Weight: 121 lbs 0 oz  General: appears well, thin  HEENT: no scleral icterus, normal conjunctiva, no facial rash  Chest: normal effort, clear lungs to auscultation  Cardiac: regula rhythm, systolic murmur best heard and RUSB  Abdomen: non-distended, nontender, BS +   Extremities: no lower extremity edema  Skin: no rash, has bruises on the backs of hands  Neuro: no facial droop, normal speech, no focal deficits: unable to remember me day to day.  Bilateral tremor  Psych: alert, cooperative, unhappy at the idea of going to a facility       Data   Recent Labs   Lab 08/08/19  1613 08/07/19  0831   WBC 8.3 9.8   HGB 10.5* 10.5*   MCV 90 92    332    139   POTASSIUM 3.8 4.1   CHLORIDE 104 103   CO2 30 29   BUN 15 23   CR 0.70 0.67   ANIONGAP 5 7   SHERI 8.5 8.6   * 102*   ALBUMIN 3.2* 3.1*   PROTTOTAL 6.5* 6.6*   BILITOTAL 0.2 0.2   ALKPHOS 53 47   ALT 56* 70*   AST 26 30

## 2019-08-11 NOTE — PLAN OF CARE
Pt disoriented to time/situation.  VSS on RA.  Initially reporting anxiety (received Ativan previous shift), then slept relatively well overnight.  Attendant at bedside for safety.  Still up frequently to void.  Denies pain on shift.  SBA in room.  Continue to monitor and follow POC.

## 2019-08-11 NOTE — CONSULTS
Consult Date:  08/11/2019      The Medicine Service requested a followup consultation to evaluate this patient for increased anxiety and concerns about potential bladder issues in her medications.      HISTORY OF PRESENT ILLNESS:  The patient is a 74-year-old female with a history of paranoid schizophrenia and recent memory decline, has been given the diagnosis of dementia, receives psychiatric care at the [facility] where she has been going for many years.  I reviewed consultations of Dr. Devon Valentin, who saw the patient on 6th of this month and the 8th of this month.  Dr. Valentin diagnosed the patient with paranoid schizophrenia and unspecified neurocognitive disorder.  He has concerns about the anticholinergic properties of the Paxil, recommended decreasing that and continuing Risperdal at current dosing.  He saw the patient again on the 8th and did not find her to have significant rigidity.  He found some mild cogwheeling in her upper extremities.  Recommended reducing her Risperdal at that time.  Since that time, the patient has become more anxious, per staff and discussing with the attending.  There has not been any overt psychosis.  I did discuss with the patient with a 1:1, who stated the patient had been somewhat somnolent today.  The patient was initially very reluctant to speak with me, but finally did agree to an interview.  She is very difficult to interview.  She had paucity of speech, essentially was monosyllabic.  I had to ask her direct questions.  She did state she felt fatigued.  She denied having problems urinating frequently, although that is clearly an issue.  She was not oriented to time, but knew she was in the hospital.  Denied any voices.  Denied depression.  I attempted to discuss her psychiatric diagnoses with her and she stated she did not have a psychiatric diagnosis.      MEDICATIONS:  Reviewed per Epic.      VITAL SIGNS:  Blood pressure 141/58, temperature 98.2, pulse 79, respirations  16.      REVIEW OF SYSTEMS:  The patient did not have any physical complaints.  I asked about chest pain, shortness of breath, GI symptoms, she denied all of those.      MENTAL STATUS EXAMINATION:   General appearance and behavior:  The patient was a somewhat lethargic appearing woman who initially declined an interview but then did get up, go to the bathroom, come back and agreed to talk to me.  Mood and affect:  Denies depression.  Her affect is blunted.  Thought processes are significant for paucity of speech.  No loosening of associations.  Thought content:  Denies hallucinations, denies suicidal ideation.  Attention and concentration appear to be limited.  Speech and language:  She speaks in a somewhat hypophonic, slightly dysarthric manner.  Orientation:  She knows she is in the hospital, but that is it.  Fund of knowledge and memory were difficult to assess.  Judgment and insight appear impaired.  Muscle bulk and tone:  She did not have significant rigidity.  She had a fairly high frequency tremor.  She did not have clear evidence of tardive dyskinesia.  When I watched her walk, she did not have a markedly shuffling gait.  Her gait was fairly stable.  Abnormal movement:  Tremor, as above.      Case discussed with Dr. Burgess in terms of issues related to the anticholinergic properties of Paxil and the question of whether she has some type of urge incontinence or urinary retention that is unclear at this time.  We did discuss her medications and behaviors.  The patient is a 74-year-old female with history of chronic schizophrenia, who has been on fairly high doses of Risperdal for some time and has been fairly stable.  She had some medication changes in terms of decreasing Risperdal and Paxil, which apparently led to some increase in anxiety.  Currently, she does not appear anxious.  She appears somewhat sedated.  Of note, she did get 1 dose of lorazepam.  She currently is showing more negative type symptoms, is  not overtly psychotic and does appear to have cognitive deficits.      DSM DIAGNOSIS:  Schizophrenia, chronic, paranoid type.  Neurocognitive disorder, not otherwise specified.      TREATMENT RECOMMENDATIONS:   1.  Would maintain the patient on the dose of Risperdal she was on at admission, which was 1 b.i.d. and 4 at bedtime.  Monitor for EPS.   2.  In terms of her Paxil, we can watch her at this lower dose.  She currently is on 30 mg. It is unclear whether the anticholinergic properties of Paxil are having an impact on her memory or her bladder functioning.   3.  In view of the patient's overall functional status, she will need placement as opposed to going home.   4.  In terms of her neurocognitive disorder, it is unclear whether this has been evaluated in the past.  Could check with family and consider workup for treatable causes or contributors to her cognitive symptoms.   5.  Please call or reconsult with any questions, concerns or change in the patient's status.  My number is [phone].         DEMETRICE ZHU MD             D: 2019   T: 2019   MT: MINNIE      Name:     LUDA BOSWELL   MRN:      [id number]        Account:       [id number]   :      1945           Consult Date:  2019      Document: [id number]

## 2019-08-11 NOTE — PLAN OF CARE
Pt disoriented to time and situation. VSS on RA. Pt denies any pain this shift. Overall pt calm and cooperative this shift. Did complain of feeling nervous and was pacing hallways ~ 2100, 0.5 mg IV ativan given. Pt has been up frequently to urinate and ambulating in hallways with 1:1 bedside attendant. Will continue POC.

## 2019-08-11 NOTE — PLAN OF CARE
D; Patient slept on and off most of am, tolerated some food later in am. Took medications well. Cont with freq urination, had BM. Up walking now with sitter, more awake now. Bladder scan done with none noted after voiding.

## 2019-08-12 ENCOUNTER — OFFICE VISIT (OUTPATIENT)
Dept: INTERPRETER SERVICES | Facility: CLINIC | Age: 74
End: 2019-08-12
Payer: MEDICARE

## 2019-08-12 LAB — GLUCOSE BLDC GLUCOMTR-MCNC: 135 MG/DL (ref 70–99)

## 2019-08-12 PROCEDURE — 99232 SBSQ HOSP IP/OBS MODERATE 35: CPT | Performed by: PEDIATRICS

## 2019-08-12 PROCEDURE — 25000132 ZZH RX MED GY IP 250 OP 250 PS 637: Mod: GY | Performed by: PEDIATRICS

## 2019-08-12 PROCEDURE — T1013 SIGN LANG/ORAL INTERPRETER: HCPCS | Mod: U3

## 2019-08-12 PROCEDURE — 00000146 ZZHCL STATISTIC GLUCOSE BY METER IP

## 2019-08-12 PROCEDURE — 25000132 ZZH RX MED GY IP 250 OP 250 PS 637: Mod: GY | Performed by: PHYSICIAN ASSISTANT

## 2019-08-12 PROCEDURE — 25000128 H RX IP 250 OP 636: Performed by: INTERNAL MEDICINE

## 2019-08-12 PROCEDURE — 25000132 ZZH RX MED GY IP 250 OP 250 PS 637: Mod: GY | Performed by: INTERNAL MEDICINE

## 2019-08-12 PROCEDURE — 12000001 ZZH R&B MED SURG/OB UMMC

## 2019-08-12 RX ADMIN — CALCIUM 500 MG: 500 TABLET ORAL at 12:22

## 2019-08-12 RX ADMIN — VANCOMYCIN HYDROCHLORIDE 125 MG: KIT at 21:44

## 2019-08-12 RX ADMIN — ENOXAPARIN SODIUM 30 MG: 30 INJECTION SUBCUTANEOUS at 08:13

## 2019-08-12 RX ADMIN — VANCOMYCIN HYDROCHLORIDE 125 MG: KIT at 08:13

## 2019-08-12 RX ADMIN — MELATONIN 1000 UNITS: at 08:12

## 2019-08-12 RX ADMIN — VANCOMYCIN HYDROCHLORIDE 125 MG: KIT at 12:22

## 2019-08-12 RX ADMIN — PAROXETINE HYDROCHLORIDE 30 MG: 30 TABLET, FILM COATED ORAL at 08:12

## 2019-08-12 RX ADMIN — RISPERIDONE 4 MG: 2 TABLET ORAL at 21:44

## 2019-08-12 RX ADMIN — RISPERIDONE 1 MG: 0.5 TABLET ORAL at 08:13

## 2019-08-12 RX ADMIN — VANCOMYCIN HYDROCHLORIDE 125 MG: KIT at 17:00

## 2019-08-12 ASSESSMENT — ACTIVITIES OF DAILY LIVING (ADL)
ADLS_ACUITY_SCORE: 12.5

## 2019-08-12 NOTE — PLAN OF CARE
Pt alert to self, place. VSS on RA. 1:1 bedside attendant continues for safety/impulsivity. Psychiatry assessed pt today and pt's home dose of Risperdal 4 mg was resumed this evening. Pt more emotional/teary this evening at HS compared to previous bedtimes and crying out for her daughter and to not be left alone. Daughter was here visiting around dinnertime. Urinary frequency and urgency- encouraged emptying and bladder scanned twice this evening with results of zero and 26. HS . Will continue POC.

## 2019-08-12 NOTE — PROGRESS NOTES
[facility], [place]    Medicine Progress Note - Hospitalist Service, Gold 8       Date of Admission:  7/31/2019  Assessment & Plan  74 year old female with a past medical history of dementia, paranoid schizophrenia, HTN, DM2, CARLOS admitted on 7/31/2019 for FTT, recent UTI, and found to have c. Diff now on vanco and improving, also with rhabdo now improved but with continued frequency of urination, with dementia and inability to safely care for her at home.       CHANGES TODAY  - re-discussed with Urology as PVR now 0 but she still attempts to urinate q5min    #. Rhabdomyolysis  #. Falls  Admitted after being found on the ground by family and multiple falls. CK on admission >7000, now down to 4K after 48 hours of fluids. Most recent .   -- pain in legs: tylenol and ibuprofen available    #. Failure to Thrive  #. Paranoid schizophrenia  #. Dementia  #. Anxiety  Per daughter, patient requires 24/7 care beyond what family can provide at this point. Attempted to move to [facility] from sitter, but needing >q1h monitoring for walk to bathroom, patient able to walk unassisted, but has significant falls history.  More tired and anxious today.  -- Continue with Risperdal 1 mg qam (home dose 0.5 mg qAM and qNoon)  -- continue bedtime risperdole to 4 mg  -- decreased paxil from 40 mg to 20 mg 8/7 - now increasing to 30 mg as worsening anxiety  -- tremor unlikely to change from continuing or decreasing her current medications per Psych, therefore best to treat underlying psych issues  -- per Psych unclear if dementia fully worked up, will investigate as able     #. C Diff Diarrhea: improved  Admitted with several days of diarrhea after antibiotic. C Diff assay (+) with mild leukocytosis.   -- Continue with PO Vancomycin for 14 days (07/31-8/13)    #. Multiorganism UTI  #. Dysuria  #. Bladder spasms/urinary frequency  #. Urinary retention  Positive UA with UCx growing out enterobacter and pseudomonas  resistant to ceftriaxone administered (7/31-8/2). Still with frequent dysuria and polyuria (low amount), no response to pyridium. Finished ceftriaxone/ciprofloxacin 500 mg BID for 5 days (8/02-8/06) for presumed UTI.  She now urinates >1x per hour but has poor voiding. Tried piridium and oxybutin without effect in the short term and stopped noth. Restarted cipro 8/8-8/10, stopped because UCx no growth  -- urology consult - please see there note 8/8 when PVR were >300  - rediscussed with urology today as now PCV no <0     #. Abnormal transaminases: Admitted with mildly elevated enzymes. Improved     #. DM2: PTA maintained on metformin 500 mg qday, daughter notes NP at clinic told her to hold. A1C 6.7 in 04/2019.   -- Agree with holding metformin while inpatient; consider discontinuing metformin at discharge  -- BG BID      #. Severe malnutrition: from dementia, providing snacks and supplements.  #. Hypokalemia: Normalized after replacement, discontinued telemetry   #. HTN: Not on meds PTA. BP stable on admission.   #. IronDA: PTA maintained on iron BID. Hgb stabel at 10.4 w/ MCV of 90. Hold iron for now.   #. PUD: [facility] EGD in 2010 w/ small healing ulcer in gastric antrum, no stigmata of recent bleeding. Hgb stable, not on PPI.      Diet: Regular Diet Adult  Snacks/Supplements Adult: Magic Cup; Between Meals    DVT Prophylaxis: Enoxaparin (Lovenox) SQ  Darby Catheter: not present  Code Status: Full Code       Disposition Plan   Expected discharge: 2 - 3 days, recommended to transitional care unit once safe disposition plan/ TCU bed available. Memory care preferable.   Entered: Tita Burgess MD 08/12/2019, 10:31 AM       The patient's care was discussed with the Patient.    Tita Burgess MD  Hospitalist Service, [address], [place]  Pager: [phone]  Please see sticky note for cross cover  information  ______________________________________________________________________    Interval History   She is very pleasant on exam. No complaints. No pain.     Data reviewed today: I reviewed all medications, new labs and imaging results over the last 24 hours. I personally reviewed no images or EKG's today.    Physical Exam   Vital Signs: Temp: 97.4  F (36.3  C) Temp src: Oral BP: (!) 149/81 Pulse: 104   Resp: 18 SpO2: 93 % O2 Device: None (Room air)    Weight: 116 lbs 13.5 oz  General: appears well, thin  HEENT: no scleral icterus, normal conjunctiva, no facial rash  Chest: normal effort, clear lungs to auscultation  Cardiac: regula rhythm, systolic murmur best heard and RUSB  Abdomen: non-distended, nontender, BS +   Extremities: no lower extremity edema  Skin: no rash, has bruises on the backs of hands  Neuro: no facial droop, normal speech, no focal deficits: unable to remember me day to day.  Bilateral tremor  Psych: alert, cooperative, unhappy at the idea of going to a facility       Data   Recent Labs   Lab 08/08/19  1613 08/07/19  0831   WBC 8.3 9.8   HGB 10.5* 10.5*   MCV 90 92    332    139   POTASSIUM 3.8 4.1   CHLORIDE 104 103   CO2 30 29   BUN 15 23   CR 0.70 0.67   ANIONGAP 5 7   SHERI 8.5 8.6   * 102*   ALBUMIN 3.2* 3.1*   PROTTOTAL 6.5* 6.6*   BILITOTAL 0.2 0.2   ALKPHOS 53 47   ALT 56* 70*   AST 26 30

## 2019-08-12 NOTE — PROGRESS NOTES
Social Work Services Progress Note    Hospital Day: 12  Date of Initial Social Work Evaluation: 8/2/2019  Collaborated with:  Medical team, pt's daughter Анна, TCU's    Data:  Pt is a 74 year old female admitted to [facility] on 7/31/2019 w/ failure to thrive and dementia w/ behavioral disturbances.    TCU/LTC/MC recommended upon discharge as daughter is no longer able to safely care for the pt at home.    Intervention:  SW participated in interdisciplinary rounds this morning to assess for needs and discharge planning. Team expressed pt will is medically ready for discharge. Neuro will be assessing pt today to fully assess the pt's dementia status.     SW called pt's dtr Анна to provide an update. Анна is now aware that [facility] had initially declined pt placement due to her first language being Estonian. It was also expressed that [facility] cannot accept the pt due to lack of beds and that [facility] has not gotten back to this writer. GRAY explained to Анна that if [facility] cannot accept the pt for placement, Анна would need to provide more options to refer to. Анна expressed understanding.       SW made referrals to the following facilities last week per dtr's request:    1) [facility] (ph: [phone]; f: [phone]) - SW heard from admissions today - they will not accept the pt for TCU placement as they feel the pt's needs would be met in a MC unit. They have no  beds available at this time.      2) [facility] (ph: [phone]; f: [phone]) - SW heard from admissions. SW explained that the pt does speak English. They expressed they will check on bed availability but that they may have given the MC bed away by now. Admissions is supposed to get back to SW today.      3) [facility] (ph: [phone]; f: [phone]) - Left a VM for Noah in admissions x2.        Assessment:  pt not seen for  this intervention; dtr understanding of SW intervention    Plan:    Anticipated Disposition:  Facility:  TCU/LTC/MC    Barriers to d/c plan:  Accepting facility     Follow Up:  SW will remain available and continue to follow, assess for needs, and assist in discharge planning.     HAN Spicer, JEANETTE   5B   Pager [phone]  Phone [phone]    Addendum 3:40p: SW received a call from Dulce at [facility]. Dulce stated she had waited to contact this writer to see if they would hear from the family that was planning to take the MC room, however they had not gotten a hold of them. Dulce requested that this writer fax over updated notes on the pt. Updated notes, H&P, and facesheet faxed to Dulce @ [phone].     HAN Spicer, JEANETTE   5B   Pager [phone]  Phone [phone]

## 2019-08-12 NOTE — PROGRESS NOTES
[facility], [place]    Medicine Progress Note - Hospitalist Service, Gold 8       Date of Admission:  7/31/2019  Assessment & Plan  74 year old female with a past medical history of dementia, paranoid schizophrenia, HTN, DM2, CARLOS admitted on 7/31/2019 for FTT, recent UTI, and found to have c. Diff now on vanco and improving, also with rhabdo now improved but with continued frequency of urination, with dementia and inability to safely care for her at home.       CHANGES TODAY  Continue to work on dispo    #. Rhabdomyolysis  #. Falls  Admitted after being found on the ground by family and multiple falls. CK on admission >7000, responded to IV fluids.   -- pain in legs: tylenol and ibuprofen available    #. Failure to Thrive  #. Paranoid schizophrenia  #. Dementia  #. Anxiety  Per daughter, patient requires 24/7 care beyond what family can provide at this point. Attempted to move to [facility] from sitter, but needing >q1h monitoring for walk to bathroom, patient able to walk unassisted, but has significant falls history.   -- Continue with Risperdal 1 mg qam (home dose 0.5 mg qAM and qNoon)  -- continue bedtime risperdole to 4 mg  -- decreased paxil from 40 mg to 20 mg 8/7 - now increasing to 30 mg as worsening anxiety  -- tremor unlikely to change from continuing or decreasing her current medications per Psych, therefore best to treat underlying psych issues  -- per Psych unclear if dementia fully worked up, will investigate as able   -- dispo: pending    #. C Diff Diarrhea: improved  Admitted with several days of diarrhea after antibiotic. C Diff assay (+) with mild leukocytosis.   -- Continue with PO Vancomycin for 14 days (07/31-8/13)    #. Multiorganism UTI  #. Dysuria  #. Bladder spasms/urinary frequency  #. Urinary retention  Positive UA with UCx growing out enterobacter and pseudomonas resistant to ceftriaxone administered (7/31-8/2). Still with frequent dysuria and polyuria (low amount),  no response to pyridium. Finished ceftriaxone/ciprofloxacin 500 mg BID for 5 days (8/02-8/06) for presumed UTI.  She now urinates >1x per hour but has poor voiding. Tried piridium and oxybutin without effect in the short term and stopped noth. Restarted cipro 8/8-8/10, stopped because UCx no growth  -- urology consult - please see there note 8/8 when PVR were >300       #. Abnormal transaminases: Admitted with mildly elevated enzymes. Improved     #. DM2: PTA maintained on metformin 500 mg qday, daughter notes NP at clinic told her to hold. A1C 6.7 in 04/2019.   -- Agree with holding metformin while inpatient; consider discontinuing metformin at discharge  -- BG BID      #. Severe malnutrition: from dementia, providing snacks and supplements.  #. Hypokalemia: Normalized after replacement, discontinued telemetry   #. HTN: Not on meds PTA. BP stable on admission.   #. IronDA: PTA maintained on iron BID. Hgb stabel at 10.4 w/ MCV of 90. Hold iron for now.   #. PUD: [facility] EGD in 2010 w/ small healing ulcer in gastric antrum, no stigmata of recent bleeding. Hgb stable, not on PPI.      Diet: Regular Diet Adult  Snacks/Supplements Adult: Magic Cup; Between Meals    DVT Prophylaxis: Enoxaparin (Lovenox) SQ  Darby Catheter: not present  Code Status: Full Code       Disposition Plan   Expected discharge: 2 - 3 days, recommended to transitional care unit once safe disposition plan/ TCU bed available. Memory care preferable.   Entered: Shelton Robledo MD 08/12/2019, 3:47 PM       The patient's care was discussed with the Patient.    Shelton Robledo MD  Hospitalist Service, [address], [place]  Pager: [phone]  Please see sticky note for cross cover information  ______________________________________________________________________    Interval History   She is very pleasant on exam. No complaints. No pain.     Data reviewed today: I reviewed all medications, new labs and imaging results over the last 24  hours.      Physical Exam   Vital Signs: Temp: 98.8  F (37.1  C) Temp src: Oral BP: 128/64 Pulse: 89   Resp: 18 SpO2: 94 % O2 Device: None (Room air)    Weight: 116 lbs 13.5 oz  General: appears well, thin  HEENT: no scleral icterus, normal conjunctiva, no facial rash  Chest: normal effort, clear lungs to auscultation  Cardiac: regula rhythm, systolic murmur best heard and RUSB  Abdomen: non-distended, nontender, BS +   Extremities: no lower extremity edema  Skin: no rash, has bruises on the backs of hands  Neuro: no facial droop, normal speech, no focal deficits: unable to remember me day to day.  Bilateral tremor  Psych: alert, cooperative, unhappy at the idea of going to a facility

## 2019-08-12 NOTE — PLAN OF CARE
Alert and oriented x 2. Frequently to the bathroom to urinate. Bedside attendant present for safety d/t falls history. Continue with current plan of nursing care, and update MD with concerns as needed.

## 2019-08-12 NOTE — PLAN OF CARE
"Pt is AxO to self and place. VSS on RA. Pt denies pain and nausea. Pt reported anxiety throughout shift stating \"I want to walk outside\", writer encouraged pt to walk on the unit and sit in dayroom to reduce anxiety. Pt continues urinary urgency and frequency, voiding multiple times per hour. Bedside attendant present. Continue to monitor and follow plan of care.   "

## 2019-08-13 ENCOUNTER — OFFICE VISIT (OUTPATIENT)
Dept: INTERPRETER SERVICES | Facility: CLINIC | Age: 74
End: 2019-08-13
Payer: MEDICARE

## 2019-08-13 PROCEDURE — T1013 SIGN LANG/ORAL INTERPRETER: HCPCS | Mod: U3

## 2019-08-13 PROCEDURE — 12000001 ZZH R&B MED SURG/OB UMMC

## 2019-08-13 PROCEDURE — 25000128 H RX IP 250 OP 636: Performed by: INTERNAL MEDICINE

## 2019-08-13 PROCEDURE — 25000132 ZZH RX MED GY IP 250 OP 250 PS 637: Mod: GY | Performed by: INTERNAL MEDICINE

## 2019-08-13 PROCEDURE — 25000132 ZZH RX MED GY IP 250 OP 250 PS 637: Mod: GY | Performed by: PHYSICIAN ASSISTANT

## 2019-08-13 PROCEDURE — 99233 SBSQ HOSP IP/OBS HIGH 50: CPT | Performed by: INTERNAL MEDICINE

## 2019-08-13 PROCEDURE — 25000132 ZZH RX MED GY IP 250 OP 250 PS 637: Mod: GY | Performed by: PEDIATRICS

## 2019-08-13 RX ADMIN — VANCOMYCIN HYDROCHLORIDE 125 MG: KIT at 15:54

## 2019-08-13 RX ADMIN — PAROXETINE HYDROCHLORIDE 30 MG: 30 TABLET, FILM COATED ORAL at 09:40

## 2019-08-13 RX ADMIN — VANCOMYCIN HYDROCHLORIDE 125 MG: KIT at 09:41

## 2019-08-13 RX ADMIN — VANCOMYCIN HYDROCHLORIDE 125 MG: KIT at 19:46

## 2019-08-13 RX ADMIN — MELATONIN 1000 UNITS: at 09:40

## 2019-08-13 RX ADMIN — ENOXAPARIN SODIUM 30 MG: 30 INJECTION SUBCUTANEOUS at 09:41

## 2019-08-13 RX ADMIN — CALCIUM 500 MG: 500 TABLET ORAL at 12:42

## 2019-08-13 RX ADMIN — ACETAMINOPHEN 325 MG: 325 TABLET, FILM COATED ORAL at 19:46

## 2019-08-13 RX ADMIN — RISPERIDONE 1 MG: 0.5 TABLET ORAL at 09:39

## 2019-08-13 RX ADMIN — VANCOMYCIN HYDROCHLORIDE 125 MG: KIT at 12:42

## 2019-08-13 RX ADMIN — RISPERIDONE 4 MG: 2 TABLET ORAL at 22:20

## 2019-08-13 RX ADMIN — MELATONIN TAB 3 MG 3 MG: 3 TAB at 22:20

## 2019-08-13 ASSESSMENT — PAIN DESCRIPTION - DESCRIPTORS: DESCRIPTORS: INTERMITTENT

## 2019-08-13 ASSESSMENT — ACTIVITIES OF DAILY LIVING (ADL)
ADLS_ACUITY_SCORE: 12.5

## 2019-08-13 NOTE — PLAN OF CARE
"Alert and disoriented to time and situation. VSS on RA. Bedside attendant present. Pt slept majority of the day, stating \"I'm very tired.\" Pt reported pain in her legs/knees this afternoon. Pt denies nausea. Pt on regular diet, but has not eaten much today. Pt continues urinary urgency and frequency. Team working to find placement option. Continue to monitor and follow plan of care.   "

## 2019-08-13 NOTE — PLAN OF CARE
Pt alert to self and place. 1:1 beside attendant continues for safety. Currently pt has no PIV access. Pt up ambulating in halls frequently this shift and urinary frequency continues. Pt sat in sun room for some time and smiled and laughed with staff at one point. At HS pt very anxious about her daughter abandoning her, needed a lot of reassurance that her daughter had called x 2. Pt refusing bladder scan check and vitals at change of shift. Will continue POC.

## 2019-08-13 NOTE — PLAN OF CARE
Full code. Alert/disoriented to person and  situation.Patient restless at beginning of shift, but calmed down and slept during the remainder of shift after toileting and cares.  Pt does better with simple explanations with 1:1 cares. No PIV access. Calm environment.  Denies pain, Patient endorses feeling tired. Attendant at bedside for saftety precautions. Plan:  Continue to monitor patient closely and notify MD of changes.  Continue to follow POC.

## 2019-08-13 NOTE — PROGRESS NOTES
Social Work Services Progress Note    Hospital Day: 13  Date of Initial Social Work Evaluation: 8/2/2019  Collaborated with:  Medical team, [facility], pt's daughter Анна     Data: Pt is a 74 year old female admitted to [facility] on 7/31/2019 w/ failure to thrive and dementia w/ behavioral disturbances.     TCU/LTC/MC recommended upon discharge as daughter is no longer able to safely care for the pt at home.    Intervention:  SW received a VM from Dulce evans/ [facility] this morning (ph: [phone]). Dulce stated they cannot consider the pt for placement due to her being on a 1:1. They stated they will evaluate for placement once the 1:1 is off.     SW called Dulce back and a VM was left verifying that SW received her VM.     Pt needs to completely be off 1:1 before a placement can occur. No facility will accept a pt on a 1:1. This writer cannot make further referrals until this has been completed. Team has been updated.     Assessment:  pt not seen for this intervention    Plan:    Anticipated Disposition:  Facility:  TCU/MC/LTC    Barriers to d/c plan:  Pt is on a 1:1, accepting bed     Follow Up:  SW will remain available and continue to follow, assess for needs, and assist in discharge planning.     HAN Spicer, LGSW   5B   Pager [phone]  Phone [phone]

## 2019-08-14 ENCOUNTER — OFFICE VISIT (OUTPATIENT)
Dept: INTERPRETER SERVICES | Facility: CLINIC | Age: 74
End: 2019-08-14
Payer: MEDICARE

## 2019-08-14 LAB
ANION GAP SERPL CALCULATED.3IONS-SCNC: 7 MMOL/L (ref 3–14)
BUN SERPL-MCNC: 21 MG/DL (ref 7–30)
CALCIUM SERPL-MCNC: 8.2 MG/DL (ref 8.5–10.1)
CHLORIDE SERPL-SCNC: 103 MMOL/L (ref 94–109)
CO2 SERPL-SCNC: 29 MMOL/L (ref 20–32)
CREAT SERPL-MCNC: 0.6 MG/DL (ref 0.52–1.04)
ERYTHROCYTE [DISTWIDTH] IN BLOOD BY AUTOMATED COUNT: 14.8 % (ref 10–15)
GFR SERPL CREATININE-BSD FRML MDRD: 88 ML/MIN/{1.73_M2}
GLUCOSE SERPL-MCNC: 99 MG/DL (ref 70–99)
HCT VFR BLD AUTO: 31.6 % (ref 35–47)
HGB BLD-MCNC: 9.9 G/DL (ref 11.7–15.7)
MCH RBC QN AUTO: 28.9 PG (ref 26.5–33)
MCHC RBC AUTO-ENTMCNC: 31.3 G/DL (ref 31.5–36.5)
MCV RBC AUTO: 92 FL (ref 78–100)
PLATELET # BLD AUTO: 326 10E9/L (ref 150–450)
POTASSIUM SERPL-SCNC: 4 MMOL/L (ref 3.4–5.3)
RBC # BLD AUTO: 3.42 10E12/L (ref 3.8–5.2)
SODIUM SERPL-SCNC: 139 MMOL/L (ref 133–144)
WBC # BLD AUTO: 7.1 10E9/L (ref 4–11)

## 2019-08-14 PROCEDURE — 25000132 ZZH RX MED GY IP 250 OP 250 PS 637: Mod: GY | Performed by: PEDIATRICS

## 2019-08-14 PROCEDURE — 25000132 ZZH RX MED GY IP 250 OP 250 PS 637: Mod: GY | Performed by: INTERNAL MEDICINE

## 2019-08-14 PROCEDURE — T1013 SIGN LANG/ORAL INTERPRETER: HCPCS | Mod: U3

## 2019-08-14 PROCEDURE — 36415 COLL VENOUS BLD VENIPUNCTURE: CPT | Performed by: INTERNAL MEDICINE

## 2019-08-14 PROCEDURE — 25000128 H RX IP 250 OP 636: Performed by: INTERNAL MEDICINE

## 2019-08-14 PROCEDURE — 85027 COMPLETE CBC AUTOMATED: CPT | Performed by: INTERNAL MEDICINE

## 2019-08-14 PROCEDURE — 40000141 ZZH STATISTIC PERIPHERAL IV START W/O US GUIDANCE

## 2019-08-14 PROCEDURE — 99233 SBSQ HOSP IP/OBS HIGH 50: CPT | Performed by: INTERNAL MEDICINE

## 2019-08-14 PROCEDURE — 12000001 ZZH R&B MED SURG/OB UMMC

## 2019-08-14 PROCEDURE — 80048 BASIC METABOLIC PNL TOTAL CA: CPT | Performed by: INTERNAL MEDICINE

## 2019-08-14 PROCEDURE — 25000132 ZZH RX MED GY IP 250 OP 250 PS 637: Mod: GY | Performed by: PHYSICIAN ASSISTANT

## 2019-08-14 RX ORDER — POLYETHYLENE GLYCOL 3350 17 G/17G
17 POWDER, FOR SOLUTION ORAL DAILY PRN
DISCHARGE
Start: 2019-08-14 | End: 2021-07-31

## 2019-08-14 RX ORDER — PAROXETINE 30 MG/1
30 TABLET, FILM COATED ORAL DAILY
DISCHARGE
Start: 2019-08-15 | End: 2021-07-31

## 2019-08-14 RX ORDER — LANOLIN ALCOHOL/MO/W.PET/CERES
3 CREAM (GRAM) TOPICAL
DISCHARGE
Start: 2019-08-14 | End: 2021-07-31

## 2019-08-14 RX ORDER — ONDANSETRON 4 MG/1
4 TABLET, ORALLY DISINTEGRATING ORAL EVERY 6 HOURS PRN
DISCHARGE
Start: 2019-08-14 | End: 2021-07-31

## 2019-08-14 RX ADMIN — VANCOMYCIN HYDROCHLORIDE 125 MG: KIT at 12:19

## 2019-08-14 RX ADMIN — PAROXETINE HYDROCHLORIDE 30 MG: 30 TABLET, FILM COATED ORAL at 09:23

## 2019-08-14 RX ADMIN — MELATONIN TAB 3 MG 3 MG: 3 TAB at 21:28

## 2019-08-14 RX ADMIN — RISPERIDONE 1 MG: 0.5 TABLET ORAL at 09:22

## 2019-08-14 RX ADMIN — MELATONIN 1000 UNITS: at 09:23

## 2019-08-14 RX ADMIN — CALCIUM 500 MG: 500 TABLET ORAL at 12:19

## 2019-08-14 RX ADMIN — ENOXAPARIN SODIUM 30 MG: 30 INJECTION SUBCUTANEOUS at 09:23

## 2019-08-14 RX ADMIN — VANCOMYCIN HYDROCHLORIDE 125 MG: KIT at 09:23

## 2019-08-14 RX ADMIN — RISPERIDONE 4 MG: 2 TABLET ORAL at 21:28

## 2019-08-14 ASSESSMENT — ACTIVITIES OF DAILY LIVING (ADL)
ADLS_ACUITY_SCORE: 12.5

## 2019-08-14 NOTE — PLAN OF CARE
Alert and disoriented to time and situation. VSS on RA. Pt is up independently in room. Bedside attendant was taken out of the room at 1000 and was discontinued by the provider. Pt currently has a door alarm active. Pt not attempting to leave room this shift. Pt is on regular diet and tolerating well. Pt continues urinary frequency and urgency. Continue to monitor and follow plan of care.

## 2019-08-14 NOTE — PLAN OF CARE
/68 (BP Location: Right arm)   Pulse 71   Temp 97.5  F (36.4  C) (Oral)   Resp 16   Wt 53 kg (116 lb 13.5 oz)   SpO2 94%   BMI 21.37 kg/m   Alert/ disoriented to person/situation. Patient active at beginning of shift going to bathroom and getting up and down several times.  Patient up with sba in room with aide.  Pt denies pain/sob/chest pain.  No acute events overnight. No IV access at this time. Plan: Call light within reach, hourly rounding complete. Continue to monitor closely and follow plan of care. Notify MD of changes.

## 2019-08-14 NOTE — PROGRESS NOTES
[facility], [place]    Medicine Progress Note - Hospitalist Service, Gold 8       Date of Admission:  7/31/2019  Assessment & Plan  74 year old female with a past medical history of dementia, paranoid schizophrenia, HTN, DM2, CARLOS admitted on 7/31/2019 for FTT, recent UTI, and found to have c. Diff now on vanco and improving, also with rhabdo now improved but with continued frequency of urination, with dementia and inability to safely care for her at home.       CHANGES TODAY  discharge bedside attendant early this morning    #. Rhabdomyolysis  #. Falls  resolved  -- pain in legs: tylenol and ibuprofen available    #. Failure to Thrive  #. Paranoid schizophrenia  #. Dementia  #. Anxiety  D/[facility] sitter 8/14 am  -- Continue with Risperdal 1 mg qam (home dose 0.5 mg qAM and qNoon)  -- continue bedtime risperdole to 4 mg  -- decreased paxil from 40 mg to 20 mg 8/7 - now increasing to 30 mg as worsening anxiety  -- tremor unlikely to change from continuing or decreasing her current medications per Psych, therefore best to treat underlying psych issues  -- per Psych unclear if dementia fully worked up, will investigate as able   -- dispo: pending    #. C Diff Diarrhea: improved  Admitted with several days of diarrhea after antibiotic. C Diff assay (+) with mild leukocytosis.   -- s/p PO Vancomycin for 14 days (07/31-8/13)    #. Multiorganism UTI  #. Dysuria  #. Bladder spasms/urinary frequency  #. Urinary retention  Positive UA with UCx growing out enterobacter and pseudomonas resistant to ceftriaxone administered (7/31-8/2). Still with frequent dysuria and polyuria (low amount), no response to pyridium. Finished ceftriaxone/ciprofloxacin 500 mg BID for 5 days (8/02-8/06) for presumed UTI.  She now urinates >1x per hour but has poor voiding. Tried piridium and oxybutin without effect in the short term and stopped noth. Restarted cipro 8/8-8/10, stopped because UCx no growth  -- urology consult -  please see there note 8/8 when PVR were >300     #. Abnormal transaminases: Admitted with mildly elevated enzymes. Improved     #. DM2: PTA maintained on metformin 500 mg qday, daughter notes NP at clinic told her to hold. A1C 6.7 in 04/2019.   -- Agree with holding metformin while inpatient; consider discontinuing metformin at discharge  -- BG BID      #. Severe malnutrition: from dementia, providing snacks and supplements.  #. Hypokalemia: Normalized after replacement, discontinued telemetry   #. HTN: Not on meds PTA. BP stable on admission.   #. IronDA: PTA maintained on iron BID. Hgb stabel at 10.4 w/ MCV of 90. Hold iron for now.     Diet: Regular Diet Adult  Snacks/Supplements Adult: Magic Cup; Between Meals    DVT Prophylaxis: Enoxaparin (Lovenox) SQ  Darby Catheter: not present  Code Status: Full Code       Disposition Plan   Expected discharge: 2 - 3 days, recommended to transitional care unit once safe disposition plan/ TCU bed available. Memory care preferable.   Entered: Shelton Robledo MD 08/14/2019, 1:51 PM       The patient's care was discussed with the Patient and daughter, as well as nurse    Shelton Robledo MD  Hospitalist Service, [address], [place]  Pager: [phone]  Please see sticky note for cross cover information  ______________________________________________________________________    Interval History   She is very pleasant on exam. No complaints. No pain.     Data reviewed today: I reviewed all medications, new labs and imaging results over the last 24 hours.      Physical Exam   Vital Signs: Temp: 97.5  F (36.4  C) Temp src: Oral BP: 126/68 Pulse: 71   Resp: 16 SpO2: 94 % O2 Device: None (Room air)    Weight: 116 lbs 13.5 oz  General: appears well, thin  HEENT: no scleral icterus, normal conjunctiva, no facial rash  Chest: normal effort, clear lungs to auscultation  Cardiac: regula rhythm, systolic murmur best heard and RUSB  Abdomen: non-distended, nontender, BS  +   Extremities: no lower extremity edema  Skin: no rash, has bruises on the backs of hands  Neuro: no facial droop, normal speech, no focal deficits: unable to remember me day to day.  Bilateral tremor  Psych: alert, cooperative, unhappy at the idea of going to a facility

## 2019-08-14 NOTE — PROGRESS NOTES
Social Work Services Progress Note    Hospital Day: 14  Date of Initial Social Work Evaluation: 8/2/2019  Collaborated with:  Pt's daughter, medical team, bedside RN, facilities     Data:  Pt is a 74 year old female admitted to [facility] on 7/31/2019 w/ failure to thrive and dementia w/ behavioral disturbances.     TCU/LTC/MC recommended upon discharge as daughter is no longer able to safely care for the pt at home.    Intervention:  SW participated in interdisciplinary rounds this morning to assess for needs and discharge planning. Team expressed 1:1 was discharged from the pt. If pt remains off 1:1, could discharge as early as 8/15/2019.     SW received a call from pt's daughter Анна wanting an update. This writer explained that the 1:1 was discharged this morning and that [facility] was unable to consider the pt for placement because of this. This writer explained that SW would be in contact w/ [facility] today to make them aware that the 1:1 was discharged this morning. Анна expressed understanding and requested that this writer send two more referrals to the following facilities:    1) [facility] (ph: [phone]; f: [phone]) - received a call from admissions. They cannot consider the pt due to her needing a locked MC unit, which they don't have.     2) [facility] (ph: [phone]; f: [phone])       Assessment:  pt not seen for this intervention; according to bedside RN, pt is off 1:1    Plan:    Anticipated Disposition:  Facility:  TCU/MC/LTC    Barriers to d/c plan:  1:1 needs to be off 24 hours before any facility will accept. Waiting on accepting facility.     Follow Up:  SW will remain available and continue to follow, assess for needs, and assist in discharge planning.     HAN Spicer, [facility]   5B   Pager [phone]  Phone [phone]

## 2019-08-14 NOTE — PLAN OF CARE
Pt is alert to person and place. 1:1 bedside attendant for safety/impulsivity. SW is working on facility placement- will need to trial discontinuing 1:1 tomorrow am. Pt complaints of pain to both knees, tylenol given. Urinary frequency continues, one BM this shift on oral vanco. Pt was up all night last night and slept a lot today. Melatonin given at HS this shift. Will continue POC.

## 2019-08-15 PROCEDURE — 25000128 H RX IP 250 OP 636: Performed by: INTERNAL MEDICINE

## 2019-08-15 PROCEDURE — 12000001 ZZH R&B MED SURG/OB UMMC

## 2019-08-15 PROCEDURE — 99232 SBSQ HOSP IP/OBS MODERATE 35: CPT | Performed by: INTERNAL MEDICINE

## 2019-08-15 PROCEDURE — 25000132 ZZH RX MED GY IP 250 OP 250 PS 637: Mod: GY | Performed by: INTERNAL MEDICINE

## 2019-08-15 PROCEDURE — 25000132 ZZH RX MED GY IP 250 OP 250 PS 637: Mod: GY | Performed by: PHYSICIAN ASSISTANT

## 2019-08-15 PROCEDURE — 25000132 ZZH RX MED GY IP 250 OP 250 PS 637: Mod: GY | Performed by: PEDIATRICS

## 2019-08-15 RX ADMIN — RISPERIDONE 4 MG: 2 TABLET ORAL at 21:52

## 2019-08-15 RX ADMIN — Medication 12.5 MG: at 21:52

## 2019-08-15 RX ADMIN — PAROXETINE HYDROCHLORIDE 30 MG: 30 TABLET, FILM COATED ORAL at 09:12

## 2019-08-15 RX ADMIN — ENOXAPARIN SODIUM 30 MG: 30 INJECTION SUBCUTANEOUS at 09:12

## 2019-08-15 RX ADMIN — RISPERIDONE 1 MG: 0.5 TABLET ORAL at 09:12

## 2019-08-15 RX ADMIN — MELATONIN TAB 3 MG 3 MG: 3 TAB at 21:52

## 2019-08-15 RX ADMIN — CALCIUM 500 MG: 500 TABLET ORAL at 11:51

## 2019-08-15 RX ADMIN — MELATONIN 1000 UNITS: at 09:12

## 2019-08-15 ASSESSMENT — ACTIVITIES OF DAILY LIVING (ADL)
ADLS_ACUITY_SCORE: 12.5

## 2019-08-15 NOTE — PLAN OF CARE
/83 (BP Location: Right arm)   Pulse 72   Temp 96.7  F (35.9  C) (Oral)   Resp 18   Wt 53.6 kg (118 lb 1.6 oz)   SpO2 97%   BMI 21.60 kg/m    Full code./ Patient  on enteric precautions. Pt up to bedtime several times overnight on her own. Door alarm for safety. Pt had 2 cans soda. Pt forgetful to person and kept discussing conversation about her daughter coming to visit her tonight but that she was ok with her taking her time because she knew that she was staying home for awhile  yet because she was caring for her 6 year old granddaughter. Tonight, patient easily redirected and able to be redirected to go back to her bed and sit in room or lie down. She was up majority of the night mingling in and out of the room and stepped out of the actual room perhaps once.  Pt showed no negative behaviors and she didn't require any  additional prn medications.  No overt signs of agitation, anxiety, or sadness.  PT had 2 cans of diet soda.  Patient polite, very cordial, responded to short, specific  questions, and she kept shutting the room' s door when staff would open the door. NO PIV access at this time.  Plan:  Anticipate placement soon. Patient would benefit from dementia unit. Plan:  Hourly rounding complete, safety checks complete. Continue to closely monitor for safety and ability to manage. Notify MD of changes.

## 2019-08-15 NOTE — PLAN OF CARE
Alert and disoriented to time and situation. VSS on RA. Pt is up independent in room with door alarm present. Pt denies pain and nausea. Pt continues urinary urgency and frequency. Pt on regular diet and tolerating well. Pt has LPIV. Pt requested to go outside late in shift and was upset when denied by writer and NST. Continue to monitor and follow plan of care.

## 2019-08-15 NOTE — PROGRESS NOTES
CLINICAL NUTRITION SERVICES - REASSESSMENT NOTE     Nutrition Prescription    Malnutrition Status:    Non-severe malnutrition in the context of chronic condition     Recommendations already ordered by Registered Dietitian (RD):  Will continue with magic cup between meals  Ordered boost glucose control with meals to continue to optimize nutrition intake        EVALUATION OF THE PROGRESS TOWARD GOALS   Diet: Regular + Magic cup between meals   Intake: Pt on regular diet and tolerating well. Intake/appetite is good. Consuming mostly % of meals.      NEW FINDINGS   PMH: Dementia, DM2, Iron deficiency anemia,  malnutrition from dementia   --Admitted on 7/31/2019 for FTT, recent UTI, and found to have C.Diff infection and on vanco. Improving, Diarrhea resolved per chart review   --Rhabdo: improved but with continued frequency of urination, with dementia and inability to safely care for her at home.    ---Awaiting placement   --Extremities: no lower extremity edema  --Lab reviewed, meds noted, wt trend stable since admit      MALNUTRITION  % Intake: Decreased intake does not meet criteria  % Weight Loss: Stable during this admit, previously sever wt loss   Subcutaneous Fat Loss: Facial region:  Mild   Muscle Loss: Temporal:  Mild   Fluid Accumulation/Edema: None noted  Malnutrition Diagnosis: Non-severe malnutrition in the context of chronic condition     Previous Goals   Patient to consume % of nutritionally adequate meal trays TID, or the equivalent with supplements/snacks.  Evaluation: Met    Previous Nutrition Diagnosis  Unintended weight loss related to likely poor PO intake 2/2 dementia and now C. Diff positive as evidenced by wt loss of 10% over the past 5 months PTA, now with improving intake.    Evaluation: Improving    CURRENT NUTRITION DIAGNOSIS  Predicted inadequate nutrient intake related to recent improve in PO and appetite post C.diff infection treatments        INTERVENTIONS  Implementation  Medical food supplement therapy: Continue     Goals  Patient to consume % of nutritionally adequate meal trays TID, or the equivalent with supplements/snacks.    Monitoring/Evaluation  Progress toward goals will be monitored and evaluated per protocol.    Samson Kaur RD/JIMMY  Pager [phone]

## 2019-08-15 NOTE — PLAN OF CARE
VSS with the exception of mild hypertension with systolic of 140. Pt denies pain or any other issue. She is up to the bathroom, frequently, to void. Pt is disoriented to time and situation and door alarm is on. She states that her daughter is coming to see her tonight and she is looking forward to it. PIV saline locked. Continue with plan of care.     Problem: Adult Inpatient Plan of Care  Goal: Plan of Care Review  8/15/2019 1856 by Denisse Love RN  Outcome: No Change     Problem: Adult Inpatient Plan of Care  Goal: Patient-Specific Goal (Individualization)  8/15/2019 1856 by Denisse Love RN  Outcome: No Change     Problem: Adult Inpatient Plan of Care  Goal: Absence of Hospital-Acquired Illness or Injury  8/15/2019 1856 by Denisse Love RN  Outcome: No Change     Problem: Adult Inpatient Plan of Care  Goal: Optimal Comfort and Wellbeing  8/15/2019 1856 by Denisse Love RN  Outcome: No Change     Problem: Adult Inpatient Plan of Care  Goal: Readiness for Transition of Care  8/15/2019 1856 by Denisse Love RN  Outcome: No Change     Problem: Adult Inpatient Plan of Care  Goal: Rounds/Family Conference  8/15/2019 1856 by Denisse Love RN  Outcome: No Change     Problem: Fluid Volume Deficit  Goal: Fluid Balance  8/15/2019 1856 by Denisse Love RN  Outcome: No Change     Problem: Diabetes Comorbidity  Goal: Blood Glucose Level Within Desired Range  8/15/2019 1856 by Denisse Love RN  Outcome: No Change     Problem: Memory Impairment Comorbidity  Goal: Memory Impairment Comorbidity  Description  Patient comorbidity will be monitored for signs and symptoms of Memory Impairment condition.  Problems will be absent, minimized or managed by discharge/transition of care.  8/15/2019 1856 by Denisse Love RN  Outcome: No Change     Problem: Mood Alteration Comorbidity  Goal: Mood Alteration Comorbidity  Description  Patient comorbidity will be monitored for signs and symptoms of Mood  Alteration condition.  Problems will be absent, minimized or managed by discharge/transition of care.  8/15/2019 1856 by Denisse Love, RN  Outcome: No Change

## 2019-08-15 NOTE — PROGRESS NOTES
[facility], [facility] Progress Note - Hospitalist Service, Gold 8       Date of Admission:  7/31/2019  Assessment & Plan  74 year old female with a past medical history of dementia, paranoid schizophrenia, HTN, DM2, CARLOS admitted on 7/31/2019 for FTT, recent UTI, and found to have c. Diff now on vanco and improving, also with rhabdo now improved but with continued frequency of urination, with dementia and inability to safely care for her at home.       CHANGES TODAY  Awaiting placement    #. Rhabdomyolysis  #. Falls  resolved    #. Failure to Thrive  #. Paranoid schizophrenia  #. Dementia  #. Anxiety  D/jarrod sitter 8/14 am  -- Continue with Risperdal 1 mg qam (home dose 0.5 mg qAM and qNoon)  -- continue bedtime risperdole to 4 mg  -- decreased paxil from 40 mg to 20 mg 8/7 - now increasing to 30 mg as worsening anxiety  -- tremor unlikely to change from continuing or decreasing her current medications per Psych, therefore best to treat underlying psych issues  -- dispo: pending    #insomnia  Will trial low dose seroquel in addition to the melatonin    #. C Diff Diarrhea:resolved  -- s/p PO Vancomycin for 14 days (07/31-8/13)    #. Multiorganism UTI  #. Dysuria  #. Bladder spasms/urinary frequency  #. Urinary retention  Positive UA with UCx growing out enterobacter and pseudomonas resistant to ceftriaxone administered (7/31-8/2). Still with frequent dysuria and polyuria (low amount), no response to pyridium. Finished ceftriaxone/ciprofloxacin 500 mg BID for 5 days (8/02-8/06) for presumed UTI.  She now urinates >1x per hour but has poor voiding. Tried piridium and oxybutin without effect in the short term and stopped noth. Restarted cipro 8/8-8/10, stopped because UCx no growth  -- urology consult - please see there note 8/8     #. Abnormal transaminases: Admitted with mildly elevated enzymes. Improved     #. DM2: PTA maintained on metformin 500 mg qday, daughter notes NP at  clinic told her to hold. A1C 6.7 in 04/2019.   -- Agree with holding metformin while inpatient; consider discontinuing metformin at discharge  -- BG BID      #. Severe malnutrition: from dementia, providing snacks and supplements.  #. Hypokalemia: Normalized after replacement, discontinued telemetry   #. HTN: Not on meds PTA. BP stable on admission.   #. IronDA: PTA maintained on iron BID. Hgb stabel at 10.4 w/ MCV of 90. Hold iron for now.     Diet: Regular Diet Adult  Snacks/Supplements Adult: Magic Cup; Between Meals    DVT Prophylaxis: Enoxaparin (Lovenox) SQ  Darby Catheter: not present  Code Status: Full Code       Disposition Plan   Expected discharge:1-2 days, recommended to transitional care unit once TCU bed available. Memory care preferable.   Entered: Shelton Robledo MD 08/15/2019, 7:29 AM       The patient's care was discussed with the Patient and daughter, as well as nurse    Shelton Robledo MD  Hospitalist Service, [address], [place]  Pager: [phone]  Please see sticky note for cross cover information  ______________________________________________________________________    Interval History   She is very pleasant on exam. No complaints. No pain.     Data reviewed today: I reviewed all medications, new labs and imaging results over the last 24 hours.      Physical Exam   Vital Signs: Temp: 96.7  F (35.9  C) Temp src: Oral BP: 139/83 Pulse: 72 Heart Rate: 72 Resp: 18 SpO2: 97 % O2 Device: None (Room air)    Weight: 118 lbs 1.6 oz  General: appears well, thin  HEENT: no scleral icterus, normal conjunctiva, no facial rash  Chest: normal effort, clear lungs to auscultation  Cardiac: regula rhythm  Abdomen: non-distended, nontender, BS +   Extremities: no lower extremity edema  Skin: no rash, has bruises on the backs of hands  Neuro: no facial droop, normal speech, no focal deficits: unable to remember me day to day.  Bilateral tremor  Psych: alert, cooperativefacility

## 2019-08-15 NOTE — PROGRESS NOTES
Social Work Services Progress Note    Hospital Day: 15  Date of Initial Social Work Evaluation: 8/2/2019  Collaborated with:  Medical team, facility admissions staff    Data: Pt is a 74 year old female admitted to [facility] on 7/31/2019 w/ failure to thrive and dementia w/ behavioral disturbances.     TCU/LTC/MC recommended upon discharge as daughter is no longer able to safely care for the pt at home.    Intervention:  SW participated in interdisciplinary rounds this morning to assess for needs and discharge planning. Team expressed pt has been off the 1:1 for 24 hours.    SW called the following facilities:    1) [facility] (ph: [phone]; f: [phone]) - received a call from admissions. They cannot consider the pt due to her needing a locked MC unit, which they don't have.     2) [facility] (ph: [phone]; f: [phone]) - 3 year wait list, no rooms available      3) [facility] (ph: [phone]) - SW spoke w/ Dulce this morning. Dulce stated there is a person interested in their bed and they are currently waiting on one more piece of paper before it is finalized. Dulce stated this writer could send her a new referral in case the placement falls through. SW sent referral this morning. Dulce stated she would provide an update to this writer either way.     SW will provide an update to the dtr tomorrow.    Assessment:  Pt not seen for this intervention    Plan:    Anticipated Disposition:  Facility:  MC/LTC    Barriers to d/c plan:  Accepting facility    Follow Up:  SW will remain available and continue to follow, assess for needs, and assist in discharge planning.     HAN Spicer, SW   5B   Pager [phone]  Phone [phone]'

## 2019-08-15 NOTE — PLAN OF CARE
Time: 1500 - 2300  Reason for admission: Pt admitted 7/31 with diarrhea/UTI  VS: VSS on RA. Denied any pain this shift.   Activity:  Independent in room (per MD order). Steady on feet.   Neuros: Disoriented to place and situation.   Cardiac: Denied any CP. Trace edema to BLE.   Respiratory: Denied any SOB. LS clear.   GI/: +gas/+BS. LBM this shift. Denied any abd pain, N/V. Frequent, small amounts of urination.   Diet: Regular diet, tolerating well.   Skin: Small skin tear to R FA - primapore dressing in place.   Lines: PIV to LFA SL.   Labs: hgb 9.9. C.Diff +  New changes this shift: Pt anxious and looking for daughter all shift. Walking to door and around room multiple times. Door alarm in place. No concerns this shift.   Will continue to monitor & follow POC.

## 2019-08-16 ENCOUNTER — OFFICE VISIT (OUTPATIENT)
Dept: INTERPRETER SERVICES | Facility: CLINIC | Age: 74
End: 2019-08-16

## 2019-08-16 PROCEDURE — 12000001 ZZH R&B MED SURG/OB UMMC

## 2019-08-16 PROCEDURE — T1013 SIGN LANG/ORAL INTERPRETER: HCPCS | Mod: U3

## 2019-08-16 PROCEDURE — 25000132 ZZH RX MED GY IP 250 OP 250 PS 637: Mod: GY | Performed by: PEDIATRICS

## 2019-08-16 PROCEDURE — 25000132 ZZH RX MED GY IP 250 OP 250 PS 637: Mod: GY | Performed by: INTERNAL MEDICINE

## 2019-08-16 PROCEDURE — 99232 SBSQ HOSP IP/OBS MODERATE 35: CPT | Performed by: INTERNAL MEDICINE

## 2019-08-16 PROCEDURE — 25000132 ZZH RX MED GY IP 250 OP 250 PS 637: Mod: GY | Performed by: PHYSICIAN ASSISTANT

## 2019-08-16 RX ADMIN — PAROXETINE HYDROCHLORIDE 30 MG: 30 TABLET, FILM COATED ORAL at 10:12

## 2019-08-16 RX ADMIN — RISPERIDONE 1 MG: 0.5 TABLET ORAL at 10:12

## 2019-08-16 RX ADMIN — MELATONIN 1000 UNITS: at 10:12

## 2019-08-16 RX ADMIN — Medication 12.5 MG: at 22:36

## 2019-08-16 RX ADMIN — CALCIUM 500 MG: 500 TABLET ORAL at 15:36

## 2019-08-16 RX ADMIN — RISPERIDONE 4 MG: 2 TABLET ORAL at 22:36

## 2019-08-16 ASSESSMENT — ACTIVITIES OF DAILY LIVING (ADL)
ADLS_ACUITY_SCORE: 11.5

## 2019-08-16 NOTE — PROGRESS NOTES
Social Work Services Progress Note    Hospital Day: 16  Date of Initial Social Work Evaluation: 8/2/2019  Collaborated with:  Medical team, pt's daughter Анна     Data: Pt is a 74 year old female admitted to [facility] on 7/31/2019 w/ failure to thrive and dementia w/ behavioral disturbances.     TCU/LTC/MC recommended upon discharge as daughter is no longer able to safely care for the pt at home.    Intervention: SW participated in interdisciplinary rounds this morning to assess for needs and discharge planning. Team expressed pt is still of the 1:1 and medically ready to discharge.    This writer called Анна to provide an update. It was expressed that the three facilities she wanted this writer to refer to have no bed openings. This writer sent an email to Анна at ([email or web address]) with a large list of MC units in the [facility] area that accept MA/EW. This writer expressed to Анна that we may need to venture out to [facility] or farther. Анна is to get back to this writer with a list of places she would like for this writer to refer to.      Assessment:  Pt not seen for this intervention    Plan:    Anticipated Disposition:  Facility:  MC/LTC    Barriers to d/c plan:  Accepting facility    Follow Up:  SW will remain available and continue to follow, assess for needs, and assist in discharge planning.     HAN Spicer, STEPHANESW   5B   Pager [phone]  Phone [phone]

## 2019-08-16 NOTE — PROGRESS NOTES
"Pt was returned to unit by staff (was found near [facility] [address] parking garage). Pt was back on unit at 1938 (last seen by previous nurse at 1830). Pts daughter came to visit at 1945 and was informed of pts elopement. Pt told her daughter that she \"got lost.\" Pt was put in hospital gown. Provider notified (Eva [id number]). Nursing supervisor notified and came to speak with pts daughter. Monitor. Ensure staff at desk at all times to prevent further elopement on this pt with active door alarm.  "

## 2019-08-16 NOTE — PLAN OF CARE
"Patient denies pain. Has been sleeping all day and reports being tired. Just wanted to \"relax\" today. Up to bathroom independently this morning to void. No BM noted. Able to eat a pancake and drink some OJ this morning along with taking morning medications. Has not tried to come out of room. Door alarm in place. Continue to assist as needed and follow plan of care. Still looking for placement.  "

## 2019-08-16 NOTE — PLAN OF CARE
Assumed care of patient after 1900. VSS, slightly hypertensive. PIV in left arm. Patient denies pain. Disoriented to time and situation. Door alarm active. Regular diet. Up independently. Patient urinates spontaneously. Awaiting LTC placement. Patient reports BM this am. Continue to monitor and notify MD with changes.

## 2019-08-16 NOTE — PROGRESS NOTES
[facility] Progress Note - Hospitalist Service, Gold 8       Date of Admission:  7/31/2019  Assessment & Plan  74 year old female with a past medical history of dementia, paranoid schizophrenia, HTN, DM2, CARLOS admitted on 7/31/2019 for FTT, recent UTI, and found to have c. Diff now on vanco and improving, also with rhabdo now improved but with continued frequency of urination, with dementia and inability to safely care for her at home.       CHANGES TODAY  Awaiting placement    #. Rhabdomyolysis  #. Falls  resolved    #. Failure to Thrive  #. Paranoid schizophrenia  #. Dementia  #. Anxiety  D/jarrod sitter 8/14 am  -- Continue with Risperdal 1 mg qam (home dose 0.5 mg qAM and qNoon)  -- continue bedtime risperdole to 4 mg  -- decreased paxil from 40 mg to 20 mg 8/7 - now increasing to 30 mg as worsening anxiety  -- tremor unlikely to change from continuing or decreasing her current medications per Psych, therefore best to treat underlying psych issues  -- dispo: pending    #insomnia  Will trial low dose seroquel in addition to the melatonin    #. C Diff Diarrhea:resolved  -- s/p PO Vancomycin for 14 days (07/31-8/13)    #. Multiorganism UTI  #. Dysuria  #. Bladder spasms/urinary frequency  #. Urinary retention  Positive UA with UCx growing out enterobacter and pseudomonas resistant to ceftriaxone administered (7/31-8/2). Still with frequent dysuria and polyuria (low amount), no response to pyridium. Finished ceftriaxone/ciprofloxacin 500 mg BID for 5 days (8/02-8/06) for presumed UTI.  She now urinates >1x per hour but has poor voiding. Tried piridium and oxybutin without effect in the short term and stopped noth. Restarted cipro 8/8-8/10, stopped because UCx no growth  -- urology consult - please see there note 8/8     #. Abnormal transaminases: Admitted with mildly elevated enzymes. Improved     #. DM2: PTA maintained on metformin 500 mg qday, daughter notes NP at  clinic told her to hold. A1C 6.7 in 04/2019.   -- Agree with holding metformin while inpatient; consider discontinuing metformin at discharge  -- BG BID      #. Severe malnutrition: from dementia, providing snacks and supplements.  #. Hypokalemia: Normalized after replacement, discontinued telemetry   #. HTN: Not on meds PTA. BP stable on admission.   #. IronDA: PTA maintained on iron BID. Hgb stabel at 10.4 w/ MCV of 90. Hold iron for now.     Diet: Regular Diet Adult  Snacks/Supplements Adult: Magic Cup; Between Meals    DVT Prophylaxis: Enoxaparin (Lovenox) SQ  Darby Catheter: not present  Code Status: Full Code       Disposition Plan   Expected discharge:1-2 days, recommended to transitional care unit once TCU bed available. Memory care preferable.   Entered: Shelton Robledo MD 08/16/2019, 7:50 AM       The patient's care was discussed with the Patient and daughter, as well as nurse    Shelton Robledo MD  Hospitalist Service, [address], [place]  Pager: [phone]  Please see sticky note for cross cover information  ______________________________________________________________________    Interval History   She is very pleasant on exam. No complaints. No pain.     Data reviewed today: I reviewed all medications, new labs and imaging results over the last 24 hours.      Physical Exam   Vital Signs: Temp: 98.6  F (37  C) Temp src: Oral BP: 134/78   Heart Rate: 91 Resp: 16 SpO2: 96 % O2 Device: None (Room air)    Weight: 117 lbs 1.6 oz  General: appears well, thin  HEENT: no scleral icterus, normal conjunctiva, no facial rash  Chest: normal effort, clear lungs to auscultation  Cardiac: regula rhythm  Abdomen: non-distended, nontender, BS +   Extremities: no lower extremity edema  Skin: no rash, has bruises on the backs of hands  Neuro: no facial droop, normal speech, no focal deficits: unable to remember me day to day.  Bilateral tremor  Psych: alert, cooperativefacility

## 2019-08-16 NOTE — PROGRESS NOTES
Pt last seen at approximately 1830. She was in her room, sitting on her bed, wearing makeup and plain clothes. Pt stated that she planned to go for a walk. Writer discussed with her that she needed to wait for her daughter. Pt stated that her daughter and a child were coming and they would walk. Writer's shift ended and handoff was given. At approximately 1910, writer received a call from 5B charge nurse asking when pt was last seen because she was not in her room. Despite door alarm being on, pt had walked away during change of shift when no one was at desk. Pt found and writer was present when she was brought back to the unit.

## 2019-08-16 NOTE — PLAN OF CARE
A:  patient in room all night.  Resting with eyes closed on rounds.    R:  continue to monitor and treat per plan of care.

## 2019-08-16 NOTE — PLAN OF CARE
[id number]: Patient wide awake around 1600 and wanted to get dressed in personal clothing. Patient agreed to wear orange scrub pants and ambulated around unit with staff. Patient then pleaded to go outside and became teary eyed when wasn't allowed to. Daughter called and came around 1830 and was able to calm patient down. Door alarm in place. Will continue to monitor.

## 2019-08-17 LAB — GLUCOSE BLDC GLUCOMTR-MCNC: 90 MG/DL (ref 70–99)

## 2019-08-17 PROCEDURE — 12000001 ZZH R&B MED SURG/OB UMMC

## 2019-08-17 PROCEDURE — 25000132 ZZH RX MED GY IP 250 OP 250 PS 637: Mod: GY | Performed by: PEDIATRICS

## 2019-08-17 PROCEDURE — 25000132 ZZH RX MED GY IP 250 OP 250 PS 637: Mod: GY | Performed by: PHYSICIAN ASSISTANT

## 2019-08-17 PROCEDURE — 25000132 ZZH RX MED GY IP 250 OP 250 PS 637: Mod: GY | Performed by: INTERNAL MEDICINE

## 2019-08-17 PROCEDURE — 00000146 ZZHCL STATISTIC GLUCOSE BY METER IP

## 2019-08-17 PROCEDURE — 99232 SBSQ HOSP IP/OBS MODERATE 35: CPT | Performed by: INTERNAL MEDICINE

## 2019-08-17 RX ADMIN — PAROXETINE HYDROCHLORIDE 30 MG: 30 TABLET, FILM COATED ORAL at 10:17

## 2019-08-17 RX ADMIN — RISPERIDONE 4 MG: 2 TABLET ORAL at 22:00

## 2019-08-17 RX ADMIN — Medication 12.5 MG: at 22:00

## 2019-08-17 RX ADMIN — CALCIUM 500 MG: 500 TABLET ORAL at 12:42

## 2019-08-17 RX ADMIN — MELATONIN 1000 UNITS: at 10:17

## 2019-08-17 RX ADMIN — RISPERIDONE 1 MG: 0.5 TABLET ORAL at 10:17

## 2019-08-17 ASSESSMENT — ACTIVITIES OF DAILY LIVING (ADL)
ADLS_ACUITY_SCORE: 11.5

## 2019-08-17 NOTE — PROGRESS NOTES
[facility], [place]    Medicine Progress Note - Hospitalist Service, Gold 8       Date of Admission:  7/31/2019  Assessment & Plan  74 year old female with a past medical history of dementia, paranoid schizophrenia, HTN, DM2, CARLOS admitted on 7/31/2019 for FTT, recent UTI, and found to have c. Diff now on vanco and improving, also with rhabdo now improved but with continued frequency of urination, with dementia and inability to safely care for her at home.       CHANGES TODAY  Awaiting placement    #. Rhabdomyolysis  #. Falls  resolved    #. Failure to Thrive  #. Paranoid schizophrenia  #. Dementia  #. Anxiety  D/jarrod sitter 8/14 am  -- Continue with Risperdal 1 mg qam (home dose 0.5 mg qAM and qNoon)  -- continue bedtime risperdole to 4 mg  -- decreased paxil from 40 mg to 20 mg 8/7 - now increasing to 30 mg as worsening anxiety  -- tremor unlikely to change from continuing or decreasing her current medications per Psych, therefore best to treat underlying psych issues  -- dispo: pending    #insomnia  Will trial low dose seroquel in addition to the melatonin    #. C Diff Diarrhea:resolved  -- s/p PO Vancomycin for 14 days (07/31-8/13)    #. Multiorganism UTI  #. Dysuria  #. Bladder spasms/urinary frequency  #. Urinary retention  Positive UA with UCx growing out enterobacter and pseudomonas resistant to ceftriaxone administered (7/31-8/2). Still with frequent dysuria and polyuria (low amount), no response to pyridium. Finished ceftriaxone/ciprofloxacin 500 mg BID for 5 days (8/02-8/06) for presumed UTI.  She now urinates >1x per hour but has poor voiding. Tried piridium and oxybutin without effect in the short term and stopped noth. Restarted cipro 8/8-8/10, stopped because UCx no growth   -- urology consult - please see there note 8/8  -- per family, she has had this for many years, so now just monitoring     #. Abnormal transaminases: Admitted with mildly elevated enzymes.  Improved     #. DM2: PTA maintained on metformin 500 mg qday, daughter notes NP at clinic told her to hold. A1C 6.7 in 04/2019.   -- Agree with holding metformin while inpatient; consider discontinuing metformin at discharge  -- BG BID      #. Severe malnutrition: from dementia, providing snacks and supplements.  #. Hypokalemia: Normalized after replacement, discontinued telemetry   #. HTN: Not on meds PTA. BP stable on admission.   #. IronDA: PTA maintained on iron BID. Hgb stabel at 10.4 w/ MCV of 90. Hold iron for now.     Diet: Regular Diet Adult  Snacks/Supplements Adult: Magic Cup; Between Meals    DVT Prophylaxis: ambulation  Darby Catheter: not present  Code Status: Full Code       Disposition Plan   Expected discharge:1-2 days, recommended to transitional care unit once TCU bed available. Memory care preferable.   Entered: Shelton Robledo MD 08/17/2019, 7:40 AM       The patient's care was discussed with the Patient and daughter, as well as nurse    Shelton Robledo MD  Hospitalist Service, [address], [place]  Pager: [phone]  Please see sticky note for cross cover information  ______________________________________________________________________    Interval History   She is very pleasant on exam. No complaints. No pain.     Data reviewed today: I reviewed all medications, new labs and imaging results over the last 24 hours.      Physical Exam   Vital Signs: Temp: 97.9  F (36.6  C) Temp src: Oral BP: (!) 164/74 Pulse: 85 Heart Rate: 63 Resp: 18 SpO2: 96 % O2 Device: None (Room air)    Weight: 116 lbs 12.8 oz  General: appears well, thin  HEENT: no scleral icterus, normal conjunctiva, no facial rash  Chest: normal effort, clear lungs to auscultation  Cardiac: regula rhythm  Abdomen: non-distended, nontender, BS +   Extremities: no lower extremity edema  Skin: no rash, has bruises on the backs of hands  Neuro: no facial droop, normal speech, no focal deficits: unable to remember me  day to day.  Bilateral tremor  Psych: alert, cooperativefacility

## 2019-08-17 NOTE — PLAN OF CARE
Patient disoriented to time. Denies pain. Gets up to the BR independently and voided. Sleeping when not up to the BR and has not attempted to come out of room.

## 2019-08-17 NOTE — PLAN OF CARE
Pt is pleasant. Gets up in her room often and comes to the door of her room and talks to staff then goes back in room. Drinking fluids on her own. Using rest room on her own. She is steady on her feet, but just a wander risk so a door alarm is in use and nursing staff is present at all times at the desk to watch for her leaving. Her daughter and grade daughter visited. She is medically stable just waiting for NH placement.

## 2019-08-17 NOTE — PLAN OF CARE
"[id number]: Patient continues to be fixated on going outside and reports feeling like she is in \"penitentiary.\" Ambulated with patient x4 around the unit and reminded patient of safety precautions. Provided emotional support as needed. Good appetite with meals. Continue to closely monitor as patient has tried to elope a couple of days ago. Door alarm in place and staff to be at the desk at all times.  "

## 2019-08-18 ENCOUNTER — OFFICE VISIT (OUTPATIENT)
Dept: INTERPRETER SERVICES | Facility: CLINIC | Age: 74
End: 2019-08-18
Payer: MEDICARE

## 2019-08-18 LAB
ANION GAP SERPL CALCULATED.3IONS-SCNC: 7 MMOL/L (ref 3–14)
BUN SERPL-MCNC: 17 MG/DL (ref 7–30)
CALCIUM SERPL-MCNC: 8.5 MG/DL (ref 8.5–10.1)
CHLORIDE SERPL-SCNC: 105 MMOL/L (ref 94–109)
CO2 SERPL-SCNC: 30 MMOL/L (ref 20–32)
CREAT SERPL-MCNC: 0.6 MG/DL (ref 0.52–1.04)
ERYTHROCYTE [DISTWIDTH] IN BLOOD BY AUTOMATED COUNT: 15.1 % (ref 10–15)
GFR SERPL CREATININE-BSD FRML MDRD: 88 ML/MIN/{1.73_M2}
GLUCOSE SERPL-MCNC: 84 MG/DL (ref 70–99)
HCT VFR BLD AUTO: 33 % (ref 35–47)
HGB BLD-MCNC: 10.4 G/DL (ref 11.7–15.7)
MCH RBC QN AUTO: 28.7 PG (ref 26.5–33)
MCHC RBC AUTO-ENTMCNC: 31.5 G/DL (ref 31.5–36.5)
MCV RBC AUTO: 91 FL (ref 78–100)
PLATELET # BLD AUTO: 364 10E9/L (ref 150–450)
POTASSIUM SERPL-SCNC: 4.2 MMOL/L (ref 3.4–5.3)
RBC # BLD AUTO: 3.63 10E12/L (ref 3.8–5.2)
SODIUM SERPL-SCNC: 142 MMOL/L (ref 133–144)
WBC # BLD AUTO: 6.7 10E9/L (ref 4–11)

## 2019-08-18 PROCEDURE — 12000001 ZZH R&B MED SURG/OB UMMC

## 2019-08-18 PROCEDURE — 25000132 ZZH RX MED GY IP 250 OP 250 PS 637: Mod: GY | Performed by: INTERNAL MEDICINE

## 2019-08-18 PROCEDURE — 85027 COMPLETE CBC AUTOMATED: CPT | Performed by: INTERNAL MEDICINE

## 2019-08-18 PROCEDURE — 25000132 ZZH RX MED GY IP 250 OP 250 PS 637: Mod: GY | Performed by: PHYSICIAN ASSISTANT

## 2019-08-18 PROCEDURE — T1013 SIGN LANG/ORAL INTERPRETER: HCPCS | Mod: U3

## 2019-08-18 PROCEDURE — 25000132 ZZH RX MED GY IP 250 OP 250 PS 637: Mod: GY | Performed by: PEDIATRICS

## 2019-08-18 PROCEDURE — 99232 SBSQ HOSP IP/OBS MODERATE 35: CPT | Performed by: INTERNAL MEDICINE

## 2019-08-18 PROCEDURE — 80048 BASIC METABOLIC PNL TOTAL CA: CPT | Performed by: INTERNAL MEDICINE

## 2019-08-18 PROCEDURE — 36415 COLL VENOUS BLD VENIPUNCTURE: CPT | Performed by: INTERNAL MEDICINE

## 2019-08-18 RX ADMIN — RISPERIDONE 4 MG: 2 TABLET ORAL at 21:18

## 2019-08-18 RX ADMIN — Medication 12.5 MG: at 21:18

## 2019-08-18 RX ADMIN — RISPERIDONE 1 MG: 0.5 TABLET ORAL at 09:55

## 2019-08-18 RX ADMIN — PAROXETINE HYDROCHLORIDE 30 MG: 30 TABLET, FILM COATED ORAL at 09:55

## 2019-08-18 RX ADMIN — CALCIUM 500 MG: 500 TABLET ORAL at 16:59

## 2019-08-18 RX ADMIN — MELATONIN 1000 UNITS: at 09:55

## 2019-08-18 ASSESSMENT — ACTIVITIES OF DAILY LIVING (ADL)
ADLS_ACUITY_SCORE: 11.5

## 2019-08-18 NOTE — PLAN OF CARE
Writer cared for pt [id number]. No significant changes to poc. Vitals wnl on ra. PIV SL. Ambulated around unit. Ind in room. Good appetite. Watching TV most of evening. Door alarm present as pt tried to elope a couple of days ago. Continue to monitor and implement poc.  R: Staff to be present around  at all times to aid with elopement prevention.

## 2019-08-18 NOTE — PROGRESS NOTES
[facility], [place]    Medicine Progress Note - Hospitalist Service, Gold 8       Date of Admission:  7/31/2019  Assessment & Plan  74 year old female with a past medical history of dementia, paranoid schizophrenia, HTN, DM2, CARLOS admitted on 7/31/2019 for FTT, recent UTI, and found to have c. Diff now on vanco and improving, also with rhabdo now improved but with continued frequency of urination, with dementia and inability to safely care for her at home.       CHANGES TODAY  Awaiting placement    #. Rhabdomyolysis  #. Falls  resolved    #. Failure to Thrive  #. Paranoid schizophrenia  #. Dementia  #. Anxiety  D/jarrod sitter 8/14 am  -- Continue with Risperdal 1 mg qam (home dose 0.5 mg qAM and qNoon)  -- continue bedtime risperdole to 4 mg  -- decreased paxil from 40 mg to 20 mg 8/7 - now increasing to 30 mg as worsening anxiety  -- tremor unlikely to change from continuing or decreasing her current medications per Psych, therefore best to treat underlying psych issues  -- dispo: pending    #insomnia  Will trial low dose seroquel in addition to the melatonin    #. C Diff Diarrhea:resolved  -- s/p PO Vancomycin for 14 days (07/31-8/13)    #. Multiorganism UTI  #. Dysuria  #. Bladder spasms/urinary frequency  #. Urinary retention  Positive UA with UCx growing out enterobacter and pseudomonas resistant to ceftriaxone administered (7/31-8/2). Still with frequent dysuria and polyuria (low amount), no response to pyridium. Finished ceftriaxone/ciprofloxacin 500 mg BID for 5 days (8/02-8/06) for presumed UTI.  She now urinates >1x per hour but has poor voiding. Tried piridium and oxybutin without effect in the short term and stopped noth. Restarted cipro 8/8-8/10, stopped because UCx no growth   -- urology consult - please see there note 8/8  -- per family, she has had this for many years, so now just monitoring     #. Abnormal transaminases: Admitted with mildly elevated enzymes.  Improved     #. DM2: PTA maintained on metformin 500 mg qday, daughter notes NP at clinic told her to hold. A1C 6.7 in 04/2019.   -- Agree with holding metformin while inpatient; consider discontinuing metformin at discharge  -- BG BID      #. Severe malnutrition: from dementia, providing snacks and supplements.  #. Hypokalemia: Normalized after replacement, discontinued telemetry   #. HTN: Not on meds PTA. BP stable on admission.   #. IronDA: PTA maintained on iron BID. Hgb stabel at 10.4 w/ MCV of 90. Hold iron for now.     Diet: Regular Diet Adult  Snacks/Supplements Adult: Magic Cup; Between Meals    DVT Prophylaxis: ambulation  Darby Catheter: not present  Code Status: Full Code       Disposition Plan   Expected discharge:1-2 days, recommended to transitional care unit once TCU bed available. Memory care preferable.   Entered: Shelton Robledo MD 08/18/2019, 12:18 PM       The patient's care was discussed with the Patient and daughter, as well as nurse    Shelton Robledo MD  Hospitalist Service, [address], [place]  Pager: [phone]  Please see sticky note for cross cover information  ______________________________________________________________________    Interval History   She is very pleasant on exam. No complaints. No pain.     Data reviewed today: I reviewed all medications, new labs and imaging results over the last 24 hours.      Physical Exam   Vital Signs: Temp: 95.2  F (35.1  C) Temp src: Axillary BP: (!) 150/65   Heart Rate: 65 Resp: 16 SpO2: 95 % O2 Device: None (Room air)    Weight: 119 lbs 0 oz  General: appears well, thin  HEENT: no scleral icterus, normal conjunctiva, no facial rash  Chest: normal effort, clear lungs to auscultation  Cardiac: regula rhythm  Abdomen: non-distended, nontender, BS +   Extremities: no lower extremity edema  Skin: no rash, has bruises on the backs of hands  Neuro: no facial droop, normal speech, no focal deficits: unable to remember me day to  day.  Bilateral tremor  Psych: alert, cooperativefacility

## 2019-08-18 NOTE — PLAN OF CARE
Status: RN assumed cares at 2300. No acute events overnight. VSS. Pt has hx of dementia, but is alert & oriented. Door alarm on for safety as pt can attempt to escape room, did set off alarm overnight. Up independently, steady on feet. Breathing adequately on RA. PIV SL. Voiding appropriately. Regular diet, denies nausea. Denies pain.   Plan of care: Awaiting TCU placement. Continue to monitor and follow POC.

## 2019-08-18 NOTE — PLAN OF CARE
Pt is alert to self but not to time and situation. Pt denies pain.Pt took am medications. Pt slept most of this shift. Pt daughter came at end of shift. Pt was rounded on frequently as pt is a flight risk. Pt did not eat am but did  noon meal this shift. VSS. Continue to monitor per plan of care.

## 2019-08-18 NOTE — PLAN OF CARE
[id number]: No acute events. VSS on room air. Continues with door alarm for safety. Ambulated in halls w/ staff. Good appetite eating most of dinner tray. Denies pain/SOB. Will report to oncoming staff.

## 2019-08-19 LAB — GLUCOSE BLDC GLUCOMTR-MCNC: 185 MG/DL (ref 70–99)

## 2019-08-19 PROCEDURE — 25000132 ZZH RX MED GY IP 250 OP 250 PS 637: Mod: GY | Performed by: INTERNAL MEDICINE

## 2019-08-19 PROCEDURE — 99232 SBSQ HOSP IP/OBS MODERATE 35: CPT | Performed by: INTERNAL MEDICINE

## 2019-08-19 PROCEDURE — 12000001 ZZH R&B MED SURG/OB UMMC

## 2019-08-19 PROCEDURE — 25000132 ZZH RX MED GY IP 250 OP 250 PS 637: Mod: GY | Performed by: PHYSICIAN ASSISTANT

## 2019-08-19 PROCEDURE — 00000146 ZZHCL STATISTIC GLUCOSE BY METER IP

## 2019-08-19 PROCEDURE — 25000132 ZZH RX MED GY IP 250 OP 250 PS 637: Mod: GY | Performed by: PEDIATRICS

## 2019-08-19 RX ADMIN — MELATONIN TAB 3 MG 3 MG: 3 TAB at 22:12

## 2019-08-19 RX ADMIN — PAROXETINE HYDROCHLORIDE 30 MG: 30 TABLET, FILM COATED ORAL at 09:24

## 2019-08-19 RX ADMIN — RISPERIDONE 4 MG: 2 TABLET ORAL at 22:12

## 2019-08-19 RX ADMIN — MELATONIN 1000 UNITS: at 09:25

## 2019-08-19 RX ADMIN — CALCIUM 500 MG: 500 TABLET ORAL at 11:56

## 2019-08-19 RX ADMIN — RISPERIDONE 1 MG: 0.5 TABLET ORAL at 09:25

## 2019-08-19 RX ADMIN — Medication 12.5 MG: at 22:11

## 2019-08-19 ASSESSMENT — ACTIVITIES OF DAILY LIVING (ADL)
ADLS_ACUITY_SCORE: 17
ADLS_ACUITY_SCORE: 11.5

## 2019-08-19 NOTE — PLAN OF CARE
Alert and disoriented to time and situation. VSS on RA. Pt is up independent in room with door alarm present. Pt denies pain and nausea. Pt is on regular diet and tolerating well. Continues urinary urgency and frequency. Pt requested to go outside multiple times throughout shift and was upset when told she cannot leave the unit. Pt distracted with tv. Continue to monitor and follow plan of care.

## 2019-08-19 NOTE — PROGRESS NOTES
[facility], [place]    Medicine Progress Note - Hospitalist Service, Gold 8       Date of Admission:  7/31/2019  Assessment & Plan  74 year old female with a past medical history of dementia, paranoid schizophrenia, HTN, DM2, CARLOS admitted on 7/31/2019 for FTT, recent UTI, and found to have c. Diff now on vanco and improving, also with rhabdo now improved but with continued frequency of urination, with dementia and inability to safely care for her at home.        CHANGES TODAY  Awaiting placement    #. Rhabdomyolysis  #. Falls  resolved    #. Failure to Thrive  #. Paranoid schizophrenia  #. Dementia  #. Anxiety  D/jarrod sitter 8/14 am  -- Continue with Risperdal 1 mg qam (home dose 0.5 mg qAM and qNoon)  -- continue bedtime risperdole to 4 mg  -- decreased paxil from 40 mg to 20 mg 8/7 - now increasing to 30 mg as worsening anxiety  -- tremor unlikely to change from continuing or decreasing her current medications per Psych, therefore best to treat underlying psych issues  -- dispo: pending placement    #insomnia  Will trial low dose seroquel in addition to the melatonin    #. C Diff Diarrhea:resolved  -- s/p PO Vancomycin for 14 days (07/31-8/13)    #. Multiorganism UTI  #. Dysuria  #. Bladder spasms/urinary frequency  #. Urinary retention  Positive UA with UCx growing out enterobacter and pseudomonas resistant to ceftriaxone administered (7/31-8/2). Still with frequent dysuria and polyuria (low amount), no response to pyridium. Finished ceftriaxone/ciprofloxacin 500 mg BID for 5 days (8/02-8/06) for presumed UTI.  She now urinates >1x per hour but has poor voiding. Tried piridium and oxybutin without effect in the short term and stopped noth. Restarted cipro 8/8-8/10, stopped because UCx no growth   -- urology consult - please see there note 8/8  -- per family, she has had this for many years, so now just monitoring     #. Abnormal transaminases: Admitted with mildly elevated enzymes.  Improved     #. DM2: PTA maintained on metformin 500 mg qday, daughter notes NP at clinic told her to hold. A1C 6.7 in 04/2019.   -- Agree with holding metformin while inpatient; consider discontinuing metformin at discharge  -- BG BID      #. Severe malnutrition: from dementia, providing snacks and supplements.  #. Hypokalemia: Normalized after replacement, discontinued telemetry   #. HTN: Not on meds PTA. BP stable on admission.   #. IronDA: PTA maintained on iron BID. Hgb stabel at 10.4 w/ MCV of 90. Hold iron for now.     Diet: Regular Diet Adult  Snacks/Supplements Adult: Magic Cup; Between Meals    DVT Prophylaxis: ambulation  Darby Catheter: not present  Code Status: Full Code       Disposition Plan   Expected discharge:1-2 days, recommended to transitional care unit once TCU bed available. Memory care preferable.   Entered: Shelton Robledo MD 08/19/2019, 1:33 PM       The patient's care was discussed with the Patient and daughter, as well as nurse    Shelton Robledo MD  Hospitalist Service, [address], [place]  Pager: [phone]  Please see sticky note for cross cover information  ______________________________________________________________________    Interval History   She is very pleasant on exam. No complaints. No pain.     Data reviewed today: I reviewed all medications, new labs and imaging results over the last 24 hours.      Physical Exam   Vital Signs: Temp: 97.5  F (36.4  C) Temp src: Oral BP: (!) 144/62 Pulse: 73 Heart Rate: 73 Resp: 16 SpO2: 95 % O2 Device: None (Room air)    Weight: 112 lbs 8 oz  General: appears well, thin  HEENT: no scleral icterus, normal conjunctiva, no facial rash  Chest: normal effort, clear lungs to auscultation  Cardiac: regula rhythm  Abdomen: non-distended, nontender, BS +   Extremities: no lower extremity edema  Skin: no rash, has bruises on the backs of hands  Neuro: no facial droop, normal speech, no focal deficits: unable to remember me day  to day.  Bilateral tremor  Psych: alert, cooperativefacility

## 2019-08-19 NOTE — PLAN OF CARE
Pt slept well. A+O x4. VSS on room air. Denies pain. No S/S of respiratory distress noted. No attempts to elope. Door alarm activated. Plan: see flow sheet for detailed assessments and interventions, awaiting memory care placement, monitor for safety, support POC.

## 2019-08-19 NOTE — PROGRESS NOTES
Social Work Services Progress Note    Hospital Day: 19  Date of Initial Social Work Evaluation: 8/2/2019  Collaborated with:  Анна (pt's dtr), medical team    Data:  Pt is a 74 year old female admitted to [facility] on 7/31/2019 w/ failure to thrive and dementia w/ behavioral disturbances.     TCU/LTC/MC recommended upon discharge as daughter is no longer able to safely care for the pt at home.    Intervention:  GRAY participated in interdisciplinary rounds this morning to assess for needs and discharge planning. Team expressed pt continues to be ready for discharge.     GRAY contacted Анна to discuss placement options. Last week, this writer had sent Анна a long list of MC options through E-mail. Анна stated there were a few she was interested in having this writer send a referral to but that she was currently driving and unable to look up the names. She expressed she would either call me when she gets to her destination or E-mail me w/ the list. More referrals to MC will be made once GRAY hears from Анна.      Assessment:  Pt wandering today; Dtr understanding of GRAY intervention    Plan:    Anticipated Disposition:  Facility:  Memory Care Facility    Barriers to d/c plan:  Accepting facility    Follow Up:  SW will remain available and continue to follow, assess for needs, and assist in discharge planning.     HAN Spicer, STEPHANESW   5B   Pager [phone]  Phone [phone]

## 2019-08-20 ENCOUNTER — OFFICE VISIT (OUTPATIENT)
Dept: INTERPRETER SERVICES | Facility: CLINIC | Age: 74
End: 2019-08-20
Payer: MEDICARE

## 2019-08-20 PROCEDURE — 25000132 ZZH RX MED GY IP 250 OP 250 PS 637: Mod: GY | Performed by: INTERNAL MEDICINE

## 2019-08-20 PROCEDURE — 99232 SBSQ HOSP IP/OBS MODERATE 35: CPT | Performed by: INTERNAL MEDICINE

## 2019-08-20 PROCEDURE — 12000001 ZZH R&B MED SURG/OB UMMC

## 2019-08-20 PROCEDURE — T1013 SIGN LANG/ORAL INTERPRETER: HCPCS | Mod: U3

## 2019-08-20 PROCEDURE — 25000132 ZZH RX MED GY IP 250 OP 250 PS 637: Mod: GY | Performed by: PEDIATRICS

## 2019-08-20 PROCEDURE — 25000132 ZZH RX MED GY IP 250 OP 250 PS 637: Mod: GY | Performed by: PHYSICIAN ASSISTANT

## 2019-08-20 RX ADMIN — RISPERIDONE 1 MG: 0.5 TABLET ORAL at 10:17

## 2019-08-20 RX ADMIN — Medication 12.5 MG: at 21:12

## 2019-08-20 RX ADMIN — RISPERIDONE 4 MG: 2 TABLET ORAL at 21:12

## 2019-08-20 RX ADMIN — PAROXETINE HYDROCHLORIDE 30 MG: 30 TABLET, FILM COATED ORAL at 10:17

## 2019-08-20 RX ADMIN — MELATONIN 1000 UNITS: at 10:17

## 2019-08-20 RX ADMIN — CALCIUM 500 MG: 500 TABLET ORAL at 13:49

## 2019-08-20 RX ADMIN — MELATONIN TAB 3 MG 3 MG: 3 TAB at 21:14

## 2019-08-20 ASSESSMENT — ACTIVITIES OF DAILY LIVING (ADL)
ADLS_ACUITY_SCORE: 17

## 2019-08-20 NOTE — PROGRESS NOTES
[facility], [place]    Medicine Progress Note - Hospitalist Service, Gold 8       Date of Admission:  7/31/2019  Assessment & Plan     74 year old female with a past medical history of dementia, paranoid schizophrenia, HTN, DM2, CARLOS admitted on 7/31/2019 for FTT, recent UTI, and found to have c. Diff now on vanco and improving, also with rhabdo now improved but with continued frequency of urination, with dementia and inability to safely care for her at home.        CHANGES TODAY  Awaiting placement    #. Rhabdomyolysis  Renal function stable on 8/18/19    #. Falls  resolved    #. Failure to Thrive  #. Paranoid schizophrenia  #. Dementia  #. Anxiety  D/jarrod sitter 8/14 am  -- Continue with Risperdal 1 mg qam (home dose 0.5 mg qAM and qNoon)  -- continue bedtime risperdole to 4 mg  -- paxil 30 mg for anxiety  -- tremor unlikely to change from continuing or decreasing her current medications per Psych, therefore best to treat underlying psych issues  -- dispo: pending placement    #insomnia  Seroquel and melatonin    #. C Diff Diarrhea:resolved  -- s/p PO Vancomycin for 14 days (07/31-8/13)    #. Multiorganism UTI- no growth   #. Dysuria improved  #. Bladder spasms/urinary frequency  #. Urinary retention  Positive UA with UCx growing out enterobacter and pseudomonas resistant to ceftriaxone administered (7/31-8/2). Still with frequent dysuria and polyuria (low amount), no response to pyridium. Finished ceftriaxone/ciprofloxacin 500 mg BID for 5 days (8/02-8/06) for presumed UTI.  She now urinates >1x per hour but has poor voiding. Tried piridium and oxybutin without effect in the short term and stopped noth. Restarted cipro 8/8-8/10, stopped because UCx no growth   -- urology consult - please see there note 8/8  -- per family, she has had this for many years, so now just monitoring     #. Abnormal transaminases: Admitted with mildly elevated enzymes. Improved     #. DM2:   Stable  Metformin  500mg upon discharge    #. Severe malnutrition: from dementia, providing snacks and supplements.  #. Hypokalemia: Normalized after replacement, discontinued telemetry   #. HTN: Not on meds PTA. BP stable on admission.   #. IronDA: PTA maintained on iron BID. Hgb stabel at 10.4 w/ MCV of 90. Hold iron for now.     Diet: Regular Diet Adult  Snacks/Supplements Adult: Magic Cup; Between Meals    DVT Prophylaxis: ambulation  Darby Catheter: not present  Code Status: Full Code       Diet: Regular Diet Adult  Snacks/Supplements Adult: Magic Cup; Between Meals  Room Service  Advance Diet as Tolerated  Snacks/Supplements Adult: Boost Glucose Control; With Meals    DVT Prophylaxis: Ambulate every shift  Darby Catheter: not present  Code Status: Full Code      Disposition Plan   Expected discharge: 2 - 3 days, recommended to transitional care unit once adequate pain management/ tolerating PO medications.  Entered: Brennen Cox MD 08/20/2019, 4:06 PM       The patient's care was discussed with the Patient.    Brennen Cox MD  Hospitalist Service, [address], [place]  Pager: [phone]  Please see sticky note for cross cover information  ______________________________________________________________________    Interval History     Patient's history was obtained through .  Patient denies any nausea vomiting fevers chills chest pain shortness of breath patient is able to get into the bathroom without difficulty.  Patient rashes  Patient denies any focal neurological deficits.  No bowel or urinary issues currently    Review of systems has been reviewed and is otherwise negative than as mentioned above    Data reviewed today: I reviewed all medications, new labs and imaging results over the last 24 hours.     Physical Exam   Vital Signs: Temp: 96.3  F (35.7  C) Temp src: Oral BP: (!) 157/81 Pulse: 75 Heart Rate: 76 Resp: 16 SpO2: 95 % O2 Device: None (Room air)    Weight: 118 lbs 1.6  oz  Constitutional: Awake, alert, cooperative, no apparent distress.  Eyes: Conjunctiva and pupils examined and normal.  HEENT: Moist mucous membranes, normal dentition.  Respiratory: Clear to auscultation bilaterally, no crackles or wheezing.  Cardiovascular: Regular rate and rhythm, normal S1 and S2, and no murmur noted.  GI: Soft, non-distended, non-tender, normal bowel sounds.  Lymph/Hematologic: No anterior cervical or supraclavicular adenopathy.  Skin: No rashes, no cyanosis, no edema.  Musculoskeletal: No joint swelling, erythema or tenderness.  Neurologic: Cranial nerves 2-12 intact, normal strength and sensation.  Psychiatric: Alert, oriented to person, place and time, no obvious anxiety or depression.      Data   Recent Labs   Lab 08/18/19  0743 08/14/19  0536   WBC 6.7 7.1   HGB 10.4* 9.9*   MCV 91 92    326    139   POTASSIUM 4.2 4.0   CHLORIDE 105 103   CO2 30 29   BUN 17 21   CR 0.60 0.60   ANIONGAP 7 7   SHERI 8.5 8.2*   GLC 84 99

## 2019-08-20 NOTE — PLAN OF CARE
D/I/A: Patient A & O X 3, with forgetfulness . VSS sat RA. Denies pain. Patient with good appetite . Up independently, and void adequate . Door alarm on , call light in reach and hourly round completed. Continue with POC and update MD with change.

## 2019-08-20 NOTE — PROGRESS NOTES
Social Work Services Progress Note    Hospital Day: 20  Date of Initial Social Work Evaluation:  8/2/2019  Collaborated with:  Medical team, pt's dtr     Data:  Pt is a 74 year old female admitted to [facility] on 7/31/2019 w/ failure to thrive and dementia w/ behavioral disturbances.     TCU/LTC/MC recommended upon discharge as daughter is no longer able to safely care for the pt at home.    Intervention: SW participated in interdisciplinary rounds this morning to assess for needs and discharge planning. Team expressed pt is medically ready for discharge.     SW received an E-mail from the pt's daughter, Анна, stating facilities she was interested in having this writer refer to. Referrals have been sent to the following facilities per dtr's request:    1) [facility] (ph: [phone]; f: [phone]), faxed through [employer].     2) [facility] (ph: [phone]; f: [phone]), faxed through [employer].     3) [facility] - won't work due to being private pay, do not take elderly waiver until after 2 years of private pay.     4) [facility] (ph: [phone]; f: [phone]) - hand faxed    5) [facility] (ph: [phone]; f: [phone]) -hand faxed    6) [facility] (ph: [phone]; f: [phone]) - hand faxed    7) [facility] (ph: [phone]; f: [phone]) - hand faxed    8) [facility] (ph: [phone]; f: [phone]) - hand faxed     Assessment:  Pt not seen for this intervention    Plan:    Anticipated Disposition:  Facility:  /LTC    Barriers to d/c plan:  Accepting facility    Follow Up:  SW will remain available and continue to follow, assess for needs, and assist in discharge planning.     HAN Spicer, LGSW   5B   Pager [phone]  Phone [phone]

## 2019-08-20 NOTE — PROGRESS NOTES
Neuro: Alert, disoriented to time and situation.   Cardiac: Afebrile, VSS.   Respiratory: RA   GI/: Voiding spontaneously. No BM this shift.   Diet/appetite: Tolerating regular diet. Denies nausea   Activity: Up ad merritt, door alarm on for pt safety     Pain: . Denies   Skin: No new deficits noted.  Lines: PIV saline locked       Pt has been resting comfortably throughout night, will continue to monitor and follow plan of care.

## 2019-08-21 ENCOUNTER — OFFICE VISIT (OUTPATIENT)
Dept: INTERPRETER SERVICES | Facility: CLINIC | Age: 74
End: 2019-08-21
Payer: MEDICARE

## 2019-08-21 PROCEDURE — 25000132 ZZH RX MED GY IP 250 OP 250 PS 637: Mod: GY | Performed by: INTERNAL MEDICINE

## 2019-08-21 PROCEDURE — 25000132 ZZH RX MED GY IP 250 OP 250 PS 637: Mod: GY | Performed by: PEDIATRICS

## 2019-08-21 PROCEDURE — 12000001 ZZH R&B MED SURG/OB UMMC

## 2019-08-21 PROCEDURE — T1013 SIGN LANG/ORAL INTERPRETER: HCPCS | Mod: U3

## 2019-08-21 PROCEDURE — 99231 SBSQ HOSP IP/OBS SF/LOW 25: CPT | Performed by: INTERNAL MEDICINE

## 2019-08-21 PROCEDURE — 25000132 ZZH RX MED GY IP 250 OP 250 PS 637: Mod: GY | Performed by: PHYSICIAN ASSISTANT

## 2019-08-21 RX ADMIN — RISPERIDONE 1 MG: 0.5 TABLET ORAL at 09:52

## 2019-08-21 RX ADMIN — PAROXETINE HYDROCHLORIDE 30 MG: 30 TABLET, FILM COATED ORAL at 09:52

## 2019-08-21 RX ADMIN — MELATONIN 1000 UNITS: at 09:52

## 2019-08-21 RX ADMIN — Medication 12.5 MG: at 22:18

## 2019-08-21 RX ADMIN — CALCIUM 500 MG: 500 TABLET ORAL at 11:39

## 2019-08-21 RX ADMIN — RISPERIDONE 4 MG: 2 TABLET ORAL at 22:18

## 2019-08-21 ASSESSMENT — ACTIVITIES OF DAILY LIVING (ADL)
ADLS_ACUITY_SCORE: 17

## 2019-08-21 NOTE — PLAN OF CARE
Problem: Diabetes Comorbidity  Goal: Blood Glucose Level Within Desired Range  Outcome: No Change     Problem: Memory Impairment Comorbidity  Goal: Memory Impairment Comorbidity  Description  Patient comorbidity will be monitored for signs and symptoms of Memory Impairment condition.  Problems will be absent, minimized or managed by discharge/transition of care.  Outcome: No Change     Problem: Mood Alteration Comorbidity  Goal: Mood Alteration Comorbidity  Description  Patient comorbidity will be monitored for signs and symptoms of Mood Alteration condition.  Problems will be absent, minimized or managed by discharge/transition of care.  Outcome: No Change     Problem: OT General Care Plan  Goal: Transfer (OT)  Description  Transfer (OT)  Outcome: No Change      Patient A & O X 3, forgetful . VSS sat RA. Denies pain. Patient with good appetite . Up independently, and void adequate . Door alarm on , call light in reach and hourly round completed. Continue with POC and update MD with change.

## 2019-08-21 NOTE — PLAN OF CARE
VSS .  Full code. Patient much quieter this shift than previous shifts and slept between cares. Less time up attempting to go to the bathroom or trying to leave the room. Pt denies pain/sob/chest pain. Plan: Hourly rounding complete, call light within reach. Door alarm on for safety. Continue to monitor and notify MD of changes. Disposition continues to be the primary concern.

## 2019-08-21 NOTE — PROGRESS NOTES
[facility], [facility] Progress Note - Hospitalist Service, Gold 8       Date of Admission:  7/31/2019  Assessment & Plan     74 year old female with a past medical history of dementia, paranoid schizophrenia, HTN, DM2, CARLOS admitted on 7/31/2019 for FTT, recent UTI, and found to have c. Diff now on vanco and improving, also with rhabdo now improved but with continued frequency of urination, with dementia and inability to safely care for her at home.  Currently pending placement      CHANGES TODAY  Awaiting placement    #. Rhabdomyolysis  Renal function stable on 8/18/19    #. Falls  resolved    #. Failure to Thrive  #. Paranoid schizophrenia  #. Dementia  #. Anxiety  D/jarrod sitter 8/14 am  -- Continue with Risperdal 1 mg qam (home dose 0.5 mg qAM and qNoon)  -- continue bedtime risperdole to 4 mg  -- paxil 30 mg for anxiety  -- tremor unlikely to change from continuing or decreasing her current medications per Psych, therefore best to treat underlying psych issues  -- dispo: pending placement    #insomnia  Seroquel and melatonin    #. C Diff Diarrhea:resolved  -- s/p PO Vancomycin for 14 days (07/31-8/13)    #. Multiorganism UTI- no growth   #. Dysuria improved  #. Bladder spasms/urinary frequency  #. Urinary retention  Status post appropriate course duration.  Patient has still continued to have some frequency however this appears to be her norm     #. Abnormal transaminases: Admitted with mildly elevated enzymes. Improved     #. DM2:   Stable  Metformin 500mg upon discharge    #. Severe malnutrition: from dementia, providing snacks and supplements.  #. Hypokalemia: Normalized after replacement, discontinued telemetry   #. HTN: Not on meds PTA. BP stable on admission.   #. IronDA: PTA maintained on iron BID. Hgb stabel at 10.4 w/ MCV of 90. Hold iron for now.     Diet: Regular Diet Adult  Snacks/Supplements Adult: Magic Cup; Between Meals    DVT Prophylaxis: ambulation  Darby  Catheter: not present  Code Status: Full Code       Diet: Regular Diet Adult  Snacks/Supplements Adult: Magic Cup; Between Meals  Room Service  Advance Diet as Tolerated  Snacks/Supplements Adult: Boost Glucose Control; With Meals    DVT Prophylaxis: Ambulate every shift  Darby Catheter: not present  Code Status: Full Code      Disposition Plan   Expected discharge: 2 - 3 days, recommended to transitional care unit once adequate pain management/ tolerating PO medications.  Entered: Brennen Cox MD 08/21/2019, 1:20 PM       The patient's care was discussed with the Patient.    Brennen Cox MD  Hospitalist Service, [address], [place]  Pager: [phone]  Please see sticky note for cross cover information  ______________________________________________________________________    Interval History     Patient's history was obtained through .  No nausea vomiting fevers chills chest pain shortness of breath or palpitations  Patient does endorse poor sleep however states this is again due to relaxing and watching television late into the night  Patient feels lethargic in the morning however would not like any medication for sleep.  Patient goes to the bathroom throughout the day however denies any symptoms of dysuria or pyuria  Four-point review of systems has been reviewed and is otherwise negative than as mentioned above    Data reviewed today: I reviewed all medications, new labs and imaging results over the last 24 hours.     Physical Exam   Vital Signs: Temp: 98  F (36.7  C) Temp src: Oral BP: 133/64 Pulse: 76 Heart Rate: 76 Resp: 16 SpO2: 95 % O2 Device: None (Room air)    Weight: 118 lbs 0 oz  Constitutional: Awake, alert, cooperative, no apparent distress.  Eyes: Conjunctiva and pupils examined and normal.  HEENT: Moist mucous membranes, normal dentition.  Respiratory: Clear to auscultation bilaterally, no crackles or wheezing.  Cardiovascular: Regular rate and rhythm,  normal S1 and S2, and no murmur noted.  GI: Soft, non-distended, non-tender, normal bowel sounds.  Lymph/Hematologic: No anterior cervical or supraclavicular adenopathy.  Skin: No rashes, no cyanosis, no edema.  Musculoskeletal: No joint swelling, erythema or tenderness.  Neurologic: Cranial nerves 2-12 intact, normal strength and sensation.  Psychiatric: Alert, oriented to person, place and time, no obvious anxiety or depression.      Data   Recent Labs   Lab 08/18/19  0743   WBC 6.7   HGB 10.4*   MCV 91         POTASSIUM 4.2   CHLORIDE 105   CO2 30   BUN 17   CR 0.60   ANIONGAP 7   SHERI 8.5   GLC 84

## 2019-08-21 NOTE — PROGRESS NOTES
Social Work Services Progress Note    Hospital Day: 21  Date of Initial Social Work Evaluation: 8/2/2019  Collaborated with:  Medical team, pt's dtr, TCU's    Data: Pt is a 74 year old female admitted to [facility] on 7/31/2019 w/ failure to thrive and dementia w/ behavioral disturbances.     TCU/LTC/MC recommended upon discharge as daughter is no longer able to safely care for the pt at home.    Intervention: SW made the following referrals yesterday for MC placement and followed up this morning with each referral:    1) [facility] (ph: [phone]; f: [phone]), faxed through [employer]. No memory care beds available.      2) [facility] (ph: [phone]; f: [phone]), faxed through [employer]. Yanely in admissions left this writer a VM stating they could accept the pt, likely for their '[facility]'. SW called Yanely back and a VM had to be left. Will wait for Yanely to call back.     3) [facility] - won't work due to being private pay, do not take elderly waiver until after 2 years of private pay.      4) [facility] (ph: [phone]; f: [phone]) - hand faxed. SW heard from Blank in admissions. They currently are full, however she will pass the pt's information along to the nurse for further review in case pt is still needing placement in a couple of weeks.     5) [facility] (ph: [phone]; f: [phone]) -hand faxed. SW heard from Dori in admissions. She stated they do have open rooms in their memory care, however it is private pay for a certain amount of time. This writer will let Анна know, however it is unlikely pt will be able to afford this as she is on MA currently.     6) [facility] (ph: [phone]; f: [phone]) - hand faxed. Unable to understand person on the other end - will try to call again     7) [facility] (ph: [phone]; f: [phone]) - hand faxed. SW heard from Mariela in admissions. She expressed that they  do not have a specific memory care unit, but that they are former independent living apartments w/ a secure door. The pt would be sharing a living space w/ another individual. The pt would have her own room and bathroom but would share a living room . Mariela requested that the daughter call her if interested at [phone].      8) [facility] (ph: [phone]; f: [phone]) - hand faxed. SW heard from site yesterday. Site requested that the dtr call to set up a tour and discuss placement. SW updated dtr.       GRAY emailed Анна evans/ the updated notes from the facilities. Анна was encouraged to contact the facilities herself as well to ask questions/set up tours.     Assessment:  pt not seen for this intervention    Plan:    Anticipated Disposition:  Facility:  /LTC    Barriers to d/c plan:  Accepting facility    Follow Up:  SW will remain available and continue to follow, assess for needs, and assist in discharge planning.     HAN Spicer, LGSW   5B   Pager [phone]  Phone [phone]

## 2019-08-21 NOTE — PROGRESS NOTES
Patient A & O X 3, forgetful . VSS sat RA. Denies pain. Patient with good appetite . Up independently, and void adequate . Door alarm on , call light in reach and hourly round completed. Continue with POC and update MD with change.

## 2019-08-21 NOTE — PLAN OF CARE
Assumed care at  11:00 am , Patient A & O X 3, with forgetfulness . VSS sat RA. Denies pain. Patient with good appetite . Up independently, and void adequate . Plan awaiting for placement . Door alarm on , call light in reach and hourly round completed. Continue with POC and update MD with change.

## 2019-08-22 ENCOUNTER — OFFICE VISIT (OUTPATIENT)
Dept: INTERPRETER SERVICES | Facility: CLINIC | Age: 74
End: 2019-08-22
Payer: MEDICARE

## 2019-08-22 PROCEDURE — 12000001 ZZH R&B MED SURG/OB UMMC

## 2019-08-22 PROCEDURE — 99232 SBSQ HOSP IP/OBS MODERATE 35: CPT | Performed by: INTERNAL MEDICINE

## 2019-08-22 PROCEDURE — 25000132 ZZH RX MED GY IP 250 OP 250 PS 637: Mod: GY | Performed by: PEDIATRICS

## 2019-08-22 PROCEDURE — 25000132 ZZH RX MED GY IP 250 OP 250 PS 637: Mod: GY | Performed by: PHYSICIAN ASSISTANT

## 2019-08-22 PROCEDURE — 25000132 ZZH RX MED GY IP 250 OP 250 PS 637: Mod: GY | Performed by: INTERNAL MEDICINE

## 2019-08-22 PROCEDURE — T1013 SIGN LANG/ORAL INTERPRETER: HCPCS | Mod: U3

## 2019-08-22 RX ADMIN — MELATONIN 1000 UNITS: at 09:28

## 2019-08-22 RX ADMIN — RISPERIDONE 4 MG: 2 TABLET ORAL at 21:18

## 2019-08-22 RX ADMIN — CALCIUM 500 MG: 500 TABLET ORAL at 12:30

## 2019-08-22 RX ADMIN — PAROXETINE HYDROCHLORIDE 30 MG: 30 TABLET, FILM COATED ORAL at 09:28

## 2019-08-22 RX ADMIN — RISPERIDONE 1 MG: 0.5 TABLET ORAL at 09:28

## 2019-08-22 RX ADMIN — Medication 12.5 MG: at 21:18

## 2019-08-22 ASSESSMENT — ACTIVITIES OF DAILY LIVING (ADL)
ADLS_ACUITY_SCORE: 17

## 2019-08-22 NOTE — PROGRESS NOTES
[facility], [facility] Progress Note - Hospitalist Service, Gold 8       Date of Admission:  7/31/2019  Assessment & Plan     74 year old female with a past medical history of dementia, paranoid schizophrenia, HTN, DM2, CARLOS admitted on 7/31/2019 for FTT, recent UTI, and found to have c. Diff now on vanco and improving, also with rhabdo now improved but with continued frequency of urination, with dementia and inability to safely care for her at home.  Currently pending placement.  Patient was denied placement secondary to not having EW.       CHANGES TODAY  Awaiting placement    #. Rhabdomyolysis  Renal function stable on 8/18/19    #. Falls  resolved    #. Failure to Thrive  #. Paranoid schizophrenia  #. Dementia  #. Anxiety  D/jarrod sitter 8/14 am  -- Continue with Risperdal 1 mg qam (home dose 0.5 mg qAM and qNoon)  -- continue bedtime risperdole to 4 mg  -- paxil 30 mg for anxiety  -- tremor unlikely to change from continuing or decreasing her current medications per Psych, therefore best to treat underlying psych issues  -- dispo: pending placement    #insomnia  Seroquel and melatonin    #. C Diff Diarrhea:resolved  -- s/p PO Vancomycin for 14 days (07/31-8/13)    #. Multiorganism UTI- no growth   #. Dysuria improved  #. Bladder spasms/urinary frequency  #. Urinary retention  Status post appropriate course duration.  Patient has still continued to have some frequency however this appears to be her norm     #. Abnormal transaminases: Admitted with mildly elevated enzymes. Improved     #. DM2:   Stable  Metformin 500mg upon discharge    #. Severe malnutrition: from dementia, providing snacks and supplements.  #. Hypokalemia: Normalized after replacement, discontinued telemetry   #. HTN: Not on meds PTA. BP stable on admission.   #. IronDA: PTA maintained on iron BID. Hgb stabel at 10.4 w/ MCV of 90. Hold iron for now.     Diet: Regular Diet Adult  Snacks/Supplements Adult: Magic  Cup; Between Meals    DVT Prophylaxis: ambulation  Darby Catheter: not present  Code Status: Full Code       Diet: Regular Diet Adult  Snacks/Supplements Adult: Magic Cup; Between Meals  Room Service  Advance Diet as Tolerated  Snacks/Supplements Adult: Boost Glucose Control; With Meals    DVT Prophylaxis: Ambulate every shift  Darby Catheter: not present  Code Status: Full Code      Disposition Plan   Expected discharge: 2 - 3 days, recommended to transitional care unit once adequate pain management/ tolerating PO medications.  Entered: Brennen Cox MD 08/22/2019, 1:02 PM       The patient's care was discussed with the Patient.    Brennen Cox MD  Hospitalist Service, [address], [place]  Pager: [phone]  Please see sticky note for cross cover information  ______________________________________________________________________    Interval History     Patient's history was obtained through .  No nausea vomiting fevers chills chest pain shortness of breath or palpitations  Continue to sleep through morning and stay awake at night.     No bowel or urinary change from yesterday    Four-point review of systems has been reviewed and is otherwise negative than as mentioned above    Data reviewed today: I reviewed all medications, new labs and imaging results over the last 24 hours.     Physical Exam   Vital Signs: Temp: 97.8  F (36.6  C) Temp src: Oral BP: 131/48 Pulse: 63 Heart Rate: 63 Resp: 16 SpO2: 95 % O2 Device: None (Room air)    Weight: 116 lbs 4.8 oz  Constitutional: Awake, alert, cooperative, no apparent distress.  Eyes: Conjunctiva and pupils examined and normal.  HEENT: Moist mucous membranes, normal dentition.  Respiratory: Clear to auscultation bilaterally, no crackles or wheezing.  Cardiovascular: Regular rate and rhythm, normal S1 and S2, and no murmur noted.  GI: Soft, non-distended, non-tender, normal bowel sounds.  Lymph/Hematologic: No anterior cervical or  supraclavicular adenopathy.  Skin: No rashes, no cyanosis, no edema.  Musculoskeletal: No joint swelling, erythema or tenderness.  Neurologic: Cranial nerves 2-12 intact, normal strength and sensation.  Psychiatric: Alert, oriented to person, place and time, no obvious anxiety or depression.      Data   Recent Labs   Lab 08/18/19  0743   WBC 6.7   HGB 10.4*   MCV 91         POTASSIUM 4.2   CHLORIDE 105   CO2 30   BUN 17   CR 0.60   ANIONGAP 7   SHERI 8.5   GLC 84

## 2019-08-22 NOTE — PLAN OF CARE
Uneventful night. Pt slept most of shift. Continue to monitor and encourage fluids.  Plan:  Continue to monitor and follow POC.  Notify MD of changes.

## 2019-08-22 NOTE — PLAN OF CARE
RN assumed cares at 0700, Pt alert and oriented to self only, VS stable this AM.  Pt up ambulating in the room and to the bathroom independently.  Has had some frequency with urination this shift, not new.  Pt denies pain throughout the shift.  Has had some issue with sleep/wake cycle.  MD spoke with patient about going to bed earlier so that she does not sleep so much during the day.  RN tried to encourage time in the chair, however patient declined.  Pt appetite is hit or miss, mostly picks at food throughout the day.  Pt is awaiting placement at a long term memory care facility; per MD and GRAY she will likely be here a while as there is a certificate needed that may take up to six weeks.  No acute incidents this shift.  Continue to monitor and notify MD of any changes.

## 2019-08-22 NOTE — PROGRESS NOTES
CLINICAL NUTRITION SERVICES - REASSESSMENT NOTE     Nutrition Prescription    Malnutrition Status:    Non-severe malnutrition in the context of chronic condition     Recommendations already ordered by Registered Dietitian (RD):  Continue with Magic cup between meals and Boost glucose control with meals to continue to optimize nutrition intake        EVALUATION OF THE PROGRESS TOWARD GOALS   Diet: Regular + Magic cup between meals + Boost glucose control with meals    Intake: Tolerating PO. Intake/appetite is good. Consuming mostly 100% of meals.      NEW FINDINGS   PMH: Dementia, DM2, ( On metformin), Iron deficiency anemia,  malnutrition from dementia,     --Admitted on 7/31/2019 for FTT likely from dementia, Recent UTI, and found to have C.Diff  --Diarrhea resolved, was on Vanco  --Rhabdo: improved    ---Awaiting placement, Daughter unable to safely care for patient at home. Medically ready for discharge      --Extremities: no lower extremity edema  --Lab reviewed, meds noted, wt trend stable since admit      MALNUTRITION  % Intake: No decrease intake    % Weight Loss: Stable during this admit, previously sever wt loss   Subcutaneous Fat Loss: Facial region:  Mild   Muscle Loss: Temporal:  Mild   Fluid Accumulation/Edema: None noted  Malnutrition Diagnosis: Non-severe malnutrition in the context of chronic condition     Previous Goals   Patient to consume % of nutritionally adequate meal trays TID, or the equivalent with supplements/snacks.  Evaluation: Met    Previous Nutrition Diagnosis  Predicted inadequate nutrient intake related to recent improve in PO and appetite post C.diff infection treatments     Evaluation: Improving    CURRENT NUTRITION DIAGNOSIS  Predicted inadequate nutrient intake related to dementia, may hinder patients ability to take sufficient PO        INTERVENTIONS  Implementation  Medical food supplement therapy: Continue with oral supplements    Goals  Patient to consume % of  nutritionally adequate meal trays TID, or the equivalent with supplements/snacks.    Monitoring/Evaluation  Progress toward goals will be monitored and evaluated per protocol.    Samson Kaur RD/JIMMY  Pager [phone]

## 2019-08-22 NOTE — PROGRESS NOTES
Social Work Services Progress Note    Hospital Day: 22  Date of Initial Social Work Evaluation:  8/2/2019  Collaborated with:  Medical team, pt's daughter Yanely Jj nathan/ [facility]    Data:  Pt is a 74 year old female admitted to [facility] on 7/31/2019 w/ failure to thrive and dementia w/ behavioral disturbances.     TCU/LTC/MC recommended upon discharge as daughter is no longer able to safely care for the pt at home.    Intervention: GRAY participated in interdisciplinary rounds this morning to assess for needs and discharge planning. Team expressed pt is medically ready for discharge.    GRAY received a call from Yanely evans/ [facility]. Yanely expressed that because the pt is not open to EW, they cannot accept the pt as they are not equipped to screen pt's for EW. Yanely suggested that this writer discharge pt to a LTC/SNF setting where they could screen and complete the EW process. Yanely stated they will have rooms opening and that the daughter can call her to set up a tour for once EW is approved.     GRAY made a call to Анна and explained the EW situation. Анна expressed understanding. GRAY sent an E-mail to Анна with a list of 47 different LTC/SNF facilities that should be equipped to handle memory care patients. Анна will E-mail this writer back with the SNF's she would like this writer to refer to.     Assessment:  Pt not seen for this intervention; family understanding of GRAY intervention    Plan:    Anticipated Disposition:  Facility:  LTC/SNF/    Barriers to d/c plan:  Accepting facility    Follow Up:  GRAY will remain available and continue to follow, assess for needs, and assist in discharge planning.     HAN Spicer, [facility]   5B   Pager [phone]  Phone [phone]    Addendum 3p: GRAY made referrals to the following facilities per Анна's request:    1) [facility] (ph: [phone]; f: [phone])    2) [facility] (ph:  [phone]; f: [phone])    3) [facility] (ph: [phone])    4) [facility] (ph: [phone]; f: [phone])     5) [facility] (ph: [phone]; f: [phone])    6) [facility] (ph: [phone]; f: [phone])    7) [facility] (ph: [phone]; f: [phone])    8) [facility] (ph: [phone]; f: [phone])    9) [facility] (ph: [phone]; f: [phone])    10) [facility] (ph: [phone]; f: [phone])    11) [facility] (ph: [phone]; f: [phone])    12) [facility] (ph: [phone]; f: [phone])    HAN Spicer, SW   5B   Pager [phone]  Phone [phone]

## 2019-08-22 NOTE — PLAN OF CARE
Alert and disoriented to time and situation. VSS on RA. Pt up independently in room with door alarm active. Pt denies pain and nausea. Pt on regular diet and tolerating well. Pt voiding adequately with urgency and frequency. Awaiting MC placement. Continue to monitor and follow plan of care.

## 2019-08-23 ENCOUNTER — OFFICE VISIT (OUTPATIENT)
Dept: INTERPRETER SERVICES | Facility: CLINIC | Age: 74
End: 2019-08-23
Payer: MEDICARE

## 2019-08-23 PROCEDURE — 25000132 ZZH RX MED GY IP 250 OP 250 PS 637: Mod: GY | Performed by: PEDIATRICS

## 2019-08-23 PROCEDURE — T1013 SIGN LANG/ORAL INTERPRETER: HCPCS | Mod: U3

## 2019-08-23 PROCEDURE — 25000132 ZZH RX MED GY IP 250 OP 250 PS 637: Mod: GY | Performed by: INTERNAL MEDICINE

## 2019-08-23 PROCEDURE — 99238 HOSP IP/OBS DSCHRG MGMT 30/<: CPT | Performed by: INTERNAL MEDICINE

## 2019-08-23 PROCEDURE — 12000001 ZZH R&B MED SURG/OB UMMC

## 2019-08-23 PROCEDURE — 25000132 ZZH RX MED GY IP 250 OP 250 PS 637: Mod: GY | Performed by: PHYSICIAN ASSISTANT

## 2019-08-23 RX ADMIN — Medication 12.5 MG: at 21:19

## 2019-08-23 RX ADMIN — RISPERIDONE 1 MG: 0.5 TABLET ORAL at 10:01

## 2019-08-23 RX ADMIN — RISPERIDONE 4 MG: 2 TABLET ORAL at 21:19

## 2019-08-23 RX ADMIN — PAROXETINE HYDROCHLORIDE 30 MG: 30 TABLET, FILM COATED ORAL at 10:01

## 2019-08-23 RX ADMIN — MELATONIN 1000 UNITS: at 10:01

## 2019-08-23 ASSESSMENT — ACTIVITIES OF DAILY LIVING (ADL)
ADLS_ACUITY_SCORE: 17

## 2019-08-23 NOTE — PLAN OF CARE
Patient is alert but disoriented to time, place, and situation. The patient denies pain and nausea. The patient has been confused saying that she is starving and hasn't eaten anything all day so I gave her some crackers too snack on in between meals. The patient has been very anxious wanting to go outside and have her daughter take her home, so instead we called her daughter to talk for a bit. Otherwise the patient has been pretty quiet and vitally stable. Continue to monitor and notify MD of any changes.

## 2019-08-23 NOTE — PLAN OF CARE
Alert/oriented to self. Full code. From a behavioral perspective,  patient had a quiet night and slept most of the shift except to get up to use toilet independently in room. Lights on for safety and door alarm. Pt pleasant and cooperative with conversation. Patient asks for daughter but easily accepts explanations of daughter having to stay at home to care for young grandson and patient redirected to next activity to get into bed.  Patient cooperative with cares and will lay back down. No outward signs of impulsivity, anxiety, or outburst.  Frequent checks only to find patient sleeping and comfortable. Plan:  Hourly rounding complete, call light within reach.  Continue to monitor and follow POC.  SW working with family to pursue memory care once EW services reinstated to assist with facilitation of memory care placement since that gap exacerbated process.

## 2019-08-23 NOTE — PLAN OF CARE
RN assumed cares at 0700, Pt alert and oriented to self only; VS stable this AM with some HTN.  Pt up ambulating to the bathroom and in the henry with RN this afternoon.  Pt slept well overnight though still reports feeling tired.  Pt does not endorse pain this shift.  Pt appetite fair, picking at breakfast throughout  the day.  Pt is very concerned about her missing shoes; RN unable to contact daughter to ask if she took them home.  Chance that patient could discharge this afternoon, however, more likely she will discharge Monday to TCU.  No acute incidents this shift.  Continue to monitor and notify MD of any changes.

## 2019-08-23 NOTE — DISCHARGE SUMMARY
[facility], [place]  Hospitalist Discharge Summary       Date of Admission:  7/31/2019  Date of Discharge:  8/23/2019  Discharging Provider: Brennen Cox MD  Discharge Team: Hospitalist Service, Gold 8    Discharge Diagnoses   Resolved c diff  HTN  DM2  UTI  Advanced dementia    Follow-ups Needed After Discharge   Follow-up Appointments     Follow Up and recommended labs and tests      Follow up with primary care provider 1-2 weeks after discharge from tcu               Unresulted Labs Ordered in the Past 30 Days of this Admission     No orders found from 7/1/2019 to 8/1/2019.          Discharge Disposition   Discharged to nursing home  Condition at discharge: Stable    Hospital Course        74 year old female with a past medical history of dementia, paranoid schizophrenia, HTN, DM2, CARLOS admitted on 7/31/2019 for FTT, recent UTI, and found to have c. Diff now on vanco and improving, also with rhabdo now improved but with continued frequency of urination, with dementia and inability to safely care for her at home.  C. difficile improved by the time of discharge.  Rhabdomyolysis improved without evidence of pigment nephropathy based off Last BMP.  Patient was given appropriate dose of ceftriaxone for urinary tract infection.  Patient was denied placement to memory care facility secondary to not having EW. During Patient Hospital course patient is found to have significant sleep disturbances.  Patient would frequently stay up late into the night and would tired the next day.  Patient was placed on Seroquel for sleep with minimal effect.  Patient was adamant about not taking any further medication.         Consultations This Hospital Stay   NUTRITION SERVICES ADULT IP CONSULT  PHYSICAL THERAPY ADULT IP CONSULT  OCCUPATIONAL THERAPY ADULT IP CONSULT  SOCIAL WORK IP CONSULT  SPIRITUAL HEALTH SERVICES IP CONSULT  VASCULAR ACCESS CARE ADULT IP CONSULT  VASCULAR ACCESS CARE ADULT IP  CONSULT  PSYCHIATRY IP CONSULT  UROLOGY IP CONSULT  PSYCHIATRY IP CONSULT  PSYCHIATRY IP CONSULT  VASCULAR ACCESS CARE ADULT IP CONSULT  PHYSICAL THERAPY ADULT IP CONSULT  OCCUPATIONAL THERAPY ADULT IP CONSULT    Code Status   Full Code    Time Spent on this Encounter   I, Brennen Cox MD, personally saw the patient today and spent less than or equal to 30 minutes discharging this patient.       Brennen Cox MD  [facility], [place]  ______________________________________________________________________    Physical Exam   Vital Signs: Temp: 96.9  F (36.1  C) Temp src: Oral BP: 132/72 Pulse: 89 Heart Rate: 89 Resp: 16 SpO2: 97 % O2 Device: None (Room air)    Weight: 116 lbs 4.8 oz  Constitutional: Awake, alert, cooperative, no apparent distress.  Eyes: Conjunctiva and pupils examined and normal.  HEENT: Moist mucous membranes, normal dentition.  Respiratory: Clear to auscultation bilaterally, no crackles or wheezing.  Cardiovascular: Regular rate and rhythm, normal S1 and S2, and no murmur noted.  GI: Soft, non-distended, non-tender, normal bowel sounds.  Lymph/Hematologic: No anterior cervical or supraclavicular adenopathy.  Skin: No rashes, no cyanosis, no edema.  Musculoskeletal: No joint swelling, erythema or tenderness.  Neurologic: Cranial nerves 2-12 intact, normal strength and sensation.  Psychiatric: Alert, oriented to person, place and time, no obvious anxiety or depression.  Primary Care Physician   Kamini Michael    Discharge Orders      General info for SNF    Length of Stay Estimate: Short Term Care: Estimated # of Days <30  Condition at Discharge: Stable  Level of care:skilled   Rehabilitation Potential: Fair  Admission H&P remains valid and up-to-date: Yes  Recent Chemotherapy: N/A  Use Nursing Home Standing Orders: Yes     Mantoux instructions    Give two-step Mantoux (PPD) Per Facility Policy Yes     Reason for your hospital stay    Dear Angelica Harding  Jacob Lawson were hospitalized at [facility] with weakness and diarrhea and treated with oral antibiotics.  Over your hospitalization your diarrhea improved and today you are ready to be discharged.  If you continue physical therapy you should continue to improve but if you develop fever, shortness of breath, light headedness, chest pain or other medical please seek medical attention.    It was a pleasure meeting with you today. Thank you for allowing me and my team the privilege of caring for you today. You are the reason we are here, and I truly hope we provided you with the excellent service you deserve. Please let us know if there is anything else we can do for you so that we can be sure you are leaving completely satisfied with your care experience.    Your hospital unit at the time of discharge is 5B so if you have any questions please call the hospital at [phone] and ask to talk to a nurse on 5B.    Take care!  Shelton Robledo MD  Department of Medicine  [facility]     Activity - Up ad merritt     Glucose monitor nursing POCT    Before meals and at bedtime     Follow Up and recommended labs and tests    Follow up with primary care provider 1-2 weeks after discharge from tcu     Full Code     Physical Therapy Adult Consult    Evaluate and treat as clinically indicated.    Reason:  deconditioned     Occupational Therapy Adult Consult    Evaluate and treat as clinically indicated.    Reason:  deconditioned     Contact Isolation     Fall precautions    Until cleared by PT     Advance Diet as Tolerated    Follow this diet upon discharge: Orders Placed This Encounter      Snacks/Supplements Adult: Magic Cup; Between Meals      Room Service      Regular Diet Adult       Significant Results and Procedures   Results for orders placed or performed during the hospital encounter of 07/30/19   XR Knee Right 1/2 Views    Narrative    Exam: 2 views of the left knee dated  7/30/2019.    COMPARISON: 12/7/2018.    CLINICAL HISTORY: Pain with fall. Bruising.    FINDINGS: AP and lateral views of the right knee were obtained. The  bones are well aligned. No large right knee joint effusion.  Degenerative osteoarthrosis in the right knee with joint space  narrowing and osteophytic spurring, greatest in the patellofemoral  joint compartment, and to a slightly lesser degree in the medial  femorotibial joint compartment.      Impression    IMPRESSION: Osteoarthrosis in the right knee, otherwise no acute bone  abnormality noted.    EDENILSON JIMENES MD       Discharge Medications   Current Discharge Medication List      START taking these medications    Details   melatonin 3 MG tablet Take 1 tablet (3 mg) by mouth nightly as needed for sleep    Associated Diagnoses: Insomnia due to other mental disorder; Dementia with behavioral disturbance, unspecified dementia type      ondansetron (ZOFRAN-ODT) 4 MG ODT tab Take 1 tablet (4 mg) by mouth every 6 hours as needed for nausea or vomiting    Associated Diagnoses: Colitis due to Clostridium difficile      polyethylene glycol (MIRALAX/GLYCOLAX) packet Take 17 g by mouth daily as needed for constipation    Associated Diagnoses: Colitis due to Clostridium difficile         CONTINUE these medications which have CHANGED    Details   PARoxetine (PAXIL) 30 MG tablet Take 1 tablet (30 mg) by mouth daily    Associated Diagnoses: Moderate episode of recurrent major depressive disorder (H)         CONTINUE these medications which have NOT CHANGED    Details   acetaminophen (TYLENOL) 500 MG tablet Take 500-1,000 mg by mouth every 6 hours as needed for mild pain      CALCIUM CARBONATE 500 MG tablet TAKE 1 TABLET BY MOUTH TWICE A DAY  Refills: 5    Associated Diagnoses: Memory loss      Cholecalciferol (VITAMIN D3) 2000 units CAPS TAKE 2 CAPSULES BY MOUTH EVERY DAY  Refills: 3    Associated Diagnoses: Memory loss      ferrous sulfate (FEROSUL) 325 (65 Fe) MG tablet  TAKE 1 TABLET BY MOUTH TWO TIMES DAILY  Refills: 2    Associated Diagnoses: Memory loss      metFORMIN (GLUCOPHAGE) 500 MG tablet TAKE 1 TABLET BY MOUTH DAILY.  Refills: 3    Associated Diagnoses: Memory loss      multivitamin, therapeutic (THERA-VIT) TABS tablet Take 1 tablet by mouth daily      !! risperiDONE (RISPERDAL) 1 MG tablet TAKE ONE TABLET BY MOUTH EVERY MORNING  Refills: 5    Associated Diagnoses: Memory loss      !! risperiDONE (RISPERDAL) 4 MG tablet TAKE 1 TABLET BY MOUTH NIGHTLY AT BEDTIME  Refills: 5    Associated Diagnoses: Memory loss       !! - Potential duplicate medications found. Please discuss with provider.        Allergies   Allergies   Allergen Reactions     Penicillins Hives

## 2019-08-23 NOTE — PROGRESS NOTES
Social Work Services Progress Note    Hospital Day: 23  Date of Initial Social Work Evaluation: 8/2/2019  Collaborated with:  Medical team, TCU's, pt's daughter    Data: Pt is a 74 year old female admitted to [facility] on 7/31/2019 w/ failure to thrive and dementia w/ behavioral disturbances.     TCU/LTC/MC recommended upon discharge as daughter is no longer able to safely care for the pt at home.    Intervention: SW participated in interdisciplinary rounds this morning to assess for needs and discharge planning. Team expressed pt is medically ready for discharge.     GRAY has heard from the following facilities that they CAN accept pt for placement:    1) [facility] (ph: [phone]; f: [phone]) - can accept pt in their LTC w/ a wander guard. Maco would like to come assess pt this afternoon.    2) [facility] (ph: [phone]; f: [phone]) - spoke w/ Katelin and pt would be accepted in their MC unit. This would be a shared room.     3) [facility] (ph: [phone]; f: [phone]) - Spoke w/ Cory and pt would be accepted to their facility. Could accommodate for a single room. Would put pt on a wander guard.     GRYA called Анна and provided her w/ an update of the three accepting facilities. GRAY requested that Анна look into which facility she prefers and to get back to this writer by 11:30am. Анна stated she would.    Assessment:  Pt not seen for this intervention; family understanding of GRAY intervention    Plan:    Anticipated Disposition:  Facility:  LTC/    Barriers to d/c plan:  None    Follow Up:  GRAY will remain available and continue to follow, assess for needs, and assist in discharge planning.     HAN Spicer, [facility]   5B   Pager [phone]  Phone [phone]    Addendum 12p: GRAY called Анна to get her input on which facility she would like. She requested that this writer reach out to [place] again to check on current bed  availability as well as the other facilities referred to yesterday that we haven't heard from yet. Анна stated that if she has to make a decision on the three accepting placements, [facility] would be her number one pick. This writer made the following phone calls:    1) [facility] (ph: [phone]) - VM was left for [place] in admissions    2) [facility] (ph: [phone]) - No LTC beds available at this time     3) [facility] (ph: [phone] f: [phone]) - requested that SW send updated notes, faxed updated notes for review. Waiting call back.     4) [facility] (ph: [phone]) - VM left for admissions    5) [facility] (ph: [phone]) - VM left for admissions    6) [facility] (ph: [phone]) - VM left for admissions    7) [facility] (ph: [phone]) - VM left for admissions    HAN Spicer, JEANETTE   5B   Pager [phone]  Phone [phone]    Addendum 1:50p: [facility] can likely take the pt. Waiting to confirm this w/ admissions as they were wondering if she still had C-diff. Maco w/ the [facility] is coming to assess the pt this afternoon for placement. The pt will likely discharge today to either [facility] or [facility].     HAN Spicer, JEANETTE   5B   Pager [phone]  Phone [phone]    Addendum 2:25p: Maco w/ [facility] arrived to assess pt for placement. Maco stated the pt would be appropriate for [facility] but that he wouldn't want to admit the pt until Monday when all staff is there.     SW is currently still waiting to hear back from [facility].     HAN Spicer, JEANETTE   5B   Pager [phone]  Phone [phone]    Addendum 3:45p: [facility] can accept the pt in their MC unit tomorrow (8/24/2019). HE w/c transport has been arranged for 1:30pm.    Weekend SW to provide updates to [facility] weekend staff (ph:  [phone]; f: [phone]) and send orders tomorrow.    Daughter updated and in agreement w/ placement. MD paged and asked to put in orders for tomorrow.     HAN Spicer, [address]   Pager [phone]  Phone [phone]

## 2019-08-24 ENCOUNTER — OFFICE VISIT (OUTPATIENT)
Dept: INTERPRETER SERVICES | Facility: CLINIC | Age: 74
End: 2019-08-24
Payer: MEDICARE

## 2019-08-24 VITALS
OXYGEN SATURATION: 96 % | TEMPERATURE: 96.6 F | SYSTOLIC BLOOD PRESSURE: 149 MMHG | WEIGHT: 118.2 LBS | DIASTOLIC BLOOD PRESSURE: 66 MMHG | HEART RATE: 89 BPM | RESPIRATION RATE: 16 BRPM | BODY MASS INDEX: 21.62 KG/M2

## 2019-08-24 PROCEDURE — 25000132 ZZH RX MED GY IP 250 OP 250 PS 637: Mod: GY | Performed by: INTERNAL MEDICINE

## 2019-08-24 PROCEDURE — 25000132 ZZH RX MED GY IP 250 OP 250 PS 637: Mod: GY | Performed by: PHYSICIAN ASSISTANT

## 2019-08-24 PROCEDURE — 25000132 ZZH RX MED GY IP 250 OP 250 PS 637: Mod: GY | Performed by: PEDIATRICS

## 2019-08-24 PROCEDURE — T1013 SIGN LANG/ORAL INTERPRETER: HCPCS | Mod: U3

## 2019-08-24 RX ADMIN — RISPERIDONE 1 MG: 0.5 TABLET ORAL at 09:21

## 2019-08-24 RX ADMIN — MELATONIN 1000 UNITS: at 09:20

## 2019-08-24 RX ADMIN — PAROXETINE HYDROCHLORIDE 30 MG: 30 TABLET, FILM COATED ORAL at 09:21

## 2019-08-24 ASSESSMENT — ACTIVITIES OF DAILY LIVING (ADL)
ADLS_ACUITY_SCORE: 17

## 2019-08-24 NOTE — PLAN OF CARE
[id number]: RN assumed cares at 0700, Pt alert and oriented to self only. Pt up ambulating in room independently. Pt does not endorse pain this shift. Pt discharged to [facility] this afternoon, as TCU/LTC/MC was recommended upon discharge as daughter is no longer able to safely care for the pt at home. Writer called [facility] and gave nurse to nurse report. NST printed packet and provided to transport- Pt left floor via WC transport at 1330.

## 2019-08-24 NOTE — PLAN OF CARE
Assumed cares from [id number]. Pt oriented to self only. Door alarm in place. Pt very focused on not being able to find her shoes. Daughter Yasmine called and reported that she has the patient's shoes. Pt frequently reminded about where shoes are located. Plan for discharge tomorrow at 1330. Will continue to monitor.

## 2019-08-24 NOTE — PLAN OF CARE
Patient will discharge today at 1330 via wheelchair van. Up to the bathroom and also noted to be laying in bed sleeping during the night. Vital signs stable. Continue with current plan of nursing care, and update MD with concerns as needed.

## 2019-08-24 NOTE — PROGRESS NOTES
Social Work Services Discharge Note      Patient Name:  Angelica James     Anticipated Discharge Date:  8/24/19    Discharge Disposition:   TCU:  [address] [place] [address]  Ph [phone]  Fax [phone]    Following MD:  Facility assignment     Pre-Admission Screening (PAS) online form has been completed.  The Level of Care (LOC) is:  Determined  Confirmation Code is:  [id number]  Patient/caregiver informed of referral to [facility] Line for Pre-Admission Screening for skilled nursing facility (SNF) placement and to expect a phone call post discharge from SNF.     Additional Services/Equipment Arranged:  Weekday SW arranged [facility] (ph [phone]) wc van for 1:30pm.      Patient / Family response to discharge plan:  Pt's daughter in agreement with plan.      Persons notified of above discharge plan:  Weekday SW notified pt's daughter Анна and primary team Gold 8; confirmed plan with Diana at [facility] today (ph [phone]).     Staff Discharge Instructions:  RN to call report to [phone].  SW to fax discharge orders and signed hard scripts for any controlled substances.  Please print a packet and send with patient.     HAN Olguin, SW  5th Floor   Pager [phone]  Phone [phone]

## 2019-08-24 NOTE — PLAN OF CARE
A&O to self and place, very forgetful.  Denies pain.  Up ad merritt in room.  Voiding frequently.  B. Daughter visited.  Door alarm active for elopement risk.  Plan to discharge tomorrow, ride at 1330.

## 2019-08-24 NOTE — PROGRESS NOTES
[facility] Progress Note - Hospitalist Service, Gold 8       Date of Admission:  7/31/2019  Assessment & Plan     74 year old female with a past medical history of dementia, paranoid schizophrenia, HTN, DM2, CARLOS admitted on 7/31/2019 for FTT, recent UTI, and found to have c. Diff now on vanco and improving, also with rhabdo now improved but with continued frequency of urination, with dementia and inability to safely care for her at home.  Currently pending placement.  Patient was denied placement secondary to not having EW.       CHANGES TODAY  Placement obtained    #. Rhabdomyolysis  Renal function stable on 8/18/19    #. Falls  resolved    #. Failure to Thrive  Toelrating diet  #. Paranoid schizophrenia  Continue anti psych meds on discharge  #. Dementia- attempted to get the patient in memory care center  #. Anxiety  D/jarrod sitter 8/14 am  -- Continue with Risperdal 1 mg qam (home dose 0.5 mg qAM and qNoon)  -- continue bedtime risperdole to 4 mg  -- paxil 30 mg for anxiety  -- tremor unlikely to change from continuing or decreasing her current medications per Psych, therefore best to treat underlying psych issues  -- dispo: pending placement    #insomnia  Seroquel and melatonin  Would recommend transitioning to Remeron upon discharge    #. C Diff Diarrhea:resolved  -- s/p PO Vancomycin for 14 days (07/31-8/13)    #. Multiorganism UTI- no growth   #. Dysuria improved  #. Bladder spasms/urinary frequency  #. Urinary retention  Status post appropriate course duration.  Patient has still continued to have some frequency however this appears to be her norm     #. Abnormal transaminases: Admitted with mildly elevated enzymes. Improved     #. DM2:   Stable  Metformin 500mg upon discharge    #. Severe malnutrition: from dementia, providing snacks and supplements.  #. Hypokalemia: Normalized after replacement, discontinued telemetry   #. HTN: Not on meds PTA. BP stable on  admission.   #. IronDA: PTA maintained on iron BID. Hgb stabel at 10.4 w/ MCV of 90. Hold iron for now.     Diet: Regular Diet Adult  Snacks/Supplements Adult: Magic Cup; Between Meals    DVT Prophylaxis: ambulation  Darby Catheter: not present  Code Status: Full Code       Diet: Regular Diet Adult  Snacks/Supplements Adult: Magic Cup; Between Meals  Room Service  Advance Diet as Tolerated  Snacks/Supplements Adult: Boost Glucose Control; With Meals    DVT Prophylaxis: Ambulate every shift  Darby Catheter: not present  Code Status: Full Code      Disposition Plan   Expected discharge: Today, recommended to transitional care unit once adequate pain management/ tolerating PO medications.  Entered: Brennen Cox MD 08/24/2019, 12:48 PM       The patient's care was discussed with the Patient.    Brennen Cox MD  Hospitalist Service, [address], [place]  Pager: [phone]  Please see sticky note for cross cover information  ______________________________________________________________________    Interval History     Patient's history was obtained through .  No nausea vomiting fevers chills chest pain shortness of breath or palpitations  Poor sleep   No bowel or urinary change from previous    Four-point review of systems has been reviewed and is otherwise negative than as mentioned above    Data reviewed today: I reviewed all medications, new labs and imaging results over the last 24 hours.     Physical Exam   Vital Signs: Temp: 96.6  F (35.9  C) Temp src: Oral BP: (!) 149/66   Heart Rate: 83 Resp: 16 SpO2: 96 % O2 Device: None (Room air)    Weight: 118 lbs 3.2 oz  Constitutional: Awake, alert, cooperative, no apparent distress.  Eyes: Conjunctiva and pupils examined and normal.  HEENT: Moist mucous membranes, normal dentition.  Respiratory: Clear to auscultation bilaterally, no crackles or wheezing.  Cardiovascular: Regular rate and rhythm, normal S1 and S2, and no murmur  noted.  GI: Soft, non-distended, non-tender, normal bowel sounds.  Lymph/Hematologic: No anterior cervical or supraclavicular adenopathy.  Skin: No rashes, no cyanosis, no edema.  Musculoskeletal: No joint swelling, erythema or tenderness.  Neurologic: Cranial nerves 2-12 intact, normal strength and sensation.  Psychiatric: Alert, oriented to person, place and time, no obvious anxiety or depression.      Data   Recent Labs   Lab 08/18/19  0743   WBC 6.7   HGB 10.4*   MCV 91         POTASSIUM 4.2   CHLORIDE 105   CO2 30   BUN 17   CR 0.60   ANIONGAP 7   SHERI 8.5   GLC 84

## 2021-07-31 ENCOUNTER — APPOINTMENT (OUTPATIENT)
Dept: CT IMAGING | Facility: CLINIC | Age: 76
DRG: 184 | End: 2021-07-31
Attending: NURSE PRACTITIONER
Payer: COMMERCIAL

## 2021-07-31 ENCOUNTER — HOSPITAL ENCOUNTER (INPATIENT)
Facility: CLINIC | Age: 76
LOS: 5 days | Discharge: NURSING, REHAB OR LONG-TERM CARE | DRG: 184 | End: 2021-08-05
Attending: NURSE PRACTITIONER | Admitting: HOSPITALIST
Payer: COMMERCIAL

## 2021-07-31 ENCOUNTER — APPOINTMENT (OUTPATIENT)
Dept: GENERAL RADIOLOGY | Facility: CLINIC | Age: 76
DRG: 184 | End: 2021-07-31
Attending: HOSPITALIST
Payer: COMMERCIAL

## 2021-07-31 DIAGNOSIS — E87.1 HYPONATREMIA: ICD-10-CM

## 2021-07-31 DIAGNOSIS — S22.49XA CLOSED FRACTURE OF MULTIPLE RIBS, UNSPECIFIED LATERALITY, INITIAL ENCOUNTER: ICD-10-CM

## 2021-07-31 DIAGNOSIS — I48.0 PAROXYSMAL ATRIAL FIBRILLATION (H): ICD-10-CM

## 2021-07-31 DIAGNOSIS — T79.6XXA TRAUMATIC RHABDOMYOLYSIS, INITIAL ENCOUNTER (H): ICD-10-CM

## 2021-07-31 DIAGNOSIS — M60.9 MYOSITIS, UNSPECIFIED MYOSITIS TYPE, UNSPECIFIED SITE: Primary | ICD-10-CM

## 2021-07-31 DIAGNOSIS — N39.0 URINARY TRACT INFECTION: ICD-10-CM

## 2021-07-31 LAB
ALBUMIN UR-MCNC: 10 MG/DL
ANION GAP SERPL CALCULATED.3IONS-SCNC: 5 MMOL/L (ref 3–14)
APPEARANCE UR: CLEAR
ATRIAL RATE - MUSE: 83 BPM
ATRIAL RATE - MUSE: 85 BPM
BACTERIA #/AREA URNS HPF: ABNORMAL /HPF
BASOPHILS # BLD AUTO: 0.1 10E3/UL (ref 0–0.2)
BASOPHILS NFR BLD AUTO: 0 %
BILIRUB UR QL STRIP: NEGATIVE
BUN SERPL-MCNC: 18 MG/DL (ref 7–30)
CALCIUM SERPL-MCNC: 8.6 MG/DL (ref 8.5–10.1)
CHLORIDE BLD-SCNC: 93 MMOL/L (ref 94–109)
CK SERPL-CCNC: 1266 U/L (ref 30–225)
CO2 SERPL-SCNC: 28 MMOL/L (ref 20–32)
COLOR UR AUTO: ABNORMAL
CREAT SERPL-MCNC: 0.57 MG/DL (ref 0.52–1.04)
CREAT SERPL-MCNC: 0.62 MG/DL (ref 0.52–1.04)
CRP SERPL-MCNC: 18.4 MG/L (ref 0–8)
DIASTOLIC BLOOD PRESSURE - MUSE: NORMAL MMHG
DIASTOLIC BLOOD PRESSURE - MUSE: NORMAL MMHG
EOSINOPHIL # BLD AUTO: 0 10E3/UL (ref 0–0.7)
EOSINOPHIL NFR BLD AUTO: 0 %
ERYTHROCYTE [DISTWIDTH] IN BLOOD BY AUTOMATED COUNT: 12.9 % (ref 10–15)
FERRITIN SERPL-MCNC: 26 NG/ML (ref 8–252)
FOLATE SERPL-MCNC: 36.5 NG/ML
GFR SERPL CREATININE-BSD FRML MDRD: 87 ML/MIN/1.73M2
GFR SERPL CREATININE-BSD FRML MDRD: 89 ML/MIN/1.73M2
GLUCOSE BLD-MCNC: 112 MG/DL (ref 70–99)
GLUCOSE BLDC GLUCOMTR-MCNC: 137 MG/DL (ref 70–99)
GLUCOSE BLDC GLUCOMTR-MCNC: 96 MG/DL (ref 70–99)
GLUCOSE UR STRIP-MCNC: NEGATIVE MG/DL
HBA1C MFR BLD: 5.7 % (ref 0–5.6)
HCT VFR BLD AUTO: 26.5 % (ref 35–47)
HGB BLD-MCNC: 9.1 G/DL (ref 11.7–15.7)
HGB UR QL STRIP: ABNORMAL
IMM GRANULOCYTES # BLD: 0.1 10E3/UL
IMM GRANULOCYTES NFR BLD: 1 %
INTERPRETATION ECG - MUSE: NORMAL
INTERPRETATION ECG - MUSE: NORMAL
IRON SATN MFR SERPL: 16 % (ref 15–46)
IRON SERPL-MCNC: 41 UG/DL (ref 35–180)
KETONES UR STRIP-MCNC: NEGATIVE MG/DL
LEUKOCYTE ESTERASE UR QL STRIP: ABNORMAL
LYMPHOCYTES # BLD AUTO: 1.1 10E3/UL (ref 0.8–5.3)
LYMPHOCYTES NFR BLD AUTO: 7 %
MCH RBC QN AUTO: 30 PG (ref 26.5–33)
MCHC RBC AUTO-ENTMCNC: 34.3 G/DL (ref 31.5–36.5)
MCV RBC AUTO: 88 FL (ref 78–100)
MONOCYTES # BLD AUTO: 1.5 10E3/UL (ref 0–1.3)
MONOCYTES NFR BLD AUTO: 9 %
NEUTROPHILS # BLD AUTO: 13.5 10E3/UL (ref 1.6–8.3)
NEUTROPHILS NFR BLD AUTO: 83 %
NITRATE UR QL: NEGATIVE
NRBC # BLD AUTO: 0 10E3/UL
NRBC BLD AUTO-RTO: 0 /100
P AXIS - MUSE: 61 DEGREES
P AXIS - MUSE: 61 DEGREES
PH UR STRIP: 7 [PH] (ref 5–7)
PLATELET # BLD AUTO: 245 10E3/UL (ref 150–450)
POTASSIUM BLD-SCNC: 3.7 MMOL/L (ref 3.4–5.3)
PR INTERVAL - MUSE: 136 MS
PR INTERVAL - MUSE: 148 MS
PROCALCITONIN SERPL-MCNC: 0.12 NG/ML
QRS DURATION - MUSE: 76 MS
QRS DURATION - MUSE: 80 MS
QT - MUSE: 376 MS
QT - MUSE: 380 MS
QTC - MUSE: 447 MS
QTC - MUSE: 467 MS
R AXIS - MUSE: 56 DEGREES
R AXIS - MUSE: 66 DEGREES
RBC # BLD AUTO: 3.03 10E6/UL (ref 3.8–5.2)
RBC URINE: 10 /HPF
RETICS # AUTO: 0.04 10E6/UL (ref 0.03–0.1)
RETICS/RBC NFR AUTO: 1.2 % (ref 0.5–2)
SARS-COV-2 RNA RESP QL NAA+PROBE: NEGATIVE
SODIUM SERPL-SCNC: 126 MMOL/L (ref 133–144)
SP GR UR STRIP: 1.01 (ref 1–1.03)
SQUAMOUS EPITHELIAL: 1 /HPF
SYSTOLIC BLOOD PRESSURE - MUSE: NORMAL MMHG
SYSTOLIC BLOOD PRESSURE - MUSE: NORMAL MMHG
T AXIS - MUSE: 52 DEGREES
T AXIS - MUSE: 62 DEGREES
TIBC SERPL-MCNC: 252 UG/DL (ref 240–430)
TROPONIN I SERPL-MCNC: 0.04 UG/L (ref 0–0.04)
TSH SERPL DL<=0.005 MIU/L-ACNC: 1.75 MU/L (ref 0.4–4)
UROBILINOGEN UR STRIP-MCNC: NORMAL MG/DL
VENTRICULAR RATE- MUSE: 85 BPM
VENTRICULAR RATE- MUSE: 91 BPM
VIT B12 SERPL-MCNC: 1291 PG/ML (ref 193–986)
WBC # BLD AUTO: 16.1 10E3/UL (ref 4–11)
WBC URINE: 26 /HPF

## 2021-07-31 PROCEDURE — 87086 URINE CULTURE/COLONY COUNT: CPT | Performed by: NURSE PRACTITIONER

## 2021-07-31 PROCEDURE — 99285 EMERGENCY DEPT VISIT HI MDM: CPT | Mod: 25

## 2021-07-31 PROCEDURE — 250N000011 HC RX IP 250 OP 636: Performed by: HOSPITALIST

## 2021-07-31 PROCEDURE — 36415 COLL VENOUS BLD VENIPUNCTURE: CPT | Performed by: HOSPITALIST

## 2021-07-31 PROCEDURE — 80048 BASIC METABOLIC PNL TOTAL CA: CPT | Performed by: NURSE PRACTITIONER

## 2021-07-31 PROCEDURE — 96365 THER/PROPH/DIAG IV INF INIT: CPT

## 2021-07-31 PROCEDURE — 258N000003 HC RX IP 258 OP 636: Performed by: NURSE PRACTITIONER

## 2021-07-31 PROCEDURE — 86140 C-REACTIVE PROTEIN: CPT | Performed by: HOSPITALIST

## 2021-07-31 PROCEDURE — 82565 ASSAY OF CREATININE: CPT | Performed by: HOSPITALIST

## 2021-07-31 PROCEDURE — 36415 COLL VENOUS BLD VENIPUNCTURE: CPT | Performed by: NURSE PRACTITIONER

## 2021-07-31 PROCEDURE — 85045 AUTOMATED RETICULOCYTE COUNT: CPT | Performed by: HOSPITALIST

## 2021-07-31 PROCEDURE — 81001 URINALYSIS AUTO W/SCOPE: CPT | Performed by: NURSE PRACTITIONER

## 2021-07-31 PROCEDURE — 93005 ELECTROCARDIOGRAM TRACING: CPT

## 2021-07-31 PROCEDURE — 70450 CT HEAD/BRAIN W/O DYE: CPT

## 2021-07-31 PROCEDURE — 84484 ASSAY OF TROPONIN QUANT: CPT | Performed by: NURSE PRACTITIONER

## 2021-07-31 PROCEDURE — 82607 VITAMIN B-12: CPT | Performed by: HOSPITALIST

## 2021-07-31 PROCEDURE — 120N000001 HC R&B MED SURG/OB

## 2021-07-31 PROCEDURE — 258N000003 HC RX IP 258 OP 636: Performed by: HOSPITALIST

## 2021-07-31 PROCEDURE — 250N000011 HC RX IP 250 OP 636: Performed by: NURSE PRACTITIONER

## 2021-07-31 PROCEDURE — 250N000013 HC RX MED GY IP 250 OP 250 PS 637: Performed by: HOSPITALIST

## 2021-07-31 PROCEDURE — 99223 1ST HOSP IP/OBS HIGH 75: CPT | Mod: AI | Performed by: HOSPITALIST

## 2021-07-31 PROCEDURE — 96361 HYDRATE IV INFUSION ADD-ON: CPT

## 2021-07-31 PROCEDURE — 93005 ELECTROCARDIOGRAM TRACING: CPT | Mod: 76

## 2021-07-31 PROCEDURE — 87635 SARS-COV-2 COVID-19 AMP PRB: CPT | Performed by: NURSE PRACTITIONER

## 2021-07-31 PROCEDURE — 83550 IRON BINDING TEST: CPT | Performed by: HOSPITALIST

## 2021-07-31 PROCEDURE — 84145 PROCALCITONIN (PCT): CPT | Performed by: HOSPITALIST

## 2021-07-31 PROCEDURE — 82746 ASSAY OF FOLIC ACID SERUM: CPT | Performed by: HOSPITALIST

## 2021-07-31 PROCEDURE — 73030 X-RAY EXAM OF SHOULDER: CPT | Mod: RT

## 2021-07-31 PROCEDURE — 85049 AUTOMATED PLATELET COUNT: CPT | Performed by: NURSE PRACTITIONER

## 2021-07-31 PROCEDURE — 84443 ASSAY THYROID STIM HORMONE: CPT | Performed by: HOSPITALIST

## 2021-07-31 PROCEDURE — 83036 HEMOGLOBIN GLYCOSYLATED A1C: CPT | Performed by: HOSPITALIST

## 2021-07-31 PROCEDURE — C9803 HOPD COVID-19 SPEC COLLECT: HCPCS

## 2021-07-31 PROCEDURE — 82550 ASSAY OF CK (CPK): CPT | Performed by: NURSE PRACTITIONER

## 2021-07-31 PROCEDURE — 82728 ASSAY OF FERRITIN: CPT | Performed by: HOSPITALIST

## 2021-07-31 RX ORDER — RISPERIDONE 1 MG/1
1 TABLET ORAL DAILY
COMMUNITY

## 2021-07-31 RX ORDER — CALCIUM CARBONATE 500(1250)
1 TABLET ORAL 2 TIMES DAILY
COMMUNITY

## 2021-07-31 RX ORDER — FERROUS SULFATE 325(65) MG
325 TABLET ORAL 2 TIMES DAILY
COMMUNITY

## 2021-07-31 RX ORDER — AMOXICILLIN 250 MG
2 CAPSULE ORAL 2 TIMES DAILY PRN
Status: DISCONTINUED | OUTPATIENT
Start: 2021-07-31 | End: 2021-08-05 | Stop reason: HOSPADM

## 2021-07-31 RX ORDER — SODIUM CHLORIDE 9 MG/ML
INJECTION, SOLUTION INTRAVENOUS CONTINUOUS
Status: DISCONTINUED | OUTPATIENT
Start: 2021-07-31 | End: 2021-08-05

## 2021-07-31 RX ORDER — CEFTRIAXONE 1 G/1
1 INJECTION, POWDER, FOR SOLUTION INTRAMUSCULAR; INTRAVENOUS ONCE
Status: COMPLETED | OUTPATIENT
Start: 2021-07-31 | End: 2021-07-31

## 2021-07-31 RX ORDER — RISPERIDONE 2 MG/1
4 TABLET ORAL EVERY EVENING
Status: DISCONTINUED | OUTPATIENT
Start: 2021-07-31 | End: 2021-08-05 | Stop reason: HOSPADM

## 2021-07-31 RX ORDER — ACETAMINOPHEN 325 MG/1
650 TABLET ORAL 3 TIMES DAILY
Status: DISCONTINUED | OUTPATIENT
Start: 2021-07-31 | End: 2021-08-01 | Stop reason: DRUGHIGH

## 2021-07-31 RX ORDER — RISPERIDONE 4 MG/1
4 TABLET ORAL EVERY EVENING
COMMUNITY

## 2021-07-31 RX ORDER — POLYETHYLENE GLYCOL 3350 17 G/17G
1 POWDER, FOR SOLUTION ORAL DAILY
COMMUNITY

## 2021-07-31 RX ORDER — MULTIVITAMIN,THERAPEUTIC
1 TABLET ORAL DAILY
Status: DISCONTINUED | OUTPATIENT
Start: 2021-08-01 | End: 2021-08-05 | Stop reason: HOSPADM

## 2021-07-31 RX ORDER — QUETIAPINE FUMARATE 25 MG/1
25 TABLET, FILM COATED ORAL 2 TIMES DAILY
COMMUNITY

## 2021-07-31 RX ORDER — ACETAMINOPHEN 325 MG/1
650 TABLET ORAL 3 TIMES DAILY
COMMUNITY
End: 2022-01-28

## 2021-07-31 RX ORDER — ONDANSETRON 4 MG/1
4 TABLET, ORALLY DISINTEGRATING ORAL EVERY 6 HOURS PRN
Status: DISCONTINUED | OUTPATIENT
Start: 2021-07-31 | End: 2021-08-05 | Stop reason: HOSPADM

## 2021-07-31 RX ORDER — PAROXETINE 20 MG/1
20 TABLET, FILM COATED ORAL EVERY MORNING
Status: DISCONTINUED | OUTPATIENT
Start: 2021-08-01 | End: 2021-08-05 | Stop reason: HOSPADM

## 2021-07-31 RX ORDER — QUETIAPINE FUMARATE 25 MG/1
25 TABLET, FILM COATED ORAL 2 TIMES DAILY
Status: DISCONTINUED | OUTPATIENT
Start: 2021-07-31 | End: 2021-08-05 | Stop reason: HOSPADM

## 2021-07-31 RX ORDER — LIDOCAINE 40 MG/G
CREAM TOPICAL
Status: DISCONTINUED | OUTPATIENT
Start: 2021-07-31 | End: 2021-08-05 | Stop reason: HOSPADM

## 2021-07-31 RX ORDER — CEFTRIAXONE 1 G/1
1 INJECTION, POWDER, FOR SOLUTION INTRAMUSCULAR; INTRAVENOUS EVERY 24 HOURS
Status: DISCONTINUED | OUTPATIENT
Start: 2021-08-01 | End: 2021-08-05

## 2021-07-31 RX ORDER — LANOLIN ALCOHOL/MO/W.PET/CERES
1 CREAM (GRAM) TOPICAL AT BEDTIME
COMMUNITY

## 2021-07-31 RX ORDER — PAROXETINE 20 MG/1
20 TABLET, FILM COATED ORAL EVERY MORNING
COMMUNITY

## 2021-07-31 RX ORDER — FERROUS SULFATE 325(65) MG
325 TABLET ORAL 2 TIMES DAILY
Status: DISCONTINUED | OUTPATIENT
Start: 2021-07-31 | End: 2021-08-05 | Stop reason: HOSPADM

## 2021-07-31 RX ORDER — DEXTROSE MONOHYDRATE 25 G/50ML
25-50 INJECTION, SOLUTION INTRAVENOUS
Status: DISCONTINUED | OUTPATIENT
Start: 2021-07-31 | End: 2021-08-05 | Stop reason: HOSPADM

## 2021-07-31 RX ORDER — SODIUM CHLORIDE 9 MG/ML
INJECTION, SOLUTION INTRAVENOUS ONCE
Status: COMPLETED | OUTPATIENT
Start: 2021-07-31 | End: 2021-07-31

## 2021-07-31 RX ORDER — AMOXICILLIN 250 MG
1 CAPSULE ORAL 2 TIMES DAILY PRN
Status: DISCONTINUED | OUTPATIENT
Start: 2021-07-31 | End: 2021-08-05 | Stop reason: HOSPADM

## 2021-07-31 RX ORDER — CALCIUM CARBONATE 500(1250)
1 TABLET ORAL 2 TIMES DAILY WITH MEALS
Status: DISCONTINUED | OUTPATIENT
Start: 2021-07-31 | End: 2021-08-05 | Stop reason: HOSPADM

## 2021-07-31 RX ORDER — ONDANSETRON 2 MG/ML
4 INJECTION INTRAMUSCULAR; INTRAVENOUS EVERY 6 HOURS PRN
Status: DISCONTINUED | OUTPATIENT
Start: 2021-07-31 | End: 2021-08-05 | Stop reason: HOSPADM

## 2021-07-31 RX ORDER — LACTOSE-REDUCED FOOD
1 LIQUID (ML) ORAL DAILY
COMMUNITY

## 2021-07-31 RX ORDER — NICOTINE POLACRILEX 4 MG
15-30 LOZENGE BUCCAL
Status: DISCONTINUED | OUTPATIENT
Start: 2021-07-31 | End: 2021-08-05 | Stop reason: HOSPADM

## 2021-07-31 RX ORDER — RISPERIDONE 1 MG/1
1 TABLET ORAL DAILY
Status: DISCONTINUED | OUTPATIENT
Start: 2021-08-01 | End: 2021-08-05 | Stop reason: HOSPADM

## 2021-07-31 RX ORDER — QUETIAPINE FUMARATE 25 MG/1
25 TABLET, FILM COATED ORAL 2 TIMES DAILY PRN
COMMUNITY

## 2021-07-31 RX ORDER — POLYETHYLENE GLYCOL 3350 17 G/17G
17 POWDER, FOR SOLUTION ORAL DAILY
Status: DISCONTINUED | OUTPATIENT
Start: 2021-08-01 | End: 2021-08-05 | Stop reason: HOSPADM

## 2021-07-31 RX ORDER — QUETIAPINE FUMARATE 25 MG/1
25 TABLET, FILM COATED ORAL 2 TIMES DAILY PRN
Status: DISCONTINUED | OUTPATIENT
Start: 2021-07-31 | End: 2021-08-05 | Stop reason: HOSPADM

## 2021-07-31 RX ADMIN — ENOXAPARIN SODIUM 40 MG: 40 INJECTION SUBCUTANEOUS at 20:22

## 2021-07-31 RX ADMIN — FERROUS SULFATE TAB 325 MG (65 MG ELEMENTAL FE) 325 MG: 325 (65 FE) TAB at 20:22

## 2021-07-31 RX ADMIN — SODIUM CHLORIDE 1000 ML: 9 INJECTION, SOLUTION INTRAVENOUS at 12:30

## 2021-07-31 RX ADMIN — SODIUM CHLORIDE: 9 INJECTION, SOLUTION INTRAVENOUS at 17:44

## 2021-07-31 RX ADMIN — CEFTRIAXONE SODIUM 1 G: 1 INJECTION, POWDER, FOR SOLUTION INTRAMUSCULAR; INTRAVENOUS at 14:18

## 2021-07-31 RX ADMIN — QUETIAPINE FUMARATE 25 MG: 25 TABLET ORAL at 18:29

## 2021-07-31 RX ADMIN — SODIUM CHLORIDE: 9 INJECTION, SOLUTION INTRAVENOUS at 13:33

## 2021-07-31 RX ADMIN — CALCIUM 500 MG: 500 TABLET ORAL at 18:29

## 2021-07-31 RX ADMIN — RISPERIDONE 4 MG: 2 TABLET ORAL at 20:21

## 2021-07-31 RX ADMIN — ACETAMINOPHEN 650 MG: 325 TABLET, FILM COATED ORAL at 19:01

## 2021-07-31 ASSESSMENT — ENCOUNTER SYMPTOMS
ARTHRALGIAS: 0
AGITATION: 1
CHILLS: 0
SHORTNESS OF BREATH: 0
HEADACHES: 0
BACK PAIN: 0
CHEST TIGHTNESS: 0
ABDOMINAL PAIN: 0
WOUND: 1
NECK PAIN: 0
COUGH: 0
DIZZINESS: 0
LIGHT-HEADEDNESS: 0
MYALGIAS: 0

## 2021-07-31 ASSESSMENT — ACTIVITIES OF DAILY LIVING (ADL): ADLS_ACUITY_SCORE: 16

## 2021-07-31 ASSESSMENT — MIFFLIN-ST. JEOR: SCORE: 895.5

## 2021-07-31 NOTE — ED NOTES
[facility]  ED Nurse Handoff Report    ED Chief complaint: Fall      ED Diagnosis:   Final diagnoses:   Hyponatremia   Traumatic rhabdomyolysis, initial encounter (H)   Paroxysmal atrial fibrillation (H)   Urinary tract infection       Code Status: not discussed by ED RN     Allergies:   Allergies   Allergen Reactions     Penicillins Hives       Patient Story: 76F unwitnessed fall at assisted living.   Focused Assessment:  Pt comes in after fall. Has dementia. Asks the same questions repeatedly. Poor historian. Was more confused than normal this morning and unable to ambulate at baseline. Pt voiding very frequently, UA shows UTI. Pt able to make needs known if asked, but unable to use call light appropriately. Pt has needed close monitoring so she doesn't get up on her own. Pt had several episodes of going into a tachycardic rhythm in the 140s, these would last several seconds.   Labs Ordered and Resulted from Time of ED Arrival Up to the Time of Departure from the ED   BASIC METABOLIC PANEL - Abnormal; Notable for the following components:       Result Value    Sodium 126 (*)     Chloride 93 (*)     Glucose 112 (*)     All other components within normal limits   ROUTINE UA WITH MICROSCOPIC REFLEX TO CULTURE - Abnormal; Notable for the following components:    Blood Urine Moderate (*)     Protein Albumin Urine 10  (*)     Leukocyte Esterase Urine Large (*)     Bacteria Urine Few (*)     RBC Urine 10 (*)     WBC Urine 26 (*)     All other components within normal limits    Narrative:     Urine Culture ordered based on laboratory criteria   CK TOTAL - Abnormal; Notable for the following components:    CK 1,266 (*)     All other components within normal limits   CBC WITH PLATELETS AND DIFFERENTIAL - Abnormal; Notable for the following components:    WBC Count 16.1 (*)     RBC Count 3.03 (*)     Hemoglobin 9.1 (*)     Hematocrit 26.5 (*)     Absolute Neutrophils 13.5 (*)     Absolute Monocytes 1.5 (*)      Absolute Immature Granulocytes 0.1 (*)     All other components within normal limits   TROPONIN I - Normal   SARS-COV2 (COVID-19) VIRUS RT-PCR   IRON AND IRON BINDING CAPACITY   FERRITIN   RETICULOCYTE COUNT   OCCULT BLOOD STOOL   VITAMIN B12   FOLATE   PROCALCITONIN   CRP INFLAMMATION   TSH WITH FREE T4 REFLEX   PERIPHERAL IV CATHETER   URINE CULTURE   COVID-19 VIRUS (CORONAVIRUS) BY PCR    Narrative:     The following orders were created for panel order Asymptomatic COVID-19 Virus (Coronavirus) by PCR Nasopharyngeal.  Procedure                               Abnormality         Status                     ---------                               -----------         ------                     SARS-COV2 (COVID-19) Vir...[[id number]]                      In process                   Please view results for these tests on the individual orders.   CBC WITH PLATELETS & DIFFERENTIAL    Narrative:     The following orders were created for panel order CBC with platelets + differential.  Procedure                               Abnormality         Status                     ---------                               -----------         ------                     CBC with platelets and d...[[id number]]  Abnormal            Final result                 Please view results for these tests on the individual orders.         Treatments and/or interventions provided: abx, fluids  Patient's response to treatments and/or interventions:     To be done/followed up on inpatient unit:      Does this patient have any cognitive concerns?: Baseline dementia    Activity level - Baseline/Home:  Walker  Activity Level - Current:   Stand with assist x2, hasn't walked. Just transfer to commode    Patient's Preferred language: Maldivian   Needed?: No    Isolation: None  Infection: Not Applicable  Patient tested for COVID 19 prior to admission: YES  Bariatric?: No    Vital Signs:   Vitals:    07/31/21 1340 07/31/21 1341 07/31/21 1345 07/31/21  1400   BP: (!) 155/72  (!) 155/72 (!) 155/107   Pulse: 77 76 71 83   Resp: 22  21 19   Temp:       TempSrc:       SpO2: 91% 98% 98%        Cardiac Rhythm:     Was the PSS-3 completed:   No -pt unable to understand/answer the questions  What interventions are required if any?               Family Comments: pt likes to talk to her daughter on the phone, asks for her frequently  OBS brochure/video discussed/provided to patient/family: N/A              Name of person given brochure if not patient:               Relationship to patient:     For the majority of the shift this patient's behavior was Green.   Behavioral interventions performed were .    ED NURSE PHONE NUMBER: [phone]

## 2021-07-31 NOTE — PLAN OF CARE
ED admit at 1730.   Summary: AMS and fall from [facility]   Primary diagnosis: UTI  Orientation: A&O x3, disoriented to situation. Hx dementia and schizophrenia. Setswana speaking but speaks and understands some english.   Aggression Stop Light: green, sitter bedside that came with from ED  Mobility: A x1 GB/walker  Pain Management: c/o right shoulder pain to neck. Bruised, paged Dr. Mendoza. Started on scheduled tylenol, requested lidocaine patches  Diet: Consistent carb  Bowel/Bladder: voiding frequently  Abnormal Lab/Assessments: CK 1266. Na 126. Trop 0.038 WBC 16.1 Hgb 9.1 UA +. Blood sugar 96. Need stool sample.   Tests: plan for XR right shoulder and echo  Drain/Device/Wound: PIV infusing at 100 ml/hr  Consults: none  D/C Day/Goals/Place: pending when off abx and PT/OT evaluated  Other: Continue IV rocephin.

## 2021-07-31 NOTE — PHARMACY-ADMISSION MEDICATION HISTORY
Pharmacy Medication History  Admission medication history interview status for the 7/31/2021  admission is complete. See EPIC admission navigator for prior to admission medications     Location of Interview: Patient room with medication list  Medication history sources: Patient, Surescripts and MAR ([facility] medication list )    Significant changes made to the medication list:  Added: polyethylene glycol,   Changed: acetaminophen (sig), paroxetine (dose)  Removed: cholecalciferol, ondansetron    In the past week, patient estimated taking medication this percent of the time: greater than 90%    Additional medication history information:   - None noted.     Medication reconciliation completed by provider prior to medication history? No    Time spent in this activity: 30 minutes    Prior to Admission medications    Medication Sig Last Dose Taking? Auth Provider   acetaminophen (TYLENOL) 325 MG tablet Take 650 mg by mouth 3 times daily Give at 0800, 1400 and 2000 7/31/2021 at 0800 Yes Unknown, Entered By History   calcium carbonate (OS-SHERI) 500 MG tablet Take 1 tablet by mouth 2 times daily 7/31/2021 at 0800 Yes Unknown, Entered By History   ferrous sulfate (FEROSUL) 325 (65 Fe) MG tablet Take 325 mg by mouth 2 times daily 7/31/2021 at 0800 Yes Unknown, Entered By History   melatonin 3 MG tablet Take 1 mg by mouth At Bedtime 7/30/2021 at Unknown time Yes Unknown, Entered By History   metFORMIN (GLUCOPHAGE) 500 MG tablet Take 500 mg by mouth daily (with breakfast) 7/31/2021 at Unknown time Yes Unknown, Entered By History   multivitamin, therapeutic (THERA-VIT) TABS tablet Take 1 tablet by mouth daily 7/31/2021 at Unknown time Yes Reported, Patient   Nutritional Supplements (ENSURE ORIGINAL) LIQD Take 1 Can by mouth daily Daily at 1000 7/31/2021 at Unknown time Yes Unknown, Entered By History   PARoxetine (PAXIL) 20 MG tablet Take 20 mg by mouth every morning 7/31/2021 at Unknown time Yes Unknown, Entered By  History   polyethylene glycol (MIRALAX) 17 g packet Take 1 packet by mouth daily 7/31/2021 at Unknown time Yes Unknown, Entered By History   QUEtiapine (SEROQUEL) 25 MG tablet Take 25 mg by mouth 2 times daily Daily at 0800 and 1600. 7/31/2021 at Unknown time Yes Unknown, Entered By History   QUEtiapine (SEROQUEL) 25 MG tablet Take 25 mg by mouth 2 times daily as needed (anxiety and agitation) 7/31/2021 at 1157 Yes Unknown, Entered By History   risperiDONE (RISPERDAL) 1 MG tablet Take 1 mg by mouth daily Daily at 1400 7/30/2021 at Unknown time Yes Unknown, Entered By History   risperiDONE (RISPERDAL) 4 MG tablet Take 4 mg by mouth every evening 7/30/2021 at Unknown time Yes Unknown, Entered By History     The information provided in this note is only as accurate as the sources available at the time of update(s)

## 2021-07-31 NOTE — ED NOTES
Bed: ED28  Expected date:   Expected time:   Means of arrival:   Comments:  E3  76 f fall/diff/walking  1206

## 2021-07-31 NOTE — H&P
[facility]    History and Physical - Hospitalist Service       Date of Admission:  7/31/2021    Assessment & Plan      Angelica James is a 76 year old female with history of dementia and schizophrenia who lives at an assisted living facility.  Apparently she fell yesterday and today was very weak and unable to ambulate so she was sent to the emergency room via ambulance.  In the emergency room she had stable vital signs and a nonfocal neurologic exam.  She was found to be hyponatremic and UA appears to show a urinary tract infection.  She also had a elevated total CK with a normal troponin.  While in the emergency room she had a short run of a narrow complex tachycardia which was asymptomatic.  She is being admitted for further evaluation.    Status post fall   Generalized weakness     PT and OT consultations    Episode of SVT versus atrial fibrillation   History of hypertension   Unclear from the short prescription if this is atrial fibrillation or SVT.  She is not on antihypertensive medication at home    Will continue telemetry    Monitor heart rate and blood pressure    Elevated CK, normal troponin    Likely rhabdomyolysis.  Its unclear if she was on the ground for a while after her fall.  She is not on a statin.    Continue with IV fluid and repeat CK tomorrow    Hyponatremia, acute versus chronic  This could be due to her SSRI or from poor intake.  We do not have any sodium level since 2019 when her serum sodium level was normal.    Continue IV normal saline and repeat a serum sodium level tomorrow    Normocytic anemia   History of iron deficiency anemia   Hemoglobin   Date Value Ref Range Status   07/31/2021 9.1 (L) 11.7 - 15.7 g/dL Final   08/18/2019 10.4 (L) 11.7 - 15.7 g/dL Final   08/14/2019 9.9 (L) 11.7 - 15.7 g/dL Final       Check iron levels, B12, folate and fecal occult blood    Monitor hemoglobin    Pyuria  She may have acute cystitis causing her weakness although she  has no urinary symptoms.    Continue IV ceftriaxone and follow-up urine culture     Type 2 diabetes mellitus     Continue prior to admission metformin    Aspart insulin correction scale ordered    Dementia   Schizophrenia   Continue prior to admission medications for now: paroxetine, quetiapine and resperidone     Diet:  diabetic   DVT Prophylaxis: Enoxaparin (Lovenox) SQ  Darby Catheter: Not present  Central Lines: None  Code Status:   NCB    Clinically Significant Risk Factors Present on Admission         # Hyponatremia: Na = 126 mmol/L (Ref range: 133 - 144 mmol/L) on admission, will monitor as appropriate           Disposition Plan   Expected discharge:  2-3 days recommended to prior living arrangement once antibiotic plan established, mental status at baseline and Na level stable .     The patient's care was discussed with the Patient.    Jarred Mendoza MD  [facility]  Securely message with the Vocera Web Console (learn more here)  Text page via [employer] Paging/Directory      ______________________________________________________________________    Chief Complaint   Status post fall     History is obtained from the electronic health record and emergency department physician    History of Present Illness   Angelica James is a 76 year old female who has dementia and lives in a chronic care facility.  She had an unwitnessed fall last night.  She was unable to ambulate today and appeared more confused than usual.  911 was called and she was taken to the emergency room.  The patient had no specific complaints.  In the emergency room her blood pressure was 153/89 pulse 92 respiratory rate 16 temperature 98.5  F oxygen saturation 96 sent.  GCS was 15 she was oriented to person only.  She was nonfocal neurologically.  Her heart sounded regular and her EKG showed sinus rhythm.  She had an episode of narrow complex tachycardia in the ER lasting only a few seconds and a twelve-lead EKG  "could not be performed before she regained normal sinus rhythm.  She was asymptomatic during the episode.  Labs are notable for sodium 126 CK 1266 white blood cell count 16,000 hemoglobin 9 g.  UA showed a large amount of leukocyte esterase 26 white cells 10 red cells, few bacteria noted.  A CT of the head was obtained and no acute pathology was seen.  The patient was given 1 L of normal saline and 1 g of IV ceftriaxone.  The patient complains of mild bilateral thigh pain but otherwise denies headache, cough, cold, chest pain, shortness of breath, abdominal pain, nausea, vomiting or dysuria.  However she is not felt to be reliable due to her dementia.    Review of Systems    Review of systems not obtained due to patient factors - confusion    Past Medical History    I have reviewed this patient's medical history and updated it with pertinent information if needed.   Past Medical History:   Diagnosis Date     Dementia      Diabetes (H)      Hypertension      Iron deficiency anemia      PUD (peptic ulcer disease)      Schizophrenia (H)        Past Surgical History   I have reviewed this patient's surgical history and updated it with pertinent information if needed.  Past Surgical History:   Procedure Laterality Date     colonoscopy       UPPER GI ENDOSCOPY         Social History   I have reviewed this patient's social history and updated it with pertinent information if needed.  Social History     Tobacco Use     Smoking status: Never Smoker     Smokeless tobacco: Never Used   Substance Use Topics     Alcohol use: No     Drug use: No       Family History   { TIP  No Family History on file, click here to document (can select \"no known problems\"). Remember to CLOSE [employer] activity & REFRESH note to display updates.   Or use the list below.                                             :[id number]}  Unable to obtain due to: patient's mental status    Prior to Admission Medications   Prior to Admission Medications "   Prescriptions Last Dose Informant Patient Reported? Taking?   Nutritional Supplements (ENSURE ORIGINAL) LIQD 7/31/2021 at Unknown time  Yes Yes   Sig: Take 1 Can by mouth daily Daily at 1000   PARoxetine (PAXIL) 20 MG tablet 7/31/2021 at Unknown time  Yes Yes   Sig: Take 20 mg by mouth every morning   QUEtiapine (SEROQUEL) 25 MG tablet 7/31/2021 at Unknown time  Yes Yes   Sig: Take 25 mg by mouth 2 times daily Daily at 0800 and 1600.   QUEtiapine (SEROQUEL) 25 MG tablet 7/31/2021 at 1157  Yes Yes   Sig: Take 25 mg by mouth 2 times daily as needed (anxiety and agitation)   acetaminophen (TYLENOL) 325 MG tablet 7/31/2021 at 0800  Yes Yes   Sig: Take 650 mg by mouth 3 times daily Give at 0800, 1400 and 2000   calcium carbonate (OS-SHERI) 500 MG tablet 7/31/2021 at 0800  Yes Yes   Sig: Take 1 tablet by mouth 2 times daily   ferrous sulfate (FEROSUL) 325 (65 Fe) MG tablet 7/31/2021 at 0800  Yes Yes   Sig: Take 325 mg by mouth 2 times daily   melatonin 3 MG tablet 7/30/2021 at Unknown time  Yes Yes   Sig: Take 1 mg by mouth At Bedtime   metFORMIN (GLUCOPHAGE) 500 MG tablet 7/31/2021 at Unknown time  Yes Yes   Sig: Take 500 mg by mouth daily (with breakfast)   multivitamin, therapeutic (THERA-VIT) TABS tablet 7/31/2021 at Unknown time Daughter Yes Yes   Sig: Take 1 tablet by mouth daily   polyethylene glycol (MIRALAX) 17 g packet 7/31/2021 at Unknown time  Yes Yes   Sig: Take 1 packet by mouth daily   risperiDONE (RISPERDAL) 1 MG tablet 7/30/2021 at Unknown time  Yes Yes   Sig: Take 1 mg by mouth daily Daily at 1400   risperiDONE (RISPERDAL) 4 MG tablet 7/30/2021 at Unknown time  Yes Yes   Sig: Take 4 mg by mouth every evening      Facility-Administered Medications: None     Allergies   Allergies   Allergen Reactions     Penicillins Hives       Physical Exam   Vital Signs: Temp: 98.5  F (36.9  C) Temp src: Oral BP: (!) 155/107 Pulse: 83   Resp: 19 SpO2: 98 % O2 Device: None (Room air)    Weight: 0 lbs 0  oz    Constitutional: awake, alert, cooperative, no apparent distress  Eyes: Lids and lashes normal, pupils equal, round, sclera clear, conjunctiva normal  ENT: Normocephalic, without obvious abnormality, atraumatic  Respiratory: No increased work of breathing, good air exchange, clear to auscultation bilaterally, no crackles or wheezing  Cardiovascular: regular rate and rhythm, normal S1 and S2, no S3 or S4, and no murmur noted  GI: normal bowel sounds, soft, non-distended, non-tender, no masses palpated  Skin: no bruising or bleeding, no rashes and no jaundice  Musculoskeletal: There is no redness, warmth, or swelling of the joints.  Full range of motion noted.  Motor strength is 5 out of 5 all extremities bilaterally.  Tone is normal.  Neurologic: Awake and alert answering simple questions appropriately.  She is moving all extremities equally and there is no tremor.  Cranial nerves II to XII are normal  Neuropsychiatric: General: normal, calm and normal eye contact    Data   Data reviewed today: I reviewed all medications, new labs and imaging results over the last 24 hours. I personally reviewed the EKG tracing showing NSR.    Recent Labs   Lab 07/31/21  1238   WBC 16.1*   HGB 9.1*   MCV 88      *   POTASSIUM 3.7   CHLORIDE 93*   CO2 28   BUN 18   CR 0.57   ANIONGAP 5   SHERI 8.6   *   TROPONIN 0.038     Recent Results (from the past 24 hour(s))   Head CT w/o contrast    Narrative    CT SCAN OF THE HEAD WITHOUT CONTRAST   7/31/2021 1:11 PM     HISTORY: Head trauma, minor (Age >= 65y).    TECHNIQUE:  Axial images of the head and coronal reformations without  IV contrast material. Radiation dose for this scan was reduced using  automated exposure control, adjustment of the mA and/or kV according  to patient size, or iterative reconstruction technique.    COMPARISON: Head CT 4/7/2019.    FINDINGS: Moderate volume loss is present. Patchy and confluent white  matter hypoattenuation likely represents  advanced chronic small vessel  ischemic change. Parenchyma is otherwise unremarkable. No evidence of  acute ischemia, hemorrhage, mass, mass effect or hydrocephalus. The  visualized calvarium, tympanic cavities, mastoid cavities, and  extracranial soft tissues are unremarkable. Mild paranasal sinus  mucosal thickening.      Impression    IMPRESSION:  No acute intracranial abnormality.     ALIN MARIE MD         SYSTEM ID:  [id number]

## 2021-07-31 NOTE — PROGRESS NOTES
RECEIVING UNIT ED HANDOFF REVIEW    ED Nurse Handoff Report was reviewed by: Soila Lomax RN on July 31, 2021 at 4:31 PM

## 2021-07-31 NOTE — ED PROVIDER NOTES
History     Chief Complaint:  Fall       HPI   Angelica James is a 76 year old female with history of schizophrenia, dementia, hypertension, diabetes, peptic ulcer disease, iron deficiency anemia who presents via EMS with difficulty walking after a fall.  The patient presents from [facility].  Per EMS staff at her assisted living facility states she fell last evening which was unwitnessed.  Staff noted this morning that she was not wanting to ambulate, which she generally does with a walker.  Staff note she was generally weaker than normal with no focal deficit.  Staff noted no obvious trauma from her fall.  Staff also noted the urine in her depends was pink/brown and patient was more restless than normal.  Patient is a type II diabetic with blood glucose 139 per EMS.  Per EMS the patient was able to stand and ambulate 3 feet with assistance of 2 and had no focal hip or lower extremity pain.  Staff did contact the patient's daughter Анна.    Allergies:  Penicillins     Medications:    acetaminophen (TYLENOL) 500 MG tablet  CALCIUM CARBONATE 500 MG tablet  Cholecalciferol (VITAMIN D3) 2000 units CAPS  ferrous sulfate (FEROSUL) 325 (65 Fe) MG tablet  melatonin 3 MG tablet  metFORMIN (GLUCOPHAGE) 500 MG tablet  multivitamin, therapeutic (THERA-VIT) TABS tablet  ondansetron (ZOFRAN-ODT) 4 MG ODT tab  PARoxetine (PAXIL) 30 MG tablet  polyethylene glycol (MIRALAX/GLYCOLAX) packet  risperiDONE (RISPERDAL) 1 MG tablet  risperiDONE (RISPERDAL) 4 MG tablet      Past Medical History:    Past Medical History:   Diagnosis Date     Dementia      Diabetes (H)      Hypertension      Iron deficiency anemia      PUD (peptic ulcer disease)      Schizophrenia (H)        Patient Active Problem List    Diagnosis Date Noted     Bladder spasm 08/04/2019     Priority: Medium     Acute cystitis without hematuria 08/03/2019     Priority: Medium     Traumatic rhabdomyolysis, initial encounter (H) 08/01/2019      Priority: Medium     Failure to thrive in adult 08/01/2019     Priority: Medium     Dementia with behavioral disturbance (H) 08/01/2019     Priority: Medium     Colitis due to Clostridium difficile 08/01/2019     Priority: Medium     Diarrhea 07/31/2019     Priority: Medium     Hyponatremia 04/07/2019     Priority: Medium     Influenza A 04/07/2019     Priority: Medium      Past Surgical History:    Past Surgical History:   Procedure Laterality Date     colonoscopy       UPPER GI ENDOSCOPY       Family History:    family history is not on file.    Social History:   reports that she has never smoked. She has never used smokeless tobacco. She reports that she does not drink alcohol and does not use drugs.  PCP: Kamini Michael     Review of Systems   Unable to perform ROS: Psychiatric disorder   Constitutional: Negative for chills.   Respiratory: Negative for cough, chest tightness and shortness of breath.    Cardiovascular: Negative for chest pain.   Gastrointestinal: Negative for abdominal pain.   Genitourinary: Positive for urgency.   Musculoskeletal: Negative for arthralgias, back pain, myalgias and neck pain.   Skin: Positive for wound. Negative for rash.   Neurological: Negative for dizziness, light-headedness and headaches.   Psychiatric/Behavioral: Positive for agitation.   All other systems reviewed and are negative.    Physical Exam     Patient Vitals for the past 24 hrs:   BP Temp Temp src Pulse Resp SpO2   07/31/21 1230 -- 98.5  F (36.9  C) Oral -- -- --   07/31/21 1225 -- -- -- -- 16 96 %   07/31/21 1220 (!) 153/89 -- -- 92 -- --        Physical Exam  General: Alert. Well kept.  HEENT:   Head: No facial asymmetry. No palpable scalp hematomas or bony step offs. No frontal or maxillary facial tenderness.   Eyes: Normal conjunctiva. No scleral icterus. PERRLA. EOMI. No raccoon s eyes.   Ears:  No hemotympanum bilaterally. No Mora's signs.   Nose: No deformity. No nasal drainage.   Throat: Moist  mucous membranes. No evidence for intraoral trauma.   Neck: No midline tenderness over cervical spine or paraspinal musculature. Normal range of motion.   Cardiac: Normal rate and regular rhythm.  No murmurs, rubs, or gallops appreciated. 2+ radial and DP pulses.   Pulmonary: CTA bilaterally.  No wheezing, crackles, or rhonchi appreciated.   Abdomen: Soft, non-distended.  Mild suprapubic tenderness.  No rebound or guarding.   Neuro: GCS 15. Alert and oriented to person only. Cranial nerves II-XII intact. 5/5 strength equal bilateral upper and lower extremities.  MUSCULOSKELETAL: Normal gross range of motion of all 4 extremities. No midline tenderness over thoracic, lumbar or sacral spine.   Upper extremities: 5/5 symmetric strength and ROM with shoulder abduction and adduction, elbow flexion and extension, dorsi- and palmar-flexion, , compartments soft.   Lower extremities: 5/5 symmetric strength and ROM with dorsi- and plantar-flexion, knee flexion and extension, hip flexion, hip internal and external rotation, compartments soft.   SKIN: Skin is warm and dry. No rashes, petechiae or pallor.  Skin tear right forearm.  Bruising left forearm.  PSYCH: Normal affect and mood. Good eye contact.    Emergency Department Course     ECG (12:34:37):  Rate 85 bpm.   QT/QTc 376/447.   P-R-T axes 61  56  62.   Normal sinus rhythm  Interpreted at 1245 by Kelin Stover CNP and Dr. Kushal Montague.         ECG (13:28:59):  Rate 91 bpm.   QT/QTc 380/467.   P-R-T axes 61  66 52.   sinus rhythm with PAC  Interpreted at 1337 by Kelin Stover CNP and Dr. Felipe.      Imaging:  Head CT w/o contrast   Final Result   IMPRESSION:  No acute intracranial abnormality.       ALIN MARIE MD            SYSTEM ID:  [id number]        Laboratory:  Labs Ordered and Resulted from Time of ED Arrival Up to the Time of Departure from the ED   BASIC METABOLIC PANEL - Abnormal; Notable for the following components:       Result Value    Sodium 126 (*)      Chloride 93 (*)     Glucose 112 (*)     All other components within normal limits   ROUTINE UA WITH MICROSCOPIC REFLEX TO CULTURE - Abnormal; Notable for the following components:    Blood Urine Moderate (*)     Protein Albumin Urine 10  (*)     Leukocyte Esterase Urine Large (*)     Bacteria Urine Few (*)     RBC Urine 10 (*)     WBC Urine 26 (*)     All other components within normal limits    Narrative:     Urine Culture ordered based on laboratory criteria   CK TOTAL - Abnormal; Notable for the following components:    CK 1,266 (*)     All other components within normal limits   CBC WITH PLATELETS AND DIFFERENTIAL - Abnormal; Notable for the following components:    WBC Count 16.1 (*)     RBC Count 3.03 (*)     Hemoglobin 9.1 (*)     Hematocrit 26.5 (*)     Absolute Neutrophils 13.5 (*)     Absolute Monocytes 1.5 (*)     Absolute Immature Granulocytes 0.1 (*)     All other components within normal limits   CRP INFLAMMATION - Abnormal; Notable for the following components:    CRP Inflammation 18.4 (*)     All other components within normal limits   TROPONIN I - Normal   SARS-COV2 (COVID-19) VIRUS RT-PCR - Normal    Narrative:     Testing was performed using the dixie  SARS-CoV-2 & Influenza A/B Assay on the dixie  Shirin  System.  This test should be ordered for the detection of SARS-COV-2 in individuals who meet SARS-CoV-2 clinical and/or epidemiological criteria. Test performance is unknown in asymptomatic patients.  This test is for in vitro diagnostic use under the FDA EUA for laboratories certified under CLIA to perform moderate and/or high complexity testing. This test has not been FDA cleared or approved.  A negative test does not rule out the presence of PCR inhibitors in the specimen or target RNA in concentration below the limit of detection for the assay. The possibility of a false negative should be considered if the patient's recent exposure or clinical presentation suggests COVID-19.  [facility]  [facility] are certified under the Clinical Laboratory Improvement Amendments of 1988 (CLIA-88) as qualified to perform moderate and/or high complexity laboratory testing.   IRON AND IRON BINDING CAPACITY - Normal   FERRITIN - Normal   RETICULOCYTE COUNT - Normal   PROCALCITONIN - Normal   TSH WITH FREE T4 REFLEX - Normal   OCCULT BLOOD STOOL   VITAMIN B12   FOLATE   PERIPHERAL IV CATHETER   COVID-19 VIRUS (CORONAVIRUS) BY PCR    Narrative:     The following orders were created for panel order Asymptomatic COVID-19 Virus (Coronavirus) by PCR Nasopharyngeal.  Procedure                               Abnormality         Status                     ---------                               -----------         ------                     SARS-COV2 (COVID-19) Vir...[[id number]]  Normal              Final result                 Please view results for these tests on the individual orders.   URINE CULTURE   CBC WITH PLATELETS & DIFFERENTIAL    Narrative:     The following orders were created for panel order CBC with platelets + differential.  Procedure                               Abnormality         Status                     ---------                               -----------         ------                     CBC with platelets and d...[[id number]]  Abnormal            Final result                 Please view results for these tests on the individual orders.        Interventions:  1230 0.9% Sodium Chloride 1000cc IV  1418 Ceftriaxone 1 gm IV      Emergency Department Course:  Past medical records, nursing notes, and vitals reviewed.  I performed an exam of the patient and obtained history, as documented above.    I rechecked the patient. Findings and plan explained to the Patient. Patient was admitted to Dr. Mendoza.    1400 I spoke with Dr. Mendoza, hospitalist, who accepts patient for admission    Impression & Plan    Medical Decision Making:  Angelica James is a 76 year old female with history of  schizophrenia, dementia, hypertension, diabetes, peptic ulcer disease, iron deficiency anemia who presents via EMS with difficulty walking after a fall.  On initial examination, patient is perseverating over needing to use the bathroom.  She has no pain with active and passive range of motion of the bilateral upper or bilateral lower extremities including hips bilateral.  She has no chest wall or abdominal tenderness.  No obvious facial or head trauma.  Head CT returns negative for intracerebral bleeding.  She does have 2 small areas of trauma on the bilateral left and right forearms.  Initial EKG shows a normal sinus rhythm and troponin returns negative.  The patient was noted to have several episodes of a narrow complex tachycardia rate 135-145 which resolved prior to being able to obtain EKG (see monitoring strip).  No history of atrial fibrillation noted in chart review.  The patient did not anticoagulated.  Lab work returned showing a hyponatremia with sodium of 126.  She was initially given 1 L normal saline when she first arrived in the ED due to dry mucous membranes.  Upon return of the blood work she was started on a normal saline drip at 75 ml/hour.  CK also returns elevated at 1266.  It is unknown if patient was on the floor for a period of time or not.  Secondary to hyponatremia a second fluid bolus was not given.  Covid testing returned negative.  Her urine does return concerning for urinary tract infection, and with her urinary frequency ceftriaxone was initiated.  Urine culture pending.  I spoke with Dr. Mendoza, hospitalist who accepts the patient for admission.    Diagnosis:    ICD-10-CM    1. Hyponatremia  E87.1    2. Traumatic rhabdomyolysis, initial encounter (H)  T79.6XXA    3. Paroxysmal atrial fibrillation (H)  I48.0    4. Urinary tract infection  N39.0       7/31/2021   Kelin Stover, Kelin Mccarty CNP  07/31/21 1948

## 2021-08-01 ENCOUNTER — APPOINTMENT (OUTPATIENT)
Dept: GENERAL RADIOLOGY | Facility: CLINIC | Age: 76
DRG: 184 | End: 2021-08-01
Attending: HOSPITALIST
Payer: COMMERCIAL

## 2021-08-01 ENCOUNTER — APPOINTMENT (OUTPATIENT)
Dept: CARDIOLOGY | Facility: CLINIC | Age: 76
DRG: 184 | End: 2021-08-01
Attending: HOSPITALIST
Payer: COMMERCIAL

## 2021-08-01 ENCOUNTER — APPOINTMENT (OUTPATIENT)
Dept: OCCUPATIONAL THERAPY | Facility: CLINIC | Age: 76
DRG: 184 | End: 2021-08-01
Attending: HOSPITALIST
Payer: COMMERCIAL

## 2021-08-01 LAB
ANION GAP SERPL CALCULATED.3IONS-SCNC: 3 MMOL/L (ref 3–14)
BACTERIA UR CULT: NORMAL
BUN SERPL-MCNC: 15 MG/DL (ref 7–30)
CALCIUM SERPL-MCNC: 8.1 MG/DL (ref 8.5–10.1)
CHLORIDE BLD-SCNC: 107 MMOL/L (ref 94–109)
CK SERPL-CCNC: 1257 U/L (ref 30–225)
CO2 SERPL-SCNC: 28 MMOL/L (ref 20–32)
CREAT SERPL-MCNC: 0.67 MG/DL (ref 0.52–1.04)
ERYTHROCYTE [DISTWIDTH] IN BLOOD BY AUTOMATED COUNT: 13.7 % (ref 10–15)
GFR SERPL CREATININE-BSD FRML MDRD: 84 ML/MIN/1.73M2
GLUCOSE BLD-MCNC: 135 MG/DL (ref 70–99)
GLUCOSE BLDC GLUCOMTR-MCNC: 120 MG/DL (ref 70–99)
GLUCOSE BLDC GLUCOMTR-MCNC: 132 MG/DL (ref 70–99)
GLUCOSE BLDC GLUCOMTR-MCNC: 144 MG/DL (ref 70–99)
GLUCOSE BLDC GLUCOMTR-MCNC: 89 MG/DL (ref 70–99)
GLUCOSE BLDC GLUCOMTR-MCNC: 90 MG/DL (ref 70–99)
HCT VFR BLD AUTO: 26.6 % (ref 35–47)
HGB BLD-MCNC: 8.8 G/DL (ref 11.7–15.7)
LVEF ECHO: NORMAL
MCH RBC QN AUTO: 30.3 PG (ref 26.5–33)
MCHC RBC AUTO-ENTMCNC: 33.1 G/DL (ref 31.5–36.5)
MCV RBC AUTO: 92 FL (ref 78–100)
PLATELET # BLD AUTO: 227 10E3/UL (ref 150–450)
POTASSIUM BLD-SCNC: 3.4 MMOL/L (ref 3.4–5.3)
RBC # BLD AUTO: 2.9 10E6/UL (ref 3.8–5.2)
SODIUM SERPL-SCNC: 138 MMOL/L (ref 133–144)
WBC # BLD AUTO: 7.7 10E3/UL (ref 4–11)

## 2021-08-01 PROCEDURE — 82374 ASSAY BLOOD CARBON DIOXIDE: CPT | Performed by: HOSPITALIST

## 2021-08-01 PROCEDURE — 250N000013 HC RX MED GY IP 250 OP 250 PS 637: Performed by: INTERNAL MEDICINE

## 2021-08-01 PROCEDURE — 999N000157 HC STATISTIC RCP TIME EA 10 MIN

## 2021-08-01 PROCEDURE — 71101 X-RAY EXAM UNILAT RIBS/CHEST: CPT | Mod: RT

## 2021-08-01 PROCEDURE — 36415 COLL VENOUS BLD VENIPUNCTURE: CPT | Performed by: HOSPITALIST

## 2021-08-01 PROCEDURE — 258N000003 HC RX IP 258 OP 636: Performed by: HOSPITALIST

## 2021-08-01 PROCEDURE — 999N000208 ECHOCARDIOGRAM COMPLETE

## 2021-08-01 PROCEDURE — 255N000002 HC RX 255 OP 636: Performed by: HOSPITALIST

## 2021-08-01 PROCEDURE — 250N000011 HC RX IP 250 OP 636: Performed by: HOSPITALIST

## 2021-08-01 PROCEDURE — 97165 OT EVAL LOW COMPLEX 30 MIN: CPT | Mod: GO

## 2021-08-01 PROCEDURE — 999N000147 HC STATISTIC PT IP EVAL DEFER

## 2021-08-01 PROCEDURE — 120N000001 HC R&B MED SURG/OB

## 2021-08-01 PROCEDURE — 93306 TTE W/DOPPLER COMPLETE: CPT | Mod: 26 | Performed by: INTERNAL MEDICINE

## 2021-08-01 PROCEDURE — 250N000013 HC RX MED GY IP 250 OP 250 PS 637: Performed by: HOSPITALIST

## 2021-08-01 PROCEDURE — 97535 SELF CARE MNGMENT TRAINING: CPT | Mod: GO

## 2021-08-01 PROCEDURE — 82550 ASSAY OF CK (CPK): CPT | Performed by: HOSPITALIST

## 2021-08-01 PROCEDURE — 99233 SBSQ HOSP IP/OBS HIGH 50: CPT | Performed by: HOSPITALIST

## 2021-08-01 PROCEDURE — 85027 COMPLETE CBC AUTOMATED: CPT | Performed by: HOSPITALIST

## 2021-08-01 RX ORDER — ACETAMINOPHEN 325 MG/1
975 TABLET ORAL EVERY 8 HOURS
Status: DISCONTINUED | OUTPATIENT
Start: 2021-08-02 | End: 2021-08-05 | Stop reason: HOSPADM

## 2021-08-01 RX ORDER — NALOXONE HYDROCHLORIDE 0.4 MG/ML
0.4 INJECTION, SOLUTION INTRAMUSCULAR; INTRAVENOUS; SUBCUTANEOUS
Status: DISCONTINUED | OUTPATIENT
Start: 2021-08-01 | End: 2021-08-05 | Stop reason: HOSPADM

## 2021-08-01 RX ORDER — NALOXONE HYDROCHLORIDE 0.4 MG/ML
0.2 INJECTION, SOLUTION INTRAMUSCULAR; INTRAVENOUS; SUBCUTANEOUS
Status: DISCONTINUED | OUTPATIENT
Start: 2021-08-01 | End: 2021-08-05 | Stop reason: HOSPADM

## 2021-08-01 RX ORDER — PHENAZOPYRIDINE HYDROCHLORIDE 100 MG/1
100 TABLET, FILM COATED ORAL
Status: DISCONTINUED | OUTPATIENT
Start: 2021-08-01 | End: 2021-08-05 | Stop reason: HOSPADM

## 2021-08-01 RX ORDER — LIDOCAINE 4 G/G
1 PATCH TOPICAL EVERY 24 HOURS
Status: DISCONTINUED | OUTPATIENT
Start: 2021-08-01 | End: 2021-08-05 | Stop reason: HOSPADM

## 2021-08-01 RX ORDER — METHOCARBAMOL 500 MG/1
500 TABLET, FILM COATED ORAL EVERY 6 HOURS PRN
Status: DISPENSED | OUTPATIENT
Start: 2021-08-01 | End: 2021-08-04

## 2021-08-01 RX ADMIN — POLYETHYLENE GLYCOL 3350 17 G: 17 POWDER, FOR SOLUTION ORAL at 09:21

## 2021-08-01 RX ADMIN — OXYCODONE HYDROCHLORIDE 2.5 MG: 5 TABLET ORAL at 15:20

## 2021-08-01 RX ADMIN — METFORMIN HYDROCHLORIDE 500 MG: 500 TABLET, FILM COATED ORAL at 09:23

## 2021-08-01 RX ADMIN — FERROUS SULFATE TAB 325 MG (65 MG ELEMENTAL FE) 325 MG: 325 (65 FE) TAB at 20:23

## 2021-08-01 RX ADMIN — PAROXETINE HYDROCHLORIDE 20 MG: 20 TABLET, FILM COATED ORAL at 09:22

## 2021-08-01 RX ADMIN — RISPERIDONE 4 MG: 2 TABLET ORAL at 20:23

## 2021-08-01 RX ADMIN — CALCIUM 500 MG: 500 TABLET ORAL at 09:23

## 2021-08-01 RX ADMIN — OXYCODONE HYDROCHLORIDE 2.5 MG: 5 TABLET ORAL at 09:21

## 2021-08-01 RX ADMIN — LIDOCAINE 1 PATCH: 560 PATCH PERCUTANEOUS; TOPICAL; TRANSDERMAL at 20:22

## 2021-08-01 RX ADMIN — FERROUS SULFATE TAB 325 MG (65 MG ELEMENTAL FE) 325 MG: 325 (65 FE) TAB at 09:23

## 2021-08-01 RX ADMIN — METHOCARBAMOL 500 MG: 500 TABLET ORAL at 20:23

## 2021-08-01 RX ADMIN — CEFTRIAXONE SODIUM 1 G: 1 INJECTION, POWDER, FOR SOLUTION INTRAMUSCULAR; INTRAVENOUS at 14:49

## 2021-08-01 RX ADMIN — ENOXAPARIN SODIUM 40 MG: 40 INJECTION SUBCUTANEOUS at 20:23

## 2021-08-01 RX ADMIN — ACETAMINOPHEN 650 MG: 325 TABLET, FILM COATED ORAL at 06:36

## 2021-08-01 RX ADMIN — HUMAN ALBUMIN MICROSPHERES AND PERFLUTREN 9 ML: 10; .22 INJECTION, SOLUTION INTRAVENOUS at 11:18

## 2021-08-01 RX ADMIN — QUETIAPINE FUMARATE 25 MG: 25 TABLET ORAL at 15:20

## 2021-08-01 RX ADMIN — CALCIUM 500 MG: 500 TABLET ORAL at 18:01

## 2021-08-01 RX ADMIN — QUETIAPINE FUMARATE 25 MG: 25 TABLET ORAL at 09:21

## 2021-08-01 RX ADMIN — RISPERIDONE 1 MG: 1 TABLET ORAL at 14:49

## 2021-08-01 RX ADMIN — ACETAMINOPHEN 650 MG: 325 TABLET, FILM COATED ORAL at 14:49

## 2021-08-01 RX ADMIN — ACETAMINOPHEN 650 MG: 325 TABLET, FILM COATED ORAL at 18:30

## 2021-08-01 RX ADMIN — PHENAZOPYRIDINE HYDROCHLORIDE 100 MG: 100 TABLET ORAL at 11:51

## 2021-08-01 RX ADMIN — SODIUM CHLORIDE: 9 INJECTION, SOLUTION INTRAVENOUS at 01:17

## 2021-08-01 RX ADMIN — THERA TABS 1 TABLET: TAB at 09:23

## 2021-08-01 RX ADMIN — PHENAZOPYRIDINE HYDROCHLORIDE 100 MG: 100 TABLET ORAL at 18:01

## 2021-08-01 RX ADMIN — OXYCODONE HYDROCHLORIDE 2.5 MG: 5 TABLET ORAL at 18:30

## 2021-08-01 RX ADMIN — SODIUM CHLORIDE: 9 INJECTION, SOLUTION INTRAVENOUS at 14:48

## 2021-08-01 ASSESSMENT — ACTIVITIES OF DAILY LIVING (ADL)
ADLS_ACUITY_SCORE: 16
ADLS_ACUITY_SCORE: 16
ADLS_ACUITY_SCORE: 18
ADLS_ACUITY_SCORE: 16

## 2021-08-01 ASSESSMENT — MIFFLIN-ST. JEOR: SCORE: 924.5

## 2021-08-01 NOTE — PROGRESS NOTES
Spoke with ELIEZER Brown from [facility] facility whom called for an update, expectations for discharge and return to care facility.

## 2021-08-01 NOTE — PLAN OF CARE
"Pt here with rib fx & UTI. Disoriented to place, time & situation, very forgetful. Severe urinary frequency/urgency - MD paged for pyridium. Diminished LS. VSS. Tele NSR. 60g CHO diet with thin liquids. Up with A1, GB, W - very restless. Continent of bladder. Denies pain. IS & flutter valve encouraged, not well tolerated - \"I want to rest.\" Pt scoring yellow on the Aggression Stop Light Tool - setting off bed alarm frequently. Echo & rib/chest Xray completed - R 8-10 rib fx, MD paged with results. Plan to encourage IS/flutter valve. Discharge to TCU pending.  "

## 2021-08-01 NOTE — PROGRESS NOTES
[facility]    Medicine Progress Note - Hospitalist Service       Date of Admission:  7/31/2021    Assessment & Plan         Angelica James is a 76 year old female with history of dementia and schizophrenia who lives at an assisted living facility.  Apparently she fell yesterday and today was very weak and unable to ambulate so she was sent to the emergency room via ambulance.  In the emergency room she had stable vital signs and a nonfocal neurologic exam.  She was found to be hyponatremic and UA appears to show a urinary tract infection.  She also had a elevated total CK with a normal troponin.  While in the emergency room she had a short run of a narrow complex tachycardia which was asymptomatic.  She is being admitted for further evaluation.    Status post fall   Generalized weakness   Acute right eighth and ninth rib fractures  Having right rib pain.  Fractures seen on shoulder x-ray.      Rib x-rays    PT and OT consultations    Oxycodone ordered    Pulmonary toileting     Episode of SVT versus atrial fibrillation   History of hypertension   Unclear from the short run if this is atrial fibrillation or SVT.  She is not on antihypertensive medication at home    Continue telemetry    Monitor heart rate and blood pressure    Elevated CK, normal troponin  CK about the same today.    Likely rhabdomyolysis.  Its unclear if she was on the ground for a while after her fall.  She is not on a statin.    Continue with IV fluid and monitor     Hyponatremia, acute   Likely acute and now corrected.    Continue IV normal saline for rhabdomyolysis    Normocytic anemia   Mild iron deficiency  History of iron deficiency anemia   Hemoglobin   Date Value Ref Range Status   07/31/2021 9.1 (L) 11.7 - 15.7 g/dL Final   08/18/2019 10.4 (L) 11.7 - 15.7 g/dL Final   08/14/2019 9.9 (L) 11.7 - 15.7 g/dL Final   Iron levels, B12, folate ok- iron saturation index is a little low. Fecal occult blood no done  yet.    Monitor hemoglobin    Can resume iron at discharge     Acute cystitis  She may have acute cystitis causing her weakness.  She is having frequency all morning.    Continue IV ceftriaxone and follow-up urine culture     Try Pyridium     Type 2 diabetes mellitus     Continue prior to admission metformin    Aspart insulin correction scale ordered    Dementia   Schizophrenia     Continue prior to admission medications for now: paroxetine, quetiapine and resperidone     Diet: Combination Diet Regular Diet Adult; Consistent Carb 60 Grams CHO per Meal Diet  Room Service    DVT Prophylaxis: Enoxaparin (Lovenox) SQ  Darby Catheter: Not present  Central Lines: None  Code Status: No CPR- Do NOT Intubate      Disposition Plan   Expected discharge:2-3 days recommended to prior living arrangement once antibiotic plan established.     The patient's care was discussed with the Bedside Nurse and Patient.    Jarred Mendoza MD  Hospitalist Service  [facility]  Securely message with the Vocera Web Console (learn more here)  Text page via [employer] Paging/Directory      Clinically Significant Risk Factors Present on Admission                   ______________________________________________________________________    Interval History   Having right rib pain. Having urinary frequency.    Data reviewed today: I reviewed all medications, new labs and imaging results over the last 24 hours. I personally reviewed no images or EKG's today.    Physical Exam   Vital Signs: Temp: 98.4  F (36.9  C) Temp src: Oral BP: (!) 147/66 Pulse: 68   Resp: 20 SpO2: 94 % O2 Device: None (Room air)    Weight: 115 lbs 4.81 oz  Constitutional: awake, alert, cooperative, no apparent distress  Respiratory: No increased work of breathing, good air exchange, clear to auscultation bilaterally, no crackles or wheezing  Cardiovascular:  regular rate and rhythm, normal S1 and S2, no S3 or S4, and no murmur noted  GI: normal bowel  sounds, soft, non-distended, non-tender,  Musculoskeletal: tender over right lateral ribs  Neuropsychiatric: General: normal, calm and normal eye contact    Data   Recent Labs   Lab 21  1104 21  1033 21  0848 21  1811 21  1238   WBC  --  7.7  --   --  16.1*   HGB  --  8.8*  --   --  9.1*   MCV  --  92  --   --  88   PLT  --  227  --   --  245   NA  --  138  --   --  126*   POTASSIUM  --  3.4  --   --  3.7   CHLORIDE  --  107  --   --  93*   CO2  --  28  --   --  28   BUN  --  15  --   --  18   CR  --  0.67  --  0.62 0.57   ANIONGAP  --  3  --   --  5   SHERI  --  8.1*  --   --  8.6   * 135* 90  --  112*   TROPONIN  --   --   --   --  0.038     Recent Results (from the past 24 hour(s))   XR Shoulder Right G/E 3 Views    Narrative    EXAM: XR SHOULDER RIGHT G/E 3 VIEWS  LOCATION: [facility]  DATE/TIME: 2021 6:50 PM    INDICATION: Pain after a fall  COMPARISON: None.      Impression    IMPRESSION:     Right humerus, scapula, and clavicle negative for fracture. Normal glenohumeral joint alignment with minimal degenerative arthritic changes. Mild acromioclavicular osteoarthrosis.    There are fractures of at least two lateral lower ribs, at least the eighth and ninth ribs. The ninth rib fracture is is displaced by approximately 5 mm, the eighth rib fracture is not significantly displaced. These appear acute, correlation recommended   with acute chest wall pain and tenderness. No pleural effusion. No evidence of pneumothorax.   Echocardiogram Complete   Result Value    LVEF  65-70%    Narrative    [id number]  [id number]  [id number]^MARITZA^MASSIMO^LOTUS     [facility]  Echocardiography Laboratory  [address] [place] [address]     Name: LUDA BOSWELL  MRN: [id number]  : 1945  Study Date: 2021 10:57 AM  Age: 76 yrs  Gender: Female  Patient Location: [facility]  Reason For Study: Atrial Fibrillation  Ordering  Physician: MASSIMO SALAS  Referring Physician: Kamini Michael  Performed By: Burt Butler TOLU     BSA: 1.5 m2  Height: 60 in  Weight: 115 lb  HR: 74  BP: 112/67 mmHg  ______________________________________________________________________________  Procedure  Complete Portable Echo Adult. Optison (NDC #[id number]) given intravenously.  ______________________________________________________________________________  Interpretation Summary     The visual ejection fraction is 65-70%. No regional wall motion abnormalities  noted.  Right ventricular systolic pressure is elevated, consistent with moderate  pulmonary hypertension. PASP is 44 plus RA. IVC is normal in size.  The right ventricle is normal in size and function.  Small, predominantly anterior, pericardial effusion without echo features of  tamponade.  There is mild to moderate (1-2+) tricuspid regurgitation.  No hemodynamically significant valvular aortic stenosis. The mean AoV pressure  gradient is 7mmHg.  ______________________________________________________________________________  Left Ventricle  The left ventricle is normal in size. There is normal left ventricular wall  thickness. Left ventricular systolic function is normal. The visual ejection  fraction is 65-70%. Diastolic Doppler findings (E/E' ratio and/or other  parameters) suggest left ventricular filling pressures are indeterminate. No  regional wall motion abnormalities noted.     Right Ventricle  The right ventricle is normal in size and function.     Atria  There is mild biatrial enlargement. There is no color Doppler evidence of an  atrial shunt.     Tricuspid Valve  There is mild to moderate (1-2+) tricuspid regurgitation. The right  ventricular systolic pressure is elevated at 43.9 mmHg. Right ventricular  systolic pressure is elevated, consistent with moderate pulmonary  hypertension.     Aortic Valve  The aortic valve is trileaflet with aortic valve sclerosis. There is  trace  aortic regurgitation. No hemodynamically significant valvular aortic stenosis.  The mean AoV pressure gradient is 7mmHg.     Pulmonic Valve  The pulmonic valve is not well seen, but is grossly normal. There is trace  pulmonic valvular regurgitation.     Vessels  The aortic root is not well visualized. The aortic root is normal size. The  inferior vena cava was normal in size with preserved respiratory variability.     Pericardium  Small anterior pericardial effusion. There are no echocardiographic  indications of cardiac tamponade.     ______________________________________________________________________________  MMode/2D Measurements & Calculations  IVSd: 0.95 cm  LVIDd: 4.0 cm  LVIDs: 2.0 cm  LVPWd: 0.92 cm  FS: 50.9 %  LV mass(C)d: 117.4 grams  LV mass(C)dI: 79.6 grams/m2  Ao root diam: 3.2 cm  LA dimension: 4.1 cm  asc Aorta Diam: 2.8 cm  LA/Ao: 1.3     RWT: 0.45     Doppler Measurements & Calculations  MV E max bernadette: 100.0 cm/sec  MV A max bernadette: 93.0 cm/sec  MV E/A: 1.1  MV dec time: 0.21 sec  PA acc time: 0.13 sec  TR max bernadette: 331.2 cm/sec  TR max P.9 mmHg  E/E' av.4  Lateral E/e': 11.9  Medial E/e': 14.9     ______________________________________________________________________________  Report approved by: Fran Schuster 2021 11:54 AM           Medications     sodium chloride 100 mL/hr at 21 0117       acetaminophen  650 mg Oral TID     calcium carbonate 500 mg (elemental)  1 tablet Oral BID w/meals     cefTRIAXone  1 g Intravenous Q24H     enoxaparin ANTICOAGULANT  40 mg Subcutaneous Q24H     ferrous sulfate  325 mg Oral BID     insulin aspart  1-7 Units Subcutaneous TID AC     insulin aspart  1-5 Units Subcutaneous At Bedtime     metFORMIN  500 mg Oral Daily with breakfast     multivitamin, therapeutic  1 tablet Oral Daily     PARoxetine  20 mg Oral QAM     phenazopyridine  100 mg Oral TID w/meals     polyethylene glycol  17 g Oral Daily     QUEtiapine  25 mg Oral BID      risperiDONE  1 mg Oral Daily     risperiDONE  4 mg Oral QPM     sodium chloride (PF)  3 mL Intracatheter Q8H

## 2021-08-01 NOTE — PLAN OF CARE
PT: Orders received. Chart reviewed and discussed with care team.  PT not indicated due to ongoing mobility training w/ OT.  Defer discharge recommendations to OT.  Will complete orders.

## 2021-08-01 NOTE — PROVIDER NOTIFICATION
MD Notification    Notified Person: MD    Notified Person Name: MD Jeff    Notification Date/Time: 7/31/21 6547    Notification Interaction: web paged     Purpose of Notification: X-ray + rib fx    Orders Received:    Comments:

## 2021-08-01 NOTE — PROGRESS NOTES
MD Notification    Notified Person: MD    Notified Person Name: Kelly    Notification Date/Time: 8/1    Notification Interaction: paged    Purpose of Notification: uncontrolled pain    Orders Received: PRN Oxycodone 2.5 - 5 mg Q4    Comments:

## 2021-08-01 NOTE — PROGRESS NOTES
08/01/21 1210   Quick Adds   Type of Visit Initial Occupational Therapy Evaluation       Present yes  (Alison)   Language Kittitian   Living Environment   People in home facility resident   Current Living Arrangements other (see comments)  (Memory care)   Self-Care   Usual Activity Tolerance moderate   Current Activity Tolerance fair   Equipment Currently Used at Home grab bar, toilet;shower chair;walker, rolling   Activity/Exercise/Self-Care Comment Per EMR, at baseline uses walker. Pt is unreliable historian.   Disability/Function   Fall history within last six months yes   Change in Functional Status Since Onset of Current Illness/Injury yes   General Information   Onset of Illness/Injury or Date of Surgery 07/31/21   Referring Physician Jarred Mendoza MD   Patient/Family Therapy Goal Statement (OT) I want to rest   Additional Occupational Profile Info/Pertinent History of Current Problem 76 year old female with history of schizophrenia, dementia, hypertension, diabetes, peptic ulcer disease, iron deficiency anemia who presents via EMS with difficulty walking after a fall.  The patient presents from [facility].  Per EMS staff at her assisted living facility states she fell, which was unwitnessed. Pt found to have UTI and power 8 & 9 rib fx   Existing Precautions/Restrictions fall   Cognitive Status Examination   Orientation Status person;place   Affect/Mental Status (Cognitive) confused   Follows Commands follows one-step commands;75-90% accuracy   Memory Deficit severe deficit;short-term memory;long-term memory   Cognitive Status Comments Pt disoriented to situation. History of dementia and from memory care   Visual Perception   Visual Impairment/Limitations corrective lenses for reading   Sensory   Sensory Comments Pt denies BUE/BLE numbness or tingling   Pain Assessment   Patient Currently in Pain Yes, see Vital Sign flowsheet   Posture   Posture Comments  Kyphotic sitting and standing posture   Strength Comprehensive (MMT)   Comment, General Manual Muscle Testing (MMT) Assessment Generalized weakness.   Bed Mobility   Bed Mobility supine-sit;sit-supine   Supine-Sit [place] (Bed Mobility) supervision   Sit-Supine [place] (Bed Mobility) supervision   Transfers   Transfers sit-stand transfer;toilet transfer   Sit-Stand Transfer   Sit-Stand [place] (Transfers) contact guard   Toilet Transfer   Type (Toilet Transfer) stand-sit;sit-stand   [place] Level (Toilet Transfer) contact guard   Activities of Daily Living   BADL Assessment grooming;lower body dressing;toileting   Lower Body Dressing Assessment   [place] Level (Lower Body Dressing) minimum assist (75% patient effort)   Grooming Assessment   [place] Level (Grooming) supervision   Toileting   [place] Level (Toileting) minimum assist (75% patient effort)   Clinical Impression   Criteria for Skilled Therapeutic Interventions Met (OT) yes;meets criteria;skilled treatment is necessary   OT Diagnosis decline function   OT Problem List-Impairments impacting ADL activity tolerance impaired;balance;cognition;pain;mobility;postural control   Assessment of Occupational Performance 5 or more Performance Deficits   Identified Performance Deficits dressing, grooming, toileting, bathing, functional mobility   Planned Therapy Interventions (OT) ADL retraining;transfer training;home program guidelines;progressive activity/exercise;cognition   Clinical Decision Making Complexity (OT) low complexity   Therapy Frequency (OT) 5x/week   Predicted Duration of Therapy 5 days   Risk & Benefits of therapy have been explained evaluation/treatment results reviewed;care plan/treatment goals reviewed;current/potential barriers reviewed;risks/benefits reviewed;participants voiced agreement with care plan;participants included;patient   OT Discharge Planning    OT Discharge Recommendation (DC Rec) Transitional  Care Facility   OT Rationale for DC Rec Pt currently a high fall risk. Has poor activity tolerance due to pain and weakness. Significant cognitive impairments. Recommend continued skilled OT while IP and in TCU to return pt's ADL performance and mobility to baseline level   Total Evaluation Time (Minutes)   Total Evaluation Time (Minutes) 9

## 2021-08-01 NOTE — PLAN OF CARE
7/31/21 1900-0730    Summary: AMS and fall from senior care   Primary diagnosis: UTI, hyponatremia  Orientation: A&O x3, disoriented to situation. Hx dementia and schizophrenia. Ukrainian speaking but speaks and understands some english.   Aggression Stop Light: green  Mobility: A x1 GB/walker  Pain Management: c/o right shoulder pain to neck. Bruised, paged Dr. Mendoza. Given tylenol x1  Diet: Consistent carb  Bowel/Bladder: voiding frequently  Abnormal Lab/Assessments: UA + +power 8&9 rib fx.  Drain/Device/Wound: PIV infusing at 100 ml/hr  Consults: none  D/C Day/Goals/Place: pending when off abx, NA corrected and PT/OT evaluated, echo    Shift Note: Continue IV rocephin. MD notified of X-ray results of power 8 & 9 rib fx. IS ordered but patient not accomplishing goals as she refused-wanting to sleep instead. Still need stool sample/echo. Spoke with daughter, Alberta evans patients permission to give information. Daughter lives in [place].

## 2021-08-02 ENCOUNTER — APPOINTMENT (OUTPATIENT)
Dept: GENERAL RADIOLOGY | Facility: CLINIC | Age: 76
DRG: 184 | End: 2021-08-02
Attending: INTERNAL MEDICINE
Payer: COMMERCIAL

## 2021-08-02 LAB
ALBUMIN SERPL-MCNC: 3.1 G/DL (ref 3.4–5)
ALBUMIN UR-MCNC: NEGATIVE MG/DL
ALP SERPL-CCNC: 42 U/L (ref 40–150)
ALT SERPL W P-5'-P-CCNC: 41 U/L (ref 0–50)
ANION GAP SERPL CALCULATED.3IONS-SCNC: 2 MMOL/L (ref 3–14)
APPEARANCE UR: CLEAR
AST SERPL W P-5'-P-CCNC: 51 U/L (ref 0–45)
BASOPHILS # BLD AUTO: 0 10E3/UL (ref 0–0.2)
BASOPHILS NFR BLD AUTO: 0 %
BILIRUB DIRECT SERPL-MCNC: 0.2 MG/DL (ref 0–0.2)
BILIRUB SERPL-MCNC: 0.6 MG/DL (ref 0.2–1.3)
BILIRUB UR QL STRIP: NEGATIVE
BUN SERPL-MCNC: 13 MG/DL (ref 7–30)
CALCIUM SERPL-MCNC: 8.1 MG/DL (ref 8.5–10.1)
CHLORIDE BLD-SCNC: 108 MMOL/L (ref 94–109)
CK SERPL-CCNC: 1132 U/L (ref 30–225)
CO2 SERPL-SCNC: 29 MMOL/L (ref 20–32)
COLOR UR AUTO: YELLOW
CREAT SERPL-MCNC: 0.58 MG/DL (ref 0.52–1.04)
EOSINOPHIL # BLD AUTO: 0.1 10E3/UL (ref 0–0.7)
EOSINOPHIL NFR BLD AUTO: 2 %
ERYTHROCYTE [DISTWIDTH] IN BLOOD BY AUTOMATED COUNT: 14 % (ref 10–15)
GFR SERPL CREATININE-BSD FRML MDRD: 89 ML/MIN/1.73M2
GLUCOSE BLD-MCNC: 107 MG/DL (ref 70–99)
GLUCOSE BLDC GLUCOMTR-MCNC: 105 MG/DL (ref 70–99)
GLUCOSE BLDC GLUCOMTR-MCNC: 127 MG/DL (ref 70–99)
GLUCOSE BLDC GLUCOMTR-MCNC: 90 MG/DL (ref 70–99)
GLUCOSE BLDC GLUCOMTR-MCNC: 94 MG/DL (ref 70–99)
GLUCOSE BLDC GLUCOMTR-MCNC: 99 MG/DL (ref 70–99)
GLUCOSE UR STRIP-MCNC: NEGATIVE MG/DL
HCT VFR BLD AUTO: 27.3 % (ref 35–47)
HGB BLD-MCNC: 8.9 G/DL (ref 11.7–15.7)
HGB UR QL STRIP: ABNORMAL
IMM GRANULOCYTES # BLD: 0 10E3/UL
IMM GRANULOCYTES NFR BLD: 0 %
KETONES UR STRIP-MCNC: NEGATIVE MG/DL
LEUKOCYTE ESTERASE UR QL STRIP: ABNORMAL
LYMPHOCYTES # BLD AUTO: 1.6 10E3/UL (ref 0.8–5.3)
LYMPHOCYTES NFR BLD AUTO: 23 %
MAGNESIUM SERPL-MCNC: 1.9 MG/DL (ref 1.6–2.3)
MCH RBC QN AUTO: 30.1 PG (ref 26.5–33)
MCHC RBC AUTO-ENTMCNC: 32.6 G/DL (ref 31.5–36.5)
MCV RBC AUTO: 92 FL (ref 78–100)
MONOCYTES # BLD AUTO: 0.8 10E3/UL (ref 0–1.3)
MONOCYTES NFR BLD AUTO: 11 %
MUCOUS THREADS #/AREA URNS LPF: PRESENT /LPF
NEUTROPHILS # BLD AUTO: 4.4 10E3/UL (ref 1.6–8.3)
NEUTROPHILS NFR BLD AUTO: 64 %
NITRATE UR QL: NEGATIVE
NRBC # BLD AUTO: 0 10E3/UL
NRBC BLD AUTO-RTO: 0 /100
PH UR STRIP: 5.5 [PH] (ref 5–7)
PHOSPHATE SERPL-MCNC: 2.5 MG/DL (ref 2.5–4.5)
PLATELET # BLD AUTO: 227 10E3/UL (ref 150–450)
POTASSIUM BLD-SCNC: 3.2 MMOL/L (ref 3.4–5.3)
POTASSIUM BLD-SCNC: 3.3 MMOL/L (ref 3.4–5.3)
POTASSIUM BLD-SCNC: 3.4 MMOL/L (ref 3.4–5.3)
POTASSIUM BLD-SCNC: 3.5 MMOL/L (ref 3.4–5.3)
PROT SERPL-MCNC: 6.5 G/DL (ref 6.8–8.8)
RBC # BLD AUTO: 2.96 10E6/UL (ref 3.8–5.2)
RBC URINE: 19 /HPF
SODIUM SERPL-SCNC: 139 MMOL/L (ref 133–144)
SP GR UR STRIP: 1.01 (ref 1–1.03)
SQUAMOUS EPITHELIAL: 1 /HPF
TROPONIN I SERPL-MCNC: <0.015 UG/L (ref 0–0.04)
UROBILINOGEN UR STRIP-MCNC: NORMAL MG/DL
WBC # BLD AUTO: 6.9 10E3/UL (ref 4–11)
WBC CLUMPS #/AREA URNS HPF: PRESENT /HPF
WBC URINE: 67 /HPF

## 2021-08-02 PROCEDURE — 80069 RENAL FUNCTION PANEL: CPT | Performed by: HOSPITALIST

## 2021-08-02 PROCEDURE — 81001 URINALYSIS AUTO W/SCOPE: CPT | Performed by: STUDENT IN AN ORGANIZED HEALTH CARE EDUCATION/TRAINING PROGRAM

## 2021-08-02 PROCEDURE — 84100 ASSAY OF PHOSPHORUS: CPT | Performed by: INTERNAL MEDICINE

## 2021-08-02 PROCEDURE — 99232 SBSQ HOSP IP/OBS MODERATE 35: CPT | Performed by: HOSPITALIST

## 2021-08-02 PROCEDURE — 85025 COMPLETE CBC W/AUTO DIFF WBC: CPT | Performed by: HOSPITALIST

## 2021-08-02 PROCEDURE — 120N000001 HC R&B MED SURG/OB

## 2021-08-02 PROCEDURE — 87086 URINE CULTURE/COLONY COUNT: CPT | Performed by: STUDENT IN AN ORGANIZED HEALTH CARE EDUCATION/TRAINING PROGRAM

## 2021-08-02 PROCEDURE — 250N000011 HC RX IP 250 OP 636: Performed by: HOSPITALIST

## 2021-08-02 PROCEDURE — 84484 ASSAY OF TROPONIN QUANT: CPT | Performed by: HOSPITALIST

## 2021-08-02 PROCEDURE — 84132 ASSAY OF SERUM POTASSIUM: CPT | Performed by: HOSPITALIST

## 2021-08-02 PROCEDURE — 82248 BILIRUBIN DIRECT: CPT | Performed by: HOSPITALIST

## 2021-08-02 PROCEDURE — 36415 COLL VENOUS BLD VENIPUNCTURE: CPT | Performed by: HOSPITALIST

## 2021-08-02 PROCEDURE — 250N000013 HC RX MED GY IP 250 OP 250 PS 637: Performed by: HOSPITALIST

## 2021-08-02 PROCEDURE — 82550 ASSAY OF CK (CPK): CPT | Performed by: HOSPITALIST

## 2021-08-02 PROCEDURE — 250N000013 HC RX MED GY IP 250 OP 250 PS 637: Performed by: INTERNAL MEDICINE

## 2021-08-02 PROCEDURE — 99207 PR CDG-MDM COMPONENT: MEETS MODERATE - DOWN CODED: CPT | Performed by: HOSPITALIST

## 2021-08-02 PROCEDURE — 71045 X-RAY EXAM CHEST 1 VIEW: CPT

## 2021-08-02 PROCEDURE — 258N000003 HC RX IP 258 OP 636: Performed by: HOSPITALIST

## 2021-08-02 PROCEDURE — 83735 ASSAY OF MAGNESIUM: CPT | Performed by: INTERNAL MEDICINE

## 2021-08-02 RX ORDER — POTASSIUM CHLORIDE 1500 MG/1
20 TABLET, EXTENDED RELEASE ORAL ONCE
Status: COMPLETED | OUTPATIENT
Start: 2021-08-02 | End: 2021-08-02

## 2021-08-02 RX ADMIN — POTASSIUM CHLORIDE 20 MEQ: 1500 TABLET, EXTENDED RELEASE ORAL at 14:18

## 2021-08-02 RX ADMIN — FERROUS SULFATE TAB 325 MG (65 MG ELEMENTAL FE) 325 MG: 325 (65 FE) TAB at 20:07

## 2021-08-02 RX ADMIN — PAROXETINE HYDROCHLORIDE 20 MG: 20 TABLET, FILM COATED ORAL at 09:09

## 2021-08-02 RX ADMIN — CALCIUM 500 MG: 500 TABLET ORAL at 09:10

## 2021-08-02 RX ADMIN — PHENAZOPYRIDINE HYDROCHLORIDE 100 MG: 100 TABLET ORAL at 09:10

## 2021-08-02 RX ADMIN — POLYETHYLENE GLYCOL 3350 17 G: 17 POWDER, FOR SOLUTION ORAL at 09:11

## 2021-08-02 RX ADMIN — OXYCODONE HYDROCHLORIDE 5 MG: 5 TABLET ORAL at 01:54

## 2021-08-02 RX ADMIN — OXYCODONE HYDROCHLORIDE 2.5 MG: 5 TABLET ORAL at 22:12

## 2021-08-02 RX ADMIN — OXYCODONE HYDROCHLORIDE 2.5 MG: 5 TABLET ORAL at 14:19

## 2021-08-02 RX ADMIN — SODIUM CHLORIDE: 9 INJECTION, SOLUTION INTRAVENOUS at 01:27

## 2021-08-02 RX ADMIN — METFORMIN HYDROCHLORIDE 500 MG: 500 TABLET, FILM COATED ORAL at 09:10

## 2021-08-02 RX ADMIN — Medication 1 MG: at 22:12

## 2021-08-02 RX ADMIN — ACETAMINOPHEN 975 MG: 325 TABLET, FILM COATED ORAL at 06:05

## 2021-08-02 RX ADMIN — SODIUM CHLORIDE: 9 INJECTION, SOLUTION INTRAVENOUS at 12:17

## 2021-08-02 RX ADMIN — ACETAMINOPHEN 975 MG: 325 TABLET, FILM COATED ORAL at 22:12

## 2021-08-02 RX ADMIN — QUETIAPINE FUMARATE 25 MG: 25 TABLET ORAL at 09:10

## 2021-08-02 RX ADMIN — PHENAZOPYRIDINE HYDROCHLORIDE 100 MG: 100 TABLET ORAL at 17:52

## 2021-08-02 RX ADMIN — METHOCARBAMOL 500 MG: 500 TABLET ORAL at 01:54

## 2021-08-02 RX ADMIN — METHOCARBAMOL 500 MG: 500 TABLET ORAL at 17:56

## 2021-08-02 RX ADMIN — RISPERIDONE 1 MG: 1 TABLET ORAL at 14:19

## 2021-08-02 RX ADMIN — PHENAZOPYRIDINE HYDROCHLORIDE 100 MG: 100 TABLET ORAL at 12:17

## 2021-08-02 RX ADMIN — ACETAMINOPHEN 975 MG: 325 TABLET, FILM COATED ORAL at 14:18

## 2021-08-02 RX ADMIN — QUETIAPINE FUMARATE 25 MG: 25 TABLET ORAL at 16:43

## 2021-08-02 RX ADMIN — CEFTRIAXONE SODIUM 1 G: 1 INJECTION, POWDER, FOR SOLUTION INTRAMUSCULAR; INTRAVENOUS at 14:19

## 2021-08-02 RX ADMIN — POTASSIUM CHLORIDE 20 MEQ: 1500 TABLET, EXTENDED RELEASE ORAL at 09:11

## 2021-08-02 RX ADMIN — FERROUS SULFATE TAB 325 MG (65 MG ELEMENTAL FE) 325 MG: 325 (65 FE) TAB at 09:10

## 2021-08-02 RX ADMIN — METHOCARBAMOL 500 MG: 500 TABLET ORAL at 09:10

## 2021-08-02 RX ADMIN — CALCIUM 500 MG: 500 TABLET ORAL at 17:53

## 2021-08-02 RX ADMIN — OXYCODONE HYDROCHLORIDE 5 MG: 5 TABLET ORAL at 06:05

## 2021-08-02 RX ADMIN — RISPERIDONE 4 MG: 2 TABLET ORAL at 20:07

## 2021-08-02 RX ADMIN — LIDOCAINE 1 PATCH: 560 PATCH PERCUTANEOUS; TOPICAL; TRANSDERMAL at 20:07

## 2021-08-02 RX ADMIN — THERA TABS 1 TABLET: TAB at 09:10

## 2021-08-02 ASSESSMENT — ACTIVITIES OF DAILY LIVING (ADL)
ADLS_ACUITY_SCORE: 18

## 2021-08-02 ASSESSMENT — MIFFLIN-ST. JEOR: SCORE: 916.5

## 2021-08-02 NOTE — PROVIDER NOTIFICATION
Hospitalist Cross Cover    RN paged regarding this patient with known rib fractures.  On visual inspection of the patient's back, RN wonders if the lungs are expanding symmetrically.  There are no changes in vital signs.  Patient is not reporting any increased distress.  Patient has normal oxygen saturations on room air, does not require supplemental oxygen.    Chest x-ray done earlier today shows acute lateral fractures involving the eighth through 10th ribs.  There was no discernible pneumothorax.  There is no pleural effusion or consolidation.  Patient does have a large hiatal hernia.    Action:  Rib fracture order set.    Radha Fernandes MD  Hospitalist  [facility]  Text Page (7am - 6pm, M-F)

## 2021-08-02 NOTE — PROGRESS NOTES
"MD Notification    Notified Person: MD    Notified Person Name: Kelly    Notification Date/Time: 8/1 1730    Notification Interaction: paged    Purpose of Notification: FYI rib/chest fx results - \"Acute lateral right eighth through 10th rib fractures. The ninth and 10th rib fractures are displaced. Osteopenia.\"    Orders Received: no new orders    Comments: no call back      "

## 2021-08-02 NOTE — PROGRESS NOTES
[facility]    Medicine Progress Note - Hospitalist Service       Date of Admission:  7/31/2021    Assessment & Plan         Angelica James is a 76 year old female with history of dementia and schizophrenia who lives at an assisted living facility.  Apparently she fell yesterday and today was very weak and unable to ambulate so she was sent to the emergency room via ambulance.  In the emergency room she had stable vital signs and a nonfocal neurologic exam.  She was found to be hyponatremic and UA appears to show a urinary tract infection.  She also had a elevated total CK with a normal troponin.  While in the emergency room she had a short run of a narrow complex tachycardia which was asymptomatic.  She is being admitted for further evaluation.    Status post fall   Generalized weakness   Acute right eighth, ninth and tenth rib fractures  Pain is under decent control with oxycodone.      PT and OT     Continue oxycodone ordered    Pulmonary toileting     Episode of SVT versus atrial fibrillation   History of hypertension   Unclear from the short run if this is atrial fibrillation or SVT.  She is not on antihypertensive medication at home    Continue telemetry    Monitor heart rate and blood pressure    Elevated CK, normal troponin  CK about the same today.  Likely rhabdomyolysis but level not decreasing ?inflammatory. No muscle pain, rash, etc.  Its unclear if she was on the ground for a while after her fall.  She is not on a statin.    Continue IV fluid and monitor     Hyponatremia, acute   Likely acute and now corrected.    Continue IV normal saline for rhabdomyolysis    Normocytic anemia   Mild iron deficiency  History of iron deficiency anemia   Hemoglobin   Date Value Ref Range Status   07/31/2021 9.1 (L) 11.7 - 15.7 g/dL Final   08/18/2019 10.4 (L) 11.7 - 15.7 g/dL Final   08/14/2019 9.9 (L) 11.7 - 15.7 g/dL Final   Iron levels, B12, folate ok- iron saturation index is a little  low. Fecal occult blood not done yet.    Monitor hemoglobin    Can resume iron at discharge     Pyuria with negative urine culture   Microscopic hematuria  Urinary frequency   Still having frequency. No improvement with ceftriaxone and Pyridium.      Will ask urologist to see her     Type 2 diabetes mellitus     Continue prior to admission metformin    Aspart insulin correction scale ordered    Dementia   Schizophrenia     Continue prior to admission medications for now: paroxetine, quetiapine and resperidone     Diet: Combination Diet Regular Diet Adult; Consistent Carb 60 Grams CHO per Meal Diet  Room Service    DVT Prophylaxis: Enoxaparin (Lovenox) SQ  Darby Catheter: Not present  Central Lines: None  Code Status: No CPR- Do NOT Intubate      Disposition Plan   Expected discharge:2-3 days recommended to prior living arrangement once antibiotic plan established.     The patient's care was discussed with the Bedside Nurse and Patient.    Jarred Mendoza MD  Hospitalist Service  [facility]  Securely message with the Vocera Web Console (learn more here)  Text page via "[employer] Paging/Directory      Clinically Significant Risk Factors Present on Admission                ______________________________________________________________________    Interval History   Having right rib pain. Having urinary frequency.    Data reviewed today: I reviewed all medications, new labs and imaging results over the last 24 hours. I personally reviewed no images or EKG's today.    Physical Exam   Vital Signs: Temp: 98  F (36.7  C) Temp src: Oral BP: (!) 173/83 Pulse: 85   Resp: 16 SpO2: 96 % O2 Device: None (Room air)    Weight: 113 lbs 8.59 oz  Constitutional: awake, alert, cooperative, no apparent distress  Respiratory: No increased work of breathing, good air exchange, clear to auscultation bilaterally, no crackles or wheezing  Cardiovascular:  regular rate and rhythm, normal S1 and S2, no S3 or S4, and  no murmur noted  GI: normal bowel sounds, soft, non-distended, non-tender,  Musculoskeletal: tender over right lateral ribs  Neuropsychiatric: General: normal, calm and normal eye contact    Data   Recent Labs   Lab 08/02/21  1304 08/02/21  1213 08/02/21  0836 08/02/21  0816 08/02/21  0709 08/01/21  1033 07/31/21  1811 07/31/21  1238   WBC  --   --   --   --  6.9 7.7  --  16.1*   HGB  --   --   --   --  8.9* 8.8*  --  9.1*   MCV  --   --   --   --  92 92  --  88   PLT  --   --   --   --  227 227  --  245   NA  --   --   --   --  139 138  --  126*   POTASSIUM 3.4  --  3.3*  --  3.2* 3.4  --  3.7   CHLORIDE  --   --   --   --  108 107  --  93*   CO2  --   --   --   --  29 28  --  28   BUN  --   --   --   --  13 15  --  18   CR  --   --   --   --  0.58 0.67 0.62 0.57   ANIONGAP  --   --   --   --  2* 3  --  5   SHERI  --   --   --   --  8.1* 8.1*  --  8.6   GLC  --  90  --  105* 107* 135*  --  112*   ALBUMIN  --   --  3.1*  --   --   --   --   --    PROTTOTAL  --   --  6.5*  --   --   --   --   --    BILITOTAL  --   --  0.6  --   --   --   --   --    ALKPHOS  --   --  42  --   --   --   --   --    ALT  --   --  41  --   --   --   --   --    AST  --   --  51*  --   --   --   --   --    TROPONIN  --   --   --   --  <0.015  --   --  0.038     Recent Results (from the past 24 hour(s))   XR Ribs & Chest Right G/E 3 Views    Narrative    EXAM: XR RIBS and CHEST RT 3VW  LOCATION: [facility]  DATE/TIME: 8/1/2021 4:34 PM    INDICATION: rib pain and fractures on shoulder x-ray  COMPARISON: None.      Impression    IMPRESSION: Acute lateral right eighth through 10th rib fractures. The ninth and 10th rib fractures are displaced. Osteopenia. No discernible pneumothorax. No pleural effusion or consolidation. Aortic atherosclerosis. Large hiatal hernia.   XR Chest 1 View    Narrative    CHEST ONE VIEW  8/2/2021 7:54 AM     HISTORY: Trauma patient with rib fracture(s).    COMPARISON: August 1, 2021       Impression    IMPRESSION: No pneumothorax evident. Fractures at the right lateral  lower rib cage again noted and unchanged. Left lung clear.     Medications     sodium chloride 100 mL/hr at 08/02/21 1217       acetaminophen  975 mg Oral Q8H     calcium carbonate 500 mg (elemental)  1 tablet Oral BID w/meals     cefTRIAXone  1 g Intravenous Q24H     enoxaparin ANTICOAGULANT  40 mg Subcutaneous Q24H     ferrous sulfate  325 mg Oral BID     insulin aspart  1-7 Units Subcutaneous TID AC     insulin aspart  1-5 Units Subcutaneous At Bedtime     lidocaine  1 patch Transdermal Q24H     lidocaine   Transdermal Q8H     metFORMIN  500 mg Oral Daily with breakfast     multivitamin, therapeutic  1 tablet Oral Daily     PARoxetine  20 mg Oral QAM     phenazopyridine  100 mg Oral TID w/meals     polyethylene glycol  17 g Oral Daily     QUEtiapine  25 mg Oral BID     risperiDONE  1 mg Oral Daily     risperiDONE  4 mg Oral QPM     sodium chloride (PF)  3 mL Intracatheter Q8H

## 2021-08-02 NOTE — PLAN OF CARE
Disoriented to time, very forgetful and impulsive.  Hypertensive.  C/O pain, Robaxin and tylenol given.  Pt up frequently to urinate, cont pyridium.  Blood noted in urine, MD aware.  Potassium replaced per protocol, recheck this evening.  Shallow respirations, lungs diminished, unable to properly perform acapella or IS.  Blood sugars stable.  Tele NSR.  Up with SBA.

## 2021-08-02 NOTE — PLAN OF CARE
8/1/21 2822-1771    Summary: AMS and fall from half-way   Primary diagnosis: UTI, rib fx/displaced/Rhabdo  Orientation: A&O x1. Hx dementia and schizophrenia. Omani speaking but speaks and understands english.   Aggression Stop Light: yellow  Mobility: A1SB  Pain Management: Tylenol/oxycodone/Robaxin  Diet: Consistent carb  Bowel/Bladder: voiding frequently with urgency - pyridium started. Bright red blood noted in brief.  Abnormal Lab/Assessments: Hgb 8.8, CK 1257, BG 94 at 0200, UA + rib&chest XRay - R 8-10 rib fx, 9-10 displaced. Blood in brief (not orange pyridium)- UA ordered  Drain/Device/Wound: PIV infusing at 100 ml/hr  Consults: none  D/C Day/Goals/Place: pending when off abx, and PT/OT evaluated    Shift Note: Pt here with rib fx/displaced & UTI. Disoriented to time-very forgetful. Severe urinary frequency/urgency during awake periods. Started on pyridium. Urine orange from pyridium but also note fresh blood in brief./urine MD notified. UA ordered. Diminished LS. VSS- slight HTN. Tele NSR. 60g CHO diet with thin liquids. 1ASB-IV pole - very restless. Continent of bladder IS & flutter valve encouraged, not well tolerated - at times refusing. Max is to 100. RT following. Pt scoring yellow on the Aggression Stop Light Tool - setting off bed alarm frequently. Echo done EF 65-70%. Plan to encourage IS/flutter valve. RT may try CPAP . Discharge to TCU pending. STILL NEED STOOL and URINE

## 2021-08-02 NOTE — PROGRESS NOTES
MD Notification    Notified Person: MD    Notified Person Name: Dom    Notification Date/Time: 8/1 1900    Notification Interaction: paged    Purpose of Notification: pt with increase work of breathing & pain compared to this AM.     Orders Received:   - negative inspiratory force/FVC  -encourage PO intact  - Robaxin PRN Q6  Etc.    Comments:

## 2021-08-02 NOTE — PROGRESS NOTES
Worked with patient on Flutter, IS, NIF and VC. NIF of -20 and VC of 685. Patient did not tolerate IS or Flutter at all. Patient had trouble understanding my instructions and did not perform any of the tests/exercises well. I was unable to accurately measure the NIF and VC so the need for CPAP could not be established. Patients SpO2 is in the mid to high 90's on room air and respirations are normal. Patient does not appear to be having respiratory issues at this time. I will pass on this information to the next RT and see if they are able to get better results on the tests.   8/2/2021  Martha Hairston

## 2021-08-02 NOTE — PROVIDER NOTIFICATION
MD Notification    Notified Person: MD    Notified Person Name: Kelly    Notification Date/Time:8/2/2021 at 0821    Notification Interaction: spoke with MD    Purpose of Notification:Critical CK 1132, potassium 3.2    Orders Received:MD aware.     Comments:

## 2021-08-02 NOTE — PLAN OF CARE
Disoriented to time, pt reports feeling anxious and afraid, sitter at bedside. VSS ex. SBP 160s-170s. CMS intact. Tele SR. Very frequently voiding small amounts in BR, approx q5mins. IVF NS at 100ml/hr.R arm skin tear dressing CDI. Robaxin and given for pain. Ambulates A1. Tolerating consistent carb diet. BG 99. Plan for urology consult. Discharge pending.

## 2021-08-02 NOTE — PROGRESS NOTES
MD Notification    Notified Person: MD    Notified Person Name: MD Jeff    Notification Date/Time: 8/2/21 1834    Notification Interaction: web page    Purpose of Notification:  Blood in urine and brief. VSS, afebrile,  RR 16-18, SpO2 96 on RA. Also, she is not able or willing to use her IS/acapella. (rib fx/displaced). RT following    Orders Received:

## 2021-08-02 NOTE — CONSULTS
Care Management Initial Consult    General Information  Assessment completed with: Kadie Gonzalez   Type of CM/SW Visit: Initial Assessment    Primary Care Provider verified and updated as needed: Yes   Readmission within the last 30 days: no previous admission in last 30 days      Reason for Consult: discharge planning  Advance Care Planning:            Communication Assessment  Patient's communication style: spoken language (non-English)    Hearing Difficulty or Deaf: no   Wear Glasses or Blind: no    Cognitive  Cognitive/Neuro/Behavioral: .WDL except  Level of Consciousness: alert  Arousal Level: opens eyes spontaneously  Orientation: disoriented to, time  Mood/Behavior: anxious  Best Language: 0 - No aphasia  Speech: rambling    Living Environment:   [facility]  Current living Arrangements: assisted living (Memory Care)  Name of Facility: [facility]   Able to return to prior arrangements: yes       Family/Social Support:  Care provided by: other (see comments) (Facility Staff)  Provides care for: no one, unable/limited ability to care for self  Marital Status:   Children          Description of Support System: Supportive, Involved    Support Assessment: Adequate family and caregiver support, Adequate social supports    Current Resources:   Patient receiving home care services:       Community Resources:    Equipment currently used at home: grab bar, toilet, shower chair, walker, rolling  Supplies currently used at home:      Employment/Financial:  Employment Status:          Financial Concerns:             Lifestyle & Psychosocial Needs:  Social Determinants of Health     Tobacco Use:      Smoking Tobacco Use:      Smokeless Tobacco Use:    Alcohol Use:      Frequency of Alcohol Consumption:      Average Number of Drinks:      Frequency of Binge Drinking:    Financial Resource Strain:      Difficulty of Paying Living Expenses:    Food Insecurity:       Worried About Running Out of Food in the Last Year:      Ran Out of Food in the Last Year:    Transportation Needs:      Lack of Transportation (Medical):      Lack of Transportation (Non-Medical):    Physical Activity:      Days of Exercise per Week:      Minutes of Exercise per Session:    Stress:      Feeling of Stress :    Social Connections:      Frequency of Communication with Friends and Family:      Frequency of Social Gatherings with Friends and Family:      Attends Tenriism Services:      Active Member of Clubs or Organizations:      Attends Club or Organization Meetings:      Marital Status:    Intimate Partner Violence:      Fear of Current or Ex-Partner:      Emotionally Abused:      Physically Abused:      Sexually Abused:    Depression: Not at risk     PHQ-2 Score: 0   Housing Stability:      Unable to Pay for Housing in the Last Year:      Number of Places Lived in the Last Year:      Unstable Housing in the Last Year:        Functional Status:  Prior to admission patient needed assistance:              Mental Health Status:          Chemical Dependency Status:                Values/Beliefs:  Spiritual, Cultural Beliefs, Tenriism Practices, Values that affect care:                 Additional Information:  Writer received consult for discharge planning. Writer reviewed notes and TCU is recommended. Writer called patients gregg Jj and introduced self and role. Writer explained the recommendations for TCU at discharge. Writer sent email to patients daughter with a list of facilities and information on Medicare.gov. Daughter will email back or call with what facilities she would like referrals sent to.     SUSANA Garcia

## 2021-08-03 ENCOUNTER — APPOINTMENT (OUTPATIENT)
Dept: CT IMAGING | Facility: CLINIC | Age: 76
DRG: 184 | End: 2021-08-03
Attending: PHYSICIAN ASSISTANT
Payer: COMMERCIAL

## 2021-08-03 ENCOUNTER — APPOINTMENT (OUTPATIENT)
Dept: OCCUPATIONAL THERAPY | Facility: CLINIC | Age: 76
DRG: 184 | End: 2021-08-03
Payer: COMMERCIAL

## 2021-08-03 ENCOUNTER — APPOINTMENT (OUTPATIENT)
Dept: GENERAL RADIOLOGY | Facility: CLINIC | Age: 76
DRG: 184 | End: 2021-08-03
Attending: INTERNAL MEDICINE
Payer: COMMERCIAL

## 2021-08-03 LAB
ANION GAP SERPL CALCULATED.3IONS-SCNC: 1 MMOL/L (ref 3–14)
BACTERIA UR CULT: NO GROWTH
BASOPHILS # BLD AUTO: 0 10E3/UL (ref 0–0.2)
BASOPHILS # BLD AUTO: 0 10E3/UL (ref 0–0.2)
BASOPHILS NFR BLD AUTO: 0 %
BASOPHILS NFR BLD AUTO: 0 %
BUN SERPL-MCNC: 7 MG/DL (ref 7–30)
CALCIUM SERPL-MCNC: 8.5 MG/DL (ref 8.5–10.1)
CHLORIDE BLD-SCNC: 106 MMOL/L (ref 94–109)
CK SERPL-CCNC: 1141 U/L (ref 30–225)
CO2 SERPL-SCNC: 32 MMOL/L (ref 20–32)
CREAT SERPL-MCNC: 0.52 MG/DL (ref 0.52–1.04)
EOSINOPHIL # BLD AUTO: 0.1 10E3/UL (ref 0–0.7)
EOSINOPHIL # BLD AUTO: 0.1 10E3/UL (ref 0–0.7)
EOSINOPHIL NFR BLD AUTO: 1 %
EOSINOPHIL NFR BLD AUTO: 1 %
ERYTHROCYTE [DISTWIDTH] IN BLOOD BY AUTOMATED COUNT: 13.8 % (ref 10–15)
ERYTHROCYTE [DISTWIDTH] IN BLOOD BY AUTOMATED COUNT: 13.8 % (ref 10–15)
GFR SERPL CREATININE-BSD FRML MDRD: >90 ML/MIN/1.73M2
GLUCOSE BLD-MCNC: 124 MG/DL (ref 70–99)
GLUCOSE BLDC GLUCOMTR-MCNC: 143 MG/DL (ref 70–99)
GLUCOSE BLDC GLUCOMTR-MCNC: 156 MG/DL (ref 70–99)
GLUCOSE BLDC GLUCOMTR-MCNC: 159 MG/DL (ref 70–99)
GLUCOSE BLDC GLUCOMTR-MCNC: 162 MG/DL (ref 70–99)
GLUCOSE BLDC GLUCOMTR-MCNC: 64 MG/DL (ref 70–99)
HCT VFR BLD AUTO: 27.4 % (ref 35–47)
HCT VFR BLD AUTO: 28.2 % (ref 35–47)
HGB BLD-MCNC: 8.9 G/DL (ref 11.7–15.7)
HGB BLD-MCNC: 9 G/DL (ref 11.7–15.7)
IMM GRANULOCYTES # BLD: 0 10E3/UL
IMM GRANULOCYTES # BLD: 0.1 10E3/UL
IMM GRANULOCYTES NFR BLD: 0 %
IMM GRANULOCYTES NFR BLD: 1 %
LACTATE SERPL-SCNC: 0.5 MMOL/L (ref 0.7–2)
LACTATE SERPL-SCNC: 1.2 MMOL/L (ref 0.7–2)
LYMPHOCYTES # BLD AUTO: 1 10E3/UL (ref 0.8–5.3)
LYMPHOCYTES # BLD AUTO: 1.7 10E3/UL (ref 0.8–5.3)
LYMPHOCYTES NFR BLD AUTO: 15 %
LYMPHOCYTES NFR BLD AUTO: 22 %
MAGNESIUM SERPL-MCNC: 2 MG/DL (ref 1.6–2.3)
MCH RBC QN AUTO: 29.6 PG (ref 26.5–33)
MCH RBC QN AUTO: 30.1 PG (ref 26.5–33)
MCHC RBC AUTO-ENTMCNC: 31.9 G/DL (ref 31.5–36.5)
MCHC RBC AUTO-ENTMCNC: 32.5 G/DL (ref 31.5–36.5)
MCV RBC AUTO: 93 FL (ref 78–100)
MCV RBC AUTO: 93 FL (ref 78–100)
MONOCYTES # BLD AUTO: 0.7 10E3/UL (ref 0–1.3)
MONOCYTES # BLD AUTO: 0.8 10E3/UL (ref 0–1.3)
MONOCYTES NFR BLD AUTO: 10 %
MONOCYTES NFR BLD AUTO: 11 %
NEUTROPHILS # BLD AUTO: 4.9 10E3/UL (ref 1.6–8.3)
NEUTROPHILS # BLD AUTO: 5 10E3/UL (ref 1.6–8.3)
NEUTROPHILS NFR BLD AUTO: 66 %
NEUTROPHILS NFR BLD AUTO: 73 %
NRBC # BLD AUTO: 0 10E3/UL
NRBC # BLD AUTO: 0 10E3/UL
NRBC BLD AUTO-RTO: 0 /100
NRBC BLD AUTO-RTO: 0 /100
PHOSPHATE SERPL-MCNC: 2.6 MG/DL (ref 2.5–4.5)
PLATELET # BLD AUTO: 250 10E3/UL (ref 150–450)
PLATELET # BLD AUTO: 261 10E3/UL (ref 150–450)
POTASSIUM BLD-SCNC: 3.5 MMOL/L (ref 3.4–5.3)
RBC # BLD AUTO: 2.96 10E6/UL (ref 3.8–5.2)
RBC # BLD AUTO: 3.04 10E6/UL (ref 3.8–5.2)
SODIUM SERPL-SCNC: 139 MMOL/L (ref 133–144)
WBC # BLD AUTO: 6.9 10E3/UL (ref 4–11)
WBC # BLD AUTO: 7.5 10E3/UL (ref 4–11)

## 2021-08-03 PROCEDURE — 85025 COMPLETE CBC W/AUTO DIFF WBC: CPT | Performed by: HOSPITALIST

## 2021-08-03 PROCEDURE — 258N000003 HC RX IP 258 OP 636: Performed by: HOSPITALIST

## 2021-08-03 PROCEDURE — 250N000011 HC RX IP 250 OP 636: Performed by: HOSPITALIST

## 2021-08-03 PROCEDURE — 83735 ASSAY OF MAGNESIUM: CPT | Performed by: INTERNAL MEDICINE

## 2021-08-03 PROCEDURE — 999N000128 HC STATISTIC PERIPHERAL IV START W/O US GUIDANCE

## 2021-08-03 PROCEDURE — 97535 SELF CARE MNGMENT TRAINING: CPT | Mod: GO | Performed by: OCCUPATIONAL THERAPIST

## 2021-08-03 PROCEDURE — 83605 ASSAY OF LACTIC ACID: CPT | Performed by: HOSPITALIST

## 2021-08-03 PROCEDURE — 258N000001 HC RX 258: Performed by: HOSPITALIST

## 2021-08-03 PROCEDURE — 999N000157 HC STATISTIC RCP TIME EA 10 MIN

## 2021-08-03 PROCEDURE — 99207 PR CDG-MDM COMPONENT: MEETS MODERATE - DOWN CODED: CPT | Performed by: HOSPITALIST

## 2021-08-03 PROCEDURE — 71045 X-RAY EXAM CHEST 1 VIEW: CPT

## 2021-08-03 PROCEDURE — 80048 BASIC METABOLIC PNL TOTAL CA: CPT | Performed by: HOSPITALIST

## 2021-08-03 PROCEDURE — 84100 ASSAY OF PHOSPHORUS: CPT | Performed by: INTERNAL MEDICINE

## 2021-08-03 PROCEDURE — 120N000001 HC R&B MED SURG/OB

## 2021-08-03 PROCEDURE — 36415 COLL VENOUS BLD VENIPUNCTURE: CPT | Performed by: HOSPITALIST

## 2021-08-03 PROCEDURE — 99232 SBSQ HOSP IP/OBS MODERATE 35: CPT | Performed by: HOSPITALIST

## 2021-08-03 PROCEDURE — 250N000009 HC RX 250: Performed by: HOSPITALIST

## 2021-08-03 PROCEDURE — 250N000013 HC RX MED GY IP 250 OP 250 PS 637: Performed by: HOSPITALIST

## 2021-08-03 PROCEDURE — 99221 1ST HOSP IP/OBS SF/LOW 40: CPT | Performed by: PHYSICIAN ASSISTANT

## 2021-08-03 PROCEDURE — 250N000012 HC RX MED GY IP 250 OP 636 PS 637: Performed by: HOSPITALIST

## 2021-08-03 PROCEDURE — 82550 ASSAY OF CK (CPK): CPT | Performed by: HOSPITALIST

## 2021-08-03 PROCEDURE — 250N000013 HC RX MED GY IP 250 OP 250 PS 637: Performed by: INTERNAL MEDICINE

## 2021-08-03 PROCEDURE — 74178 CT ABD&PLV WO CNTR FLWD CNTR: CPT

## 2021-08-03 RX ORDER — IOPAMIDOL 755 MG/ML
120 INJECTION, SOLUTION INTRAVASCULAR ONCE
Status: COMPLETED | OUTPATIENT
Start: 2021-08-03 | End: 2021-08-03

## 2021-08-03 RX ADMIN — CEFTRIAXONE SODIUM 1 G: 1 INJECTION, POWDER, FOR SOLUTION INTRAMUSCULAR; INTRAVENOUS at 14:19

## 2021-08-03 RX ADMIN — PHENAZOPYRIDINE HYDROCHLORIDE 100 MG: 100 TABLET ORAL at 12:42

## 2021-08-03 RX ADMIN — QUETIAPINE FUMARATE 25 MG: 25 TABLET ORAL at 22:13

## 2021-08-03 RX ADMIN — SODIUM CHLORIDE 100 ML: 9 INJECTION, SOLUTION INTRAVENOUS at 10:08

## 2021-08-03 RX ADMIN — ACETAMINOPHEN 975 MG: 325 TABLET, FILM COATED ORAL at 06:26

## 2021-08-03 RX ADMIN — QUETIAPINE FUMARATE 25 MG: 25 TABLET ORAL at 16:04

## 2021-08-03 RX ADMIN — RISPERIDONE 4 MG: 2 TABLET ORAL at 21:10

## 2021-08-03 RX ADMIN — PHENAZOPYRIDINE HYDROCHLORIDE 100 MG: 100 TABLET ORAL at 08:16

## 2021-08-03 RX ADMIN — PAROXETINE HYDROCHLORIDE 20 MG: 20 TABLET, FILM COATED ORAL at 08:16

## 2021-08-03 RX ADMIN — FERROUS SULFATE TAB 325 MG (65 MG ELEMENTAL FE) 325 MG: 325 (65 FE) TAB at 21:11

## 2021-08-03 RX ADMIN — CALCIUM 500 MG: 500 TABLET ORAL at 17:38

## 2021-08-03 RX ADMIN — ACETAMINOPHEN 975 MG: 325 TABLET, FILM COATED ORAL at 21:11

## 2021-08-03 RX ADMIN — METFORMIN HYDROCHLORIDE 500 MG: 500 TABLET, FILM COATED ORAL at 08:16

## 2021-08-03 RX ADMIN — OXYCODONE HYDROCHLORIDE 2.5 MG: 5 TABLET ORAL at 06:26

## 2021-08-03 RX ADMIN — RISPERIDONE 1 MG: 1 TABLET ORAL at 14:28

## 2021-08-03 RX ADMIN — INSULIN ASPART 1 UNITS: 100 INJECTION, SOLUTION INTRAVENOUS; SUBCUTANEOUS at 12:43

## 2021-08-03 RX ADMIN — DEXTROSE MONOHYDRATE 25 ML: 500 INJECTION PARENTERAL at 02:14

## 2021-08-03 RX ADMIN — INSULIN ASPART 1 UNITS: 100 INJECTION, SOLUTION INTRAVENOUS; SUBCUTANEOUS at 17:38

## 2021-08-03 RX ADMIN — CALCIUM 500 MG: 500 TABLET ORAL at 08:16

## 2021-08-03 RX ADMIN — SODIUM CHLORIDE: 9 INJECTION, SOLUTION INTRAVENOUS at 10:49

## 2021-08-03 RX ADMIN — THERA TABS 1 TABLET: TAB at 08:16

## 2021-08-03 RX ADMIN — ACETAMINOPHEN 975 MG: 325 TABLET, FILM COATED ORAL at 14:30

## 2021-08-03 RX ADMIN — FERROUS SULFATE TAB 325 MG (65 MG ELEMENTAL FE) 325 MG: 325 (65 FE) TAB at 08:16

## 2021-08-03 RX ADMIN — IOPAMIDOL 120 ML: 755 INJECTION, SOLUTION INTRAVENOUS at 10:08

## 2021-08-03 RX ADMIN — PHENAZOPYRIDINE HYDROCHLORIDE 100 MG: 100 TABLET ORAL at 17:38

## 2021-08-03 RX ADMIN — QUETIAPINE FUMARATE 25 MG: 25 TABLET ORAL at 08:16

## 2021-08-03 RX ADMIN — ENOXAPARIN SODIUM 40 MG: 40 INJECTION SUBCUTANEOUS at 21:11

## 2021-08-03 ASSESSMENT — ACTIVITIES OF DAILY LIVING (ADL)
ADLS_ACUITY_SCORE: 18

## 2021-08-03 NOTE — PROGRESS NOTES
Respiratory care note - NIF: -30,  mLs. Fair effort at VC. Did EzPAP as result of lower volumes. Patient is distracted and not cooperative with EzPAP.

## 2021-08-03 NOTE — PLAN OF CARE
Pt confuses up to the bathroom every 10 min. Had Ct and talked to assisted living. It is normal for her to be in bathroom most of day. In afternoon wanted to go home with sherry.Up walking ,blood glucose 143 and 1 unit(s) insulin.awaiting placement.

## 2021-08-03 NOTE — PROGRESS NOTES
Patient has been up to urinate (little dribbles) multiple times, over 25 times this morning already since 0700. Bladderscanned for only 6mL.

## 2021-08-03 NOTE — PLAN OF CARE
Disoriented to time, forgetful. Requests to call daughter Анна frequently. VSS on RA ex HTN. C/o pain, managed with PRN oxy and lido patch to R ribs. Up SBA frequently to urinate, refused BS. K+ recheck 3.5. Unable to perform acapella or IS. BG at 0200 64, IV dextrose given, recheck 156. Tele SD. Discharge pending.

## 2021-08-03 NOTE — PROGRESS NOTES
[facility]    Medicine Progress Note - Hospitalist Service       Date of Admission:  7/31/2021    Assessment & Plan         Angelica James is a 76 year old female with history of dementia and schizophrenia who lives at an assisted living facility.  Apparently she fell yesterday and today was very weak and unable to ambulate so she was sent to the emergency room via ambulance.  In the emergency room she had stable vital signs and a nonfocal neurologic exam.  She was found to be hyponatremic and UA appears to show a urinary tract infection.  She also had a elevated total CK with a normal troponin.  While in the emergency room she had a short run of a narrow complex tachycardia which was asymptomatic.  She is being admitted for further evaluation.    Status post fall   Generalized weakness   Acute right eighth, ninth and tenth rib fractures  Pain is under decent control with oxycodone.      PT and OT     Continue oxycodone ordered    Pulmonary toileting     Episode of SVT versus atrial fibrillation   History of hypertension   Unclear from the short run if this is atrial fibrillation or SVT.  She is not on antihypertensive medication at home    Continue telemetry    Monitor heart rate and blood pressure    Elevated CK, normal troponin  CK about the same today.  Likely rhabdomyolysis but level not decreasing ?inflammatory. No muscle pain, rash, etc.  Its unclear if she was on the ground for a while after her fall.  She is not on a statin.    Will need to evaluate for myositis    Continue intravenous fluid     Hyponatremia, acute   Likely acute and now corrected.    Normocytic anemia   Mild iron deficiency  History of iron deficiency anemia   Hemoglobin   Date Value Ref Range Status   07/31/2021 9.1 (L) 11.7 - 15.7 g/dL Final   08/18/2019 10.4 (L) 11.7 - 15.7 g/dL Final   08/14/2019 9.9 (L) 11.7 - 15.7 g/dL Final   Iron levels, B12, folate ok- iron saturation index is a little low. Fecal  occult blood not done yet.    Monitor hemoglobin    Can resume iron at discharge     Pyuria with negative urine culture   Microscopic hematuria  Urinary frequency   Still having frequency. No improvement with ceftriaxone and Pyridium.      Urology consulting today     Type 2 diabetes mellitus     Continue prior to admission metformin    Aspart insulin correction scale ordered    Dementia   Schizophrenia     Continue prior to admission medications for now: paroxetine, quetiapine and resperidone     Diet: Combination Diet Regular Diet Adult; Consistent Carb 60 Grams CHO per Meal Diet  Room Service    DVT Prophylaxis: Enoxaparin (Lovenox) SQ  Darby Catheter: Not present  Central Lines: None  Code Status: No CPR- Do NOT Intubate      Disposition Plan   Expected discharge:2-3 days recommended to prior living arrangement once urinary frequency improves     The patient's care was discussed with the Bedside Nurse and Patient.    Jarred Mendoza MD  Hospitalist Service  [facility]  Securely message with the Vocera Web Console (learn more here)  Text page via [email or web address] Paging/Directory      Clinically Significant Risk Factors Present on Admission                ______________________________________________________________________    Interval History   Rib pain is better but still having urinary frequency     Data reviewed today: I reviewed all medications, new labs and imaging results over the last 24 hours. I personally reviewed no images or EKG's today.    Physical Exam   Vital Signs: Temp: 97.5  F (36.4  C) Temp src: Oral BP: (!) 175/85 (patient not able to sit still) Pulse: 93   Resp: 20 SpO2: 94 % O2 Device: None (Room air)    Weight: 113 lbs 8.59 oz  Constitutional: awake, alert, cooperative, no apparent distress  Respiratory: No increased work of breathing, good air exchange, clear to auscultation bilaterally, no crackles or wheezing  Cardiovascular:  regular rate and rhythm, normal S1 and  S2, no S3 or S4, and no murmur noted  GI: normal bowel sounds, soft, non-distended, non-tender,  Musculoskeletal: less tender over right lateral ribs  Skin- healing ecchymoses on left lateral chest wall  Neuropsychiatric: General: a little agitated this morning     Data   Recent Labs   Lab 08/03/21  1121 08/03/21  0714 08/03/21  0235 08/02/21  1938 08/02/21  1304 08/02/21  0836 08/02/21  0709 08/01/21  1033 07/31/21  1737 07/31/21  1238   WBC  --  7.5  --   --   --   --  6.9 7.7  --  16.1*   HGB  --  9.0*  --   --   --   --  8.9* 8.8*  --  9.1*   MCV  --  93  --   --   --   --  92 92  --  88   PLT  --  261  --   --   --   --  227 227  --  245   NA  --  139  --   --   --   --  139 138  --  126*   POTASSIUM  --  3.5  --  3.5 3.4 3.3* 3.2* 3.4  --  3.7   CHLORIDE  --  106  --   --   --   --  108 107  --  93*   CO2  --  32  --   --   --   --  29 28  --  28   BUN  --  7  --   --   --   --  13 15  --  18   CR  --  0.52  --   --   --   --  0.58 0.67   < > 0.57   ANIONGAP  --  1*  --   --   --   --  2* 3  --  5   SHERI  --  8.5  --   --   --   --  8.1* 8.1*  --  8.6   * 124* 156*  --   --   --  107* 135*  --  112*   ALBUMIN  --   --   --   --   --  3.1*  --   --   --   --    PROTTOTAL  --   --   --   --   --  6.5*  --   --   --   --    BILITOTAL  --   --   --   --   --  0.6  --   --   --   --    ALKPHOS  --   --   --   --   --  42  --   --   --   --    ALT  --   --   --   --   --  41  --   --   --   --    AST  --   --   --   --   --  51*  --   --   --   --    TROPONIN  --   --   --   --   --   --  <0.015  --   --  0.038    < > = values in this interval not displayed.     Recent Results (from the past 24 hour(s))   XR Chest 1 View    Narrative    CHEST ONE VIEW August 3, 2021 8:07 AM     HISTORY: Trauma patient with rib fracture(s).    COMPARISON: August 20, 2021.      Impression    IMPRESSION: Lower right rib fractures again noted. No pneumothorax.  Question trace pleural fluid on the right. Left lung clear.    CHRISTIAN  MD TASHA         SYSTEM ID:  [id number]   CT Urogram wo & w Contrast    Narrative    CT ABDOMEN AND PELVIS WITHOUT AND WITH CONTRAST  8/3/2021 10:29 AM      HISTORY: Hematuria, unknown cause.    COMPARISON: None.    TECHNIQUE: Volumetric helical acquisition of CT images from the lung  bases through the symphysis pubis before and after the uneventful  administration of 120 mL Isovue-370 intravenous contrast. Radiation  dose for this scan was reduced using automated exposure control,  adjustment of the mA and/or kV according to patient size, or iterative  reconstruction technique.    FINDINGS: There are no stones seen within either kidney, either  ureter, or the bladder. There is no hydroureter or hydronephrosis.  There is no perinephric fat stranding. Kidneys are normal in size and  configuration. No suspicious filling defects seen in the opacified  intrarenal collecting systems, ureters, or bladder to suggest a  urothelial malignancy. The liver, spleen, adrenal glands, and pancreas  demonstrate no worrisome focal lesion. Tiny benign cyst in the left  kidney. Trace pelvic free fluid. No free air. No definite adenopathy.  There are moderate atherosclerotic changes of the visualized aorta and  its branches. There is no evidence of aortic dissection or aneurysm.  There are no dilated loops of small intestine or large bowel to  suggest ileus or obstruction.The visualized lung bases are  unremarkable. Bone windows reveal no destructive lesions. Minimal  right pleural effusion and associated probable compressive atelectasis  and/or infiltrate. Large hiatal hernia containing stomach and a  portion of the transverse colon. Tiny fluid vs. less likely a bowel  containing right femoral hernia. Additional fat-containing right  inguinal hernia.      Impression    IMPRESSION:   1. No evidence for renal, ureteral, or bladder calculi.  2. No masses or suspicious filling defects within the opacified  urinary collecting system.  3.  Large hiatal hernia containing the stomach and a portion of the  transverse colon.  4. Minimal right pleural effusion and associated probable compressive  atelectasis vs. less likely infiltrate.  5. Tiny probable fluid vs. less likely bowel containing right femoral  hernia. No obstruction.    CHRISTIAN CORDOVA MD         SYSTEM ID:  [id number]     Medications     sodium chloride 100 mL/hr at 08/03/21 1049       acetaminophen  975 mg Oral Q8H     calcium carbonate 500 mg (elemental)  1 tablet Oral BID w/meals     cefTRIAXone  1 g Intravenous Q24H     enoxaparin ANTICOAGULANT  40 mg Subcutaneous Q24H     ferrous sulfate  325 mg Oral BID     insulin aspart  1-7 Units Subcutaneous TID AC     insulin aspart  1-5 Units Subcutaneous At Bedtime     lidocaine  1 patch Transdermal Q24H     lidocaine   Transdermal Q8H     metFORMIN  500 mg Oral Daily with breakfast     multivitamin, therapeutic  1 tablet Oral Daily     PARoxetine  20 mg Oral QAM     phenazopyridine  100 mg Oral TID w/meals     polyethylene glycol  17 g Oral Daily     QUEtiapine  25 mg Oral BID     risperiDONE  1 mg Oral Daily     risperiDONE  4 mg Oral QPM     sodium chloride (PF)  3 mL Intracatheter Q8H

## 2021-08-03 NOTE — CONSULTS
[facility] Consultation by [facility]    Angelica James MRN# [id number]   Age: 76 year old YOB: 1945     Date of Admission:  7/31/2021    Reason for consult: Frequency       Requesting PA/MD: Kelly       Level of consult: Consult, follow and place orders           Assessment and Plan:   Assessment:   Urinary freq, low PVRs  Hematuria      Plan:   CT urogram to r/o stone, eval hematuria/freq.   Updated Dr. Bowman.      ADDENDUM: Nothing concerning on CTU. Low PVRs. Mental health is likely contributing. Could try an anticholinergic if medically able (Detrol LA or Vesicare).  Joana Nicole PA-C  [facility]  [phone]               Chief Complaint:   Fall     History is obtained from the patient and EMR.         History of Present Illness:   This patient is a 76 year old female admitted due to fall and weakness. She has a history of dementia and schizophrenia who lives at an assisted living facility. Initially no urinary symptoms until yesterday. Getting up to void every 5-10 min. This continued over night. UA with hematuria and UC neg. On Lovenox prophy. Unable to obtain history from the patient.               Past Medical History:     Past Medical History:   Diagnosis Date     Dementia      Diabetes (H)      Hypertension      Iron deficiency anemia      PUD (peptic ulcer disease)      Schizophrenia (H)              Past Surgical History:     Past Surgical History:   Procedure Laterality Date     colonoscopy       UPPER GI ENDOSCOPY               Social History:     Social History     Tobacco Use     Smoking status: Never Smoker     Smokeless tobacco: Never Used   Substance Use Topics     Alcohol use: No             Family History:   No family history on file.  Family history reviewed.             Allergies:     Allergies   Allergen Reactions     Penicillins Hives     Tolerates cephalosporins             Medications:     Current Facility-Administered Medications   Medication      acetaminophen (TYLENOL) tablet 975 mg     calcium carbonate 500 mg (elemental) (OSCAL) tablet 500 mg     cefTRIAXone (ROCEPHIN) 1 g vial to attach to  mL bag for ADULTS or NS 50 mL bag for PEDS     glucose gel 15-30 g    Or     dextrose 50 % injection 25-50 mL    Or     glucagon injection 1 mg     enoxaparin ANTICOAGULANT (LOVENOX) injection 40 mg     ferrous sulfate (FEROSUL) tablet 325 mg     insulin aspart (NovoLOG) injection (RAPID ACTING)     insulin aspart (NovoLOG) injection (RAPID ACTING)     Lidocaine (LIDOCARE) 4 % Patch 1 patch     lidocaine (LMX4) cream     lidocaine 1 % 0.1-1 mL     lidocaine patch in PLACE     melatonin tablet 1 mg     metFORMIN (GLUCOPHAGE) tablet 500 mg     methocarbamol (ROBAXIN) tablet 500 mg     multivitamin, therapeutic (THERA-VIT) tablet 1 tablet     naloxone (NARCAN) injection 0.2 mg    Or     naloxone (NARCAN) injection 0.4 mg    Or     naloxone (NARCAN) injection 0.2 mg    Or     naloxone (NARCAN) injection 0.4 mg     ondansetron (ZOFRAN-ODT) ODT tab 4 mg    Or     ondansetron (ZOFRAN) injection 4 mg     oxyCODONE IR (ROXICODONE) half-tab 2.5-5 mg     PARoxetine (PAXIL) tablet 20 mg     phenazopyridine (PYRIDIUM) tablet 100 mg     polyethylene glycol (MIRALAX) Packet 17 g     QUEtiapine (SEROquel) tablet 25 mg     QUEtiapine (SEROquel) tablet 25 mg     risperiDONE (risperDAL) tablet 1 mg     risperiDONE (risperDAL) tablet 4 mg     senna-docusate (SENOKOT-S/PERICOLACE) 8.6-50 MG per tablet 1 tablet    Or     senna-docusate (SENOKOT-S/PERICOLACE) 8.6-50 MG per tablet 2 tablet     sodium chloride (PF) 0.9% PF flush 3 mL     sodium chloride (PF) 0.9% PF flush 3 mL     sodium chloride 0.9% infusion             Review of Systems:   A comprehensive 10-point review of systems was performed and found to be negative except as described in the HPI.     BP (!) 175/85 (BP Location: Left arm)   Pulse 93   Temp 97.5  F (36.4  C) (Oral)   Resp 20   Ht 1.524 m (5')   Wt 51.5 kg  (113 lb 8.6 oz)   SpO2 94%   BMI 22.17 kg/m    PSYCH: NAD  EYES: EOMI  MOUTH: MMM            Data:     Lab Results   Component Value Date    WBC 7.5 08/03/2021    HGB 9.0 (L) 08/03/2021    HCT 28.2 (L) 08/03/2021    MCV 93 08/03/2021     08/03/2021     Lab Results   Component Value Date    CR 0.52 08/03/2021

## 2021-08-04 ENCOUNTER — APPOINTMENT (OUTPATIENT)
Dept: GENERAL RADIOLOGY | Facility: CLINIC | Age: 76
DRG: 184 | End: 2021-08-04
Attending: INTERNAL MEDICINE
Payer: COMMERCIAL

## 2021-08-04 LAB
GLUCOSE BLDC GLUCOMTR-MCNC: 100 MG/DL (ref 70–99)
GLUCOSE BLDC GLUCOMTR-MCNC: 112 MG/DL (ref 70–99)
GLUCOSE BLDC GLUCOMTR-MCNC: 98 MG/DL (ref 70–99)
MAGNESIUM SERPL-MCNC: 2 MG/DL (ref 1.6–2.3)
PHOSPHATE SERPL-MCNC: 2.9 MG/DL (ref 2.5–4.5)

## 2021-08-04 PROCEDURE — 71045 X-RAY EXAM CHEST 1 VIEW: CPT

## 2021-08-04 PROCEDURE — 99232 SBSQ HOSP IP/OBS MODERATE 35: CPT | Performed by: HOSPITALIST

## 2021-08-04 PROCEDURE — 36415 COLL VENOUS BLD VENIPUNCTURE: CPT | Performed by: INTERNAL MEDICINE

## 2021-08-04 PROCEDURE — 250N000013 HC RX MED GY IP 250 OP 250 PS 637: Performed by: INTERNAL MEDICINE

## 2021-08-04 PROCEDURE — 120N000001 HC R&B MED SURG/OB

## 2021-08-04 PROCEDURE — 258N000003 HC RX IP 258 OP 636: Performed by: HOSPITALIST

## 2021-08-04 PROCEDURE — 250N000013 HC RX MED GY IP 250 OP 250 PS 637: Performed by: HOSPITALIST

## 2021-08-04 PROCEDURE — 250N000011 HC RX IP 250 OP 636: Performed by: HOSPITALIST

## 2021-08-04 PROCEDURE — 83735 ASSAY OF MAGNESIUM: CPT | Performed by: INTERNAL MEDICINE

## 2021-08-04 PROCEDURE — 84100 ASSAY OF PHOSPHORUS: CPT | Performed by: INTERNAL MEDICINE

## 2021-08-04 RX ADMIN — OXYCODONE HYDROCHLORIDE 2.5 MG: 5 TABLET ORAL at 12:30

## 2021-08-04 RX ADMIN — RISPERIDONE 4 MG: 2 TABLET ORAL at 20:15

## 2021-08-04 RX ADMIN — ACETAMINOPHEN 975 MG: 325 TABLET, FILM COATED ORAL at 06:14

## 2021-08-04 RX ADMIN — THERA TABS 1 TABLET: TAB at 09:15

## 2021-08-04 RX ADMIN — ENOXAPARIN SODIUM 40 MG: 40 INJECTION SUBCUTANEOUS at 20:15

## 2021-08-04 RX ADMIN — PAROXETINE HYDROCHLORIDE 20 MG: 20 TABLET, FILM COATED ORAL at 09:15

## 2021-08-04 RX ADMIN — POLYETHYLENE GLYCOL 3350 17 G: 17 POWDER, FOR SOLUTION ORAL at 09:15

## 2021-08-04 RX ADMIN — PHENAZOPYRIDINE HYDROCHLORIDE 100 MG: 100 TABLET ORAL at 09:15

## 2021-08-04 RX ADMIN — PHENAZOPYRIDINE HYDROCHLORIDE 100 MG: 100 TABLET ORAL at 12:30

## 2021-08-04 RX ADMIN — ACETAMINOPHEN 975 MG: 325 TABLET, FILM COATED ORAL at 14:37

## 2021-08-04 RX ADMIN — QUETIAPINE FUMARATE 25 MG: 25 TABLET ORAL at 09:15

## 2021-08-04 RX ADMIN — CEFTRIAXONE SODIUM 1 G: 1 INJECTION, POWDER, FOR SOLUTION INTRAMUSCULAR; INTRAVENOUS at 14:38

## 2021-08-04 RX ADMIN — QUETIAPINE FUMARATE 25 MG: 25 TABLET ORAL at 17:47

## 2021-08-04 RX ADMIN — FERROUS SULFATE TAB 325 MG (65 MG ELEMENTAL FE) 325 MG: 325 (65 FE) TAB at 09:15

## 2021-08-04 RX ADMIN — FERROUS SULFATE TAB 325 MG (65 MG ELEMENTAL FE) 325 MG: 325 (65 FE) TAB at 20:15

## 2021-08-04 RX ADMIN — CALCIUM 500 MG: 500 TABLET ORAL at 09:15

## 2021-08-04 RX ADMIN — SODIUM CHLORIDE: 9 INJECTION, SOLUTION INTRAVENOUS at 23:45

## 2021-08-04 RX ADMIN — LIDOCAINE 1 PATCH: 560 PATCH PERCUTANEOUS; TOPICAL; TRANSDERMAL at 20:15

## 2021-08-04 RX ADMIN — METFORMIN HYDROCHLORIDE 500 MG: 500 TABLET, FILM COATED ORAL at 09:15

## 2021-08-04 RX ADMIN — RISPERIDONE 1 MG: 1 TABLET ORAL at 14:38

## 2021-08-04 RX ADMIN — CALCIUM 500 MG: 500 TABLET ORAL at 17:47

## 2021-08-04 RX ADMIN — ACETAMINOPHEN 975 MG: 325 TABLET, FILM COATED ORAL at 22:15

## 2021-08-04 RX ADMIN — PHENAZOPYRIDINE HYDROCHLORIDE 100 MG: 100 TABLET ORAL at 17:47

## 2021-08-04 ASSESSMENT — ACTIVITIES OF DAILY LIVING (ADL)
ADLS_ACUITY_SCORE: 18

## 2021-08-04 NOTE — PROVIDER NOTIFICATION
MD Notification    Person notified:    Person Name: Dr. Mendoza    Date/Time: 8/4/21 at 0842    Interaction: [email or web address] message    Purpose of Notification: Can this patient PLEASE be off tele- it is agitating her?    Orders Received:    Comments:

## 2021-08-04 NOTE — PLAN OF CARE
A&Ox2, disoriented to time and situation. VSS on RA ex HTN. Up to BR SBA every few minutes when awake. Was able to sleep several few hour stretches. Attempted 0200 BG multiple times, patient agitated and refusing. Denies pain. Tele NSR. Discharge pending.
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
## 2021-08-04 NOTE — PROGRESS NOTES
19:20 pt refused  NIF and VC.t seems confused. Pt one on one with a nursing id in the room. BBS: clear/Diminished on RA.

## 2021-08-04 NOTE — PLAN OF CARE
KY pt orientation; scattered thoughts, but appears oriented to self able to communicate needs. Pt is obviously leary and apprehensive of certain people- needs calming reassurance. BP elevated. Frequent trips to the BR or urination r/t UTI or MH dx? BG checks with sliding scale insulin. Mod Cho diet-poor appetite. Purple bruising along right lower rib area radiating upwards. Green bruising to right shoulder area. Takes meds w/out difficulty. Patient care order to take patient outside with sitter.

## 2021-08-04 NOTE — H&P
MD Notification    Notified Person: MD    Notified Person Name: Dr Powell    Notification Date/Time: 815 8/04    Notification Interaction: text page    Purpose of Notification: confirm plan for discharge    Orders Received: continue as planned for DC    Comments: pt lactic acid 3.6 last night. Currently 1.3- pt VSS, denies any pain, dizziness, nausea

## 2021-08-04 NOTE — PROGRESS NOTES
Care Management Follow Up    Length of Stay (days): 4    Expected Discharge Date: 08/05/2021     Concerns to be Addressed: discharge planning     Patient plan of care discussed at interdisciplinary rounds: Yes    Anticipated Discharge Disposition: Transitional Care     Anticipated Discharge Services: None  Anticipated Discharge DME: None    Patient/family educated on Medicare website which has current facility and service quality ratings: yes  Education Provided on the Discharge Plan:    Patient/Family in Agreement with the Plan: yes    Referrals Placed by CM/SW:    Private pay costs discussed: Not applicable    Additional Information:  PAS-RR    D: Per DHS regulation, SW completed and submitted PAS-RR to [place] [employer] Direct Connect via the [employer].  PAS-RR confirmation # is : [id number]    I: SW spoke with pt and they are aware a PAS-RR has been submitted.  SW reviewed with pt that they may be contacted for a follow up appointment within 10 days of hospital discharge if their SNF stay is < 30 days.  Contact information for [facility] was also provided.    A: pt verbalized understanding.    P: Further questions may be directed to [facility] at #[phone], option #4 for PAS-RR staff.    SUSANA Guajardo

## 2021-08-04 NOTE — PROVIDER NOTIFICATION
MD Notification    Notified Person: MD    Notified Person Name: Dr Mendoza    Notification Date/Time: 1500 8/4    Notification Interaction: Text    Purpose of Notification: Pt highly anxious and would like to go outside    Orders Received: Ok for patient to sit outside with long arm attendent     Comments:

## 2021-08-04 NOTE — PROGRESS NOTES
[facility]    Medicine Progress Note - Hospitalist Service       Date of Admission:  7/31/2021    Assessment & Plan         Angelica James is a 76 year old female with history of dementia and schizophrenia who lives at an assisted living facility.  Apparently she fell yesterday and today was very weak and unable to ambulate so she was sent to the emergency room via ambulance.  In the emergency room she had stable vital signs and a nonfocal neurologic exam.  She was found to be hyponatremic and UA appears to show a urinary tract infection.  She also had a elevated total CK with a normal troponin.  While in the emergency room she had a short run of a narrow complex tachycardia which was asymptomatic.  She is being admitted for further evaluation.    Status post fall   Generalized weakness   Acute right eighth, ninth and tenth rib fractures  Pain is under decent control with oxycodone.      PT and OT     Continue oxycodone ordered    Pulmonary toileting     Episode of SVT versus atrial fibrillation   History of hypertension   Unclear from the short run if this is atrial fibrillation or SVT.  She is not on antihypertensive medication at home    Can stop telemetry    Monitor heart rate and blood pressure    Elevated CK, normal troponin  CK about the same today.  Likely rhabdomyolysis but level not decreasing ?inflammatory. No muscle pain, rash, etc.  Its unclear if she was on the ground for a while after her fall.  She is not on a statin.    Will need to evaluate for myositis if stays elevated    Continue intravenous fluid     Hyponatremia, acute   Likely acute and now corrected.    Normocytic anemia   Mild iron deficiency  History of iron deficiency anemia   Hemoglobin   Date Value Ref Range Status   07/31/2021 9.1 (L) 11.7 - 15.7 g/dL Final   08/18/2019 10.4 (L) 11.7 - 15.7 g/dL Final   08/14/2019 9.9 (L) 11.7 - 15.7 g/dL Final   Iron levels, B12, folate ok- iron saturation index is a  little low. Fecal occult blood not done yet.    Monitor hemoglobin    Can resume iron at discharge     Pyuria with negative urine culture   Microscopic hematuria  Urinary frequency   Still having frequency. No improvement with ceftriaxone and Pyridium. She is not having retention. Urology consulted and ordered a CT which was negative.  Anticholinergic could be tried but may worsen mental status. Suspect this is mostly behavioral    Follow up with urology for outpatient cystoscopy     Would complete 2 more days of ceftriaxone    Type 2 diabetes mellitus     Continue prior to admission metformin    Aspart insulin correction scale ordered    Dementia   Schizophrenia     Continue prior to admission medications for now: paroxetine, quetiapine and resperidone     Diet: Combination Diet Regular Diet Adult; Consistent Carb 60 Grams CHO per Meal Diet  Room Service    DVT Prophylaxis: Enoxaparin (Lovenox) SQ  Darby Catheter: Not present  Central Lines: None  Code Status: No CPR- Do NOT Intubate      Disposition Plan   Expected discharge: to prior living arrangement in 1-2 days     The patient's care was discussed with the patient.    Jarred Mendoza MD  Hospitalist Service  [facility]  Securely message with the Vocera Web Console (learn more here)  Text page via [employer] Paging/Directory      Clinically Significant Risk Factors Present on Admission                ______________________________________________________________________    Interval History   Still some rib pain and going to urinate frequently.    Data reviewed today: I reviewed all medications, new labs and imaging results over the last 24 hours. I personally reviewed no images or EKG's today.    Physical Exam   Vital Signs: Temp: (!) 96.6  F (35.9  C) Temp src: Axillary BP: (!) 188/75 Pulse: 95   Resp: 20 SpO2: 91 % O2 Device: None (Room air)    Weight: 113 lbs 8.59 oz  Constitutional: awake, alert, cooperative, no apparent  distress  Respiratory: No increased work of breathing, good air exchange, clear to auscultation bilaterally, no crackles or wheezing  Cardiovascular:  regular rate and rhythm, normal S1 and S2, no S3 or S4, and no murmur noted  GI: normal bowel sounds, soft, non-distended, non-tender,  Musculoskeletal: less tender over right lateral ribs  Skin- healing ecchymoses on left lateral chest wall  Neuropsychiatric: General: calm this morning     Data   Recent Labs   Lab 08/04/21  1208 08/04/21  0749 08/03/21  2109 08/03/21  1550 08/03/21  0714 08/02/21  1938 08/02/21  1304 08/02/21  0836 08/02/21  0709 08/01/21  1033 07/31/21  1737 07/31/21  1238   WBC  --   --   --  6.9 7.5  --   --   --  6.9 7.7  --  16.1*   HGB  --   --   --  8.9* 9.0*  --   --   --  8.9* 8.8*  --  9.1*   MCV  --   --   --  93 93  --   --   --  92 92  --  88   PLT  --   --   --  250 261  --   --   --  227 227  --  245   NA  --   --   --   --  139  --   --   --  139 138  --  126*   POTASSIUM  --   --   --   --  3.5 3.5 3.4 3.3* 3.2* 3.4  --  3.7   CHLORIDE  --   --   --   --  106  --   --   --  108 107  --  93*   CO2  --   --   --   --  32  --   --   --  29 28  --  28   BUN  --   --   --   --  7  --   --   --  13 15  --  18   CR  --   --   --   --  0.52  --   --   --  0.58 0.67   < > 0.57   ANIONGAP  --   --   --   --  1*  --   --   --  2* 3  --  5   SHERI  --   --   --   --  8.5  --   --   --  8.1* 8.1*  --  8.6   * 98 162*  --  124*  --   --   --  107* 135*  --  112*   ALBUMIN  --   --   --   --   --   --   --  3.1*  --   --   --   --    PROTTOTAL  --   --   --   --   --   --   --  6.5*  --   --   --   --    BILITOTAL  --   --   --   --   --   --   --  0.6  --   --   --   --    ALKPHOS  --   --   --   --   --   --   --  42  --   --   --   --    ALT  --   --   --   --   --   --   --  41  --   --   --   --    AST  --   --   --   --   --   --   --  51*  --   --   --   --    TROPONIN  --   --   --   --   --   --   --   --  <0.015  --   --  0.038    <  > = values in this interval not displayed.     Recent Results (from the past 24 hour(s))   XR Chest 1 View    Narrative    XR CHEST 1 VIEW 8/4/2021 11:59 AM    HISTORY: Trauma Patient with Rib Fracture(s)    COMPARISON: 8/3/2021      Impression    IMPRESSION: Right basilar atelectasis/pulmonary contusion is  unchanged. Trace right pleural effusion. Stable right lateral mildly  displaced rib fractures. No pneumothorax. The left lung is clear.  Large hiatal hernia.    DIOMEDES SMITH MD         SYSTEM ID:  [id number]     Medications     sodium chloride 100 mL/hr at 08/03/21 1049       acetaminophen  975 mg Oral Q8H     calcium carbonate 500 mg (elemental)  1 tablet Oral BID w/meals     cefTRIAXone  1 g Intravenous Q24H     enoxaparin ANTICOAGULANT  40 mg Subcutaneous Q24H     ferrous sulfate  325 mg Oral BID     insulin aspart  1-7 Units Subcutaneous TID AC     insulin aspart  1-5 Units Subcutaneous At Bedtime     lidocaine  1 patch Transdermal Q24H     lidocaine   Transdermal Q8H     metFORMIN  500 mg Oral Daily with breakfast     multivitamin, therapeutic  1 tablet Oral Daily     PARoxetine  20 mg Oral QAM     phenazopyridine  100 mg Oral TID w/meals     polyethylene glycol  17 g Oral Daily     QUEtiapine  25 mg Oral BID     risperiDONE  1 mg Oral Daily     risperiDONE  4 mg Oral QPM     sodium chloride (PF)  3 mL Intracatheter Q8H

## 2021-08-05 ENCOUNTER — APPOINTMENT (OUTPATIENT)
Dept: GENERAL RADIOLOGY | Facility: CLINIC | Age: 76
DRG: 184 | End: 2021-08-05
Attending: INTERNAL MEDICINE
Payer: COMMERCIAL

## 2021-08-05 VITALS
TEMPERATURE: 96.8 F | SYSTOLIC BLOOD PRESSURE: 167 MMHG | DIASTOLIC BLOOD PRESSURE: 85 MMHG | RESPIRATION RATE: 16 BRPM | BODY MASS INDEX: 22.29 KG/M2 | OXYGEN SATURATION: 95 % | HEIGHT: 60 IN | WEIGHT: 113.54 LBS | HEART RATE: 83 BPM

## 2021-08-05 LAB
GLUCOSE BLDC GLUCOMTR-MCNC: 109 MG/DL (ref 70–99)
GLUCOSE BLDC GLUCOMTR-MCNC: 82 MG/DL (ref 70–99)

## 2021-08-05 PROCEDURE — 71045 X-RAY EXAM CHEST 1 VIEW: CPT

## 2021-08-05 PROCEDURE — 99239 HOSP IP/OBS DSCHRG MGMT >30: CPT | Performed by: INTERNAL MEDICINE

## 2021-08-05 PROCEDURE — 250N000013 HC RX MED GY IP 250 OP 250 PS 637: Performed by: HOSPITALIST

## 2021-08-05 PROCEDURE — 250N000013 HC RX MED GY IP 250 OP 250 PS 637: Performed by: INTERNAL MEDICINE

## 2021-08-05 RX ORDER — OXYCODONE HYDROCHLORIDE 5 MG/1
2.5 TABLET ORAL 2 TIMES DAILY PRN
Qty: 6 TABLET | Refills: 0 | Status: SHIPPED | DISCHARGE
Start: 2021-08-05 | End: 2021-09-11

## 2021-08-05 RX ADMIN — THERA TABS 1 TABLET: TAB at 09:03

## 2021-08-05 RX ADMIN — FERROUS SULFATE TAB 325 MG (65 MG ELEMENTAL FE) 325 MG: 325 (65 FE) TAB at 09:03

## 2021-08-05 RX ADMIN — METFORMIN HYDROCHLORIDE 500 MG: 500 TABLET, FILM COATED ORAL at 09:03

## 2021-08-05 RX ADMIN — PAROXETINE HYDROCHLORIDE 20 MG: 20 TABLET, FILM COATED ORAL at 09:03

## 2021-08-05 RX ADMIN — QUETIAPINE FUMARATE 25 MG: 25 TABLET ORAL at 09:04

## 2021-08-05 RX ADMIN — ACETAMINOPHEN 975 MG: 325 TABLET, FILM COATED ORAL at 09:03

## 2021-08-05 RX ADMIN — PHENAZOPYRIDINE HYDROCHLORIDE 100 MG: 100 TABLET ORAL at 10:41

## 2021-08-05 RX ADMIN — CALCIUM 500 MG: 500 TABLET ORAL at 09:04

## 2021-08-05 RX ADMIN — POLYETHYLENE GLYCOL 3350 17 G: 17 POWDER, FOR SOLUTION ORAL at 09:03

## 2021-08-05 ASSESSMENT — ACTIVITIES OF DAILY LIVING (ADL)
ADLS_ACUITY_SCORE: 18

## 2021-08-05 NOTE — PROGRESS NOTES
Discharge Note    Patient discharged to TCU via transportation service    IV: Discontinued  Prescriptions sent with patient to discharge facility .   Belongings reviewed and sent with patient.   Home medications returned to patient: NA  Equipment sent with: patient, N/A.   patient verbalizes understanding of discharge instructions. AVS given to patient and family.

## 2021-08-05 NOTE — PROGRESS NOTES
Care Management Discharge Note    Discharge Date: 08/05/2021       Discharge Disposition: Transitional Care [facility]    Discharge Services: None    Discharge DME: None    Discharge Transportation:  [facility] at 11:00    Private pay costs discussed: transportation costs    PAS Confirmation Code: [id number]  Patient/family educated on Medicare website which has current facility and service quality ratings: yes    Education Provided on the Discharge Plan:  Yes  Persons Notified of Discharge Plans: Margareth Jj  Patient/Family in Agreement with the Plan: yes    Handoff Referral Completed: No    Additional Information:  Received discharge orders for patient to discharge to TCU today via [facility] at 11:00.  Faxed orders to facility.  Daughter and bedside nurse updated of discharge time.     SUSANA Alonso

## 2021-08-05 NOTE — PLAN OF CARE
A&O to self only, place at times. VSS on RA ex HTN. Up to BR every few minutes while awake. BG WDL. Calm and cooperative today. Lido patch applied. Pain managed with scheduled Tylenol. PIV with IVF. Discharge pending.

## 2021-08-05 NOTE — DISCHARGE SUMMARY
[facility]    Hospitalist Discharge Summary       Date of Admission:  7/31/2021  Date of Discharge:  8/5/2021  Discharging Provider: Chano Garces MD      Discharge Diagnoses   Unwitnessed fall  Generalized weakness  Myositis, unclear etiology  Hyponatremia, resolved  DM2  Dementia    Follow-ups Needed After Discharge   Follow-up Appointments     Follow Up and recommended labs and tests      Follow up with [facility] physician.  The following labs/tests are   recommended: repeat BMP, check CK in one week.  - Follow up with urology in about 2 weeks for urinary frequency           Follow up myositis labs--dermatomyositis/polymyositis panel, sed rate, anti Lorenza, CURTIS    Hospital Course    Angelica James is a 76 year old female with history of dementia and schizophrenia who lives at an assisted living facility, who was admitted on 7/31/2021 after an unwitnessed fall. Workup in the ED was notable for three rib fractures, hyponatremia, generalized weakness. She had a nonfocal neurologic exam. While in the emergency room she had a short run of a narrow complex tachycardia which was asymptomatic--no repeats.   She was treated for a UTI here with five days of Ceftriaxone, of note urine culture was negative. Of note, she has a persistently elevated CK--myositis workup started. She is being discharged to TCU for ongoing rehabilitation.    Status post fall   Generalized weakness   Acute right eighth, ninth and tenth rib fractures  Pain is under decent control with oxycodone. Etiology of fall is unclear, but could be related to age, deconditioning. She also has a persistently elevated CK--see below.    Elevated CK, normal troponin  CK has been persistently elevated 1266-->1141 this admission. Likely rhabdomyolysis but level not decreasing as expected, which raises the concern for an inflammatory condition such as myositis. No muscle pain, rash, etc. She does not have clear proximal weakness.  She is not on a statin.  - Myositis labs ordered at discharge--follow up  - If clinical concern for inflamatory myositis increases then consider steroid course, rheumatology consult  - Patient was given IVF x5 days, okay to stop and discharge to outpaitent    Normocytic anemia   Mild iron deficiency  History of iron deficiency anemia   Iron levels, B12, folate ok- iron saturation index is a little low.   - resume iron at discharge  - outpatient follow up    Pyuria with negative urine culture   Microscopic hematuria  Urinary frequency   Received ceftriaxone x5 days. Still having frequency at discharge. Urology consulted and ordered a CT which was negative.  Anticholinergic could be tried but may worsen mental status. Suspect this is mostly behavioral  - Outpatient urology follow up    Type 2 diabetes mellitus   Controlled with metformin    Dementia   Schizophrenia   Continue prior to admission medications for now: paroxetine, quetiapine and resperidone    Consultations This Hospital Stay   PHYSICAL THERAPY ADULT IP CONSULT  OCCUPATIONAL THERAPY ADULT IP CONSULT  CARE MANAGEMENT / SOCIAL WORK IP CONSULT  UROLOGY IP CONSULT  PHYSICAL THERAPY ADULT IP CONSULT  OCCUPATIONAL THERAPY ADULT IP CONSULT    Code Status   No CPR- Do NOT Intubate    Time Spent on this Encounter   I, Chano Garces, personally saw the patient today and spent approximately 35 minutes discharging this patient.       Chano Garces MD  [facility]  ______________________________________________________________________    Physical Exam   Vital Signs: Temp: 96.8  F (36  C) Temp src: Oral BP: (!) 167/85 Pulse: 83   Resp: 16 SpO2: 95 % O2 Device: None (Room air)    Weight: 113 lbs 8.59 oz    Constitutional: Thin elderly frail appearing female in NAD  Eyes: Nonicteric, normal ocular movements  HEENT: Normocephalic, atraumatic, oral mucosa moist  Respiratory: Mild basilar crackles, no wheezing  Cardiovascular: RRR, normal  "S1/2, no m/r/g  GI: Nontender, nondistended  Skin: No rashes, minor scattered bruises upper extremities  Musculoskeletal: Moves all extremities  Neurologic: A&O to self and \"hospital\"  Psychiatric: Appropriate affect and mood       Primary Care Physician   Kamini Michael    Discharge Disposition   Discharged to short-term care facility  Condition at discharge: Stable    Significant Results and Procedures   Most Recent 3 CBC's:  Recent Labs   Lab Test 08/03/21  1550 08/03/21  0714 08/02/21  0709   WBC 6.9 7.5 6.9   HGB 8.9* 9.0* 8.9*   MCV 93 93 92    261 227     Most Recent 3 BMP's:  Recent Labs   Lab Test 08/05/21  0746 08/05/21  0545 08/04/21  2213 08/03/21  0714 08/02/21  1938 08/02/21  1304 08/02/21  0816 08/02/21  0709 08/01/21  1033   NA  --   --   --  139  --   --   --  139 138   POTASSIUM  --   --   --  3.5 3.5 3.4   < > 3.2* 3.4   CHLORIDE  --   --   --  106  --   --   --  108 107   CO2  --   --   --  32  --   --   --  29 28   BUN  --   --   --  7  --   --   --  13 15   CR  --   --   --  0.52  --   --   --  0.58 0.67   ANIONGAP  --   --   --  1*  --   --   --  2* 3   SHERI  --   --   --  8.5  --   --   --  8.1* 8.1*   * 82 112* 124*  --   --   --  107* 135*    < > = values in this interval not displayed.     Most Recent 2 LFT's:  Recent Labs   Lab Test 08/02/21  0836 08/08/19  1613   AST 51* 26   ALT 41 56*   ALKPHOS 42 53   BILITOTAL 0.6 0.2   ,   Results for orders placed or performed during the hospital encounter of 07/31/21   Head CT w/o contrast    Narrative    CT SCAN OF THE HEAD WITHOUT CONTRAST   7/31/2021 1:11 PM     HISTORY: Head trauma, minor (Age >= 65y).    TECHNIQUE:  Axial images of the head and coronal reformations without  IV contrast material. Radiation dose for this scan was reduced using  automated exposure control, adjustment of the mA and/or kV according  to patient size, or iterative reconstruction technique.    COMPARISON: Head CT 4/7/2019.    FINDINGS: Moderate " volume loss is present. Patchy and confluent white  matter hypoattenuation likely represents advanced chronic small vessel  ischemic change. Parenchyma is otherwise unremarkable. No evidence of  acute ischemia, hemorrhage, mass, mass effect or hydrocephalus. The  visualized calvarium, tympanic cavities, mastoid cavities, and  extracranial soft tissues are unremarkable. Mild paranasal sinus  mucosal thickening.      Impression    IMPRESSION:  No acute intracranial abnormality.     ALIN MARIE MD         SYSTEM ID:  [id number]   XR Shoulder Right G/E 3 Views    Narrative    EXAM: XR SHOULDER RIGHT G/E 3 VIEWS  LOCATION: [facility]  DATE/TIME: 7/31/2021 6:50 PM    INDICATION: Pain after a fall  COMPARISON: None.      Impression    IMPRESSION:     Right humerus, scapula, and clavicle negative for fracture. Normal glenohumeral joint alignment with minimal degenerative arthritic changes. Mild acromioclavicular osteoarthrosis.    There are fractures of at least two lateral lower ribs, at least the eighth and ninth ribs. The ninth rib fracture is is displaced by approximately 5 mm, the eighth rib fracture is not significantly displaced. These appear acute, correlation recommended   with acute chest wall pain and tenderness. No pleural effusion. No evidence of pneumothorax.   XR Ribs & Chest Right G/E 3 Views    Narrative    EXAM: XR RIBS and CHEST RT 3VW  LOCATION: [facility]  DATE/TIME: 8/1/2021 4:34 PM    INDICATION: rib pain and fractures on shoulder x-ray  COMPARISON: None.      Impression    IMPRESSION: Acute lateral right eighth through 10th rib fractures. The ninth and 10th rib fractures are displaced. Osteopenia. No discernible pneumothorax. No pleural effusion or consolidation. Aortic atherosclerosis. Large hiatal hernia.   XR Chest 1 View    Narrative    CHEST ONE VIEW  8/2/2021 7:54 AM     HISTORY: Trauma patient with rib fracture(s).    COMPARISON: August 1,  2021      Impression    IMPRESSION: No pneumothorax evident. Fractures at the right lateral  lower rib cage again noted and unchanged. Left lung clear.    CHRISTIAN CORDOVA MD         SYSTEM ID:  [id number]   XR Chest 1 View    Narrative    CHEST ONE VIEW August 3, 2021 8:07 AM     HISTORY: Trauma patient with rib fracture(s).    COMPARISON: August 20, 2021.      Impression    IMPRESSION: Lower right rib fractures again noted. No pneumothorax.  Question trace pleural fluid on the right. Left lung clear.    CHRISTIAN CORDOVA MD         SYSTEM ID:  [id number]   CT Urogram wo & w Contrast    Narrative    CT ABDOMEN AND PELVIS WITHOUT AND WITH CONTRAST  8/3/2021 10:29 AM      HISTORY: Hematuria, unknown cause.    COMPARISON: None.    TECHNIQUE: Volumetric helical acquisition of CT images from the lung  bases through the symphysis pubis before and after the uneventful  administration of 120 mL Isovue-370 intravenous contrast. Radiation  dose for this scan was reduced using automated exposure control,  adjustment of the mA and/or kV according to patient size, or iterative  reconstruction technique.    FINDINGS: There are no stones seen within either kidney, either  ureter, or the bladder. There is no hydroureter or hydronephrosis.  There is no perinephric fat stranding. Kidneys are normal in size and  configuration. No suspicious filling defects seen in the opacified  intrarenal collecting systems, ureters, or bladder to suggest a  urothelial malignancy. The liver, spleen, adrenal glands, and pancreas  demonstrate no worrisome focal lesion. Tiny benign cyst in the left  kidney. Trace pelvic free fluid. No free air. No definite adenopathy.  There are moderate atherosclerotic changes of the visualized aorta and  its branches. There is no evidence of aortic dissection or aneurysm.  There are no dilated loops of small intestine or large bowel to  suggest ileus or obstruction.The visualized lung bases are  unremarkable. Bone windows  reveal no destructive lesions. Minimal  right pleural effusion and associated probable compressive atelectasis  and/or infiltrate. Large hiatal hernia containing stomach and a  portion of the transverse colon. Tiny fluid vs. less likely a bowel  containing right femoral hernia. Additional fat-containing right  inguinal hernia.      Impression    IMPRESSION:   1. No evidence for renal, ureteral, or bladder calculi.  2. No masses or suspicious filling defects within the opacified  urinary collecting system.  3. Large hiatal hernia containing the stomach and a portion of the  transverse colon.  4. Minimal right pleural effusion and associated probable compressive  atelectasis vs. less likely infiltrate.  5. Tiny probable fluid vs. less likely bowel containing right femoral  hernia. No obstruction.    CHRISTIAN CORDOVA MD         SYSTEM ID:  [id number]   XR Chest 1 View    Narrative    XR CHEST 1 VIEW 8/4/2021 11:59 AM    HISTORY: Trauma Patient with Rib Fracture(s)    COMPARISON: 8/3/2021      Impression    IMPRESSION: Right basilar atelectasis/pulmonary contusion is  unchanged. Trace right pleural effusion. Stable right lateral mildly  displaced rib fractures. No pneumothorax. The left lung is clear.  Large hiatal hernia.    DIOMEDES SMITH MD         SYSTEM ID:  [id number]   XR Chest 1 View    Narrative    EXAM: XR CHEST 1 VIEW  LOCATION: [facility]  DATE/TIME: 8/5/2021 6:46 AM    INDICATION: Trauma Patient with Rib Fracture(s)  COMPARISON: 08/04/2021 at 12:04 PM      Impression    IMPRESSION: Normal heart size and pulmonary vascularity. No pneumothorax. Minimal atelectasis right lung base. Tortuous calcified thoracic aorta. Esophageal hiatal hernia. Interval increase in angulation involving the right lateral rib fractures at the   C7 through 11th ribs. Degenerative changes in the spine.   Echocardiogram Complete     Value    LVEF  65-70%    Narrative     [id number]  Formerly [facility]  [id number]  [id number]^MARITZA^MASSIMO^LOTUS     [facility]  Echocardiography Laboratory  [address] [place] [address]     Name: LUDA BOSWELL  MRN: [id number]  : 1945  Study Date: 2021 10:57 AM  Age: 76 yrs  Gender: Female  Patient Location: [facility]  Reason For Study: Atrial Fibrillation  Ordering Physician: MASSIMO SALAS  Referring Physician: Kamini Mcihael  Performed By: Burt Butler RDCS     BSA: 1.5 m2  Height: 60 in  Weight: 115 lb  HR: 74  BP: 112/67 mmHg  ______________________________________________________________________________  Procedure  Complete Portable Echo Adult. Optison (NDC #[id number]) given intravenously.  ______________________________________________________________________________  Interpretation Summary     The visual ejection fraction is 65-70%. No regional wall motion abnormalities  noted.  Right ventricular systolic pressure is elevated, consistent with moderate  pulmonary hypertension. PASP is 44 plus RA. IVC is normal in size.  The right ventricle is normal in size and function.  Small, predominantly anterior, pericardial effusion without echo features of  tamponade.  There is mild to moderate (1-2+) tricuspid regurgitation.  No hemodynamically significant valvular aortic stenosis. The mean AoV pressure  gradient is 7mmHg.  ______________________________________________________________________________  Left Ventricle  The left ventricle is normal in size. There is normal left ventricular wall  thickness. Left ventricular systolic function is normal. The visual ejection  fraction is 65-70%. Diastolic Doppler findings (E/E' ratio and/or other  parameters) suggest left ventricular filling pressures are indeterminate. No  regional wall motion abnormalities noted.     Right Ventricle  The right ventricle is normal in size and function.     Atria  There is mild biatrial enlargement. There is no color Doppler evidence  of an  atrial shunt.     Tricuspid Valve  There is mild to moderate (1-2+) tricuspid regurgitation. The right  ventricular systolic pressure is elevated at 43.9 mmHg. Right ventricular  systolic pressure is elevated, consistent with moderate pulmonary  hypertension.     Aortic Valve  The aortic valve is trileaflet with aortic valve sclerosis. There is trace  aortic regurgitation. No hemodynamically significant valvular aortic stenosis.  The mean AoV pressure gradient is 7mmHg.     Pulmonic Valve  The pulmonic valve is not well seen, but is grossly normal. There is trace  pulmonic valvular regurgitation.     Vessels  The aortic root is not well visualized. The aortic root is normal size. The  inferior vena cava was normal in size with preserved respiratory variability.     Pericardium  Small anterior pericardial effusion. There are no echocardiographic  indications of cardiac tamponade.     ______________________________________________________________________________  MMode/2D Measurements & Calculations  IVSd: 0.95 cm  LVIDd: 4.0 cm  LVIDs: 2.0 cm  LVPWd: 0.92 cm  FS: 50.9 %  LV mass(C)d: 117.4 grams  LV mass(C)dI: 79.6 grams/m2  Ao root diam: 3.2 cm  LA dimension: 4.1 cm  asc Aorta Diam: 2.8 cm  LA/Ao: 1.3     RWT: 0.45     Doppler Measurements & Calculations  MV E max bernadette: 100.0 cm/sec  MV A max bernadette: 93.0 cm/sec  MV E/A: 1.1  MV dec time: 0.21 sec  PA acc time: 0.13 sec  TR max bernadette: 331.2 cm/sec  TR max P.9 mmHg  E/E' av.4  Lateral E/e': 11.9  Medial E/e': 14.9     ______________________________________________________________________________  Report approved by: Fran Schuster 2021 11:54 AM               Discharge Orders      CK total     General info for SNF    Length of Stay Estimate: Short Term Care: Estimated # of Days <30  Condition at Discharge: Improving  Level of care:skilled   Rehabilitation Potential: Good  Admission H&P remains valid and up-to-date: Yes  Recent Chemotherapy:  N/A  Use Nursing Home Standing Orders: Yes     Mantoux instructions    Give two-step Mantoux (PPD) Per Facility Policy Yes     Reason for your hospital stay    You were hospitalized for weakness and rib fractures. You are getting stronger and are being discharged.     Activity - Up with nursing assistance     Glucose monitor nursing POCT    Before meals and at bedtime     Follow Up and recommended labs and tests    Follow up with [facility] physician.  The following labs/tests are recommended: repeat BMP, check CK in one week.  - Follow up with urology in about 2 weeks for urinary frequency     No CPR- Do NOT Intubate     Physical Therapy Adult Consult    Evaluate and treat as clinically indicated.    Reason:  weakness, rib fx     Occupational Therapy Adult Consult    Evaluate and treat as clinically indicated.    Reason:  weakness, rib fx     Fall precautions     Advance Diet as Tolerated    Follow this diet upon discharge:       Room Service      Combination Diet Regular Diet Adult; Consistent Carb 60 Grams CHO per Meal Diet     Discharge Medications   Current Discharge Medication List      START taking these medications    Details   oxyCODONE (ROXICODONE) 5 MG tablet Take 0.5 tablets (2.5 mg) by mouth 2 times daily as needed for moderate to severe pain  Qty: 6 tablet, Refills: 0    Associated Diagnoses: Closed fracture of multiple ribs, unspecified laterality, initial encounter         CONTINUE these medications which have NOT CHANGED    Details   acetaminophen (TYLENOL) 325 MG tablet Take 650 mg by mouth 3 times daily Give at 0800, 1400 and 2000      calcium carbonate (OS-SHERI) 500 MG tablet Take 1 tablet by mouth 2 times daily      ferrous sulfate (FEROSUL) 325 (65 Fe) MG tablet Take 325 mg by mouth 2 times daily      melatonin 3 MG tablet Take 1 mg by mouth At Bedtime      metFORMIN (GLUCOPHAGE) 500 MG tablet Take 500 mg by mouth daily (with breakfast)      multivitamin, therapeutic (THERA-VIT) TABS tablet  Take 1 tablet by mouth daily      Nutritional Supplements (ENSURE ORIGINAL) LIQD Take 1 Can by mouth daily Daily at 1000      PARoxetine (PAXIL) 20 MG tablet Take 20 mg by mouth every morning      polyethylene glycol (MIRALAX) 17 g packet Take 1 packet by mouth daily      !! QUEtiapine (SEROQUEL) 25 MG tablet Take 25 mg by mouth 2 times daily Daily at 0800 and 1600.      !! QUEtiapine (SEROQUEL) 25 MG tablet Take 25 mg by mouth 2 times daily as needed (anxiety and agitation)      !! risperiDONE (RISPERDAL) 1 MG tablet Take 1 mg by mouth daily Daily at 1400      !! risperiDONE (RISPERDAL) 4 MG tablet Take 4 mg by mouth every evening       !! - Potential duplicate medications found. Please discuss with provider.        Allergies   Allergies   Allergen Reactions     Penicillins Hives     Tolerates cephalosporins

## 2021-08-05 NOTE — PROGRESS NOTES
Patient alert to self, stable vitals, Lidocine patch in place, IV infusing, Tylenol for pain management. Sitter at bedside, frequency to bathroom.  Patient declined IV insertion, previous one was pulled out when patient was getingt of bed. Also, patient refused to do down for X-ray. Will continue to monitor patient.

## 2021-08-05 NOTE — PROVIDER NOTIFICATION
MD Notification    Notified Person: MD    Notified Person Name: Dr. Garces    Notification Date/Time: 8/5/21 805    Notification Interaction: text page/telephone    Purpose of Notification: Rib fracture decompensation BPA fired d/t elevated BP.    Orders Received: no further intervention needed.    Comments:

## 2021-08-06 NOTE — PLAN OF CARE
Occupational Therapy Discharge Summary    Reason for therapy discharge:    Discharged to transitional care facility.    Progress towards therapy goal(s). See goals on Care Plan in [facility] electronic health record for goal details.  Goals not met.  Barriers to achieving goals:   discharge from facility.    Therapy recommendation(s):    Continued therapy is recommended.  Rationale/Recommendations:  maximize safety and independence with ADLs.

## 2021-09-11 ENCOUNTER — APPOINTMENT (OUTPATIENT)
Dept: CT IMAGING | Facility: CLINIC | Age: 76
End: 2021-09-11
Attending: INTERNAL MEDICINE
Payer: COMMERCIAL

## 2021-09-11 ENCOUNTER — APPOINTMENT (OUTPATIENT)
Dept: CT IMAGING | Facility: CLINIC | Age: 76
End: 2021-09-11
Attending: EMERGENCY MEDICINE
Payer: COMMERCIAL

## 2021-09-11 ENCOUNTER — HOSPITAL ENCOUNTER (OUTPATIENT)
Facility: CLINIC | Age: 76
Setting detail: OBSERVATION
Discharge: NURSING, REHAB OR LONG-TERM CARE | End: 2021-09-12
Attending: EMERGENCY MEDICINE | Admitting: EMERGENCY MEDICINE
Payer: COMMERCIAL

## 2021-09-11 DIAGNOSIS — S06.5XAA SUBDURAL HEMATOMA (H): ICD-10-CM

## 2021-09-11 DIAGNOSIS — E87.1 HYPONATREMIA: ICD-10-CM

## 2021-09-11 DIAGNOSIS — F03.91 DEMENTIA WITH BEHAVIORAL DISTURBANCE, UNSPECIFIED DEMENTIA TYPE: ICD-10-CM

## 2021-09-11 PROBLEM — E11.9 TYPE 2 DIABETES MELLITUS (H): Status: ACTIVE | Noted: 2021-09-11

## 2021-09-11 PROBLEM — D63.8 ANEMIA OF CHRONIC DISEASE: Status: ACTIVE | Noted: 2021-09-11

## 2021-09-11 PROBLEM — R19.7 DIARRHEA: Status: RESOLVED | Noted: 2019-07-31 | Resolved: 2021-09-11

## 2021-09-11 PROBLEM — N30.00 ACUTE CYSTITIS WITHOUT HEMATURIA: Status: RESOLVED | Noted: 2019-08-03 | Resolved: 2021-09-11

## 2021-09-11 PROBLEM — R62.7 FAILURE TO THRIVE IN ADULT: Status: RESOLVED | Noted: 2019-08-01 | Resolved: 2021-09-11

## 2021-09-11 PROBLEM — J10.1 INFLUENZA A: Status: RESOLVED | Noted: 2019-04-07 | Resolved: 2021-09-11

## 2021-09-11 LAB
ALBUMIN UR-MCNC: 30 MG/DL
ANION GAP SERPL CALCULATED.3IONS-SCNC: 5 MMOL/L (ref 3–14)
APPEARANCE UR: ABNORMAL
ATRIAL RATE - MUSE: 79 BPM
BACTERIA #/AREA URNS HPF: ABNORMAL /HPF
BASOPHILS # BLD AUTO: 0 10E3/UL (ref 0–0.2)
BASOPHILS NFR BLD AUTO: 0 %
BILIRUB UR QL STRIP: NEGATIVE
BUN SERPL-MCNC: 17 MG/DL (ref 7–30)
CALCIUM SERPL-MCNC: 8.3 MG/DL (ref 8.5–10.1)
CHLORIDE BLD-SCNC: 92 MMOL/L (ref 94–109)
CK SERPL-CCNC: 307 U/L (ref 30–225)
CO2 SERPL-SCNC: 28 MMOL/L (ref 20–32)
COLOR UR AUTO: ABNORMAL
CREAT SERPL-MCNC: 0.6 MG/DL (ref 0.52–1.04)
DIASTOLIC BLOOD PRESSURE - MUSE: NORMAL MMHG
EOSINOPHIL # BLD AUTO: 0 10E3/UL (ref 0–0.7)
EOSINOPHIL NFR BLD AUTO: 0 %
ERYTHROCYTE [DISTWIDTH] IN BLOOD BY AUTOMATED COUNT: 13.6 % (ref 10–15)
GFR SERPL CREATININE-BSD FRML MDRD: 88 ML/MIN/1.73M2
GLUCOSE BLD-MCNC: 133 MG/DL (ref 70–99)
GLUCOSE BLDC GLUCOMTR-MCNC: 108 MG/DL (ref 70–99)
GLUCOSE BLDC GLUCOMTR-MCNC: 117 MG/DL (ref 70–99)
GLUCOSE BLDC GLUCOMTR-MCNC: 130 MG/DL (ref 70–99)
GLUCOSE BLDC GLUCOMTR-MCNC: 143 MG/DL (ref 70–99)
GLUCOSE BLDC GLUCOMTR-MCNC: 163 MG/DL (ref 70–99)
GLUCOSE BLDC GLUCOMTR-MCNC: 68 MG/DL (ref 70–99)
GLUCOSE UR STRIP-MCNC: NEGATIVE MG/DL
HCT VFR BLD AUTO: 29.5 % (ref 35–47)
HGB BLD-MCNC: 9.9 G/DL (ref 11.7–15.7)
HGB UR QL STRIP: ABNORMAL
HOLD SPECIMEN: NORMAL
IMM GRANULOCYTES # BLD: 0.1 10E3/UL
IMM GRANULOCYTES NFR BLD: 0 %
INTERPRETATION ECG - MUSE: NORMAL
KETONES UR STRIP-MCNC: NEGATIVE MG/DL
LEUKOCYTE ESTERASE UR QL STRIP: ABNORMAL
LYMPHOCYTES # BLD AUTO: 0.7 10E3/UL (ref 0.8–5.3)
LYMPHOCYTES NFR BLD AUTO: 6 %
MCH RBC QN AUTO: 30.4 PG (ref 26.5–33)
MCHC RBC AUTO-ENTMCNC: 33.6 G/DL (ref 31.5–36.5)
MCV RBC AUTO: 91 FL (ref 78–100)
MONOCYTES # BLD AUTO: 0.8 10E3/UL (ref 0–1.3)
MONOCYTES NFR BLD AUTO: 6 %
MUCOUS THREADS #/AREA URNS LPF: PRESENT /LPF
NEUTROPHILS # BLD AUTO: 11 10E3/UL (ref 1.6–8.3)
NEUTROPHILS NFR BLD AUTO: 88 %
NITRATE UR QL: NEGATIVE
NRBC # BLD AUTO: 0 10E3/UL
NRBC BLD AUTO-RTO: 0 /100
P AXIS - MUSE: 78 DEGREES
PH UR STRIP: 7.5 [PH] (ref 5–7)
PLATELET # BLD AUTO: 288 10E3/UL (ref 150–450)
POTASSIUM BLD-SCNC: 3.9 MMOL/L (ref 3.4–5.3)
PR INTERVAL - MUSE: 146 MS
QRS DURATION - MUSE: 78 MS
QT - MUSE: 382 MS
QTC - MUSE: 438 MS
R AXIS - MUSE: 80 DEGREES
RBC # BLD AUTO: 3.26 10E6/UL (ref 3.8–5.2)
RBC URINE: >182 /HPF
SARS-COV-2 RNA RESP QL NAA+PROBE: NEGATIVE
SODIUM SERPL-SCNC: 125 MMOL/L (ref 133–144)
SP GR UR STRIP: 1.02 (ref 1–1.03)
SQUAMOUS EPITHELIAL: 14 /HPF
SYSTOLIC BLOOD PRESSURE - MUSE: NORMAL MMHG
T AXIS - MUSE: 76 DEGREES
UROBILINOGEN UR STRIP-MCNC: NORMAL MG/DL
VENTRICULAR RATE- MUSE: 79 BPM
WBC # BLD AUTO: 12.7 10E3/UL (ref 4–11)
WBC CLUMPS #/AREA URNS HPF: PRESENT /HPF
WBC URINE: 31 /HPF

## 2021-09-11 PROCEDURE — 258N000001 HC RX 258: Performed by: INTERNAL MEDICINE

## 2021-09-11 PROCEDURE — 99219 PR INITIAL OBSERVATION CARE,LEVEL II: CPT | Performed by: INTERNAL MEDICINE

## 2021-09-11 PROCEDURE — 258N000003 HC RX IP 258 OP 636: Performed by: EMERGENCY MEDICINE

## 2021-09-11 PROCEDURE — G0378 HOSPITAL OBSERVATION PER HR: HCPCS

## 2021-09-11 PROCEDURE — 85025 COMPLETE CBC W/AUTO DIFF WBC: CPT | Performed by: EMERGENCY MEDICINE

## 2021-09-11 PROCEDURE — C9803 HOPD COVID-19 SPEC COLLECT: HCPCS

## 2021-09-11 PROCEDURE — 250N000013 HC RX MED GY IP 250 OP 250 PS 637: Performed by: INTERNAL MEDICINE

## 2021-09-11 PROCEDURE — 81001 URINALYSIS AUTO W/SCOPE: CPT | Performed by: EMERGENCY MEDICINE

## 2021-09-11 PROCEDURE — 96361 HYDRATE IV INFUSION ADD-ON: CPT | Mod: 59

## 2021-09-11 PROCEDURE — 96360 HYDRATION IV INFUSION INIT: CPT

## 2021-09-11 PROCEDURE — 99204 OFFICE O/P NEW MOD 45 MIN: CPT | Performed by: NURSE PRACTITIONER

## 2021-09-11 PROCEDURE — U0005 INFEC AGEN DETEC AMPLI PROBE: HCPCS | Performed by: EMERGENCY MEDICINE

## 2021-09-11 PROCEDURE — 80048 BASIC METABOLIC PNL TOTAL CA: CPT | Performed by: EMERGENCY MEDICINE

## 2021-09-11 PROCEDURE — 96374 THER/PROPH/DIAG INJ IV PUSH: CPT | Mod: 59

## 2021-09-11 PROCEDURE — 99285 EMERGENCY DEPT VISIT HI MDM: CPT | Mod: 25

## 2021-09-11 PROCEDURE — 82550 ASSAY OF CK (CPK): CPT | Performed by: EMERGENCY MEDICINE

## 2021-09-11 PROCEDURE — 12002 RPR S/N/AX/GEN/TRNK2.6-7.5CM: CPT

## 2021-09-11 PROCEDURE — 93005 ELECTROCARDIOGRAM TRACING: CPT

## 2021-09-11 PROCEDURE — 70450 CT HEAD/BRAIN W/O DYE: CPT | Mod: 77

## 2021-09-11 PROCEDURE — 70450 CT HEAD/BRAIN W/O DYE: CPT

## 2021-09-11 PROCEDURE — 36415 COLL VENOUS BLD VENIPUNCTURE: CPT | Performed by: EMERGENCY MEDICINE

## 2021-09-11 RX ORDER — ONDANSETRON 2 MG/ML
4 INJECTION INTRAMUSCULAR; INTRAVENOUS EVERY 6 HOURS PRN
Status: DISCONTINUED | OUTPATIENT
Start: 2021-09-11 | End: 2021-09-12 | Stop reason: HOSPADM

## 2021-09-11 RX ORDER — PAROXETINE 20 MG/1
20 TABLET, FILM COATED ORAL EVERY MORNING
Status: DISCONTINUED | OUTPATIENT
Start: 2021-09-11 | End: 2021-09-12 | Stop reason: HOSPADM

## 2021-09-11 RX ORDER — POLYETHYLENE GLYCOL 3350 17 G/17G
17 POWDER, FOR SOLUTION ORAL DAILY
Status: DISCONTINUED | OUTPATIENT
Start: 2021-09-12 | End: 2021-09-12 | Stop reason: HOSPADM

## 2021-09-11 RX ORDER — NICOTINE POLACRILEX 4 MG
15-30 LOZENGE BUCCAL
Status: DISCONTINUED | OUTPATIENT
Start: 2021-09-11 | End: 2021-09-12 | Stop reason: HOSPADM

## 2021-09-11 RX ORDER — RISPERIDONE 0.25 MG/1
1 TABLET ORAL DAILY
Status: DISCONTINUED | OUTPATIENT
Start: 2021-09-11 | End: 2021-09-12 | Stop reason: HOSPADM

## 2021-09-11 RX ORDER — ONDANSETRON 4 MG/1
4 TABLET, ORALLY DISINTEGRATING ORAL EVERY 6 HOURS PRN
Status: DISCONTINUED | OUTPATIENT
Start: 2021-09-11 | End: 2021-09-12 | Stop reason: HOSPADM

## 2021-09-11 RX ORDER — RISPERIDONE 2 MG/1
4 TABLET ORAL EVERY EVENING
Status: DISCONTINUED | OUTPATIENT
Start: 2021-09-11 | End: 2021-09-12 | Stop reason: HOSPADM

## 2021-09-11 RX ORDER — QUETIAPINE FUMARATE 25 MG/1
25 TABLET, FILM COATED ORAL 2 TIMES DAILY PRN
Status: DISCONTINUED | OUTPATIENT
Start: 2021-09-11 | End: 2021-09-12 | Stop reason: HOSPADM

## 2021-09-11 RX ORDER — HYDRALAZINE HYDROCHLORIDE 20 MG/ML
10 INJECTION INTRAMUSCULAR; INTRAVENOUS EVERY 4 HOURS PRN
Status: DISCONTINUED | OUTPATIENT
Start: 2021-09-11 | End: 2021-09-12 | Stop reason: HOSPADM

## 2021-09-11 RX ORDER — LABETALOL HYDROCHLORIDE 5 MG/ML
10 INJECTION, SOLUTION INTRAVENOUS
Status: DISCONTINUED | OUTPATIENT
Start: 2021-09-11 | End: 2021-09-12 | Stop reason: HOSPADM

## 2021-09-11 RX ORDER — QUETIAPINE FUMARATE 25 MG/1
25 TABLET, FILM COATED ORAL 2 TIMES DAILY
Status: DISCONTINUED | OUTPATIENT
Start: 2021-09-11 | End: 2021-09-12 | Stop reason: HOSPADM

## 2021-09-11 RX ORDER — ACETAMINOPHEN 325 MG/1
650 TABLET ORAL EVERY 4 HOURS PRN
Status: DISCONTINUED | OUTPATIENT
Start: 2021-09-11 | End: 2021-09-12 | Stop reason: HOSPADM

## 2021-09-11 RX ORDER — DEXTROSE MONOHYDRATE 25 G/50ML
25-50 INJECTION, SOLUTION INTRAVENOUS
Status: DISCONTINUED | OUTPATIENT
Start: 2021-09-11 | End: 2021-09-12 | Stop reason: HOSPADM

## 2021-09-11 RX ADMIN — DEXTROSE MONOHYDRATE 25 ML: 500 INJECTION PARENTERAL at 19:04

## 2021-09-11 RX ADMIN — RISPERIDONE 1 MG: 0.25 TABLET ORAL at 14:06

## 2021-09-11 RX ADMIN — ACETAMINOPHEN 650 MG: 325 TABLET, FILM COATED ORAL at 15:34

## 2021-09-11 RX ADMIN — SODIUM CHLORIDE 1000 ML: 9 INJECTION, SOLUTION INTRAVENOUS at 09:22

## 2021-09-11 RX ADMIN — RISPERIDONE 4 MG: 2 TABLET ORAL at 20:17

## 2021-09-11 RX ADMIN — QUETIAPINE FUMARATE 25 MG: 25 TABLET ORAL at 15:31

## 2021-09-11 RX ADMIN — PAROXETINE HYDROCHLORIDE 20 MG: 20 TABLET, FILM COATED ORAL at 13:07

## 2021-09-11 NOTE — H&P
[facility]    History and Physical  Hospitalist       Date of Admission:  9/11/2021    Assessment & Plan   76 year old female with dementia, who presents with unwitnessed fall:    Summary:    Principal Problem:    Traumatic Left SDH, 3 mm   Active Problems:    Hyponatremia    Moderate Dementia w behavioral disturb    Paroxysmal atrial fibrillation (H)    Fall    DM 2, Hgb A1C 5.7 on 7/31/21    Anemia of chronic disease       Plan:  Neurosurgery consulted, plan repeat Head CT in 6 hours, and if no change anticipate return to care center in AM.     DVT Prophylaxis: Pneumatic Compression Devices  Code Status: DNR / DNI    Disposition: Expected discharge tomorrow AM.     Adrian Robles Maricarmen  Pager: [phone]  Cell Phone:  [phone]     Primary Care Physician   Kamini Michael    Chief Complaint   fall    History is obtained from Patient's granddaughter and ER Provider.     History of Present Illness   76 year old female with history of dementia, schizophrenia, type II diabetes mellitus, hypertension, and paroxysmal atrial fibrillation who presents with a fall. Per EMS, Angelica suffered an unwitnessed fall after reportingly experiencing dizziness, she was found on the floor by staff this morning at 0700. She fell backwards, hitting her head and facing a possible head laceration. Angelica is documented to be a high fall risk and exit seeker. Patient DNR/DNI.    In ER, AVSS, WBC 12.7, hgb 9.9, Sodium 125, , UA with 31 WBC's and >182 RBC's, and head  CT with 3 mm left SDH.     PAST MEDICAL HISTORY    Past Medical History:   Diagnosis Date     Dementia (H)      Hypertension      Iron deficiency anemia      PUD (peptic ulcer disease)      Schizophrenia (H)      Type 2 diabetes mellitus (H)         PAST SURGICAL HISTORY    Past Surgical History:   Procedure Laterality Date     colonoscopy       UPPER GI ENDOSCOPY          Prior to Admission Medications   Prior to Admission Medications    Prescriptions Last Dose Informant Patient Reported? Taking?   Nutritional Supplements (ENSURE ORIGINAL) LIQD 9/10/2021 at Unknown time Nursing Home Yes Yes   Sig: Take 1 Can by mouth daily Daily at 1000   PARoxetine (PAXIL) 20 MG tablet 9/10/2021 at Unknown time Nursing Home Yes Yes   Sig: Take 20 mg by mouth every morning   QUEtiapine (SEROQUEL) 25 MG tablet 9/10/2021 at Unknown time Nursing Home Yes Yes   Sig: Take 25 mg by mouth 2 times daily Daily at 0800 and 1600.   QUEtiapine (SEROQUEL) 25 MG tablet  at PRN Nursing Home Yes Yes   Sig: Take 25 mg by mouth 2 times daily as needed (anxiety and agitation)   acetaminophen (TYLENOL) 325 MG tablet 9/10/2021 at Unknown time Nursing Home Yes Yes   Sig: Take 650 mg by mouth 3 times daily Give at 0800, 1400 and 2000   calcium carbonate (OS-SHERI) 500 MG tablet 9/10/2021 at Unknown time Nursing Home Yes Yes   Sig: Take 1 tablet by mouth 2 times daily   ferrous sulfate (FEROSUL) 325 (65 Fe) MG tablet 9/10/2021 at Unknown time Nursing Home Yes Yes   Sig: Take 325 mg by mouth 2 times daily   melatonin 3 MG tablet 9/10/2021 at Unknown time Nursing Home Yes Yes   Sig: Take 1 mg by mouth At Bedtime   metFORMIN (GLUCOPHAGE) 500 MG tablet 9/10/2021 at Unknown time Nursing Home Yes Yes   Sig: Take 500 mg by mouth daily (with breakfast)   multivitamin, therapeutic (THERA-VIT) TABS tablet 9/10/2021 at Unknown time Nursing Home Yes Yes   Sig: Take 1 tablet by mouth daily   polyethylene glycol (MIRALAX) 17 g packet 9/10/2021 at Unknown time Nursing Home Yes Yes   Sig: Take 1 packet by mouth daily   risperiDONE (RISPERDAL) 1 MG tablet 9/10/2021 at Unknown time Nursing Home Yes Yes   Sig: Take 1 mg by mouth daily Daily at 1400   risperiDONE (RISPERDAL) 4 MG tablet 9/10/2021 at Unknown time Nursing Home Yes Yes   Sig: Take 4 mg by mouth every evening      Facility-Administered Medications: None     Allergies   Allergies   Allergen Reactions     Penicillins Hives     Tolerates  cephalosporins       SOCIAL HISTORY    Social History     Social History Narrative    , 4 children, lives a [facility].  Is DNR/DNI.  (last updated 9/11/2021)       Social History     Tobacco Use     Smoking status: Never Smoker     Smokeless tobacco: Never Used   Substance Use Topics     Alcohol use: No     Drug use: No        FAMILY HISTORY    Family History   Family history unknown: Yes        Review of Systems   Review of systems not obtained due to patient factors - confusion      PHYSICAL EXAM     Temp: 97.5  F (36.4  C) Temp src: Oral BP: (!) 140/70 Pulse: 71   Resp: 22 SpO2: 95 % O2 Device: None (Room air)    Vital Signs with Ranges  Temp:  [97.5  F (36.4  C)-98.3  F (36.8  C)] 97.5  F (36.4  C)  Pulse:  [62-99] 71  Resp:  [15-24] 22  BP: (122-140)/() 140/70  SpO2:  [95 %-99 %] 95 %  0 lbs 0 oz    Constitutional: Awake, alert, cooperative, no apparent distress.  Eyes: Conjunctiva and pupils examined and normal.  HEENT: Moist mucous membranes, normal dentition.  Respiratory: Clear to auscultation bilaterally, no crackles or wheezing.  Cardiovascular: Regular rate and rhythm, normal S1 and S2, and no murmur noted, no carotid bruits.  No ankle edema.   GI: Soft, non-distended, non-tender, normal bowel sounds.  Lymph/Hematologic: No anterior cervical, supraclavicular or axillary adenopathy.  Skin: No rashes, no cyanosis.   Musculoskeletal: No joint swelling, erythema or tenderness.  Neurologic: Alert, Ox1 (knows family members names, but not year or place), Cranial nerves 2-12 intact, no focal weakness or numbness  Psychiatric:  No obvious anxiety or depression.    Data   Data reviewed today:  I personally reviewed the EKG tracing showing NSR with rate 79, and small Q's laterally and inferiorly.   Recent Labs   Lab 09/11/21  0753 09/11/21  0750   WBC 12.7*  --    HGB 9.9*  --    MCV 91  --      --    *  --    POTASSIUM 3.9  --    CHLORIDE 92*  --    CO2 28  --    BUN  17  --    CR 0.60  --    ANIONGAP 5  --    SHERI 8.3*  --    * 143*       Imaging:  Recent Results (from the past 24 hour(s))   CT Head w/o Contrast    Narrative    CT HEAD W/O CONTRAST 9/11/2021 8:37 AM    INDICATION: fall, posterior head injury  TECHNIQUE: CT scan of the head without contrast. Dose reduction  techniques were used.  CONTRAST: None.  COMPARISON: Head CT July 31, 2021    FINDINGS:  New thin acute subdural hematoma along the left cerebral convexity  measuring 3 mm on axial image #14, series 3 and coronal image #15,  series 5. No other evidence of acute intracranial hemorrhage or mass  effect. Scattered foci of decreased attenuation within the cerebral  hemispheric white matter which are nonspecific, though most commonly  ascribed to chronic small vessel ischemic disease. The ventricles and  sulci are prominent consistent with moderate brain parenchymal volume  loss. Gray-white matter differentiation is maintained. The basilar  cisterns are patent.    The globes are unremarkable. The partially imaged paranasal sinuses,  mastoid air cells and middle ear cavities are unremarkable. The  visualized skull base and calvarium are unremarkable.      Impression    IMPRESSION:    1.  New thin acute subdural hematoma along the left cerebral convexity  measuring 3 mm on axial image #14, series 3 and coronal image #15,  series 5.   2.  No other evidence of acute hemorrhage or mass effect.  3.  Moderate nonspecific white matter changes.  4.  Moderate brain parenchymal volume loss.    Dr. Neely discussed the results with Dr. Mehta on 9/11/2021 8:44  AM by telephone.    FELIZ NEELY MD         SYSTEM ID:  [id number]

## 2021-09-11 NOTE — ED PROVIDER NOTES
History   Chief Complaint:  Fall    History limited by: poor historian    History supplemented by electronic chart review and EMS, as well as daughter by phone     Angelica James is a 76 year old female with history of dementia, schizophrenia, type II diabetes mellitus, hypertension, and paroxysmal atrial fibrillation who presents by EMS after a fall. Per EMS, Angelica presumably suffered an unwitnessed fall after possibly experiencing dizziness, she was found awake on the floor by staff this morning at 0700. She hit her head and has a possible head laceration. Angelica is documented to be a high fall risk and exit seeker. Patient DNR/DNI.    Review of Systems   Unable to perform ROS: Dementia     Allergies:  Penicillins    Medications:  Calcium carbonate  Ferrous sulfate  Metformin  Paroxetine  Seroquel  Risperidone    Past Medical History:    Dementia  Type II diabetes mellitus    Hypertension   Peptic ulcer disease  Schizophrenia  Paroxysmal atrial fibrillation  Urinary tract infection  Bladder spasm  Acute cystitis without hematuria  Traumatic rhabdomyolysis   Failure to thrive in adult  Dementia with behavioral disturbance  Colitis due to clostridium difficile  Hyponatremia  Influenza A  Liver lesion  Esophagitis  Hiatal hernia  Iron deficiency anemia  Murmur, cardiac  Major depressive disorder  Generalized anxiety disorder  Schizoaffective disorder  Psychosis   Uterovaginal prolapse  Achilles tendonitis  Syncope and collapse  Osteoarthritis of knee    Past Surgical History:    Uterine myomas removed     Family History:    Father - diabetes    Social History:  The patient arrives via EMS, is unaccompanied in the ED today.    Physical Exam     Patient Vitals for the past 24 hrs:   BP Temp Temp src Pulse Resp SpO2   09/11/21 0900 133/70 -- -- 67 17 99 %   09/11/21 0850 (!) 133/103 -- -- 63 24 96 %   09/11/21 0741 122/81 98.3  F (36.8  C) Oral 99 16 95 %       Physical Exam  General: Nontoxic-appearing woman  sitting upright in room 24, granddaughter later at bedside  HENT: mucous membranes moist, face nontender  CV: rate as above, regular rhythm, no LE edema  Resp: normal effort, speaks in full phrases, no stridor, no cough observed  GI: abdomen soft and nontender, no guarding  MSK: no bony tenderness, no spinal tenderness, chest wall nontender, no CVAT  Skin: appropriately warm and dry  Approximately 3 cm linear minimally gaping laceration to posterior scalp without active bleeding  Neuro: alert, clear speech,  equal, can lift both legs off bed, poor historian was unable to provide reliable details regarding recent events  Psych: cooperative    Emergency Department Course   ECG  ECG taken at 0751, ECG read at 0756  Normal sinus rhythm      Rate 79 bpm. HI interval 146 ms. QRS duration 78 ms. QT/QTc 382/438 ms. P-R-T axes 78 80 76.     Imaging:    CT Head w/o Contrast  Final Result  IMPRESSION:    1.  New thin acute subdural hematoma along the left cerebral convexity  measuring 3 mm on axial image #14, series 3 and coronal image #15,  series 5.   2.  No other evidence of acute hemorrhage or mass effect.  3.  Moderate nonspecific white matter changes.  4.  Moderate brain parenchymal volume loss.  Per radiology    Laboratory:   CBC: WBC 12.7 (H), HGB 9.9 (L),      Glucose by Meter (Collected 0750): 143 (H)    CK total: 307 (H)    BMP: Sodium: 125 (L); Chloride: 92 (L); Calcium: 8.3 (L); Glucose: 133 (H) o/w WNL (Creatinine 0.60)     UA with microscopic: Color: orange; Appearance: cloudy; Blood: moderate; pH: 7.5 (H); Protein Albumin: 30; Leukocyte Esterase: large; Bacteria: few; WBC Clumps: present; Mucus: present; RBC: >182 (H); WBC: 31 (H); Squamous Epithelials: 14 (H) o/w WNL     Asymptomatic COVID19 Virus PCR by nasopharyngeal swab pending       Procedures  Procedure Note: Laceration Repair   Performed by: Jason Mehta MD    Verbal consent given by patient and granddaughter who confirms  understanding of the procedure being performed after discussing the risks, benefits and alternatives.    Preparation: Patient was prepped and draped in usual sterile fashion, with assistance from ERT.  Irrigation solution: saline    Body area: posterior scalp  Laceration length: 3.0 cm  Contamination: The wound is not grossly contaminated.  Foreign bodies: none  Tendon involvement: none  Anesthesia:  none    Debridement: none   Skin closure: Closed with 4 staples  Technique: interrupted  Approximation: close  Approximation difficulty: simple    Patient tolerated the procedure well with no immediate complications.    Emergency Department Course:    Reviewed:  I reviewed nursing notes, vitals, past medical history and care everywhere    Assessments:  0741 I obtained history and examined the patient as noted above.     0750 I spoke with the patient's daughter, Анна,  over the phone.    0854 I spoke with and updated the patients daughter, Анна, over the phone.    0930 I rechecked the patient and performed procedure as noted above.    0952 I spoke with hospitalist, Dr. Fragoso.     Consults:   0844 I consulted and discussed results with Dr Ojeda, from radiology.    0900 I consulted with Clarissa Saez with neurosurgery.     Interventions:  0922 0.9% NS 1000 mL IV    Disposition:  The patient was admitted to the hospital under the care of Dr. Fragoso.       Impression & Plan   Medical Decision Making:  She presents after unwitnessed fall, presumably from standing, under unclear circumstances. History is limited because of her dementia. She has mild headache at times, though at other times she denies this, and does not have other worrisome signs or symptoms to suggest additional serious injury. Head CT was performed with abnormality as above. Spoke with the neurosurgical service who does not anticipate the need for procedural intervention, though recommends a repeat head CT in 6 hours and monitoring of her blood  pressure. I spoke to the patient's daughter several times and updated her on these findings and tentative plan of care, and she agrees with them. She had a scalp laceration that was stapled with good wound edge approximation and hemostasis maintained.    Covid-19  Angelica James was evaluated during a global COVID-19 pandemic, which necessitated consideration that the patient might be at risk for infection with the SARS-CoV-2 virus that causes COVID-19.   Applicable protocols for evaluation were followed during the patient's care.   COVID-19 was considered as part of the patient's evaluation. The plan for testing is:  a test was obtained during this visit.    Diagnosis:    ICD-10-CM    1. Subdural hematoma (H)  S06.5X9A    2. Dementia with behavioral disturbance, unspecified dementia type (H)  F03.91    3. Hyponatremia  E87.1      This note was completed in part using Dragon voice recognition software. Although reviewed after completion, some word and grammatical errors may occur.    Scribe Disclosure:  I, Donato Lake, am serving as a scribe at 7:40 AM on 9/11/2021 to document services personally performed by Jason Mehta MD based on my observations and the provider's statements to me.     This note was completed in part using Dragon voice recognition software. Although reviewed after completion, some word and grammatical errors may occur.           Jason Mehta MD  09/11/21 [id number]

## 2021-09-11 NOTE — ED NOTES
Bed: ED24  Expected date: 9/11/21  Expected time: 7:30 AM  Means of arrival: Ambulance  Comments:  Rosa Elena 2 : 76F Fall

## 2021-09-11 NOTE — PHARMACY-ADMISSION MEDICATION HISTORY
Pharmacy Medication History  Admission medication history interview status for the 9/11/2021  admission is complete. See EPIC admission navigator for prior to admission medications     Location of Interview: Outside patient room but on unit  Medication history sources: MAR (Janusz -[phone])    Significant changes made to the medication list:  Removed Oxycodone     In the past week, patient estimated taking medication this percent of the time: greater than 90%    Additional medication history information:   Patient came from assisted living. Med list was sent with the patient. Called facility to confirm last dose for medications. Spoke with Stuart and patient has not taken anything today.      Medication reconciliation completed by provider prior to medication history? No    Time spent in this activity: 10 minutes     Prior to Admission medications    Medication Sig Last Dose Taking? Auth Provider   acetaminophen (TYLENOL) 325 MG tablet Take 650 mg by mouth 3 times daily Give at 0800, 1400 and 2000 9/10/2021 at Unknown time Yes Unknown, Entered By History   calcium carbonate (OS-SHERI) 500 MG tablet Take 1 tablet by mouth 2 times daily 9/10/2021 at Unknown time Yes Unknown, Entered By History   ferrous sulfate (FEROSUL) 325 (65 Fe) MG tablet Take 325 mg by mouth 2 times daily 9/10/2021 at Unknown time Yes Unknown, Entered By History   melatonin 3 MG tablet Take 1 mg by mouth At Bedtime 9/10/2021 at Unknown time Yes Unknown, Entered By History   metFORMIN (GLUCOPHAGE) 500 MG tablet Take 500 mg by mouth daily (with breakfast) 9/10/2021 at Unknown time Yes Unknown, Entered By History   multivitamin, therapeutic (THERA-VIT) TABS tablet Take 1 tablet by mouth daily 9/10/2021 at Unknown time Yes Reported, Patient   Nutritional Supplements (ENSURE ORIGINAL) LIQD Take 1 Can by mouth daily Daily at 1000 9/10/2021 at Unknown time Yes Unknown, Entered By History   PARoxetine (PAXIL) 20 MG tablet Take 20 mg by  mouth every morning 9/10/2021 at Unknown time Yes Unknown, Entered By History   polyethylene glycol (MIRALAX) 17 g packet Take 1 packet by mouth daily 9/10/2021 at Unknown time Yes Unknown, Entered By History   QUEtiapine (SEROQUEL) 25 MG tablet Take 25 mg by mouth 2 times daily Daily at 0800 and 1600. 9/10/2021 at Unknown time Yes Unknown, Entered By History   QUEtiapine (SEROQUEL) 25 MG tablet Take 25 mg by mouth 2 times daily as needed (anxiety and agitation)  at PRN Yes Unknown, Entered By History   risperiDONE (RISPERDAL) 1 MG tablet Take 1 mg by mouth daily Daily at 1400 9/10/2021 at Unknown time Yes Unknown, Entered By History   risperiDONE (RISPERDAL) 4 MG tablet Take 4 mg by mouth every evening 9/10/2021 at Unknown time Yes Unknown, Entered By History       The information provided in this note is only as accurate as the sources available at the time of update(s)   Patria Said   Student Pharmacist

## 2021-09-11 NOTE — PROGRESS NOTES
Observation goals    -diagnostic tests and consults completed and resulted :not met    -vital signs normal or at patient baseline : not met    Nurse to notify provider when observation goals have been met and patient is ready for discharge.

## 2021-09-11 NOTE — PROGRESS NOTES
Observation goals    -diagnostic tests and consults completed and resulted : not met    -vital signs normal or at patient baseline : partially met    Nurse to notify provider when observation goals have been met and patient is ready for discharge.

## 2021-09-11 NOTE — PLAN OF CARE
Pt arrived from ed today at 1230. A/o to self and place. Disoriented to time and situation. DM diet. Tolerated. Urinary frequency. Incontinent at times. Constantly getting up to use the bathroom earlier during the admission.  High fall risk. Laceration CDI. Staples in place. Up with SBA.  Per facility, pt tends to wander. Bed alarm on Zone 2.       Addendum 1700 : pt sleeping. Woken up to eat dinner. Pt refused to eat. Tray saved in the room.  .       1858: pt rechecked. Pt refusing to eat. BG checked and is 68. Orange juice given. Pt only took few sips. Refuses to eat currently. Dextrose given through IV. Recheck glucose in 15 mins      1921: BG rechecked and is 163. Pt arouses to voice.

## 2021-09-11 NOTE — ED TRIAGE NOTES
Patient coming from [facility] for unwitnessed fall this morning around 0700. Pt fell backwards into door frame, laceration to posterior head. Unknown if LOC. Baseline mentation per staff, oriented to self. No thinners. VSS on transport.

## 2021-09-11 NOTE — ED NOTES
[facility]  ED Nurse Handoff Report    ED Chief complaint: Fall      ED Diagnosis:   Final diagnoses:   Subdural hematoma (H)   Dementia with behavioral disturbance, unspecified dementia type (H)   Hyponatremia       Code Status: DNR/DNI per POLST    Allergies:   Allergies   Allergen Reactions     Penicillins Hives     Tolerates cephalosporins       Patient Story: Patient coming from [facility] for unwitnessed fall this morning around 0700. Pt fell backwards into door frame, laceration to posterior head. Unknown if LOC. Baseline mentation per staff, oriented to self.  Focused Assessment:  Oriented to self. VSS. NPO. Laceration to posterior head, 4 staples placed. Daughter updated and granddaughter at bedside.     CT Head w/o Contrast   Final Result   IMPRESSION:     1.  New thin acute subdural hematoma along the left cerebral convexity   measuring 3 mm on axial image #14, series 3 and coronal image #15,   series 5.    2.  No other evidence of acute hemorrhage or mass effect.   3.  Moderate nonspecific white matter changes.   4.  Moderate brain parenchymal volume loss.      Dr. Neely discussed the results with Dr. Mheta on 9/11/2021 8:44   AM by telephone.      FELIZ NEELY MD            SYSTEM ID:  [id number]          Treatments and/or interventions provided: NS bolus  Patient's response to treatments and/or interventions: resting    To be done/followed up on inpatient unit:  Continue with plan of care per admitting MD.    Does this patient have any cognitive concerns?: Baseline dementia, Disoriented to time, Disoriented to place and Disoriented to situation    Activity level - Baseline/Home:  Stand with Assist and Walker  Activity Level - Current:   Stand with Assist and Walker    Patient's Preferred language: Khmer, speaks english   Needed?: No    Isolation: None  Infection: Not Applicable  Patient tested for COVID 19 prior to admission: YES  Bariatric?:  No    Vital Signs:   Vitals:    09/11/21 0741 09/11/21 0850 09/11/21 0900   BP: 122/81 (!) 133/103 133/70   Pulse: 99 63 67   Resp: 16 24 17   Temp: 98.3  F (36.8  C)     TempSrc: Oral     SpO2: 95% 96% 99%       Cardiac Rhythm:     Was the PSS-3 completed:   Yes  What interventions are required if any?               Family Comments: Daughter and granddaughter updated  OBS brochure/video discussed/provided to patient/family: Yes        For the majority of the shift this patient's behavior was Green.   Behavioral interventions performed were NA.    ED NURSE PHONE NUMBER: *[phone]

## 2021-09-11 NOTE — PROGRESS NOTES
RECEIVING UNIT ED HANDOFF REVIEW    ED Nurse Handoff Report was reviewed by: Chel Miguel RN on September 11, 2021 at 11:50 AM

## 2021-09-11 NOTE — CONSULTS
[facility]    Neurosurgery Consultation     Date of Admission:  9/11/2021  Date of Consult (When I saw the patient): 09/11/21    Assessment & Plan   Angelica James is a 76 year old female with dementia who presented after an unwitnessed fall at her care facility. Per chart review, patient experienced dizziness and was found on the floor by staff this morning. Radiographic findings include an acute subdural hematoma along the left cerebral convexity measuring 3 mm. Patient is not on anticoagulation. On exam, patient is awake, alert, oriented to person and place. She cannot recall the fall this morning. Baseline dementia per family at bedside. Denies headache, dizziness, nausea, vision/speech changes, or weakness. Able to move all extremities and follows commands. Patient is DNR/DNI.      Plan:   - Repeat head CT at 6 hours   - Hold any NSAIDS/blood thinners  - HOB 30  - Continue neuro checks and vitals  - SBP goal less than 140    I have discussed the following assessment and plan with Dr. Perry who is in agreement with initial plan and will follow up with further consultation recommendations.    Jemima Lama, CNP  [facility] Neurosurgery  [address] [address]  Tel [phone]  Pager [phone]    Code Status    Prior    Reason for Consult   Reason for consult: I was asked by Dr. Mehta to evaluate this patient for SDH.    Primary Care Physician   Kamini Michael    History is obtained from the patient, electronic health record, emergency department physician and patient's family    History of Present Illness   Angelica James is a 76 year old female with dementia who presented after an unwitnessed fall at her care facility. Per chart review, patient experienced dizziness and was found on the floor by staff this morning. Radiographic findings include an acute subdural hematoma along the left cerebral convexity  measuring 3 mm. Patient is not on anticoagulation. On exam, patient is awake, alert, oriented to person and place. She cannot recall the fall this morning. Baseline dementia per family at bedside. Denies headache, dizziness, nausea, vision/speech changes, or weakness. Able to move all extremities and follows commands. Patient is DNR/DNI.    CT HEAD W/O CONTRAST 9/11/2021 8:37 AM  IMPRESSION:    1.  New thin acute subdural hematoma along the left cerebral convexity  measuring 3 mm on axial image #14, series 3 and coronal image #15,  series 5.   2.  No other evidence of acute hemorrhage or mass effect.  3.  Moderate nonspecific white matter changes.  4.  Moderate brain parenchymal volume loss.    Past Medical History   I have reviewed this patient's medical history and updated it with pertinent information if needed.   Past Medical History:   Diagnosis Date     Dementia (H)      Hypertension      Iron deficiency anemia      PUD (peptic ulcer disease)      Schizophrenia (H)      Type 2 diabetes mellitus (H)        Past Surgical History   I have reviewed this patient's surgical history and updated it with pertinent information if needed.  Past Surgical History:   Procedure Laterality Date     colonoscopy       UPPER GI ENDOSCOPY         Prior to Admission Medications   Prior to Admission Medications   Prescriptions Last Dose Informant Patient Reported? Taking?   Nutritional Supplements (ENSURE ORIGINAL) LIQD 9/10/2021 at Unknown time Nursing Home Yes Yes   Sig: Take 1 Can by mouth daily Daily at 1000   PARoxetine (PAXIL) 20 MG tablet 9/10/2021 at Unknown time Nursing Home Yes Yes   Sig: Take 20 mg by mouth every morning   QUEtiapine (SEROQUEL) 25 MG tablet 9/10/2021 at Unknown time Nursing Home Yes Yes   Sig: Take 25 mg by mouth 2 times daily Daily at 0800 and 1600.   QUEtiapine (SEROQUEL) 25 MG tablet  at PRN Nursing Home Yes Yes   Sig: Take 25 mg by mouth 2 times daily as needed (anxiety and agitation)   acetaminophen  (TYLENOL) 325 MG tablet 9/10/2021 at Unknown time Nursing Home Yes Yes   Sig: Take 650 mg by mouth 3 times daily Give at 0800, 1400 and 2000   calcium carbonate (OS-SHERI) 500 MG tablet 9/10/2021 at Unknown time Nursing Home Yes Yes   Sig: Take 1 tablet by mouth 2 times daily   ferrous sulfate (FEROSUL) 325 (65 Fe) MG tablet 9/10/2021 at Unknown time Nursing Home Yes Yes   Sig: Take 325 mg by mouth 2 times daily   melatonin 3 MG tablet 9/10/2021 at Unknown time Nursing Home Yes Yes   Sig: Take 1 mg by mouth At Bedtime   metFORMIN (GLUCOPHAGE) 500 MG tablet 9/10/2021 at Unknown time Nursing Home Yes Yes   Sig: Take 500 mg by mouth daily (with breakfast)   multivitamin, therapeutic (THERA-VIT) TABS tablet 9/10/2021 at Unknown time Nursing Home Yes Yes   Sig: Take 1 tablet by mouth daily   polyethylene glycol (MIRALAX) 17 g packet 9/10/2021 at Unknown time Nursing Home Yes Yes   Sig: Take 1 packet by mouth daily   risperiDONE (RISPERDAL) 1 MG tablet 9/10/2021 at Unknown time Nursing Home Yes Yes   Sig: Take 1 mg by mouth daily Daily at 1400   risperiDONE (RISPERDAL) 4 MG tablet 9/10/2021 at Unknown time Nursing Home Yes Yes   Sig: Take 4 mg by mouth every evening      Facility-Administered Medications: None     Allergies   Allergies   Allergen Reactions     Penicillins Hives     Tolerates cephalosporins       Social History   I have reviewed this patient's social history and updated it with pertinent information if needed. Angelica James  reports that she has never smoked. She has never used smokeless tobacco. She reports that she does not drink alcohol and does not use drugs.    Family History   I have reviewed this patient's family history and updated it with pertinent information if needed.   Family History   Family history unknown: Yes       Review of Systems   10 point ROS negative other than symptoms noted in HPI    Physical Exam   Temp: 98.3  F (36.8  C) Temp src: Oral BP: 133/70 Pulse: 62   Resp: 22 SpO2: 98 %  O2 Device: None (Room air)    Vital Signs with Ranges  Temp:  [98.3  F (36.8  C)] 98.3  F (36.8  C)  Pulse:  [62-99] 62  Resp:  [15-24] 22  BP: (122-133)/() 133/70  SpO2:  [95 %-99 %] 98 %  0 lbs 0 oz     , Blood pressure 133/70, pulse 62, temperature 98.3  F (36.8  C), temperature source Oral, resp. rate 22, SpO2 98 %.  0 lbs 0 oz    NEUROLOGICAL EXAMINATION:   Mental status:  Awake, alert, oriented, baseline dementia, speech clear   Cranial nerves:  II-XII intact.   Motor:  Strength is 5/5 throughout the upper and lower extremities  Coordination:  Smooth finger to nose testing.   Negative pronator drift.      Data     CBC RESULTS:   Recent Labs   Lab Test 09/11/21  0753   WBC 12.7*   RBC 3.26*   HGB 9.9*   HCT 29.5*   MCV 91   MCH 30.4   MCHC 33.6   RDW 13.6        Basic Metabolic Panel:  Lab Results   Component Value Date     09/11/2021     08/18/2019      Lab Results   Component Value Date    POTASSIUM 3.9 09/11/2021    POTASSIUM 4.2 08/18/2019     Lab Results   Component Value Date    CHLORIDE 92 09/11/2021    CHLORIDE 105 08/18/2019     Lab Results   Component Value Date    SHERI 8.3 09/11/2021    SHERI 8.5 08/18/2019     Lab Results   Component Value Date    CO2 28 09/11/2021    CO2 30 08/18/2019     Lab Results   Component Value Date    BUN 17 09/11/2021    BUN 17 08/18/2019     Lab Results   Component Value Date    CR 0.60 09/11/2021    CR 0.60 08/18/2019     Lab Results   Component Value Date     09/11/2021    GLC 84 08/18/2019     INR:  No results found for: INR

## 2021-09-12 VITALS
DIASTOLIC BLOOD PRESSURE: 75 MMHG | OXYGEN SATURATION: 94 % | SYSTOLIC BLOOD PRESSURE: 127 MMHG | RESPIRATION RATE: 18 BRPM | TEMPERATURE: 97.7 F | HEART RATE: 82 BPM

## 2021-09-12 LAB
GLUCOSE BLDC GLUCOMTR-MCNC: 104 MG/DL (ref 70–99)
GLUCOSE BLDC GLUCOMTR-MCNC: 90 MG/DL (ref 70–99)

## 2021-09-12 PROCEDURE — 250N000013 HC RX MED GY IP 250 OP 250 PS 637: Performed by: INTERNAL MEDICINE

## 2021-09-12 PROCEDURE — 250N000011 HC RX IP 250 OP 636: Performed by: PHYSICIAN ASSISTANT

## 2021-09-12 PROCEDURE — G0378 HOSPITAL OBSERVATION PER HR: HCPCS

## 2021-09-12 PROCEDURE — 96375 TX/PRO/DX INJ NEW DRUG ADDON: CPT

## 2021-09-12 PROCEDURE — 99217 PR OBSERVATION CARE DISCHARGE: CPT | Performed by: INTERNAL MEDICINE

## 2021-09-12 RX ADMIN — QUETIAPINE FUMARATE 25 MG: 25 TABLET ORAL at 10:06

## 2021-09-12 RX ADMIN — QUETIAPINE FUMARATE 25 MG: 25 TABLET ORAL at 16:32

## 2021-09-12 RX ADMIN — QUETIAPINE FUMARATE 25 MG: 25 TABLET ORAL at 09:07

## 2021-09-12 RX ADMIN — HYDRALAZINE HYDROCHLORIDE 10 MG: 20 INJECTION INTRAMUSCULAR; INTRAVENOUS at 09:31

## 2021-09-12 RX ADMIN — RISPERIDONE 1 MG: 0.25 TABLET ORAL at 13:24

## 2021-09-12 RX ADMIN — ACETAMINOPHEN 650 MG: 325 TABLET, FILM COATED ORAL at 10:00

## 2021-09-12 RX ADMIN — PAROXETINE HYDROCHLORIDE 20 MG: 20 TABLET, FILM COATED ORAL at 09:07

## 2021-09-12 NOTE — CONSULTS
Care Management Discharge Note    Discharge Date: 09/12/2021   To [facility]       Discharge Disposition: Home, Assisted Living [facility]    Discharge Services: Transportation Services    Discharge DME: None    Discharge Transportation: other (see comments), health plan transportation ([facility] Stretcher 17:00)    Private pay costs discussed: transportation costs and insurance costs out of pocket expenses informed daughter of medical criteria for Stretcher ride.    PAS Confirmation Code:  n/a  Patient/family educated on Medicare website which has current facility and service quality ratings: no    Education Provided on the Discharge Plan:  yes  Persons Notified of Discharge Plans: patients daughter Анна by phone and awaiting a call from [facility] for [facility] stretcher ride at 17:00 to [facility]  Patient/Family in Agreement with the Plan: yes    Handoff Referral Completed: Yes    Additional Information:  Writer called and talked to the patient's daughter Анна by phone.  Анна is aware of observation status and clearance for discharge today.  [place] is currently out of [place] and would like an [facility] W/chair ride or stretcher scheduled for discharge. Анна is aware of specialty follow up with [facility] for repeat imaging, she asks that this information be included in the discharge orders and she will follow up and bring her mother if needed.  Writer called [facility] phone [phone] talked to Saida (spelling?) she stated that she will need to contact the Charge RN for return to home.    Writer gave direct number and awaits a call back. Will call patient's daughter with ride time once [facility] confirms acceptance.  Bedside updated on the above.  Per Daughter patient receives assistance with all cares at baseline.     Addendum: 9/12/21 11:40 a.m.  Writer called [facility] again, they stated that they have called another  to confirm they are able to take the patient back with  current level of cares, [facility] [phone] and scheduled an [facility] stretcher ride for 17:00.     Addendum: 9/12/11 11:55 a.m.  Received a call back from [facility] ELIEZER Quarles phone# [phone] she is asking for RN to RN report Robina is aware that we have a ride for 17:00 and that orders have been faxed to the [facility].  Robina will call this writer if there are any concerns after RN to RN report. Called Daughter Анна and left a message on her phone with update.  No further needs identified.         Jessica Rader RN

## 2021-09-12 NOTE — PROGRESS NOTES
Observation goals PRIOR TO DISCHARGE     Comments:   -diagnostic tests and consults completed and resulted - NOT met    -vital signs normal or at patient baseline - met    Nurse to notify provider when observation goals have been met and patient is ready for discharge.

## 2021-09-12 NOTE — PLAN OF CARE
Observation Goals     -diagnostic tests and consults completed and resulted Met  -vital signs normal or at patient baseline Met  Nurse to notify provider when observation goals have been met and patient is ready for discharge.      Pt is A&O to self but otherwise disoriented, VSS, and tolerating small sips and bites but declining to eat a full meal. Pt up with SBA to the bathroom, is impulsive and forgetful but redirectable. Pt's grand-daughter at the bedside this AM, agreeable to plan for discharge today. Tylenol given once for ALANNA leg pain. Urinary frequency, but is her baseline. C/o nervousness this AM and PRN dose of Seroquel given. Calls sometimes, pt also on bed alarm. Per [facility] there is a stretcher ride set up for 1700, nurse to nurse report given to facility. Nurse will continue to monitor.

## 2021-09-12 NOTE — PLAN OF CARE
Observation Goals    -diagnostic tests and consults completed and resulted Not Met  -vital signs normal or at patient baseline Met  Nurse to notify provider when observation goals have been met and patient is ready for discharge.

## 2021-09-12 NOTE — PROGRESS NOTES
Observation goals PRIOR TO DISCHARGE    Comments: -diagnostic tests and consults completed and resulted: met  -vital signs normal or at patient baseline: met   plan for possible discharge 9/12/2021.   Nurse to notify provider when observation goals have been met and patient is ready for discharge.

## 2021-09-12 NOTE — DISCHARGE SUMMARY
[facility]    Discharge Summary  Hospitalist    Date of Admission:  9/11/2021  Date of Discharge:  9/12/2021  Discharging Provider: Adrian Hernadez MD    Discharge Diagnoses   Principal Problem:    Traumatic Left SDH, 3 mm   Active Problems:    Hyponatremia    Moderate Dementia w behavioral disturb    Paroxysmal atrial fibrillation (H)    Fall    DM 2, Hgb A1C 5.7 on 7/31/21    Anemia of chronic disease      History of Present Illness   76 year old female with history of dementia, schizophrenia, type II diabetes mellitus, hypertension, and paroxysmal atrial fibrillation who presents with a fall. Per EMS, Angelica suffered an unwitnessed fall after reportingly experiencing dizziness, she was found on the floor by staff this morning at 0700. She fell backwards, hitting her head and facing a possible head laceration. Angelica is documented to be a high fall risk and exit seeker. Patient DNR/DNI.     In ER, AVSS, WBC 12.7, hgb 9.9, Sodium 125, , UA with 31 WBC's and >182 RBC's, and head  CT with 3 mm left SDH, and 3 cm occipital scalp laceration which was closed with 4 stable.    Hospital Course   Admitted to observation, repeat Head CT 6 hours later was unchanged, and patient remained neurologically intact, pleasant but with significant memory loss consistent with moderate dementia.  She will return to [facility], and advise removing occipital scalp stables on Monday, 9/20/21.     Adrian Hernadez MD  Pager: [phone]  Cell Phone:  [phone]       Significant Results and Procedures   As above    Pending Results   These results will be followed up by Dr. Hernadez  Unresulted Labs Ordered in the Past 30 Days of this Admission     No orders found for last 31 day(s).          Code Status   DNR / DNI       Primary Care Physician   Kamini Michael    Physical Exam   Temp: 98.3  F (36.8  C) Temp src: Oral BP: (!) 164/79 Pulse: 58   Resp: 18 SpO2: 95 %  O2 Device: None (Room air)    There were no vitals filed for this visit.  Vital Signs with Ranges  Temp:  [97.2  F (36.2  C)-98.4  F (36.9  C)] 98.3  F (36.8  C)  Pulse:  [58-82] 58  Resp:  [15-24] 18  BP: (120-172)/() 164/79  SpO2:  [93 %-99 %] 95 %  I/O last 3 completed shifts:  In: 290 [P.O.:290]  Out: -     Exam on discharge:   Alert, Ox1, pleasant.      Discharge Disposition   Discharged to assisted living  Condition at discharge: Stable    Consultations This Hospital Stay   CARE MANAGEMENT / SOCIAL WORK IP CONSULT    Time Spent on this Encounter   I spent a total of 20 minutes discharging this patient.     Discharge Orders      Reason for your hospital stay    Fall with head injury -- occipital laceration and small asymptomatic Subdural Hematoma.     Activity    Your activity upon discharge: activity -- up with assistance     Discharge Instructions    Call Dr. Fragoso if any medical questions at Cell Phone [phone].     Follow-up and recommended labs and tests     Follow up with provider at Care Center or Clinic (Kamini Michael) to have 4 occipital scalp stables removed on Monday, 9/20/21.     Diet    Follow this diet upon discharge: Orders Placed This Encounter      Regular Diet Adult     Discharge Medications   Current Discharge Medication List      CONTINUE these medications which have NOT CHANGED    Details   acetaminophen (TYLENOL) 325 MG tablet Take 650 mg by mouth 3 times daily Give at 0800, 1400 and 2000      calcium carbonate (OS-SHERI) 500 MG tablet Take 1 tablet by mouth 2 times daily      ferrous sulfate (FEROSUL) 325 (65 Fe) MG tablet Take 325 mg by mouth 2 times daily      melatonin 3 MG tablet Take 1 mg by mouth At Bedtime      metFORMIN (GLUCOPHAGE) 500 MG tablet Take 500 mg by mouth daily (with breakfast)      multivitamin, therapeutic (THERA-VIT) TABS tablet Take 1 tablet by mouth daily      Nutritional Supplements (ENSURE ORIGINAL) LIQD Take 1 Can by mouth daily Daily at 1000       PARoxetine (PAXIL) 20 MG tablet Take 20 mg by mouth every morning      polyethylene glycol (MIRALAX) 17 g packet Take 1 packet by mouth daily      !! QUEtiapine (SEROQUEL) 25 MG tablet Take 25 mg by mouth 2 times daily Daily at 0800 and 1600.      !! QUEtiapine (SEROQUEL) 25 MG tablet Take 25 mg by mouth 2 times daily as needed (anxiety and agitation)      !! risperiDONE (RISPERDAL) 1 MG tablet Take 1 mg by mouth daily Daily at 1400      !! risperiDONE (RISPERDAL) 4 MG tablet Take 4 mg by mouth every evening       !! - Potential duplicate medications found. Please discuss with provider.        Allergies   Allergies   Allergen Reactions     Penicillins Hives     Tolerates cephalosporins     Data   Most Recent 3 CBC's:Recent Labs   Lab Test 09/11/21  0753 08/03/21  1550 08/03/21  0714   WBC 12.7* 6.9 7.5   HGB 9.9* 8.9* 9.0*   MCV 91 93 93    250 261      Most Recent 3 BMP's:  Recent Labs   Lab Test 09/11/21  2113 09/11/21  1921 09/11/21  1856 09/11/21  0753 08/03/21  0714 08/02/21  1938 08/02/21  0816 08/02/21  0709   NA  --   --   --  125* 139  --   --  139   POTASSIUM  --   --   --  3.9 3.5 3.5   < > 3.2*   CHLORIDE  --   --   --  92* 106  --   --  108   CO2  --   --   --  28 32  --   --  29   BUN  --   --   --  17 7  --   --  13   CR  --   --   --  0.60 0.52  --   --  0.58   ANIONGAP  --   --   --  5 1*  --   --  2*   SHERI  --   --   --  8.3* 8.5  --   --  8.1*   * 163* 68* 133* 124*  --   --  107*    < > = values in this interval not displayed.     Most Recent 2 LFT's:  Recent Labs   Lab Test 08/02/21  0836 08/08/19  1613   AST 51* 26   ALT 41 56*   ALKPHOS 42 53   BILITOTAL 0.6 0.2     Most Recent INR's and Anticoagulation Dosing History:  Anticoagulation Dose History    There is no flowsheet data to display.       Most Recent 3 Troponin's:  Recent Labs   Lab Test 08/02/21  0709 07/31/21  1238   TROPONIN <0.015 0.038     Most Recent Cholesterol Panel:No lab results found.  Most Recent 6  Bacteria Isolates From Any Culture (See EPIC Reports for Culture Details):  Recent Labs   Lab Test 08/08/19  1800 07/31/19  2200 07/22/19  0938 04/07/19  1540   CULT <10,000 colonies/mL  urogenital melany  Susceptibility testing not routinely done   <10,000 colonies/mL  Enterobacter cloacae complex  *  <10,000 colonies/mL  Pseudomonas aeruginosa  * 50,000 to 100,000 colonies/mL  mixed urogenital melany  Susceptibility testing not routinely done   >100,000 colonies/mL  mixed urogenital melany  Susceptibility testing not routinely done       Most Recent TSH, T4 and A1c Labs:  Recent Labs   Lab Test 07/31/21  1811 07/31/21  1238 12/07/18  1051   TSH  --  1.75 2.52   T4  --   --  1.09   A1C 5.7*  --   --

## 2021-09-12 NOTE — PROGRESS NOTES
Alert to self and place only. Forgetful, impulsive. VSS on RA. Declining dinner. PRN Seroquel given before EMS to come . A1 w/GB/walker. Stretcher ride arrived at 4:30 pm, pt extremely anxious to leave. Called daughter to speak  with patient, unable to console easily. Per pts daughter, ok to transfer pt despite pt's uneasiness. Second attempt made and eliza to get pt in stretcher and console. Pt discharged off the unit at 5:00 pm to [facility] in [place]. Sent with packet, belongings, and PIV out.

## 2021-09-12 NOTE — PLAN OF CARE
Alert and oriented to self. VSS on RA. Goal SBP<140. Has PRN labetalol and hydralazine for >140. Denies pain. Regular diet. Moves SBA GB/W. Laceration on posterior head with staples in place. Neuros intact. Neurosurg consult. Possible discharge back to prior living 9/12. Continue to monitor.          Observation goals PRIOR TO DISCHARGE    Comments: -diagnostic tests and consults completed and resulted: met  -vital signs normal or at patient baseline: met  Nurse to notify provider when observation goals have been met and patient is ready for discharge.

## 2021-09-12 NOTE — PROGRESS NOTES
[facility]    Neurosurgery  Daily Progress Note    Assessment & Plan      76 year old female with dementia who presented on 9/11/21 after an unwitnessed fall at her care facility. Head CT showed an acute subdural hematoma along the left cerebral convexity measuring 3 mm. Repeat head CT shows decreased hematoma. Patient is not on anticoagulation. On exam this morning, patient reports feeling well. Denies headaches, dizziness, nausea. Following commands and moves extremities equally.     Repeat head CT reviewed.   IMPRESSION:  1. Redistribution of thin, mid left frontal convexity subdural  hematoma inferiorly adjacent to the left temporal lobe. Maximum  thickness of 1 to 2 mm. No evidence for progressive hemorrhage or new  hemorrhages elsewhere.  2. Atrophy and chronic vascular changes as above  3. Generalized atrophy. Negative evidence for hydrocephalus or  intraventricular hemorrhage.    Plan:  - Okay to discharge from [facility] standpoint  - Follow up with NSG clinic in 4 weeks with repeat head CT prior     Discussed with Dr. Orlando Lama, CNP  [facility] Neurosurgery  [address] [address]  Tel [phone]  Pager [phone]      Interval History   Stable     Physical Exam   Temp: 98.3  F (36.8  C) Temp src: Oral BP: (!) 164/79 Pulse: 58   Resp: 18 SpO2: 95 % O2 Device: None (Room air)    There were no vitals filed for this visit.  Vital Signs with Ranges  Temp:  [97.2  F (36.2  C)-98.4  F (36.9  C)] 98.3  F (36.8  C)  Pulse:  [58-82] 58  Resp:  [15-24] 18  BP: (120-172)/() 164/79  SpO2:  [93 %-99 %] 95 %  I/O last 3 completed shifts:  In: 290 [P.O.:290]  Out: -     Awake, alert, oriented, baseline dementia, speech clear  Following commands  Moves extremities equally and bilaterally     Medications        insulin aspart  1-7 Units Subcutaneous TID AC     insulin aspart  1-5 Units Subcutaneous At Bedtime      PARoxetine  20 mg Oral QAM     polyethylene glycol  17 g Oral Daily     QUEtiapine  25 mg Oral BID     risperiDONE  1 mg Oral Daily     risperiDONE  4 mg Oral QPM       Jemima Byrd [facility] Neurosurgery   [address] [place] [address], [place] [address]  Tel [phone]  Pager [phone]

## 2021-09-14 DIAGNOSIS — S06.5XAA SDH (SUBDURAL HEMATOMA) (H): Primary | ICD-10-CM

## 2021-09-15 ENCOUNTER — DOCUMENTATION ONLY (OUTPATIENT)
Dept: OTHER | Facility: CLINIC | Age: 76
End: 2021-09-15

## 2021-10-13 ENCOUNTER — HOSPITAL ENCOUNTER (OUTPATIENT)
Dept: CT IMAGING | Facility: CLINIC | Age: 76
Discharge: HOME | End: 2021-10-13
Attending: NURSE PRACTITIONER | Admitting: NURSE PRACTITIONER
Payer: COMMERCIAL

## 2021-10-13 DIAGNOSIS — S06.5XAA SDH (SUBDURAL HEMATOMA) (H): ICD-10-CM

## 2021-10-13 PROCEDURE — 70450 CT HEAD/BRAIN W/O DYE: CPT

## 2021-10-18 ENCOUNTER — OFFICE VISIT (OUTPATIENT)
Dept: NEUROSURGERY | Facility: CLINIC | Age: 76
End: 2021-10-18
Attending: NURSE PRACTITIONER
Payer: COMMERCIAL

## 2021-10-18 VITALS
HEIGHT: 60 IN | OXYGEN SATURATION: 97 % | RESPIRATION RATE: 18 BRPM | BODY MASS INDEX: 22.19 KG/M2 | WEIGHT: 113 LBS | SYSTOLIC BLOOD PRESSURE: 118 MMHG | DIASTOLIC BLOOD PRESSURE: 74 MMHG | HEART RATE: 72 BPM

## 2021-10-18 DIAGNOSIS — S06.5XAA SUBDURAL HEMATOMA (H): Primary | ICD-10-CM

## 2021-10-18 PROCEDURE — G0463 HOSPITAL OUTPT CLINIC VISIT: HCPCS

## 2021-10-18 PROCEDURE — 99214 OFFICE O/P EST MOD 30 MIN: CPT | Performed by: NURSE PRACTITIONER

## 2021-10-18 ASSESSMENT — MIFFLIN-ST. JEOR: SCORE: 914.06

## 2021-10-18 NOTE — PROGRESS NOTES
[facility]   Follow Up Visit      HPI: Angelica James is a 76 year old female who presents with her daughter for follow-up evaluation of acute subdural hematoma that occurred s/p fall on 9/11/21. Imaging showed an acute subdural hematoma along the left cerebral convexity measuring 3 mm. Repeat head CT was stable at that time and patient was discharged back to her care facility. Today, patient reports feeling well. Denies any headaches, dizziness, nausea, vision/speech changes, or other neurological changes. Denies any falls, weakness, or injuries. Denies any concerns today.     Past Medical History:   Diagnosis Date     Dementia (H)      Hypertension      Iron deficiency anemia      PUD (peptic ulcer disease)      Schizophrenia (H)      Type 2 diabetes mellitus (H)        Past Medical History reviewed with patient during visit.    Past Surgical History:   Procedure Laterality Date     colonoscopy       UPPER GI ENDOSCOPY       Past Surgical History reviewed with patient during visit.    Current Outpatient Medications   Medication     acetaminophen (TYLENOL) 325 MG tablet     calcium carbonate (OS-SHERI) 500 MG tablet     ferrous sulfate (FEROSUL) 325 (65 Fe) MG tablet     melatonin 3 MG tablet     metFORMIN (GLUCOPHAGE) 500 MG tablet     multivitamin, therapeutic (THERA-VIT) TABS tablet     Nutritional Supplements (ENSURE ORIGINAL) LIQD     PARoxetine (PAXIL) 20 MG tablet     polyethylene glycol (MIRALAX) 17 g packet     QUEtiapine (SEROQUEL) 25 MG tablet     QUEtiapine (SEROQUEL) 25 MG tablet     risperiDONE (RISPERDAL) 1 MG tablet     risperiDONE (RISPERDAL) 4 MG tablet     No current facility-administered medications for this visit.       Allergies   Allergen Reactions     Penicillins Hives     Tolerates cephalosporins       Social History     Socioeconomic History     Marital status:      Spouse name: Not on file     Number of children: 4     Years of education: Not on file      Highest education level: Not on file   Occupational History     Not on file   Tobacco Use     Smoking status: Never Smoker     Smokeless tobacco: Never Used   Substance and Sexual Activity     Alcohol use: No     Drug use: No     Sexual activity: Not on file   Other Topics Concern     Not on file   Social History Narrative    , 4 children, lives a [facility].  Is DNR/DNI.  (last updated 9/11/2021)      Social Determinants of Health     Financial Resource Strain:      Difficulty of Paying Living Expenses:    Food Insecurity:      Worried About Running Out of Food in the Last Year:      Ran Out of Food in the Last Year:    Transportation Needs:      Lack of Transportation (Medical):      Lack of Transportation (Non-Medical):    Physical Activity:      Days of Exercise per Week:      Minutes of Exercise per Session:    Stress:      Feeling of Stress :    Social Connections:      Frequency of Communication with Friends and Family:      Frequency of Social Gatherings with Friends and Family:      Attends Sabianism Services:      Active Member of Clubs or Organizations:      Attends Club or Organization Meetings:      Marital Status:    Intimate Partner Violence:      Fear of Current or Ex-Partner:      Emotionally Abused:      Physically Abused:      Sexually Abused:        Family History   Family history unknown: Yes     ROS: 10 point ROS neg other than the symptoms noted above in the HPI.    Vital Signs: /74   Pulse 72   Resp 18   Ht 5' (1.524 m)   Wt 113 lb (51.3 kg)   SpO2 97%   BMI 22.07 kg/m      Examination:  Constitutional:  Alert, well nourished, NAD.  HEENT: Normocephalic, atraumatic.   Pulm:  Without shortness of breath or audible adventitious respiratory sounds.  CV:  No pitting edema of BLE.  Brisk capillary refill, CMS intact.    Neurological:  Awake  Alert  Oriented to person and place. Disoriented to time, baseline dementia.   Speech clear  Cranial nerves II - XII  intact  PERRL  EOMI   Face symmetric  Tongue midline  Motor exam:  5/5 strength in all four extremities.   Sensation intact  Finger to Nose smooth  Pronator drift negative  Gait: Able to stand from a seated position. Ambulates with a walker.   Imaging:   CT HEAD WITHOUT CONTRAST 10/13/2021 3:21 PM  IMPRESSION: No residual subdural hematoma or acute intracranial finding.      Assessment/Plan:   Subdural hematoma     76 year old female who presents with her daughter for follow-up evaluation of acute subdural hematoma that occurred s/p fall on 9/11/21. Imaging showed an acute subdural hematoma along the left cerebral convexity measuring 3 mm. Repeat head CT 10/13/21 shows no residual subdural hematoma or acute intracranial finding. Patient is doing well. Denies any complaints. Recommend to follow-up with our clinic as needed.     Discussed red flag symptoms and advised to seek medical attention with any increased headaches, dizziness, nausea/vomiting, vision/speech changes, weakness, confusion, seizure activity, or other neurological changes. Patient voiced understanding and agreement.      Jemima Lama CNP  [facility]  [address] [address]  Tel [phone]  Pager [phone]

## 2021-10-18 NOTE — LETTER
10/18/2021         RE: Angelica James  [address]  [place] [address]        Dear Colleague,    Thank you for referring your patient, Angelica James, to the [facility]. Please see a copy of my visit note below.    [facility]   Follow Up Visit      HPI: Angelica James is a 76 year old female who presents with her daughter for follow-up evaluation of acute subdural hematoma that occurred s/p fall on 9/11/21. Imaging showed an acute subdural hematoma along the left cerebral convexity measuring 3 mm. Repeat head CT was stable at that time and patient was discharged back to her care facility. Today, patient reports feeling well. Denies any headaches, dizziness, nausea, vision/speech changes, or other neurological changes. Denies any falls, weakness, or injuries. Denies any concerns today.     Past Medical History:   Diagnosis Date     Dementia (H)      Hypertension      Iron deficiency anemia      PUD (peptic ulcer disease)      Schizophrenia (H)      Type 2 diabetes mellitus (H)        Past Medical History reviewed with patient during visit.    Past Surgical History:   Procedure Laterality Date     colonoscopy       UPPER GI ENDOSCOPY       Past Surgical History reviewed with patient during visit.    Current Outpatient Medications   Medication     acetaminophen (TYLENOL) 325 MG tablet     calcium carbonate (OS-SHERI) 500 MG tablet     ferrous sulfate (FEROSUL) 325 (65 Fe) MG tablet     melatonin 3 MG tablet     metFORMIN (GLUCOPHAGE) 500 MG tablet     multivitamin, therapeutic (THERA-VIT) TABS tablet     Nutritional Supplements (ENSURE ORIGINAL) LIQD     PARoxetine (PAXIL) 20 MG tablet     polyethylene glycol (MIRALAX) 17 g packet     QUEtiapine (SEROQUEL) 25 MG tablet     QUEtiapine (SEROQUEL) 25 MG tablet     risperiDONE (RISPERDAL) 1 MG tablet     risperiDONE (RISPERDAL) 4 MG tablet     No current facility-administered medications for  this visit.       Allergies   Allergen Reactions     Penicillins Hives     Tolerates cephalosporins       Social History     Socioeconomic History     Marital status:      Spouse name: Not on file     Number of children: 4     Years of education: Not on file     Highest education level: Not on file   Occupational History     Not on file   Tobacco Use     Smoking status: Never Smoker     Smokeless tobacco: Never Used   Substance and Sexual Activity     Alcohol use: No     Drug use: No     Sexual activity: Not on file   Other Topics Concern     Not on file   Social History Narrative    , 4 children, lives a [facility].  Is DNR/DNI.  (last updated 9/11/2021)      Social Determinants of Health     Financial Resource Strain:      Difficulty of Paying Living Expenses:    Food Insecurity:      Worried About Running Out of Food in the Last Year:      Ran Out of Food in the Last Year:    Transportation Needs:      Lack of Transportation (Medical):      Lack of Transportation (Non-Medical):    Physical Activity:      Days of Exercise per Week:      Minutes of Exercise per Session:    Stress:      Feeling of Stress :    Social Connections:      Frequency of Communication with Friends and Family:      Frequency of Social Gatherings with Friends and Family:      Attends Mu-ism Services:      Active Member of Clubs or Organizations:      Attends Club or Organization Meetings:      Marital Status:    Intimate Partner Violence:      Fear of Current or Ex-Partner:      Emotionally Abused:      Physically Abused:      Sexually Abused:        Family History   Family history unknown: Yes     ROS: 10 point ROS neg other than the symptoms noted above in the HPI.    Vital Signs: /74   Pulse 72   Resp 18   Ht 5' (1.524 m)   Wt 113 lb (51.3 kg)   SpO2 97%   BMI 22.07 kg/m      Examination:  Constitutional:  Alert, well nourished, NAD.  HEENT: Normocephalic, atraumatic.   Pulm:  Without  shortness of breath or audible adventitious respiratory sounds.  CV:  No pitting edema of BLE.  Brisk capillary refill, CMS intact.    Neurological:  Awake  Alert  Oriented to person and place. Disoriented to time, baseline dementia.   Speech clear  Cranial nerves II - XII intact  PERRL  EOMI   Face symmetric  Tongue midline  Motor exam:  5/5 strength in all four extremities.   Sensation intact  Finger to Nose smooth  Pronator drift negative  Gait: Able to stand from a seated position. Ambulates with a walker.   Imaging:   CT HEAD WITHOUT CONTRAST 10/13/2021 3:21 PM  IMPRESSION: No residual subdural hematoma or acute intracranial finding.      Assessment/Plan:   Subdural hematoma     76 year old female who presents with her daughter for follow-up evaluation of acute subdural hematoma that occurred s/p fall on 9/11/21. Imaging showed an acute subdural hematoma along the left cerebral convexity measuring 3 mm. Repeat head CT 10/13/21 shows no residual subdural hematoma or acute intracranial finding. Patient is doing well. Denies any complaints. Recommend to follow-up with our clinic as needed.     Discussed red flag symptoms and advised to seek medical attention with any increased headaches, dizziness, nausea/vomiting, vision/speech changes, weakness, confusion, seizure activity, or other neurological changes. Patient voiced understanding and agreement.      Jemima Lama CNP  [facility]  [address] [address]  Tel [phone]  Pager [phone]        Again, thank you for allowing me to participate in the care of your patient.        Sincerely,        Jemima Lama NP

## 2021-10-18 NOTE — NURSING NOTE
Angelica James is a 76 year old female who presents for:  Chief Complaint   Patient presents with     RECHECK     SDH         Initial Vitals:  /74   Pulse 72   Resp 18   Ht 5' (1.524 m)   Wt 113 lb (51.3 kg)   SpO2 97%   BMI 22.07 kg/m   Estimated body mass index is 22.07 kg/m  as calculated from the following:    Height as of this encounter: 5' (1.524 m).    Weight as of this encounter: 113 lb (51.3 kg).. Body surface area is 1.47 meters squared. BP completed using cuff size: regular  Data Unavailable    Nursing Comments: Patient states that she is feeling well with no concerns. Denies vision/hearing changes. Patient has dementia. No headaches.,     Claudia Carrero MA

## 2021-11-01 ENCOUNTER — APPOINTMENT (OUTPATIENT)
Dept: CT IMAGING | Facility: CLINIC | Age: 76
End: 2021-11-01
Attending: EMERGENCY MEDICINE
Payer: COMMERCIAL

## 2021-11-01 ENCOUNTER — HOSPITAL ENCOUNTER (EMERGENCY)
Facility: CLINIC | Age: 76
Discharge: HOME | End: 2021-11-01
Attending: EMERGENCY MEDICINE | Admitting: EMERGENCY MEDICINE
Payer: COMMERCIAL

## 2021-11-01 VITALS
DIASTOLIC BLOOD PRESSURE: 78 MMHG | SYSTOLIC BLOOD PRESSURE: 149 MMHG | HEART RATE: 81 BPM | TEMPERATURE: 98.1 F | OXYGEN SATURATION: 97 % | RESPIRATION RATE: 16 BRPM

## 2021-11-01 DIAGNOSIS — K40.90 RIGHT INGUINAL HERNIA: ICD-10-CM
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
## 2021-11-01 DIAGNOSIS — N39.0 URINARY TRACT INFECTION WITHOUT HEMATURIA, SITE UNSPECIFIED: ICD-10-CM

## 2021-11-01 LAB
ALBUMIN SERPL-MCNC: 3.4 G/DL (ref 3.4–5)
ALBUMIN UR-MCNC: NEGATIVE MG/DL
ALP SERPL-CCNC: 55 U/L (ref 40–150)
ALT SERPL W P-5'-P-CCNC: 44 U/L (ref 0–50)
ANION GAP SERPL CALCULATED.3IONS-SCNC: 3 MMOL/L (ref 3–14)
APPEARANCE UR: CLEAR
AST SERPL W P-5'-P-CCNC: 41 U/L (ref 0–45)
BACTERIA #/AREA URNS HPF: ABNORMAL /HPF
BASOPHILS # BLD AUTO: 0 10E3/UL (ref 0–0.2)
BASOPHILS NFR BLD AUTO: 0 %
BILIRUB SERPL-MCNC: 0.4 MG/DL (ref 0.2–1.3)
BILIRUB UR QL STRIP: NEGATIVE
BUN SERPL-MCNC: 15 MG/DL (ref 7–30)
CALCIUM SERPL-MCNC: 8.8 MG/DL (ref 8.5–10.1)
CHLORIDE BLD-SCNC: 98 MMOL/L (ref 94–109)
CO2 SERPL-SCNC: 31 MMOL/L (ref 20–32)
COLOR UR AUTO: ABNORMAL
CREAT SERPL-MCNC: 0.62 MG/DL (ref 0.52–1.04)
EOSINOPHIL # BLD AUTO: 0 10E3/UL (ref 0–0.7)
EOSINOPHIL NFR BLD AUTO: 0 %
ERYTHROCYTE [DISTWIDTH] IN BLOOD BY AUTOMATED COUNT: 13.8 % (ref 10–15)
GFR SERPL CREATININE-BSD FRML MDRD: 87 ML/MIN/1.73M2
GLUCOSE BLD-MCNC: 140 MG/DL (ref 70–99)
GLUCOSE UR STRIP-MCNC: NEGATIVE MG/DL
HCT VFR BLD AUTO: 29.4 % (ref 35–47)
HGB BLD-MCNC: 9.7 G/DL (ref 11.7–15.7)
HGB UR QL STRIP: ABNORMAL
IMM GRANULOCYTES # BLD: 0 10E3/UL
IMM GRANULOCYTES NFR BLD: 0 %
KETONES UR STRIP-MCNC: NEGATIVE MG/DL
LEUKOCYTE ESTERASE UR QL STRIP: ABNORMAL
LYMPHOCYTES # BLD AUTO: 1.1 10E3/UL (ref 0.8–5.3)
LYMPHOCYTES NFR BLD AUTO: 11 %
MCH RBC QN AUTO: 30 PG (ref 26.5–33)
MCHC RBC AUTO-ENTMCNC: 33 G/DL (ref 31.5–36.5)
MCV RBC AUTO: 91 FL (ref 78–100)
MONOCYTES # BLD AUTO: 0.9 10E3/UL (ref 0–1.3)
MONOCYTES NFR BLD AUTO: 9 %
NEUTROPHILS # BLD AUTO: 7.7 10E3/UL (ref 1.6–8.3)
NEUTROPHILS NFR BLD AUTO: 80 %
NITRATE UR QL: NEGATIVE
NRBC # BLD AUTO: 0 10E3/UL
NRBC BLD AUTO-RTO: 0 /100
PH UR STRIP: 7.5 [PH] (ref 5–7)
PLATELET # BLD AUTO: 269 10E3/UL (ref 150–450)
POTASSIUM BLD-SCNC: 3.9 MMOL/L (ref 3.4–5.3)
PROT SERPL-MCNC: 7.2 G/DL (ref 6.8–8.8)
RBC # BLD AUTO: 3.23 10E6/UL (ref 3.8–5.2)
RBC URINE: 1 /HPF
SODIUM SERPL-SCNC: 132 MMOL/L (ref 133–144)
SP GR UR STRIP: 1 (ref 1–1.03)
SQUAMOUS EPITHELIAL: <1 /HPF
UROBILINOGEN UR STRIP-MCNC: NORMAL MG/DL
WBC # BLD AUTO: 9.8 10E3/UL (ref 4–11)
WBC URINE: 15 /HPF

## 2021-11-01 PROCEDURE — 96365 THER/PROPH/DIAG IV INF INIT: CPT | Mod: 59

## 2021-11-01 PROCEDURE — 80053 COMPREHEN METABOLIC PANEL: CPT | Performed by: EMERGENCY MEDICINE

## 2021-11-01 PROCEDURE — 250N000011 HC RX IP 250 OP 636: Performed by: EMERGENCY MEDICINE

## 2021-11-01 PROCEDURE — 85025 COMPLETE CBC W/AUTO DIFF WBC: CPT | Performed by: EMERGENCY MEDICINE

## 2021-11-01 PROCEDURE — 87086 URINE CULTURE/COLONY COUNT: CPT | Performed by: EMERGENCY MEDICINE

## 2021-11-01 PROCEDURE — 99285 EMERGENCY DEPT VISIT HI MDM: CPT | Mod: 25

## 2021-11-01 PROCEDURE — 74177 CT ABD & PELVIS W/CONTRAST: CPT

## 2021-11-01 PROCEDURE — 250N000009 HC RX 250: Performed by: EMERGENCY MEDICINE

## 2021-11-01 PROCEDURE — 81001 URINALYSIS AUTO W/SCOPE: CPT | Performed by: EMERGENCY MEDICINE

## 2021-11-01 PROCEDURE — 70450 CT HEAD/BRAIN W/O DYE: CPT

## 2021-11-01 PROCEDURE — 36415 COLL VENOUS BLD VENIPUNCTURE: CPT | Performed by: EMERGENCY MEDICINE

## 2021-11-01 RX ORDER — IOPAMIDOL 755 MG/ML
57 INJECTION, SOLUTION INTRAVASCULAR ONCE
Status: COMPLETED | OUTPATIENT
Start: 2021-11-01 | End: 2021-11-01

## 2021-11-01 RX ORDER — CEPHALEXIN 500 MG/1
500 CAPSULE ORAL 3 TIMES DAILY
Qty: 28 CAPSULE | Refills: 0 | Status: SHIPPED | OUTPATIENT
Start: 2021-11-01 | End: 2021-11-08

## 2021-11-01 RX ORDER — CEFTRIAXONE 1 G/1
1 INJECTION, POWDER, FOR SOLUTION INTRAMUSCULAR; INTRAVENOUS ONCE
Status: COMPLETED | OUTPATIENT
Start: 2021-11-01 | End: 2021-11-01

## 2021-11-01 RX ADMIN — SODIUM CHLORIDE 60 ML: 900 INJECTION INTRAVENOUS at 18:09

## 2021-11-01 RX ADMIN — CEFTRIAXONE SODIUM 1 G: 1 INJECTION, POWDER, FOR SOLUTION INTRAMUSCULAR; INTRAVENOUS at 19:05

## 2021-11-01 RX ADMIN — IOPAMIDOL 57 ML: 755 INJECTION, SOLUTION INTRAVENOUS at 18:08
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
## 2021-11-02 LAB — BACTERIA UR CULT: NORMAL

## 2021-11-03 NOTE — RESULT ENCOUNTER NOTE
Final urine culture report is negative.  Adult Negative Urine culture parameters per protocol: Any # Urogenital single or mixed organism, <10,000 col/ml single organism (cath specimen), and <50,000 col/ml single organism (midstream).  [facility] Emergency Dept discharge antibiotic prescribed (If applicable): Cephalexin  Treatment recommendations per [facility] ED Lab Result Urine Culture protocol.

## 2022-01-28 ENCOUNTER — HOSPITAL ENCOUNTER (INPATIENT)
Facility: CLINIC | Age: 77
LOS: 3 days | Discharge: NURSING, REHAB OR LONG-TERM CARE | DRG: 644 | End: 2022-01-31
Attending: EMERGENCY MEDICINE | Admitting: INTERNAL MEDICINE
Payer: COMMERCIAL

## 2022-01-28 ENCOUNTER — APPOINTMENT (OUTPATIENT)
Dept: CT IMAGING | Facility: CLINIC | Age: 77
DRG: 644 | End: 2022-01-28
Attending: EMERGENCY MEDICINE
Payer: COMMERCIAL

## 2022-01-28 ENCOUNTER — APPOINTMENT (OUTPATIENT)
Dept: GENERAL RADIOLOGY | Facility: CLINIC | Age: 77
DRG: 644 | End: 2022-01-28
Attending: EMERGENCY MEDICINE
Payer: COMMERCIAL

## 2022-01-28 DIAGNOSIS — E87.1 HYPONATREMIA: ICD-10-CM

## 2022-01-28 DIAGNOSIS — R31.9 URINARY TRACT INFECTION WITH HEMATURIA, SITE UNSPECIFIED: ICD-10-CM

## 2022-01-28 DIAGNOSIS — D64.9 ANEMIA, UNSPECIFIED TYPE: ICD-10-CM
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
## 2022-01-28 DIAGNOSIS — N39.0 URINARY TRACT INFECTION WITH HEMATURIA, SITE UNSPECIFIED: ICD-10-CM

## 2022-01-28 DIAGNOSIS — R41.0 CONFUSION: ICD-10-CM

## 2022-01-28 DIAGNOSIS — R31.21 ASYMPTOMATIC MICROSCOPIC HEMATURIA: Primary | ICD-10-CM

## 2022-01-28 LAB
ALBUMIN SERPL-MCNC: 3.5 G/DL (ref 3.4–5)
ALBUMIN UR-MCNC: 10 MG/DL
ALP SERPL-CCNC: 45 U/L (ref 40–150)
ALT SERPL W P-5'-P-CCNC: 31 U/L (ref 0–50)
ANION GAP SERPL CALCULATED.3IONS-SCNC: 9 MMOL/L (ref 3–14)
APPEARANCE UR: ABNORMAL
AST SERPL W P-5'-P-CCNC: 28 U/L (ref 0–45)
BASOPHILS # BLD AUTO: 0 10E3/UL (ref 0–0.2)
BASOPHILS NFR BLD AUTO: 0 %
BILIRUB SERPL-MCNC: 0.6 MG/DL (ref 0.2–1.3)
BILIRUB UR QL STRIP: NEGATIVE
BUN SERPL-MCNC: 27 MG/DL (ref 7–30)
CALCIUM SERPL-MCNC: 8.2 MG/DL (ref 8.5–10.1)
CHLORIDE BLD-SCNC: 81 MMOL/L (ref 94–109)
CO2 SERPL-SCNC: 28 MMOL/L (ref 20–32)
COLOR UR AUTO: ABNORMAL
CREAT SERPL-MCNC: 0.44 MG/DL (ref 0.52–1.04)
CREAT SERPL-MCNC: 0.52 MG/DL (ref 0.52–1.04)
CREAT UR-MCNC: 8 MG/DL
CRP SERPL-MCNC: 4.1 MG/L (ref 0–8)
D DIMER PPP FEU-MCNC: 1.33 UG/ML FEU (ref 0–0.5)
EOSINOPHIL # BLD AUTO: 0 10E3/UL (ref 0–0.7)
EOSINOPHIL NFR BLD AUTO: 0 %
ERYTHROCYTE [DISTWIDTH] IN BLOOD BY AUTOMATED COUNT: 13.2 % (ref 10–15)
FRACT EXCRET NA UR+SERPL-RTO: 2 %
GFR SERPL CREATININE-BSD FRML MDRD: >90 ML/MIN/1.73M2
GFR SERPL CREATININE-BSD FRML MDRD: >90 ML/MIN/1.73M2
GLUCOSE BLD-MCNC: 133 MG/DL (ref 70–99)
GLUCOSE UR STRIP-MCNC: NEGATIVE MG/DL
HCT VFR BLD AUTO: 23.3 % (ref 35–47)
HGB BLD-MCNC: 7.8 G/DL (ref 11.7–15.7)
HGB UR QL STRIP: ABNORMAL
HOLD SPECIMEN: NORMAL
IMM GRANULOCYTES # BLD: 0.1 10E3/UL
IMM GRANULOCYTES NFR BLD: 1 %
KETONES UR STRIP-MCNC: NEGATIVE MG/DL
LEUKOCYTE ESTERASE UR QL STRIP: ABNORMAL
LYMPHOCYTES # BLD AUTO: 0.8 10E3/UL (ref 0.8–5.3)
LYMPHOCYTES NFR BLD AUTO: 9 %
MCH RBC QN AUTO: 29.7 PG (ref 26.5–33)
MCHC RBC AUTO-ENTMCNC: 33.5 G/DL (ref 31.5–36.5)
MCV RBC AUTO: 89 FL (ref 78–100)
MONOCYTES # BLD AUTO: 0.7 10E3/UL (ref 0–1.3)
MONOCYTES NFR BLD AUTO: 7 %
NEUTROPHILS # BLD AUTO: 7.8 10E3/UL (ref 1.6–8.3)
NEUTROPHILS NFR BLD AUTO: 83 %
NITRATE UR QL: NEGATIVE
NRBC # BLD AUTO: 0 10E3/UL
NRBC BLD AUTO-RTO: 0 /100
OSMOLALITY UR: 182 MMOL/KG (ref 100–1200)
PH UR STRIP: 8 [PH] (ref 5–7)
PLATELET # BLD AUTO: 273 10E3/UL (ref 150–450)
POTASSIUM BLD-SCNC: 4.4 MMOL/L (ref 3.4–5.3)
PROT SERPL-MCNC: 6.9 G/DL (ref 6.8–8.8)
RBC # BLD AUTO: 2.63 10E6/UL (ref 3.8–5.2)
RBC URINE: 5 /HPF
SARS-COV-2 RNA RESP QL NAA+PROBE: POSITIVE
SODIUM SERPL-SCNC: 118 MMOL/L (ref 133–144)
SODIUM SERPL-SCNC: 127 MMOL/L (ref 133–144)
SODIUM UR-SCNC: 40 MMOL/L
SP GR UR STRIP: 1.02 (ref 1–1.03)
TSH SERPL DL<=0.005 MIU/L-ACNC: 3.54 MU/L (ref 0.4–4)
UROBILINOGEN UR STRIP-MCNC: NORMAL MG/DL
WBC # BLD AUTO: 9.3 10E3/UL (ref 4–11)
WBC URINE: 33 /HPF

## 2022-01-28 PROCEDURE — 84443 ASSAY THYROID STIM HORMONE: CPT | Performed by: INTERNAL MEDICINE

## 2022-01-28 PROCEDURE — 250N000013 HC RX MED GY IP 250 OP 250 PS 637: Performed by: INTERNAL MEDICINE

## 2022-01-28 PROCEDURE — 85025 COMPLETE CBC W/AUTO DIFF WBC: CPT | Performed by: EMERGENCY MEDICINE

## 2022-01-28 PROCEDURE — 80053 COMPREHEN METABOLIC PANEL: CPT | Performed by: EMERGENCY MEDICINE

## 2022-01-28 PROCEDURE — 81001 URINALYSIS AUTO W/SCOPE: CPT | Performed by: EMERGENCY MEDICINE

## 2022-01-28 PROCEDURE — 120N000001 HC R&B MED SURG/OB

## 2022-01-28 PROCEDURE — C9803 HOPD COVID-19 SPEC COLLECT: HCPCS

## 2022-01-28 PROCEDURE — 82565 ASSAY OF CREATININE: CPT | Performed by: INTERNAL MEDICINE

## 2022-01-28 PROCEDURE — 85379 FIBRIN DEGRADATION QUANT: CPT | Performed by: INTERNAL MEDICINE

## 2022-01-28 PROCEDURE — 36415 COLL VENOUS BLD VENIPUNCTURE: CPT | Performed by: INTERNAL MEDICINE

## 2022-01-28 PROCEDURE — 70450 CT HEAD/BRAIN W/O DYE: CPT

## 2022-01-28 PROCEDURE — 250N000011 HC RX IP 250 OP 636: Performed by: EMERGENCY MEDICINE

## 2022-01-28 PROCEDURE — 72220 X-RAY EXAM SACRUM TAILBONE: CPT

## 2022-01-28 PROCEDURE — 87086 URINE CULTURE/COLONY COUNT: CPT | Performed by: EMERGENCY MEDICINE

## 2022-01-28 PROCEDURE — 83935 ASSAY OF URINE OSMOLALITY: CPT | Performed by: INTERNAL MEDICINE

## 2022-01-28 PROCEDURE — 86140 C-REACTIVE PROTEIN: CPT | Performed by: INTERNAL MEDICINE

## 2022-01-28 PROCEDURE — 87635 SARS-COV-2 COVID-19 AMP PRB: CPT | Performed by: EMERGENCY MEDICINE

## 2022-01-28 PROCEDURE — 84300 ASSAY OF URINE SODIUM: CPT | Performed by: INTERNAL MEDICINE

## 2022-01-28 PROCEDURE — 84295 ASSAY OF SERUM SODIUM: CPT | Performed by: INTERNAL MEDICINE

## 2022-01-28 PROCEDURE — 96374 THER/PROPH/DIAG INJ IV PUSH: CPT

## 2022-01-28 PROCEDURE — 99285 EMERGENCY DEPT VISIT HI MDM: CPT | Mod: 25

## 2022-01-28 PROCEDURE — 99223 1ST HOSP IP/OBS HIGH 75: CPT | Mod: AI | Performed by: INTERNAL MEDICINE

## 2022-01-28 PROCEDURE — 51798 US URINE CAPACITY MEASURE: CPT

## 2022-01-28 PROCEDURE — 36415 COLL VENOUS BLD VENIPUNCTURE: CPT | Performed by: EMERGENCY MEDICINE

## 2022-01-28 PROCEDURE — 72170 X-RAY EXAM OF PELVIS: CPT

## 2022-01-28 RX ORDER — MULTIVITAMIN,THERAPEUTIC
1 TABLET ORAL EVERY EVENING
Status: DISCONTINUED | OUTPATIENT
Start: 2022-01-28 | End: 2022-01-31 | Stop reason: HOSPADM

## 2022-01-28 RX ORDER — LIDOCAINE 40 MG/G
CREAM TOPICAL
Status: DISCONTINUED | OUTPATIENT
Start: 2022-01-28 | End: 2022-01-31 | Stop reason: HOSPADM

## 2022-01-28 RX ORDER — PAROXETINE 20 MG/1
20 TABLET, FILM COATED ORAL EVERY MORNING
Status: CANCELLED | OUTPATIENT
Start: 2022-01-29

## 2022-01-28 RX ORDER — POLYETHYLENE GLYCOL 3350 17 G/17G
17 POWDER, FOR SOLUTION ORAL DAILY
Status: DISCONTINUED | OUTPATIENT
Start: 2022-01-29 | End: 2022-01-31 | Stop reason: HOSPADM

## 2022-01-28 RX ORDER — CALCIUM CARBONATE 500(1250)
1 TABLET ORAL 2 TIMES DAILY
Status: DISCONTINUED | OUTPATIENT
Start: 2022-01-28 | End: 2022-01-31 | Stop reason: HOSPADM

## 2022-01-28 RX ORDER — ONDANSETRON 2 MG/ML
4 INJECTION INTRAMUSCULAR; INTRAVENOUS EVERY 6 HOURS PRN
Status: DISCONTINUED | OUTPATIENT
Start: 2022-01-28 | End: 2022-01-31 | Stop reason: HOSPADM

## 2022-01-28 RX ORDER — RISPERIDONE 1 MG/1
1 TABLET ORAL DAILY
Status: DISCONTINUED | OUTPATIENT
Start: 2022-01-29 | End: 2022-01-31 | Stop reason: HOSPADM

## 2022-01-28 RX ORDER — ONDANSETRON 4 MG/1
4 TABLET, ORALLY DISINTEGRATING ORAL EVERY 6 HOURS PRN
Status: DISCONTINUED | OUTPATIENT
Start: 2022-01-28 | End: 2022-01-31 | Stop reason: HOSPADM

## 2022-01-28 RX ORDER — ACETAMINOPHEN 500 MG
1000 TABLET ORAL 3 TIMES DAILY
Status: DISCONTINUED | OUTPATIENT
Start: 2022-01-28 | End: 2022-01-31 | Stop reason: HOSPADM

## 2022-01-28 RX ORDER — ACETAMINOPHEN 500 MG
1000 TABLET ORAL 3 TIMES DAILY
COMMUNITY

## 2022-01-28 RX ORDER — FERROUS SULFATE 325(65) MG
325 TABLET ORAL 2 TIMES DAILY
Status: DISCONTINUED | OUTPATIENT
Start: 2022-01-28 | End: 2022-01-31 | Stop reason: HOSPADM

## 2022-01-28 RX ORDER — RISPERIDONE 2 MG/1
4 TABLET ORAL EVERY EVENING
Status: DISCONTINUED | OUTPATIENT
Start: 2022-01-28 | End: 2022-01-31 | Stop reason: HOSPADM

## 2022-01-28 RX ORDER — CEFTRIAXONE 1 G/1
1 INJECTION, POWDER, FOR SOLUTION INTRAMUSCULAR; INTRAVENOUS EVERY 24 HOURS
Status: DISCONTINUED | OUTPATIENT
Start: 2022-01-29 | End: 2022-01-31

## 2022-01-28 RX ORDER — QUETIAPINE FUMARATE 25 MG/1
25 TABLET, FILM COATED ORAL 2 TIMES DAILY PRN
Status: DISCONTINUED | OUTPATIENT
Start: 2022-01-28 | End: 2022-01-31 | Stop reason: HOSPADM

## 2022-01-28 RX ORDER — QUETIAPINE FUMARATE 25 MG/1
25 TABLET, FILM COATED ORAL 2 TIMES DAILY
Status: DISCONTINUED | OUTPATIENT
Start: 2022-01-28 | End: 2022-01-31 | Stop reason: HOSPADM

## 2022-01-28 RX ORDER — CEFTRIAXONE 1 G/1
1 INJECTION, POWDER, FOR SOLUTION INTRAMUSCULAR; INTRAVENOUS ONCE
Status: COMPLETED | OUTPATIENT
Start: 2022-01-28 | End: 2022-01-28

## 2022-01-28 RX ADMIN — FERROUS SULFATE TAB 325 MG (65 MG ELEMENTAL FE) 325 MG: 325 (65 FE) TAB at 20:38

## 2022-01-28 RX ADMIN — QUETIAPINE FUMARATE 25 MG: 25 TABLET ORAL at 20:38

## 2022-01-28 RX ADMIN — THERA TABS 1 TABLET: TAB at 20:38

## 2022-01-28 RX ADMIN — CEFTRIAXONE SODIUM 1 G: 1 INJECTION, POWDER, FOR SOLUTION INTRAMUSCULAR; INTRAVENOUS at 15:27

## 2022-01-28 ASSESSMENT — ACTIVITIES OF DAILY LIVING (ADL)
ADLS_ACUITY_SCORE: 12
ADLS_ACUITY_SCORE: 18
ADLS_ACUITY_SCORE: 12
ADLS_ACUITY_SCORE: 12

## 2022-01-28 ASSESSMENT — MIFFLIN-ST. JEOR: SCORE: 872.25

## 2022-01-28 NOTE — ED NOTES
Bed: ED03  Expected date: 1/28/22  Expected time: 12:27 PM  Means of arrival: Ambulance  Comments:  Kinga 76f dementia, uti?  ETA [id number]

## 2022-01-28 NOTE — ED TRIAGE NOTES
Pt BIBA. Alert x 2. Botswanan speaking, understands some English. Several UTIs prior, staff at [facility] believed pt to be having symptoms of retention. No complaints of pain.

## 2022-01-28 NOTE — ED NOTES
"[facility]  ED Nurse Handoff Report    ED Chief complaint: Rule out Urinary Tract Infection      ED Diagnosis:   Final diagnoses:   Hyponatremia   Urinary tract infection with hematuria, site unspecified   Anemia, unspecified type   Confusion   Fall, initial encounter       Code Status: DNR / DNI    Allergies:   Allergies   Allergen Reactions     Penicillins Hives     Tolerates cephalosporins       Patient Story: Pt comes from [facility] with multiple UTIs in the past. Staff thought pt was having retention. Pt baseline dementia. Irish as first language but communicates in english as well. No complaints of pain.   Focused Assessment:  Alert x 2. Improving post Antx. Frequent urination, purewick in place. VSS. Tolerating reg diet. Up with SBA.    Treatments and/or interventions provided: CT, UA, COVID swab  Patient's response to treatments and/or interventions: mentation improved post antx    To be done/followed up on inpatient unit:      Does this patient have any cognitive concerns?: Baseline dementia    Activity level - Baseline/Home:  Stand with Assist  Activity Level - Current:   Stand with Assist    Patient's Preferred language: Irish   Needed?: Yes    Isolation: None and COVID r/o and special precautions - post COVID  Infection: Not Applicable  COVID r/o and special precautions  Patient tested for COVID 19 prior to admission: YES  Bariatric?: No    Vital Signs:   Vitals:    01/28/22 1250 01/28/22 1306 01/28/22 1420   BP: (!) 146/81  (!) 145/69   Pulse: 86  76   Resp: 16     Temp: 98.9  F (37.2  C)     TempSrc: Oral     SpO2: 98%  98%   Height:  1.575 m (5' 2\")        Cardiac Rhythm:     Was the PSS-3 completed:   Yes  What interventions are required if any?               Family Comments: Daughter called and at the bedside  OBS brochure/video discussed/provided to patient/family: N/A              Name of person given brochure if not patient:               Relationship to " patient:     For the majority of the shift this patient's behavior was Green.   Behavioral interventions performed were redirection.    ED NURSE PHONE NUMBER: *[phone]

## 2022-01-28 NOTE — PHARMACY-ADMISSION MEDICATION HISTORY
Pharmacy Medication History  Admission medication history interview status for the 1/28/2022  admission is complete. See EPIC admission navigator for prior to admission medications     Location of Interview: Outside patient room but on unit  Medication history sources: MAR ([facility], [phone])    Significant changes made to the medication list:      In the past week, patient estimated taking medication this percent of the time: n/a    Additional medication history information:   ---  Spoke with a nurse at [facility].  She believed pt received all of her medications this morning but not her 14:00 doses and on.    Medication reconciliation completed by provider prior to medication history? No    Time spent in this activity: 20 minutes    Prior to Admission medications    Medication Sig Last Dose Taking? Auth Provider   acetaminophen (TYLENOL) 500 MG tablet Take 1,000 mg by mouth 3 times daily At 08:00, 14:00 & 20:00 1/28/2022 at am Yes Unknown, Entered By History   calcium carbonate (OS-SHERI) 500 MG tablet Take 1 tablet by mouth 2 times daily At 08:00 & 16:00 1/28/2022 at am Yes Unknown, Entered By History   ferrous sulfate (FEROSUL) 325 (65 Fe) MG tablet Take 325 mg by mouth 2 times daily 1/28/2022 at am Yes Unknown, Entered By History   melatonin 3 MG tablet Take 1 mg by mouth At Bedtime 1/27/2022 Yes Unknown, Entered By History   metFORMIN (GLUCOPHAGE) 500 MG tablet Take 500 mg by mouth daily (with breakfast) 1/28/2022 Yes Unknown, Entered By History   multivitamin, therapeutic (THERA-VIT) TABS tablet Take 1 tablet by mouth every evening  1/27/2022 Yes Reported, Patient   Nutritional Supplements (ENSURE ORIGINAL) LIQD Take 1 Can by mouth daily Daily at 1000 1/28/2022 Yes Unknown, Entered By History   PARoxetine (PAXIL) 20 MG tablet Take 20 mg by mouth every morning 1/28/2022 at am Yes Unknown, Entered By History   polyethylene glycol (MIRALAX) 17 g packet Take 1 packet by mouth daily 1/28/2022 at  am Yes Unknown, Entered By History   QUEtiapine (SEROQUEL) 25 MG tablet Take 25 mg by mouth 2 times daily Daily at 0800 and 1600. 1/28/2022 at am Yes Unknown, Entered By History   QUEtiapine (SEROQUEL) 25 MG tablet Take 25 mg by mouth 2 times daily as needed (anxiety and agitation) prn Yes Unknown, Entered By History   risperiDONE (RISPERDAL) 1 MG tablet Take 1 mg by mouth daily Daily at 1400 1/27/2022 Yes Unknown, Entered By History   risperiDONE (RISPERDAL) 4 MG tablet Take 4 mg by mouth every evening At 20:00 1/27/2022 Yes Unknown, Entered By History       The information provided in this note is only as accurate as the sources available at the time of update(s)

## 2022-01-28 NOTE — ED PROVIDER NOTES
"  History     Chief Complaint:  Rule out Urinary Tract Infection     HPI   Angelica James is a 76 year old female with a history of dementia, anemia, schizophrenia and recurrent UTI who was sent from her assisted living memory care facility for concern of possible urinary tract infection.  The patient is unable to give a complete history.  She repeatedly asked to \" let me leave here.\".    Paperwork is sparse but requested a urinalysis, CBC and CMP.  Her daughter arrived later in the course of her stay and reports that 2 days ago she had a fall.  Unsure if she hit her head.  The EMS had reported a small bruise on her bottom.  She was notified by the assisting living facility that her mom was more confused than normal, lethargic and concerned about the possibility of a UTI.  No fevers that she is aware of. Her daughter also stated that she tested positive for Covid a few weeks ago. She is not sure where the test was done at.    History and review of systems limited by patient's dementia and language barrier.  Telephone  was used.    ROS:  Review of Systems   As noted per HPI.  Remainder of a 10 point review of systems was negative.    History and review of systems limited by patient's dementia and language barrier.  Telephone  was used.    Allergies:  Penicillins     Medications:    No current outpatient medications on file.    Past Medical History:    Past Medical History:   Diagnosis Date     Dementia (H)      Hypertension      Iron deficiency anemia      PUD (peptic ulcer disease)      Schizophrenia (H)      Type 2 diabetes mellitus (H)      Patient Active Problem List   Diagnosis     Hyponatremia     Traumatic rhabdomyolysis, initial encounter (H)     Moderate Dementia w behavioral disturb     Colitis due to Clostridium difficile     Bladder spasm     Paroxysmal atrial fibrillation (H)     Urinary tract infection     Traumatic Left SDH, 3 mm      Fall     DM 2, Hgb A1C 5.7 " "on 7/31/21     Anemia of chronic disease     Confusion     Anemia, unspecified type        Past Surgical History:    Past Surgical History:   Procedure Laterality Date     colonoscopy       UPPER GI ENDOSCOPY          Family History:    Family history is unknown by patient.    Social History:   reports that she has never smoked. She has never used smokeless tobacco. She reports that she does not drink alcohol and does not use drugs.  PCP: Kamini Michael     Physical Exam     Patient Vitals for the past 24 hrs:   BP Temp Temp src Pulse Resp SpO2 Height Weight   01/28/22 1906 137/72 99.1  F (37.3  C) Oral 79 16 97 % -- 43.9 kg (96 lb 12.5 oz)   01/28/22 1830 102/53 -- -- 102 -- -- -- --   01/28/22 1800 119/78 -- -- 95 -- -- -- --   01/28/22 1730 123/65 -- -- 75 -- 98 % -- --   01/28/22 1700 103/80 -- -- 78 -- (!) 85 % -- --   01/28/22 1630 133/69 -- -- 105 -- -- -- --   01/28/22 1600 129/81 -- -- 84 -- -- -- --   01/28/22 1530 135/63 -- -- 71 -- 98 % -- --   01/28/22 1430 123/59 -- -- 72 -- 97 % -- --   01/28/22 1420 (!) 145/69 -- -- 76 -- 98 % -- --   01/28/22 1306 -- -- -- -- -- -- 1.575 m (5' 2\") --   01/28/22 1250 (!) 146/81 98.9  F (37.2  C) Oral 86 16 98 % -- --      Physical Exam  General: Thin elderly woman.  Moving around on the cart and asking to leave.    Eyes: PERRL, conjunctivae pink no scleral icterus or conjunctival injection  ENT:  Moist mucus membranes, posterior oropharynx clear without erythema or exudates  Respiratory:  Lungs clear to auscultation bilaterally, no crackles/rubs/wheezes.  Good air movement  CV: Normal rate and rhythm, no murmurs/rubs/gallops  GI:  Abdomen soft and non-distended.  Normoactive BS.  No tenderness, guarding or rebound  Skin: Warm, dry.  No rashes or petechiae  Musculoskeletal: No peripheral edema or calf tenderness  Neuro: Somnolent but arouses to voice and participates minimally with conversation with .  Confused.  Repeatedly asks to leave.  Unable to " fully assess cranial nerves but no apparent cranial nerve deficits.  Moving all 4 extremities.  Globally weak.    Psychiatric: Unable to assess    Emergency Department Course     Imaging:  Head CT w/o contrast   Final Result   IMPRESSION:   1. Exam is mildly limited.   2. No definite CT findings of acute intracranial process.   3. Brain atrophy and presumed chronic small vessel ischemic changes,   as described.       CHRISTIAN TORRES MD            SYSTEM ID:  [id number]      XR Sacrum and Coccyx 2 Views   Final Result   IMPRESSION: No definite displaced fracture of the visualized aspects   of the bony pelvis identified. Degenerative changes of bilateral   sacroiliac joints (right greater than left) and pubic symphysis are   better characterized on prior CT exam. Moderate to marked degenerative   disc space narrowing at L5-S1 with marginal endplate osteophytes and   relatively advanced lower lumbar degenerative facet arthropathy.   Scattered presumed calcified phleboliths and atherosclerotic vascular   calcifications in the pelvis.       CHRISTIAN TORRES MD            SYSTEM ID:  [id number]      XR Pelvis 1/2 Views   Final Result   IMPRESSION: No evidence of acute fracture. Hip joint spaces are   preserved. Degenerative changes in the lower lumbar spine. Sclerotic   changes adjacent to the inferior right SI joint.      TONI LUNA MD            SYSTEM ID:  [id number]      XR Chest Port 1 View    (Results Pending)      Report per radiology    Laboratory:  Labs Ordered and Resulted from Time of ED Arrival to Time of ED Departure   ROUTINE UA WITH MICROSCOPIC REFLEX TO CULTURE - Abnormal       Result Value    Color Urine Straw      Appearance Urine Cloudy (*)     Glucose Urine Negative      Bilirubin Urine Negative      Ketones Urine Negative      Specific Gravity Urine 1.019      Blood Urine Moderate (*)     pH Urine 8.0 (*)     Protein Albumin Urine 10  (*)     Urobilinogen Urine Normal      Nitrite Urine Negative       Leukocyte Esterase Urine Large (*)     RBC Urine 5 (*)     WBC Urine 33 (*)    COMPREHENSIVE METABOLIC PANEL - Abnormal    Sodium 118 (*)     Potassium 4.4      Chloride 81 (*)     Carbon Dioxide (CO2) 28      Anion Gap 9      Urea Nitrogen 27      Creatinine 0.44 (*)     Calcium 8.2 (*)     Glucose 133 (*)     Alkaline Phosphatase 45      AST 28      ALT 31      Protein Total 6.9      Albumin 3.5      Bilirubin Total 0.6      GFR Estimate >90     CBC WITH PLATELETS AND DIFFERENTIAL - Abnormal    WBC Count 9.3      RBC Count 2.63 (*)     Hemoglobin 7.8 (*)     Hematocrit 23.3 (*)     MCV 89      MCH 29.7      MCHC 33.5      RDW 13.2      Platelet Count 273      % Neutrophils 83      % Lymphocytes 9      % Monocytes 7      % Eosinophils 0      % Basophils 0      % Immature Granulocytes 1      NRBCs per 100 WBC 0      Absolute Neutrophils 7.8      Absolute Lymphocytes 0.8      Absolute Monocytes 0.7      Absolute Eosinophils 0.0      Absolute Basophils 0.0      Absolute Immature Granulocytes 0.1      Absolute NRBCs 0.0     COVID-19 VIRUS (CORONAVIRUS) BY PCR - Abnormal    SARS CoV2 PCR Positive (*)    URINE CULTURE     Emergency Department Course:    Reviewed:  I reviewed nursing notes, vitals, past medical history and Care Everywhere    Assessments:  I obtained history and examined the patient as noted above.   I rechecked the patient and explained findings.     Consults:   I consulted with Dr. Reyes of the hospitalist service.    Interventions:  Medications   cefTRIAXone (ROCEPHIN) 1 g vial to attach to  mL bag for ADULTS or NS 50 mL bag for PEDS (0 g Intravenous Stopped 1/28/22 1542)      Disposition:  The patient was admitted to the hospital under the care of Dr. Reyes.     Impression & Plan      Covid-19  Angelica James was evaluated during a global COVID-19 pandemic, which necessitated consideration that the patient might be at risk for infection with the SARS-CoV-2 virus that causes COVID-19.    Applicable protocols for evaluation were followed during the patient's care.   COVID-19 was considered as part of the patient's evaluation.   The plan for testing is:  a test was obtained during this visit.    Medical Decision Making:  Angelica James is a 76 year old female with history of dementia who was brought for confusion and a fall 2 days ago. Head CT shows no evidence of fracture. Sacral pelvis x-rays show no obvious fracture. The patient is not febrile but her urinalysis is consistent with a urinary tract infection. This was treated with Rocephin. Of note, her sodium is quite low. Significantly lower than previous. The cause of this is unclear though it is possible that it secondary to recent Covid infection. She should be Covid recovered per history but we do not have access to these records to know for certain. Fortunately she is not hypoxic and does not seem to have Covid pneumonia. We will admit her to the hospital for treatment of her hyponatremia and slow correction. Discussed with her daughter who was in agreement. Dr. Mcghee graciously agreed to admit her.    Diagnosis:    ICD-10-CM    1. Hyponatremia  E87.1    2. Urinary tract infection with hematuria, site unspecified  N39.0     R31.9    3. Anemia, unspecified type  D64.9    4. Confusion  R41.0    5. Fall, initial encounter  W19.XXXA         Discharge Medications:  Current Discharge Medication List           1/28/2022   Melodie Hoff MD Cho, Amy C, MD  01/28/22 1132

## 2022-01-29 ENCOUNTER — APPOINTMENT (OUTPATIENT)
Dept: OCCUPATIONAL THERAPY | Facility: CLINIC | Age: 77
DRG: 644 | End: 2022-01-29
Attending: INTERNAL MEDICINE
Payer: COMMERCIAL

## 2022-01-29 LAB
ANION GAP SERPL CALCULATED.3IONS-SCNC: 4 MMOL/L (ref 3–14)
BUN SERPL-MCNC: 16 MG/DL (ref 7–30)
CALCIUM SERPL-MCNC: 7.9 MG/DL (ref 8.5–10.1)
CHLORIDE BLD-SCNC: 98 MMOL/L (ref 94–109)
CO2 SERPL-SCNC: 28 MMOL/L (ref 20–32)
CREAT SERPL-MCNC: 0.5 MG/DL (ref 0.52–1.04)
CREAT SERPL-MCNC: 0.55 MG/DL (ref 0.52–1.04)
ERYTHROCYTE [DISTWIDTH] IN BLOOD BY AUTOMATED COUNT: 13.9 % (ref 10–15)
GFR SERPL CREATININE-BSD FRML MDRD: >90 ML/MIN/1.73M2
GLUCOSE BLD-MCNC: 85 MG/DL (ref 70–99)
HCT VFR BLD AUTO: 23.6 % (ref 35–47)
HGB BLD-MCNC: 8 G/DL (ref 11.7–15.7)
MCH RBC QN AUTO: 30 PG (ref 26.5–33)
MCHC RBC AUTO-ENTMCNC: 33.9 G/DL (ref 31.5–36.5)
MCV RBC AUTO: 88 FL (ref 78–100)
PLATELET # BLD AUTO: 279 10E3/UL (ref 150–450)
POTASSIUM BLD-SCNC: 3.7 MMOL/L (ref 3.4–5.3)
RBC # BLD AUTO: 2.67 10E6/UL (ref 3.8–5.2)
SODIUM SERPL-SCNC: 130 MMOL/L (ref 133–144)
SODIUM SERPL-SCNC: 131 MMOL/L (ref 133–144)
SODIUM SERPL-SCNC: 133 MMOL/L (ref 133–144)
WBC # BLD AUTO: 4.8 10E3/UL (ref 4–11)

## 2022-01-29 PROCEDURE — 97166 OT EVAL MOD COMPLEX 45 MIN: CPT | Mod: GO | Performed by: OCCUPATIONAL THERAPIST

## 2022-01-29 PROCEDURE — 85027 COMPLETE CBC AUTOMATED: CPT | Performed by: INTERNAL MEDICINE

## 2022-01-29 PROCEDURE — 97530 THERAPEUTIC ACTIVITIES: CPT | Mod: GO | Performed by: OCCUPATIONAL THERAPIST

## 2022-01-29 PROCEDURE — 258N000003 HC RX IP 258 OP 636: Performed by: HOSPITALIST

## 2022-01-29 PROCEDURE — 84295 ASSAY OF SERUM SODIUM: CPT | Performed by: INTERNAL MEDICINE

## 2022-01-29 PROCEDURE — 97535 SELF CARE MNGMENT TRAINING: CPT | Mod: GO | Performed by: OCCUPATIONAL THERAPIST

## 2022-01-29 PROCEDURE — 36415 COLL VENOUS BLD VENIPUNCTURE: CPT | Performed by: INTERNAL MEDICINE

## 2022-01-29 PROCEDURE — 120N000001 HC R&B MED SURG/OB

## 2022-01-29 PROCEDURE — 258N000003 HC RX IP 258 OP 636: Performed by: INTERNAL MEDICINE

## 2022-01-29 PROCEDURE — 82565 ASSAY OF CREATININE: CPT | Performed by: INTERNAL MEDICINE

## 2022-01-29 PROCEDURE — 99233 SBSQ HOSP IP/OBS HIGH 50: CPT | Performed by: INTERNAL MEDICINE

## 2022-01-29 PROCEDURE — 250N000013 HC RX MED GY IP 250 OP 250 PS 637: Performed by: INTERNAL MEDICINE

## 2022-01-29 PROCEDURE — 250N000011 HC RX IP 250 OP 636: Performed by: INTERNAL MEDICINE

## 2022-01-29 RX ORDER — DEXTROSE MONOHYDRATE 50 MG/ML
INJECTION, SOLUTION INTRAVENOUS CONTINUOUS
Status: ACTIVE | OUTPATIENT
Start: 2022-01-29 | End: 2022-01-30

## 2022-01-29 RX ORDER — DEXTROSE MONOHYDRATE 50 MG/ML
INJECTION, SOLUTION INTRAVENOUS CONTINUOUS
Status: ACTIVE | OUTPATIENT
Start: 2022-01-29 | End: 2022-01-29

## 2022-01-29 RX ADMIN — ENOXAPARIN SODIUM 30 MG: 30 INJECTION SUBCUTANEOUS at 10:48

## 2022-01-29 RX ADMIN — QUETIAPINE FUMARATE 25 MG: 25 TABLET ORAL at 10:48

## 2022-01-29 RX ADMIN — RISPERIDONE 4 MG: 2 TABLET ORAL at 20:29

## 2022-01-29 RX ADMIN — CALCIUM 500 MG: 500 TABLET ORAL at 20:29

## 2022-01-29 RX ADMIN — THERA TABS 1 TABLET: TAB at 20:29

## 2022-01-29 RX ADMIN — ACETAMINOPHEN 1000 MG: 500 TABLET ORAL at 16:21

## 2022-01-29 RX ADMIN — DEXTROSE MONOHYDRATE: 50 INJECTION, SOLUTION INTRAVENOUS at 18:09

## 2022-01-29 RX ADMIN — ACETAMINOPHEN 1000 MG: 500 TABLET ORAL at 10:48

## 2022-01-29 RX ADMIN — CALCIUM 500 MG: 500 TABLET ORAL at 10:48

## 2022-01-29 RX ADMIN — QUETIAPINE FUMARATE 25 MG: 25 TABLET ORAL at 20:29

## 2022-01-29 RX ADMIN — FERROUS SULFATE TAB 325 MG (65 MG ELEMENTAL FE) 325 MG: 325 (65 FE) TAB at 20:29

## 2022-01-29 RX ADMIN — FERROUS SULFATE TAB 325 MG (65 MG ELEMENTAL FE) 325 MG: 325 (65 FE) TAB at 10:48

## 2022-01-29 RX ADMIN — QUETIAPINE FUMARATE 25 MG: 25 TABLET ORAL at 18:10

## 2022-01-29 RX ADMIN — RISPERIDONE 1 MG: 1 TABLET ORAL at 16:21

## 2022-01-29 RX ADMIN — RISPERIDONE 4 MG: 2 TABLET ORAL at 01:33

## 2022-01-29 RX ADMIN — ACETAMINOPHEN 1000 MG: 500 TABLET ORAL at 20:28

## 2022-01-29 RX ADMIN — DEXTROSE MONOHYDRATE: 50 INJECTION, SOLUTION INTRAVENOUS at 03:41

## 2022-01-29 RX ADMIN — POLYETHYLENE GLYCOL 3350 17 G: 17 POWDER, FOR SOLUTION ORAL at 10:49

## 2022-01-29 RX ADMIN — CEFTRIAXONE SODIUM 1 G: 1 INJECTION, POWDER, FOR SOLUTION INTRAMUSCULAR; INTRAVENOUS at 15:04

## 2022-01-29 ASSESSMENT — ACTIVITIES OF DAILY LIVING (ADL)
ADLS_ACUITY_SCORE: 18
ADLS_ACUITY_SCORE: 18
ADLS_ACUITY_SCORE: 21
ADLS_ACUITY_SCORE: 23
ADLS_ACUITY_SCORE: 18
ADLS_ACUITY_SCORE: 23
ADLS_ACUITY_SCORE: 18
ADLS_ACUITY_SCORE: 23
ADLS_ACUITY_SCORE: 23
ADLS_ACUITY_SCORE: 18
ADLS_ACUITY_SCORE: 23
ADLS_ACUITY_SCORE: 18
ADLS_ACUITY_SCORE: 23
ADLS_ACUITY_SCORE: 18
ADLS_ACUITY_SCORE: 18
ADLS_ACUITY_SCORE: 23
ADLS_ACUITY_SCORE: 18
ADLS_ACUITY_SCORE: 21
ADLS_ACUITY_SCORE: 18
ADLS_ACUITY_SCORE: 23

## 2022-01-29 NOTE — UTILIZATION REVIEW
Admission Status; Secondary Review Determination     Admission Date: 1/28/2022 12:42 PM       Under the authority of the Utilization Management Committee, the utilization review process indicated a secondary review on the above patient.  The review outcome is based on review of the medical records, discussions with staff, and applying clinical experience noted on the date of the review.        (x)      Inpatient Status Appropriate - This patient's medical care is consistent with medical management for inpatient care and reasonable inpatient medical practice.       RATIONALE FOR DETERMINATION      Brief clinical presentation, information copied from the chart, abbreviated and edited for relevant content:     Angelica James is a 77 yo female with history including dementia, schizophrenia, DM2 and h/o hyponatremia, who presented 1/28/2022 with multiple falls and found with sodium level of 118 and abnormal UA.  Admitted with Hyponatremia, question SIADH component or related to excessive free water intake. Urine sodium 40, urine osmol 182 1/28. Early am 1/29, sodium showed sodium increased to 131 and started on D5W. Plan to monitor sodium daily. Also with UTI, on IV ceftriaxone, pending urine culture. COVID positive on 1/28.      At the time of admission with the information available to the attending physician, more than 2 nights hospital complex care was anticipated. Also, there was a risk of adverse outcome if patient was treated outpatient or observation. High intensity of services anticipated. Inpatient admission appropriate based on Medicare guidelines.       The information on this document is developed by the utilization review team in order for the business office to ensure compliance.  This only denotes the appropriateness of proper admission status and does not reflect the quality of care rendered.         The definitions of Inpatient Status and Observation Status used in making the determination above are those  provided in the CMS Coverage Manual, Chapter 1 and Chapter 6, section 70.4.      Sincerely,      Blank Barrientos MD   Utilization Review/ Case Management  [facility].

## 2022-01-29 NOTE — PROGRESS NOTES
.RECEIVING UNIT ED HANDOFF REVIEW    ED Nurse Handoff Report was reviewed by: Cecilia oCx RN on January 28, 2022 at 6:37 PM

## 2022-01-29 NOTE — PROGRESS NOTES
"   01/29/22 1151   Quick Adds   Type of Visit Initial Occupational Therapy Evaluation   Living Environment   People in home facility resident   Living Environment Comments pt lives in memory care   Self-Care   Usual Activity Tolerance moderate   Current Activity Tolerance fair   Activity/Exercise/Self-Care Comment baseline unknown   Instrumental Activities of Daily Living (IADL)   IADL Comments facility resident at    General Information   Onset of Illness/Injury or Date of Surgery 01/28/22   Referring Physician Ben Reyes   Patient/Family Therapy Goal Statement (OT) \"I want to go to bed\" \"I don't want to stay at the hospital\"   Additional Occupational Profile Info/Pertinent History of Current Problem 76-year-old female with history of dementia, schizophrenia, anemia, recurrent UTIs with frequent falls, who was sent from her assisted living memory care facility for concern for possible UTI.   Existing Precautions/Restrictions fall   Left Upper Extremity (Weight-bearing Status) full weight-bearing (FWB)  (all)   General Observations and Info Activity order: up with assist prn   Cognitive Status Examination   Orientation Status person;place   Affect/Mental Status (Cognitive) confused   Follows Commands follows one-step commands;75-90% accuracy   Memory Deficit severe deficit   Cognitive Status Comments pt oriented to nurse button on call light; pt unable to recall how to call nurse 1 minute later   Integumentary/Edema   Integumentary/Edema Comments large bruise on coccyx area   Posture   Posture protracted shoulders   Range of Motion Comprehensive   Comment, General Range of Motion BUE WFL; BLE WFL   Strength Comprehensive (MMT)   Comment, General Manual Muscle Testing (MMT) Assessment generalized weakness   Coordination   Upper Extremity Coordination No deficits were identified   Bed Mobility   Bed Mobility sit-supine   Sit-Supine [place] (Bed Mobility) contact guard   Transfers   Transfers sit-stand " transfer;toilet transfer;bed-chair transfer   Transfer Skill: Bed to Chair/Chair to Bed   Bed-Chair [place] (Transfers) contact guard   Sit-Stand Transfer   Sit-Stand [place] (Transfers) contact guard   Toilet Transfer   Type (Toilet Transfer) sit-stand;stand-sit   [place] Level (Toilet Transfer) contact guard   Balance   Balance Comments good seated; unsteady ambulating, 1 LOB requiring TH assist to recover   Activities of Daily Living   BADL Assessment lower body dressing;toileting   Lower Body Dressing Assessment   [place] Level (Lower Body Dressing) moderate assist (50% patient effort)   Toileting   [place] Level (Toileting) contact guard assist   Clinical Impression   Criteria for Skilled Therapeutic Interventions Met (OT) yes;meets criteria;skilled treatment is necessary   OT Diagnosis impaired ADLs   OT Problem List-Impairments impacting ADL problems related to;activity tolerance impaired;balance;cognition;strength   Assessment of Occupational Performance 3-5 Performance Deficits   Identified Performance Deficits dressing, toileting, bathing   Planned Therapy Interventions (OT) ADL retraining;strengthening;transfer training   Clinical Decision Making Complexity (OT) moderate complexity   Therapy Frequency (OT) 4x/week   Predicted Duration of Therapy 3 days   Risk & Benefits of therapy have been explained evaluation/treatment results reviewed;care plan/treatment goals reviewed;participants included;patient   OT Discharge Planning    OT Discharge Recommendation (DC Rec)   (Return to [facility] with increased assist for mobility/ADLs)   OT Rationale for DC Rec Pt limited by decreased balance, weakness, and significant coginitive deficits.  Anticipate she will be able to return to [facility] with Ax1 for all mobility/functional transfers/ADLs.  If [facility] cannot accomodate, recommend TCU for OT/PT to regain strength/balance for safe mobility/functional transfers.    OT Brief overview of current  status  [place] room ambulation (1 LOB requiring TH assist), ModA LE dressing, CGA toilet transfer/toileting, significant cognitive deficits (TH oriented pt to nurse call button, pt unable to recall how to call nurse 1 min later).   Total Evaluation Time (Minutes)   Total Evaluation Time (Minutes) 5

## 2022-01-29 NOTE — PROGRESS NOTES
[facility]    Internal Medicine Hospitalist Progress Note  01/29/2022  I evaluated patient on the above date.    Storm Abdalla Jr., MD  [phone] (p)  Text Page  Vocera        Assessment & Plan New actions/orders today (01/29/2022) are underlined.    Angelica James is a 75 yo female with history including dementia, schizophrenia, DM2 and h/o hyponatremia, who presented 1/28/2022 with multiple falls and found with sodium level of 118 and abnormal UA.    Hyponatremia, question SIADH component or related to excessive free water intake.  * Sodium 118 on admit 1/28. Urine sodium 40, urine osmol 182 1/28. On admit, oral intake unclear. Not given any fluids on admit.  * Early am 1/29, sodium showed sodium increased to 131 and started on D5W.  Recent Labs   Lab 01/29/22  1035 01/29/22  0655 01/29/22  0208 01/28/22  2110 01/28/22  1310   * 130* 131* 127* 118*   - Continue D5W at 100 ml/hr - goal sodium today ~126-128 range.  - Monitor sodium closely.  - Continue regular diet for now but plan for fluid restriction.    ADDENDUM 17:28:  * Sodium still 131.   - Continue D5W at 100 ml/hr for 12h.    UTI.  * UA on admit 1/28 with 33 WBC, lg LE. Started on ceftriaxone 1/28.  - Continue ceftriaxone (started 1/28).  - Follow-up urine culture.    Anemia, suspect chronic iron deficiency component.  * Hgb 7.8 on admit. No overt clinical signs of major bleeding. Iron studies form 7/2021 suggest iron deficiency component (low normal iron, iron saturation, ferritin; normal TIBC).  Recent Labs   Lab 01/29/22  0655 01/28/22  1310   HGB 8.0* 7.8*   - Monitor CBC.    COVID-19 positive.    D-dimer elevated, non-specific.  * No fever or cough PTA. On initial evaluation was afebrile, not hypoxic. Routine screening COVID test 1/28 positive.   * WBC and CRP normal on admit. D-dimer 1.33, but nonspecific.  Recent Labs   Lab 01/29/22  0655 01/28/22  1952 01/28/22  1310   WBC 4.8  --  9.3   CRP  --  4.1  --    DD  --   "1.33*  --    - CXR pending.  - No treatment for now.  - Continue isolation precautions.    ADDENDUM 17:29:  * Notified that pt had positive test result for COVID 1/10.  - Discontinue isolation, pt COVID recovered.    Falls, suspect due to hyponatremia and UTI.  - Treat above issues.    Schizophrenia.  Dementia.  * PTA paroxetine held on admit due to hyponatremia.  - Continue quetiapine 25 mg BID; and risperidol 1 mg afternoon and 4 mg qpm.  - Continue PRN quetiapine 25 mg BID.    DM2.  [PTA: metformin 500 mg daily.]  * Hgb A1C 5.7 7/2021.  Recent Labs   Lab 01/29/22  0655 01/28/22  1310   GLC 85 133*   - Continue to hold PTA metformin.    Degenerative joint disease/OA.  - Continue scheduled acetaminophen 1000 mg TID.     COVID-19 testing.  COVID-19 PCR Results    COVID-19 PCR Results 7/31/21 9/11/21 1/28/22   SARS CoV2 PCR Negative Negative Positive (A)   (A) Abnormal value       Comments are available for some flowsheets but are not being displayed.         COVID-19 Antibody Results, Testing for Immunity    COVID-19 Antibody Results, Testing for Immunity   No data to display.             Diet: Combination Diet Regular Diet Adult    Prophylaxis: PCD's, ambulation.   Darby Catheter: Not present  Central Lines: None  Code Status: No CPR- Do NOT Intubate    Disposition Plan   Expected discharge: 1-2d recommended to prior living arrangement pending above.  Entered: Storm Abdalla MD 01/29/2022, 12:07 PM         Interval History   \"I want to sleep\".  Did not answer questions.    -Data reviewed today: I reviewed all new labs and imaging over the last 24 hours. I personally reviewed no images or EKG's today.    Physical Exam    , Blood pressure 118/71, pulse 72, temperature 98.2  F (36.8  C), temperature source Axillary, resp. rate 18, height 1.575 m (5' 2\"), weight 43.9 kg (96 lb 12.5 oz), SpO2 97 %.  Vitals:    01/28/22 1906   Weight: 43.9 kg (96 lb 12.5 oz)     Vital Signs with Ranges  Temp:  [97.9  F (36.6 " " C)-99.1  F (37.3  C)] 98.2  F (36.8  C)  Pulse:  [] 72  Resp:  [16-18] 18  BP: (102-146)/(53-81) 118/71  SpO2:  [85 %-98 %] 97 %  Patient Vitals for the past 24 hrs:   BP Temp Temp src Pulse Resp SpO2 Height Weight   01/29/22 0932 118/71 98.2  F (36.8  C) Axillary 72 18 97 % -- --   01/29/22 0129 123/60 97.9  F (36.6  C) Axillary 70 16 94 % -- --   01/28/22 1906 137/72 99.1  F (37.3  C) Oral 79 16 97 % -- 43.9 kg (96 lb 12.5 oz)   01/28/22 1830 102/53 -- -- 102 -- -- -- --   01/28/22 1800 119/78 -- -- 95 -- -- -- --   01/28/22 1730 123/65 -- -- 75 -- 98 % -- --   01/28/22 1700 103/80 -- -- 78 -- (!) 85 % -- --   01/28/22 1630 133/69 -- -- 105 -- -- -- --   01/28/22 1600 129/81 -- -- 84 -- -- -- --   01/28/22 1530 135/63 -- -- 71 -- 98 % -- --   01/28/22 1430 123/59 -- -- 72 -- 97 % -- --   01/28/22 1420 (!) 145/69 -- -- 76 -- 98 % -- --   01/28/22 1306 -- -- -- -- -- -- 1.575 m (5' 2\") --   01/28/22 1250 (!) 146/81 98.9  F (37.2  C) Oral 86 16 98 % -- --     I/O's Last 24 hours  I/O last 3 completed shifts:  In: 336 [I.V.:236; IV Piggyback:100]  Out: 1270 [Urine:1270]    Constitutional: Trying to sleep.  Respiratory: Diminished in bases. No crackles or wheezes but not taking deep breaths.  Cardiovascular: RRR, no m/r/g.  GI:   Skin/Integumen:   Other:        Data   Recent Labs   Lab 01/29/22  1035 01/29/22  0655 01/29/22  0208 01/28/22  2110 01/28/22  1952 01/28/22  1310   WBC  --  4.8  --   --   --  9.3   HGB  --  8.0*  --   --   --  7.8*   MCV  --  88  --   --   --  89   PLT  --  279  --   --   --  273   * 130* 131*   < >  --  118*   POTASSIUM  --  3.7  --   --   --  4.4   CHLORIDE  --  98  --   --   --  81*   CO2  --  28  --   --   --  28   BUN  --  16  --   --   --  27   CR 0.55 0.50*  --   --  0.52 0.44*   ANIONGAP  --  4  --   --   --  9   SHERI  --  7.9*  --   --   --  8.2*   GLC  --  85  --   --   --  133*   ALBUMIN  --   --   --   --   --  3.5   PROTTOTAL  --   --   --   --   --  6.9   BILITOTAL  " --   --   --   --   --  0.6   ALKPHOS  --   --   --   --   --  45   ALT  --   --   --   --   --  31   AST  --   --   --   --   --  28    < > = values in this interval not displayed.     Recent Labs   Lab Test 01/29/22  0655 01/28/22  1310 11/01/21  1745 09/12/21  1209 09/12/21  0907 07/31/21  1238 08/19/19  1936 08/18/19  0743 08/17/19  0616 08/14/19  0536 08/12/19  2148 08/11/19  2256 08/11/19  1036   GLC 85 133* 140* 104* 90   < >  --    < >  --    < >  --   --   --    BGM  --   --   --   --   --   --  185*  --  90  --  135* 139* 110*    < > = values in this interval not displayed.     Recent Labs   Lab 01/29/22  0655 01/28/22 1952 01/28/22  1310   WBC 4.8  --  9.3   CRP  --  4.1  --    DD  --  1.33*  --          Recent Results (from the past 24 hour(s))   XR Pelvis 1/2 Views    Narrative    PELVIS ONE TO TWO VIEWS   1/28/2022 3:30 PM     HISTORY: Pelvic pain after fall.    COMPARISON: None.      Impression    IMPRESSION: No evidence of acute fracture. Hip joint spaces are  preserved. Degenerative changes in the lower lumbar spine. Sclerotic  changes adjacent to the inferior right SI joint.    TONI LUNA MD         SYSTEM ID:  [id number]   XR Sacrum and Coccyx 2 Views    Narrative    SACRUM AND COCCYX TWO VIEWS   1/28/2022 3:32 PM     HISTORY: Fall, pain.    COMPARISON: Pelvis radiographs dated 1/28/2022. CT of the abdomen and  pelvis 11/1/2021.      Impression    IMPRESSION: No definite displaced fracture of the visualized aspects  of the bony pelvis identified. Degenerative changes of bilateral  sacroiliac joints (right greater than left) and pubic symphysis are  better characterized on prior CT exam. Moderate to marked degenerative  disc space narrowing at L5-S1 with marginal endplate osteophytes and  relatively advanced lower lumbar degenerative facet arthropathy.  Scattered presumed calcified phleboliths and atherosclerotic vascular  calcifications in the pelvis.     CHRISTIAN TORRES MD         SYSTEM ID:   [id number]   Head CT w/o contrast    Narrative    CT SCAN OF THE HEAD WITHOUT CONTRAST   1/28/2022 3:46 PM     HISTORY: Head trauma, minor (Age >= 65y).    TECHNIQUE:  Axial images of the head and coronal reformations without  IV contrast material. Radiation dose for this scan was reduced using  automated exposure control, adjustment of the mA and/or kV according  to patient size, or iterative reconstruction technique.    COMPARISON: CT head 11/1/2021.    FINDINGS: There is linear streak artifact projecting over the lower  aspect of the cerebral hemispheres and the posterior fossa structures,  somewhat limiting evaluation. There are also some mild motion-related  artifacts. The ventricles are normal in size and configuration.  Moderate generalized brain parenchymal volume loss. Moderate to severe  scattered nonspecific patchy and confluent hypodensity in the cerebral  white matter, as before, presumably due to chronic small vessel  ischemic disease. No definite CT findings of acute infarct. No  intracranial hemorrhage, acute extra-axial fluid collection, or  significant mass effect/herniation. Intracranial atherosclerotic  calcifications involving the carotid siphons again noted.    Probable small retention cysts in the posterior ethmoid region and  mild polypoid mucosal thickening in the right sphenoid sinus. The  mastoid and middle ear cavities appear clear where visualized. The  calvarium and skull base appear grossly intact.      Impression    IMPRESSION:  1. Exam is mildly limited.  2. No definite CT findings of acute intracranial process.  3. Brain atrophy and presumed chronic small vessel ischemic changes,  as described.     CHRISTIAN TORRES MD         SYSTEM ID:  [id number]       Medications   All medications were reviewed.    D5W 100 mL/hr at 01/29/22 0341       acetaminophen  1,000 mg Oral TID     calcium carbonate 500 mg (elemental)  1 tablet Oral BID     cefTRIAXone  1 g Intravenous Q24H     enoxaparin  ANTICOAGULANT  30 mg Subcutaneous Q24H     ferrous sulfate  325 mg Oral BID     multivitamin, therapeutic  1 tablet Oral QPM     polyethylene glycol  17 g Oral Daily     QUEtiapine  25 mg Oral BID     risperiDONE  1 mg Oral Daily     risperiDONE  4 mg Oral QPM     sodium chloride (PF)  3 mL Intracatheter Q8H     lidocaine 4%, lidocaine (buffered or not buffered), melatonin, ondansetron **OR** ondansetron, QUEtiapine, sodium chloride (PF)

## 2022-01-29 NOTE — PLAN OF CARE
Admission    Patient arrives to room 602 via cart from ED.   Care plan note:   DATE & TIME: 1/28/2022 [id number]  Cognitive Concerns/ Orientation : Alert, confused, oriented x2, disoriented to time, situation; knows she is in a hospital. Sammarinese and English speaking.   BEHAVIOR & AGGRESSION TOOL COLOR: green   CIWA SCORE: n/a  ABNL VS/O2: VSS on RA  MOBILITY: Assist of 1, GB   PAIN MANAGMENT: denies pain   DIET: Regular  BOWEL/BLADDER: urinary frequency and urgency; up frequently to the bathroom/BSC, but also incontinent. Using purewick this am. Bladder scanned for 0 ml.   ABNL LAB/BG: Na q4hr: 118 at admit; then 127, 131, MD notified of increase, start D5 @ 100 ml/hr; next check at 0600.   DRAIN/DEVICES: PIV, started D5 @ 100 ml/hr.   TELEMETRY RHYTHM: n/a  SKIN: pale, scattered bruises and scabs. Bruising to coccyx.   TESTS/PROCEDURES: CXR ordered, refused overnight, will try again in am.   D/C DAY/GOALS/PLACE: pending improvement. From memory care.   OTHER IMPORTANT INFO: PT/OT ordered. Daughter updated over the phone.     Inpatient nursing criteria listed below were met:    Did you put disposition on whiteboard and in sticky note: NA  Full skin assessment done (add LDA if skin issue present) :Yes  Isolation education started/completed NA  Patient allergies verified with patient: No - confused   Fall Risk? (Care plan updated, Education given and documented) Yes  Primary Care Plan initiated: Yes  Home medications documented in belongings flowsheet: NA  Patient belongings documented in belongings flowsheet: Yes  Reminder note (belongings/ medications) placed in discharge instructions:Yes  Admission profile/ required documentation complete: No - confused  If patient is a 72 hour hold/Commitment are belongings removed from room and locked up? NA

## 2022-01-29 NOTE — H&P
Admitted: 01/28/2022    REVISED REPORT     PRIMARY CARE PHYSICIAN:  Kamini Michael NP    CHIEF COMPLAINT:  Multiple falls.    HISTORY OF PRESENT ILLNESS:  Angelica James is a 76-year-old female with history of dementia, schizophrenia, anemia, recurrent UTIs with frequent falls, who was sent from her assisted living memory care facility for concern for possible UTI.  The patient is unable to really give a good history.  The patient answer some simple questions, but according to the daughter, the patient had fallen two days ago and had another fall earlier today.  There was no injury to the head.  She was not lethargic.  No fevers.  The patient was brought to [facility] for further assessment.    In the Emergency Department, the patient was seen by Dr. Melodie Hoff.  The patient had temperature 99.1.  Saturations are 97% on room air.  Blood pressures were stable.     Blood work revealed sodium 118, potassium 4.4, BUN 27, creatinine 0.44 with GFR greater than 90.  Albumin is 3.4.  Other LFTs were normal.  Glucose was 140.  CBC showed white count 9.3, hemoglobin 7.8, which is actually a bit low, and platelets are 273.  There is no left shift.     Urinalysis is abnormal with specific gravity 1.019 with moderate blood, large leukocytes, 33 white cells, 5 red cells and cloudy.  Urine cultures were sent.     She did come back positive for COVID.    Due to her fall, she had x-ray of the pelvis, which showed no acute fracture.  Hip joint spaces are preserved.  Degenerative changes in the lower lumbar spine.     She had x-ray of the sacrum and coccyx, which also showed no definite displaced fracture of the bony pelvis.  Moderate to marked degenerative disk space narrowing at the L5-S1 joint space.     Chest x-ray was ordered, but has not been done due to the patient testing positive for COVID.  The patient is not hypoxic.     She also had a head CT to her fall, which showed no definite CT finding for any acute  intracranial process, such as brain atrophy and small vessel ischemic changes.     In the ED, the patient was given ceftriaxone and is being admitted for further treatment.    PAST MEDICAL HISTORY:    1.  History of hyponatremia.  2.  History of traumatic rhabdomyolysis.  3.  Moderate dementia with behavioral disturbance.  4.  History of colitis due to difficile.  5.  History of bladder spasms.    6.  Paroxysmal atrial fibrillation.  7.  History of recurrent UTIs.  8.  History of left subdural hematoma, traumatic.  9.  History of multiple falls.  10.  Type 2 diabetes with last A1c of 5.71  11.  Anemia of chronic disease.    PAST SURGICAL HISTORY:  Colonoscopy and upper endoscopy.    FAMILY HISTORY:  Unknown.  The patient is unable to provide.    SOCIAL HISTORY:  No tobacco.  No alcohol.  Lives in a memory care assisted living.    ALLERGIES:  PENICILLIN.    CURRENT MEDICATION LIST:   1.  Tylenol.  2.  Calcium.  3.  Ferrous sulfate.  4.  Melatonin.  5.  Metformin.  6.  Multivitamin.  7.  Ensure.   8.  Paxil.  9.  MiraLax.  10.  Quetiapine.  11.  Risperdal.    REVIEW OF SYSTEMS:  Unable to provide due to dementia and language barrier.    PHYSICAL EXAMINATION:    VITAL SIGNS:  Temperature 99.1, heart rate in the 79, respirations 16, blood pressure 137/72, sats are 97% on room air.  GENERAL:  The patient is a very frail 76-year-old female, but I saw her ambulate and she did reasonably well.  HEENT:  Pupils equal.  Sclerae are anicteric.  Mucous membranes are moist.  NECK:  JVP is not elevated.  PULMONARY:  Lungs are clear to auscultation.  CARDIOVASCULAR:  S1, S2, regular rhythm.  GASTROINTESTINAL:  Abdomen is soft, nontender, normoactive bowel sounds.  MUSCULOSKELETAL:  Shows no edema.    NEUROLOGIC:  She is ambulatory on her own with standby assist.  She does move all four extremities.  Cranial nerves appear intact.    LABORATORY AND IMAGING STUDIES:  As dictated in history of present illness.    ASSESSMENT:  Angelica  Jacob is 76, who has had multiple falls now with abnormalities of sodium of 118 and abnormal urinalysis being admitted as inpatient for further treatment.    PLAN:     1.  Hyponatremia:  The patient's oral intake is unclear.  The patient is hungry and I suspect the patient is probably eating and drinking her reasonable level, and in fact, given her psychiatric history, maybe possibly over drinking fluids.  It is unknown given her dementia.  She is on Paxil, which obviously can also cause hyponatremia as well.  In this setting, we will hold off on any IV fluids.  We will do sodium checks every four hours.  Check urine sodium and urine fraction excretion, as well as a urine osmolality.  We will allow her to have a regular diet.  Based on lab tests we can see if she needs some additional fluids that are isotonic.    2.  Urinary UTI:  The patient's urinalysis was abnormal.  She has history of UTIs with altered mentation and falls.  Urine cultures have been sent.  We will continue the patient on ceftriaxone.  We will await urine culture.    3.  History of schizophrenia and dementia:  The patient will be continued on quetiapine and Risperdal.  We will hold her Paxil, given her hyponatremia.  The patient appears to be cooperative and compensated.    4.  History of diabetes:  We will hold the patient's metformin.    5.  Degenerative joint disease:  The patient will have scheduled Tylenol, which we will continue.  We will try to avoid narcotics in this patient.    6.  COVID-19 positive.  The patient comes from an assisted living facility.  She does not have any fever or cough or needing oxygen.  She appears to be mostly asymptomatic.  In this setting, patient will be on COVID precautions.  We will check a sed rate, D-dimer and a chest x-ray.  Would will be very cautious about starting the patient on Decadron or steroids given her underlying dementia and psychiatric history, and I do not see any indication for her to be on  Remdesivir.  The patient will be started on subcutaneous Lovenox given the COVID diagnosis, pending a dimer level.    CODE STATUS:  DNR/DNI.    Addendum TID [id number] darin    Ben Reyes MD        D: 2022   T: 2022   MT: [id number]    Name:     LUDA BOSWELL  MRN:      [id number]        Account:     [id number]   :      1945           Admitted:    2022       Document: [id number]    cc:  Kamini Michael NP

## 2022-01-29 NOTE — PLAN OF CARE
Summary:     DATE & TIME: 1/29/22 Day shift  Cognitive Concerns/ Orientation : Alert, confused, oriented x2, disoriented to time, situation; knows she is in a hospital. Arabic and English speaking.   BEHAVIOR & AGGRESSION TOOL COLOR: Green   CIWA SCORE: n/a  ABNL VS/O2: VSS on RA  MOBILITY: Assist of 1, GB   PAIN MANAGMENT: States she has pain all over, gave scheduled tylenol  DIET: Regular- good breakfast; poor lunch  BOWEL/BLADDER: No signs of incontinence, walked to bathroom. No BM; gave scheduled miralax  ABNL LAB/BG: Na 130  DRAIN/DEVICES: PIV, started D5 @ 100 ml/hr will stop after 10 hours  TELEMETRY RHYTHM: n/a  SKIN: pale, scattered bruises and scabs. Bruising to coccyx.   TESTS/PROCEDURES: CXR ordered, refused overnight, will try again in am.   D/C DAY/GOALS/PLACE: Pending improvement. From memory care, will update daughter  OTHER IMPORTANT INFO: PT/OT ordered.

## 2022-01-29 NOTE — PROGRESS NOTES
Paged for rapid rate of rise of Na to 131, about 12 hours now after the level of 118. She is not on IV fluid.    For now I will order D5W 100 ml/hour as results of tests such as urine osmolality remain pending.

## 2022-01-29 NOTE — PROVIDER NOTIFICATION
MD Notification    Notified Person: MD    Notified Person Name: on call Dr. Shannon MOSER     Notification Date/Time: 1/29/22 0314    Notification Interaction: paged/spoke with MD    Purpose of Notification: Rapid increase in Na. At 1310 in ED was 118; 127 at 2100; 131 now. We are not giving IVF or anything in increase, and no fluid restriction. Advise of any interventions.    Orders Received: start D5 @ 100 ml/hr     Comments:

## 2022-01-29 NOTE — PLAN OF CARE
PT: Eval orders received, chart reviewed. Per discussion with OT, pt mobilizing CGA, lives in  [facility]. Pt appropriate for one discipline during IP stay. OT to follow to address mobility. Defer PT to next level of care.

## 2022-01-30 ENCOUNTER — APPOINTMENT (OUTPATIENT)
Dept: OCCUPATIONAL THERAPY | Facility: CLINIC | Age: 77
DRG: 644 | End: 2022-01-30
Payer: COMMERCIAL

## 2022-01-30 LAB
ANION GAP SERPL CALCULATED.3IONS-SCNC: 3 MMOL/L (ref 3–14)
BACTERIA UR CULT: NORMAL
BASOPHILS # BLD AUTO: 0 10E3/UL (ref 0–0.2)
BASOPHILS NFR BLD AUTO: 1 %
BUN SERPL-MCNC: 15 MG/DL (ref 7–30)
CALCIUM SERPL-MCNC: 8.4 MG/DL (ref 8.5–10.1)
CHLORIDE BLD-SCNC: 102 MMOL/L (ref 94–109)
CO2 SERPL-SCNC: 31 MMOL/L (ref 20–32)
CREAT SERPL-MCNC: 0.56 MG/DL (ref 0.52–1.04)
EOSINOPHIL # BLD AUTO: 0.1 10E3/UL (ref 0–0.7)
EOSINOPHIL NFR BLD AUTO: 2 %
ERYTHROCYTE [DISTWIDTH] IN BLOOD BY AUTOMATED COUNT: 14.2 % (ref 10–15)
FERRITIN SERPL-MCNC: 27 NG/ML (ref 8–252)
GFR SERPL CREATININE-BSD FRML MDRD: >90 ML/MIN/1.73M2
GLUCOSE BLD-MCNC: 97 MG/DL (ref 70–99)
HCT VFR BLD AUTO: 23.9 % (ref 35–47)
HGB BLD-MCNC: 7.7 G/DL (ref 11.7–15.7)
IMM GRANULOCYTES # BLD: 0 10E3/UL
IMM GRANULOCYTES NFR BLD: 1 %
IRON SATN MFR SERPL: 15 % (ref 15–46)
IRON SERPL-MCNC: 33 UG/DL (ref 35–180)
LYMPHOCYTES # BLD AUTO: 1.5 10E3/UL (ref 0.8–5.3)
LYMPHOCYTES NFR BLD AUTO: 30 %
MCH RBC QN AUTO: 30 PG (ref 26.5–33)
MCHC RBC AUTO-ENTMCNC: 32.2 G/DL (ref 31.5–36.5)
MCV RBC AUTO: 93 FL (ref 78–100)
MONOCYTES # BLD AUTO: 0.6 10E3/UL (ref 0–1.3)
MONOCYTES NFR BLD AUTO: 12 %
NEUTROPHILS # BLD AUTO: 2.8 10E3/UL (ref 1.6–8.3)
NEUTROPHILS NFR BLD AUTO: 54 %
NRBC # BLD AUTO: 0 10E3/UL
NRBC BLD AUTO-RTO: 0 /100
PLATELET # BLD AUTO: 285 10E3/UL (ref 150–450)
POTASSIUM BLD-SCNC: 3.7 MMOL/L (ref 3.4–5.3)
RBC # BLD AUTO: 2.57 10E6/UL (ref 3.8–5.2)
SODIUM SERPL-SCNC: 134 MMOL/L (ref 133–144)
SODIUM SERPL-SCNC: 136 MMOL/L (ref 133–144)
TIBC SERPL-MCNC: 220 UG/DL (ref 240–430)
TSH SERPL DL<=0.005 MIU/L-ACNC: 2.03 MU/L (ref 0.4–4)
WBC # BLD AUTO: 4.9 10E3/UL (ref 4–11)

## 2022-01-30 PROCEDURE — 99232 SBSQ HOSP IP/OBS MODERATE 35: CPT | Performed by: INTERNAL MEDICINE

## 2022-01-30 PROCEDURE — 258N000003 HC RX IP 258 OP 636: Performed by: INTERNAL MEDICINE

## 2022-01-30 PROCEDURE — 84443 ASSAY THYROID STIM HORMONE: CPT | Performed by: INTERNAL MEDICINE

## 2022-01-30 PROCEDURE — 250N000011 HC RX IP 250 OP 636: Performed by: INTERNAL MEDICINE

## 2022-01-30 PROCEDURE — 84295 ASSAY OF SERUM SODIUM: CPT | Performed by: INTERNAL MEDICINE

## 2022-01-30 PROCEDURE — 250N000013 HC RX MED GY IP 250 OP 250 PS 637: Performed by: INTERNAL MEDICINE

## 2022-01-30 PROCEDURE — 36415 COLL VENOUS BLD VENIPUNCTURE: CPT | Performed by: INTERNAL MEDICINE

## 2022-01-30 PROCEDURE — 85025 COMPLETE CBC W/AUTO DIFF WBC: CPT | Performed by: INTERNAL MEDICINE

## 2022-01-30 PROCEDURE — 97530 THERAPEUTIC ACTIVITIES: CPT | Mod: GO | Performed by: OCCUPATIONAL THERAPIST

## 2022-01-30 PROCEDURE — 83550 IRON BINDING TEST: CPT | Performed by: INTERNAL MEDICINE

## 2022-01-30 PROCEDURE — 120N000001 HC R&B MED SURG/OB

## 2022-01-30 PROCEDURE — 82728 ASSAY OF FERRITIN: CPT | Performed by: INTERNAL MEDICINE

## 2022-01-30 PROCEDURE — 97535 SELF CARE MNGMENT TRAINING: CPT | Mod: GO | Performed by: OCCUPATIONAL THERAPIST

## 2022-01-30 RX ORDER — PHENAZOPYRIDINE HYDROCHLORIDE 100 MG/1
100 TABLET, FILM COATED ORAL 3 TIMES DAILY PRN
Status: DISCONTINUED | OUTPATIENT
Start: 2022-01-30 | End: 2022-01-31 | Stop reason: HOSPADM

## 2022-01-30 RX ORDER — DEXTROSE MONOHYDRATE 50 MG/ML
INJECTION, SOLUTION INTRAVENOUS CONTINUOUS
Status: ACTIVE | OUTPATIENT
Start: 2022-01-30 | End: 2022-01-30

## 2022-01-30 RX ADMIN — POLYETHYLENE GLYCOL 3350 17 G: 17 POWDER, FOR SOLUTION ORAL at 08:40

## 2022-01-30 RX ADMIN — PHENAZOPYRIDINE HYDROCHLORIDE 100 MG: 100 TABLET ORAL at 21:10

## 2022-01-30 RX ADMIN — QUETIAPINE FUMARATE 25 MG: 25 TABLET ORAL at 12:41

## 2022-01-30 RX ADMIN — QUETIAPINE FUMARATE 25 MG: 25 TABLET ORAL at 21:09

## 2022-01-30 RX ADMIN — CEFTRIAXONE SODIUM 1 G: 1 INJECTION, POWDER, FOR SOLUTION INTRAMUSCULAR; INTRAVENOUS at 15:25

## 2022-01-30 RX ADMIN — THERA TABS 1 TABLET: TAB at 21:09

## 2022-01-30 RX ADMIN — ENOXAPARIN SODIUM 30 MG: 30 INJECTION SUBCUTANEOUS at 08:40

## 2022-01-30 RX ADMIN — CALCIUM 500 MG: 500 TABLET ORAL at 21:10

## 2022-01-30 RX ADMIN — QUETIAPINE FUMARATE 25 MG: 25 TABLET ORAL at 08:40

## 2022-01-30 RX ADMIN — CALCIUM 500 MG: 500 TABLET ORAL at 08:40

## 2022-01-30 RX ADMIN — PHENAZOPYRIDINE HYDROCHLORIDE 100 MG: 100 TABLET ORAL at 11:28

## 2022-01-30 RX ADMIN — ACETAMINOPHEN 1000 MG: 500 TABLET ORAL at 21:09

## 2022-01-30 RX ADMIN — DEXTROSE MONOHYDRATE: 50 INJECTION, SOLUTION INTRAVENOUS at 08:40

## 2022-01-30 RX ADMIN — ACETAMINOPHEN 1000 MG: 500 TABLET ORAL at 08:40

## 2022-01-30 RX ADMIN — FERROUS SULFATE TAB 325 MG (65 MG ELEMENTAL FE) 325 MG: 325 (65 FE) TAB at 08:40

## 2022-01-30 RX ADMIN — DEXTROSE MONOHYDRATE: 50 INJECTION, SOLUTION INTRAVENOUS at 19:09

## 2022-01-30 RX ADMIN — RISPERIDONE 4 MG: 2 TABLET ORAL at 21:09

## 2022-01-30 RX ADMIN — ACETAMINOPHEN 1000 MG: 500 TABLET ORAL at 15:25

## 2022-01-30 RX ADMIN — QUETIAPINE FUMARATE 25 MG: 25 TABLET ORAL at 18:34

## 2022-01-30 RX ADMIN — RISPERIDONE 1 MG: 1 TABLET ORAL at 15:25

## 2022-01-30 RX ADMIN — FERROUS SULFATE TAB 325 MG (65 MG ELEMENTAL FE) 325 MG: 325 (65 FE) TAB at 21:09

## 2022-01-30 RX ADMIN — PHENAZOPYRIDINE HYDROCHLORIDE 100 MG: 100 TABLET ORAL at 15:25

## 2022-01-30 ASSESSMENT — ACTIVITIES OF DAILY LIVING (ADL)
ADLS_ACUITY_SCORE: 23
ADLS_ACUITY_SCORE: 19
ADLS_ACUITY_SCORE: 17
ADLS_ACUITY_SCORE: 19
ADLS_ACUITY_SCORE: 19
ADLS_ACUITY_SCORE: 15
ADLS_ACUITY_SCORE: 19
ADLS_ACUITY_SCORE: 17
ADLS_ACUITY_SCORE: 23
ADLS_ACUITY_SCORE: 19
ADLS_ACUITY_SCORE: 23
ADLS_ACUITY_SCORE: 19
ADLS_ACUITY_SCORE: 23
ADLS_ACUITY_SCORE: 19
ADLS_ACUITY_SCORE: 19
ADLS_ACUITY_SCORE: 15
ADLS_ACUITY_SCORE: 23
ADLS_ACUITY_SCORE: 15
ADLS_ACUITY_SCORE: 17
ADLS_ACUITY_SCORE: 19
ADLS_ACUITY_SCORE: 19

## 2022-01-30 ASSESSMENT — MIFFLIN-ST. JEOR: SCORE: 867.25

## 2022-01-30 NOTE — PLAN OF CARE
Summary:  UTI, hyponatremia  DATE & TIME: 1/29 Nights  Cognitive Concerns/ Orientation : Alert, confused, oriented x2, disoriented to time, situation; knows she is in a hospital at times, can be impulsive; [place] and English speaking.   BEHAVIOR & AGGRESSION TOOL COLOR: Green   CIWA SCORE: n/a  ABNL VS/O2: VSS on RA  MOBILITY: Assist of 1, GB   PAIN MANAGMENT: PRN Tylenol - generalized pain  DIET: Regular  BOWEL/BLADDER: No signs of incontinence, walked to bathroom.  ABNL LAB/BG: Last Na 134 (WNL)  DRAIN/DEVICES: D5 stopped now SL  TELEMETRY RHYTHM: n/a  SKIN: pale, scattered bruises and scabs. Bruising to coccyx.   TESTS/PROCEDURES:   D/C DAY/GOALS/PLACE: From memory care, will go back - today or tomorrow?  OTHER IMPORTANT INFO: Covid recovered

## 2022-01-30 NOTE — PLAN OF CARE
Summary:  UTI  DATE & TIME: 1/29/22 Evening shift  Cognitive Concerns/ Orientation : Alert, confused, oriented x2, disoriented to time, situation; knows she is in a hospital at times, can be impulsive; [place] Korean and English speaking.   BEHAVIOR & AGGRESSION TOOL COLOR: Green   CIWA SCORE: n/a  ABNL VS/O2: VSS on RA  MOBILITY: Assist of 1, GB   PAIN MANAGMENT: States she has pain all over, gave scheduled tylenol  DIET: Regular- good breakfast; poor lunch but great dinner  BOWEL/BLADDER: No signs of incontinence, walked to bathroom. No BM; gave scheduled miralax. Shows signs of scant hematuria at times, hgb stable   ABNL LAB/BG: Na 133- scheduled every 4 hours  DRAIN/DEVICES: new PIV in L arm , D5 @ 100 ml/hr   TELEMETRY RHYTHM: n/a  SKIN: pale, scattered bruises and scabs. Bruising to coccyx.   TESTS/PROCEDURES: CXR ordered, will try in am after confirming with MD  D/C DAY/GOALS/PLACE: Pending improvement. From memory care, will update daughter  OTHER IMPORTANT INFO: PT/OT ordered.    Patient Refused

## 2022-01-30 NOTE — CONSULTS
Care Management Initial Consult    General Information  Assessment completed with: Care Team Member,Children,VM-chart review, Amish  Type of CM/SW Visit: Initial Assessment    Primary Care Provider verified and updated as needed: Yes ([facility] physicians)   Readmission within the last 30 days:        Reason for Consult: discharge planning  Advance Care Planning: Advance Care Planning Reviewed: other (comment) (none)          Communication Assessment  Patient's communication style: spoken language (English or Bilingual)             Cognitive  Cognitive/Neuro/Behavioral: .WDL except  Level of Consciousness: confused (per notes, )  Arousal Level: opens eyes spontaneously  Orientation: disoriented to,place,time,situation  Mood/Behavior: restless  Best Language: 0 - No aphasia  Speech: rambling    Living Environment:   People in home: facility resident ([facility] assisted living)     Current living Arrangements: assisted living  Name of Facility: [facility]   Able to return to prior arrangements: other (see comments) (per staff today, RN at facility will need assessment prior )       Family/Social Support:  Care provided by: other (see comments) (staff)  Provides care for: no one, unable/limited ability to care for self  Marital Status:   Children          Description of Support System:           Current Resources:   Patient receiving home care services: No     Community Resources:    Equipment currently used at home: walker, standard  Supplies currently used at home:      Employment/Financial:  Employment Status:          Financial Concerns:          Lifestyle & Psychosocial Needs:  Social Determinants of Health     Tobacco Use: Low Risk      Smoking Tobacco Use: Never Smoker     Smokeless Tobacco Use: Never Used   Alcohol Use: Not on file   Financial Resource Strain: Not on file   Food Insecurity: Not on file   Transportation Needs: Not on file   Physical Activity: Not on file    Stress: Not on file   Social Connections: Not on file   Intimate Partner Violence: Not on file   Depression: Not on file   Housing Stability: Not on file       Functional Status:  Prior to admission patient needed assistance: needs total assistance due to cognitive deficits       Mental Health Status: did not assess          Chemical Dependency Status:did not assess                Values/Beliefs:  Spiritual, Cultural Beliefs, [employer] Practices, Values that affect care:       Did not assess          Additional Information:  Briefly met with patient in room. Called april Jj to discuss plans at discharge. Анна anticipates return to [facility] when ready to leave. Her concern is about how her mom would tolerate the urology appointment due to her cognition history. Encourage her to discuss with Angelica's primary team at [facility], she agreed and thought this was a good idea. [facility] called and I spoke to Dom whom confirmed services for me. Patient resides in the [facility] and receives total cares with all her activities, toilting, medication administration, meal set up, bathing. She relays that patient is ambulatory, SBA with a walker. New medications should be filled her at the [facility], she also relays RN assessment to return. Case management will follow along to finalize plans.    Talya Sanz RN   [facility]   Phone [phone]

## 2022-01-30 NOTE — PLAN OF CARE
Summary:  UTI, hyponatremia  DATE & TIME: 1/30/22 Day shift  Cognitive Concerns/ Orientation : Alert, confused, oriented x2, disoriented to time, situation; knows she is in a hospital at times, can be impulsive; Yoruba and English speaking.   BEHAVIOR & AGGRESSION TOOL COLOR: Green   CIWA SCORE: n/a  ABNL VS/O2: VSS on RA  MOBILITY: Assist of 1, GB   PAIN MANAGMENT: PRN Tylenol - generalized pain  DIET: Regular- good appetite  BOWEL/BLADDER: No signs of incontinence, walked to bathroom. Extreme urgency and frequency- pyridium started  ABNL LAB/BG: Last Na 134 (WNL)  DRAIN/DEVICES: D5 running at 100 ml/hr  TELEMETRY RHYTHM: n/a  SKIN: pale, scattered bruises and scabs. Bruising to coccyx.   TESTS/PROCEDURES:   D/C DAY/GOALS/PLACE: From memory care- waiting on urine culture  OTHER IMPORTANT INFO: Covid recovered

## 2022-01-30 NOTE — PROGRESS NOTES
[facility]    Internal Medicine Hospitalist Progress Note  01/30/2022  I evaluated patient on the above date.    Storm Abdalla Jr., MD  [phone] (p)  Text Page  Vocera        Assessment & Plan New actions/orders today (01/30/2022) are underlined.    Angelica James is a 75 yo female with history including dementia, schizophrenia, DM2 and h/o hyponatremia, who presented 1/28/2022 with multiple falls and found with sodium level of 118 and abnormal UA.    Hyponatremia, question SIADH component or related to excessive free water intake.  * Sodium 118 on admit 1/28. Urine sodium 40, urine osmol 182 1/28. On admit, oral intake unclear. Not given any fluids on admit.  * Early am 1/29, sodium showed sodium increased to 131 and started on D5W.  Recent Labs   Lab 01/30/22  0750 01/30/22  0138 01/29/22  2020 01/29/22  1559 01/29/22  1035 01/29/22  0655    134 133 131* 131* 130*   - Continue D5W at 100 ml/hr x 12h today 1/30.  - Continue to monitor sodium closely.  - Continue regular diet for now but plan for fluid restriction.    UTI.  * UA on admit 1/28 with 33 WBC, lg LE. Started on ceftriaxone 1/28. UC 1/28 pending.  * 1/30: Pt with dysuria.  - Try PRN pyridium 100 mg TID.  - Continue ceftriaxone (started 1/28).  - Follow-up urine culture.    Hematuria, unclear etiology, question related to UTI or chronic from other cause.  * Pt with moderate blood noted in UA 1/28. From chart review, has had moderate to large blood in urine last year as well. Per d/w daughter 1/30, she has not seen a Urologist.  - Treat UTI as above.  - Plan outpatient Urology referral.    Anemia, suspect chronic iron deficiency component, question related to hematuria.  * Hgb 7.8 on admit. No overt clinical signs of major bleeding. Iron studies form 7/2021 suggest iron deficiency component (low normal iron, iron saturation, ferritin; normal TIBC).  Recent Labs   Lab 01/30/22  0750 01/29/22  0655 01/28/22  1310   HGB 7.7* 8.0*  7.8*   - Check iron studies, ferritin, TSH.  - Monitor CBC.    COVID-19 recovered.  D-dimer elevated, non-specific.  * No fever or cough PTA. On initial evaluation was afebrile, not hypoxic. Routine screening COVID test 1/28 positive.   * WBC and CRP normal on admit. D-dimer 1.33, but nonspecific.  * On 1/29, notified that pt had positive test result for COVID 1/10; deemed COVID recovered.  Recent Labs   Lab 01/30/22  0750 01/29/22  0655 01/28/22  1952 01/28/22  1310   WBC 4.9 4.8  --  9.3   CRP  --   --  4.1  --    DD  --   --  1.33*  --    - Monitor clinically.    Falls, suspect due to hyponatremia and UTI.  - Treat above issues.    Schizophrenia.  Dementia.  * PTA paroxetine held on admit due to hyponatremia.  - Continue quetiapine 25 mg BID; and risperidol 1 mg afternoon and 4 mg qpm.  - Continue PRN quetiapine 25 mg BID.    DM2.  [PTA: metformin 500 mg daily.]  * Hgb A1C 5.7 7/2021.  Recent Labs   Lab 01/30/22  0750 01/29/22  0655 01/28/22  1310   GLC 97 85 133*   - Continue to hold PTA metformin.    Degenerative joint disease/OA.  - Continue scheduled acetaminophen 1000 mg TID.     Weakness and physical deconditioning due to multiple acute and chronic medical issues.  * Lives in memory care.  - Continue PT and OT.      COVID-19 testing.  COVID-19 PCR Results    COVID-19 PCR Results 7/31/21 9/11/21 1/28/22   SARS CoV2 PCR Negative Negative Positive (A)   (A) Abnormal value       Comments are available for some flowsheets but are not being displayed.         COVID-19 Antibody Results, Testing for Immunity    COVID-19 Antibody Results, Testing for Immunity   No data to display.             Diet: Combination Diet Regular Diet Adult    Prophylaxis: PCD's, ambulation.   Darby Catheter: Not present  Central Lines: None  Code Status: No CPR- Do NOT Intubate    Disposition Plan   Expected discharge: possibly tomorrow recommended to prior living arrangement pending above.  Entered: Storm Abdalla MD 01/30/2022,  "9:58 AM       Communication.  - I d/w pt's daughter 1/30.    Interval History   Doing better.  Asking about going home.  Noted with feeling of needing to urinate frequently but no output, negative bladder scan..    -Data reviewed today: I reviewed all new labs and imaging over the last 24 hours. I personally reviewed no images or EKG's today.    Physical Exam    , Blood pressure (!) 141/67, pulse 70, temperature 97.6  F (36.4  C), temperature source Oral, resp. rate 16, height 1.575 m (5' 2\"), weight 43.4 kg (95 lb 10.9 oz), SpO2 96 %.  Vitals:    01/28/22 1906 01/30/22 0515   Weight: 43.9 kg (96 lb 12.5 oz) 43.4 kg (95 lb 10.9 oz)     Vital Signs with Ranges  Temp:  [97.6  F (36.4  C)-98.8  F (37.1  C)] 97.6  F (36.4  C)  Pulse:  [70-97] 70  Resp:  [16-20] 16  BP: (118-141)/(67-79) 141/67  SpO2:  [96 %-98 %] 96 %  Patient Vitals for the past 24 hrs:   BP Temp Temp src Pulse Resp SpO2 Weight   01/30/22 0730 (!) 141/67 97.6  F (36.4  C) Oral 70 16 96 % --   01/30/22 0515 -- -- -- -- -- -- 43.4 kg (95 lb 10.9 oz)   01/29/22 2328 126/67 98.2  F (36.8  C) Axillary 88 18 97 % --   01/29/22 1556 118/79 98.8  F (37.1  C) Axillary 97 20 98 % --     I/O's Last 24 hours  I/O last 3 completed shifts:  In: 380 [P.O.:380]  Out: 1100 [Urine:1100]    Constitutional: Awake, alert, more conversant.  Respiratory: Diminished in bases. No crackles or wheezes but not taking deep breaths.  Cardiovascular: RRR, no m/r/g.  GI:   Skin/Integumen:   Other:        Data   Recent Labs   Lab 01/30/22  0750 01/30/22  0138 01/29/22 2020 01/29/22  1559 01/29/22  1035 01/29/22  0655 01/28/22 1952 01/28/22  1310   WBC 4.9  --   --   --   --  4.8  --  9.3   HGB 7.7*  --   --   --   --  8.0*  --  7.8*   MCV 93  --   --   --   --  88  --  89     --   --   --   --  279  --  273    134 133   < > 131* 130*   < > 118*   POTASSIUM 3.7  --   --   --   --  3.7  --  4.4   CHLORIDE 102  --   --   --   --  98  --  81*   CO2 31  --   --   --   --  28 "  --  28   BUN 15  --   --   --   --  16  --  27   CR 0.56  --   --   --  0.55 0.50*   < > 0.44*   ANIONGAP 3  --   --   --   --  4  --  9   SHERI 8.4*  --   --   --   --  7.9*  --  8.2*   GLC 97  --   --   --   --  85  --  133*   ALBUMIN  --   --   --   --   --   --   --  3.5   PROTTOTAL  --   --   --   --   --   --   --  6.9   BILITOTAL  --   --   --   --   --   --   --  0.6   ALKPHOS  --   --   --   --   --   --   --  45   ALT  --   --   --   --   --   --   --  31   AST  --   --   --   --   --   --   --  28    < > = values in this interval not displayed.     Recent Labs   Lab Test 01/30/22  0750 01/29/22  0655 01/28/22  1310 11/01/21  1745 09/12/21  1209 07/31/21  1238 08/19/19  1936 08/18/19  0743 08/17/19  0616 08/14/19  0536 08/12/19  2148 08/11/19  2256 08/11/19  1036   GLC 97 85 133* 140* 104*   < >  --    < >  --    < >  --   --   --    BGM  --   --   --   --   --   --  185*  --  90  --  135* 139* 110*    < > = values in this interval not displayed.     Recent Labs   Lab 01/30/22  0750 01/29/22  0655 01/28/22  1952 01/28/22  1310   WBC 4.9 4.8  --  9.3   CRP  --   --  4.1  --    DD  --   --  1.33*  --          No results found for this or any previous visit (from the past 24 hour(s)).    Medications   All medications were reviewed.    D5W 100 mL/hr at 01/30/22 0840       acetaminophen  1,000 mg Oral TID     calcium carbonate 500 mg (elemental)  1 tablet Oral BID     cefTRIAXone  1 g Intravenous Q24H     enoxaparin ANTICOAGULANT  30 mg Subcutaneous Q24H     ferrous sulfate  325 mg Oral BID     multivitamin, therapeutic  1 tablet Oral QPM     polyethylene glycol  17 g Oral Daily     QUEtiapine  25 mg Oral BID     risperiDONE  1 mg Oral Daily     risperiDONE  4 mg Oral QPM     sodium chloride (PF)  3 mL Intracatheter Q8H     lidocaine 4%, lidocaine (buffered or not buffered), melatonin, ondansetron **OR** ondansetron, QUEtiapine, sodium chloride (PF)

## 2022-01-31 VITALS
HEART RATE: 56 BPM | DIASTOLIC BLOOD PRESSURE: 80 MMHG | RESPIRATION RATE: 16 BRPM | BODY MASS INDEX: 17.61 KG/M2 | SYSTOLIC BLOOD PRESSURE: 135 MMHG | TEMPERATURE: 98.1 F | WEIGHT: 95.68 LBS | OXYGEN SATURATION: 97 % | HEIGHT: 62 IN

## 2022-01-31 LAB
ANION GAP SERPL CALCULATED.3IONS-SCNC: 6 MMOL/L (ref 3–14)
BUN SERPL-MCNC: 13 MG/DL (ref 7–30)
CALCIUM SERPL-MCNC: 8.6 MG/DL (ref 8.5–10.1)
CHLORIDE BLD-SCNC: 100 MMOL/L (ref 94–109)
CO2 SERPL-SCNC: 27 MMOL/L (ref 20–32)
CREAT SERPL-MCNC: 0.43 MG/DL (ref 0.52–1.04)
GFR SERPL CREATININE-BSD FRML MDRD: >90 ML/MIN/1.73M2
GLUCOSE BLD-MCNC: 264 MG/DL (ref 70–99)
HGB BLD-MCNC: 8.2 G/DL (ref 11.7–15.7)
PLATELET # BLD AUTO: 309 10E3/UL (ref 150–450)
POTASSIUM BLD-SCNC: 3.5 MMOL/L (ref 3.4–5.3)
SODIUM SERPL-SCNC: 133 MMOL/L (ref 133–144)

## 2022-01-31 PROCEDURE — 36415 COLL VENOUS BLD VENIPUNCTURE: CPT | Performed by: INTERNAL MEDICINE

## 2022-01-31 PROCEDURE — 250N000013 HC RX MED GY IP 250 OP 250 PS 637: Performed by: INTERNAL MEDICINE

## 2022-01-31 PROCEDURE — 85018 HEMOGLOBIN: CPT | Performed by: INTERNAL MEDICINE

## 2022-01-31 PROCEDURE — 99239 HOSP IP/OBS DSCHRG MGMT >30: CPT | Performed by: INTERNAL MEDICINE

## 2022-01-31 PROCEDURE — 250N000011 HC RX IP 250 OP 636: Performed by: INTERNAL MEDICINE

## 2022-01-31 PROCEDURE — 85049 AUTOMATED PLATELET COUNT: CPT | Performed by: INTERNAL MEDICINE

## 2022-01-31 PROCEDURE — 80048 BASIC METABOLIC PNL TOTAL CA: CPT | Performed by: INTERNAL MEDICINE

## 2022-01-31 RX ADMIN — ACETAMINOPHEN 1000 MG: 500 TABLET ORAL at 10:03

## 2022-01-31 RX ADMIN — ENOXAPARIN SODIUM 30 MG: 30 INJECTION SUBCUTANEOUS at 10:06

## 2022-01-31 RX ADMIN — QUETIAPINE FUMARATE 25 MG: 25 TABLET ORAL at 12:51

## 2022-01-31 RX ADMIN — PHENAZOPYRIDINE HYDROCHLORIDE 100 MG: 100 TABLET ORAL at 01:12

## 2022-01-31 RX ADMIN — RISPERIDONE 1 MG: 1 TABLET ORAL at 13:08

## 2022-01-31 RX ADMIN — FERROUS SULFATE TAB 325 MG (65 MG ELEMENTAL FE) 325 MG: 325 (65 FE) TAB at 10:03

## 2022-01-31 RX ADMIN — CALCIUM 500 MG: 500 TABLET ORAL at 10:03

## 2022-01-31 RX ADMIN — QUETIAPINE FUMARATE 25 MG: 25 TABLET ORAL at 04:31

## 2022-01-31 ASSESSMENT — ACTIVITIES OF DAILY LIVING (ADL)
ADLS_ACUITY_SCORE: 21
ADLS_ACUITY_SCORE: 21
ADLS_ACUITY_SCORE: 17
ADLS_ACUITY_SCORE: 21
ADLS_ACUITY_SCORE: 17
ADLS_ACUITY_SCORE: 21
ADLS_ACUITY_SCORE: 17
ADLS_ACUITY_SCORE: 21
ADLS_ACUITY_SCORE: 21
ADLS_ACUITY_SCORE: 17
ADLS_ACUITY_SCORE: 21
ADLS_ACUITY_SCORE: 21
ADLS_ACUITY_SCORE: 17
ADLS_ACUITY_SCORE: 21

## 2022-01-31 NOTE — PLAN OF CARE
DATE & TIME: 1/30/22 [id number]  Cognitive Concerns/ Orientation : Alert, confused. Hx dementia. Disoriented x2-3. Very forgetful and impulsive at times.  Panamanian speaking, understands and speaks basic English. Pt did not sleep more than 5 minutes at a time overnight due to urinary frequency, urgency, and confusion. PRN Seroquel given without relief  BEHAVIOR & AGGRESSION TOOL COLOR: Green   ABNL VS/O2: VSS on RA  MOBILITY: SB GB. Unsteady at times  PAIN MANAGMENT: Denies. Scheduled Tylenol  DIET: Regular  BOWEL/BLADDER: Urinary urgency and frequency, roughly every 5-15 minutes (see flowsheet). PRN Pyridium given x1. Incontinent at times. Up to BR or BSC  ABNL LAB/BG: Hgb 7.7  DRAIN/DEVICES: R arm PIV SL  SKIN: Scattered bruising and scabbing  TESTS/PROCEDURES: None scheduled   D/C DAY/GOALS/PLACE: Pt is from [facility]. Discharge potentially Monday 1/31.  OTHER IMPORTANT INFO: Rocephin q24h.

## 2022-01-31 NOTE — PROGRESS NOTES
[facility]    Internal Medicine Hospitalist Progress Note  01/31/2022  I evaluated patient on the above date.    Storm Abdalla Jr., MD  [phone] (p)  Text Page  Vocera        Assessment & Plan New actions/orders today (01/31/2022) are underlined.    Angelica James is a 75 yo female with history including dementia, schizophrenia, DM2 and h/o hyponatremia, who presented 1/28/2022 with multiple falls and found with sodium level of 118 and abnormal UA.    Hyponatremia, question SIADH component or related to excessive free water intake.  * Sodium 118 on admit 1/28. Urine sodium 40, urine osmol 182 1/28. On admit, oral intake unclear. Not given any fluids on admit.  * Early am 1/29, sodium showed sodium increased to 131 and started on D5W.  * Given additional D5W 1/30.  Recent Labs   Lab 01/30/22  0750 01/30/22  0138 01/29/22  2020 01/29/22  1559 01/29/22  1035 01/29/22  0655    134 133 131* 131* 130*   - Order 2L fluid restriction.  - Monitor BMP.    Possible UTI.  * UA on admit 1/28 with 33 WBC, lg LE. Started on ceftriaxone 1/28. UC 1/28 grew only 10,000-50,000 CFU/mL mixture of urogenital melany .  * 1/30: Pt with dysuria, ordered PRN pyridium. Completed ceftriaxone.  - Continue PRN pyridium.    Hematuria, unclear etiology, question related to UTI or chronic from other cause.  * Pt with moderate blood noted in UA 1/28. From chart review, has had moderate to large blood in urine last year as well. Per d/w daughter 1/30, she has not seen a Urologist.  - Treat UTI as above.  - Plan outpatient Urology referral.    Anemia, suspect chronic iron deficiency component, question related to hematuria.  * PTA on iron.  * Hgb 7.8 on admit. No overt clinical signs of major bleeding. Iron studies form 7/2021 suggest iron deficiency component (low normal iron, iron saturation, ferritin; normal TIBC).  * 1/30: TSH normal. Iron studies suggest ACD + iron deficiency component.  Recent Labs   Lab  01/31/22  0842 01/30/22  0750 01/29/22  0655 01/28/22  1310   HGB 8.2* 7.7* 8.0* 7.8*   - Continue iron supplement.  - Monitor CBC.  - Hematuria evaluation as above, outpatient.    COVID-19 recovered.  D-dimer elevated, non-specific.  * No fever or cough PTA. On initial evaluation was afebrile, not hypoxic. Routine screening COVID test 1/28 positive.   * WBC and CRP normal on admit. D-dimer 1.33, but nonspecific.  * On 1/29, notified that pt had positive test result for COVID 1/10; deemed COVID recovered.  Recent Labs   Lab 01/30/22  1021 01/30/22  0750 01/29/22  0655 01/28/22 1952 01/28/22  1310   WBC  --  4.9 4.8  --  9.3   CRP  --   --   --  4.1  --    DD  --   --   --  1.33*  --    SCOOBY 27  --   --   --   --    - Monitor clinically.    Falls, suspect due to hyponatremia and UTI.  - Treat above issues.    Schizophrenia.  Dementia.  * PTA paroxetine held on admit due to hyponatremia.  - Continue quetiapine 25 mg BID; and risperidol 1 mg afternoon and 4 mg qpm.  - Continue PRN quetiapine 25 mg BID.    DM2.  [PTA: metformin 500 mg daily.]  * Hgb A1C 5.7 7/2021.  Recent Labs   Lab 01/30/22  0750 01/29/22  0655 01/28/22  1310   GLC 97 85 133*   - Continue to hold PTA metformin.    Degenerative joint disease/OA.  - Continue scheduled acetaminophen 1000 mg TID.     Weakness and physical deconditioning due to multiple acute and chronic medical issues.  * Lives in memory care.  - Continue PT and OT.      COVID-19 testing.  COVID-19 PCR Results    COVID-19 PCR Results 7/31/21 9/11/21 1/28/22   SARS CoV2 PCR Negative Negative Positive (A)   (A) Abnormal value       Comments are available for some flowsheets but are not being displayed.         COVID-19 Antibody Results, Testing for Immunity    COVID-19 Antibody Results, Testing for Immunity   No data to display.             Diet: Combination Diet Regular Diet Adult    Prophylaxis: PCD's, ambulation.   Darby Catheter: Not present  Central Lines: None  Code Status: No CPR- Do  "NOT Intubate    Disposition Plan   Expected discharge:today recommended to prior living arrangement pending above.  Entered: Storm Abdalla MD 01/31/2022, 9:47 AM       Communication.  - I d/w pt's daughter 1/30.    Interval History   No acute event overnight.  Still with frequent attempts to urinate; per daughter this is not uncommon for pt.  Otherwise, doing OK.    -Data reviewed today: I reviewed all new labs and imaging over the last 24 hours. I personally reviewed no images or EKG's today.    Physical Exam    , Blood pressure 135/80, pulse 56, temperature 98.1  F (36.7  C), resp. rate 16, height 1.575 m (5' 2\"), weight 43.4 kg (95 lb 10.9 oz), SpO2 97 %.  Vitals:    01/28/22 1906 01/30/22 0515   Weight: 43.9 kg (96 lb 12.5 oz) 43.4 kg (95 lb 10.9 oz)     Vital Signs with Ranges  Temp:  [97.9  F (36.6  C)-98.7  F (37.1  C)] 98.1  F (36.7  C)  Pulse:  [56-72] 56  Resp:  [16] 16  BP: (118-135)/(57-80) 135/80  SpO2:  [95 %-97 %] 97 %  Patient Vitals for the past 24 hrs:   BP Temp Temp src Pulse Resp SpO2   01/31/22 0725 135/80 98.1  F (36.7  C) -- 56 16 97 %   01/30/22 2356 122/69 97.9  F (36.6  C) Oral 72 16 97 %   01/30/22 1644 118/57 98.7  F (37.1  C) Oral 67 16 95 %     I/O's Last 24 hours  I/O last 3 completed shifts:  In: 710 [P.O.:710]  Out: 1830 [Urine:1830]    Constitutional: Awake, alert.  Respiratory: Diminished in bases. No crackles or wheezes but not taking deep breaths.  Cardiovascular: RRR, no m/r/g.  GI:   Skin/Integumen:   Other:        Data   Recent Labs   Lab 01/31/22  0842 01/30/22  0750 01/30/22  0138 01/29/22 2020 01/29/22  1559 01/29/22  1035 01/29/22  0655 01/28/22  1952 01/28/22  1310   WBC  --  4.9  --   --   --   --  4.8  --  9.3   HGB 8.2* 7.7*  --   --   --   --  8.0*  --  7.8*   MCV  --  93  --   --   --   --  88  --  89    285  --   --   --   --  279  --  273   NA  --  136 134 133   < > 131* 130*   < > 118*   POTASSIUM  --  3.7  --   --   --   --  3.7  --  4.4   CHLORIDE "  --  102  --   --   --   --  98  --  81*   CO2  --  31  --   --   --   --  28  --  28   BUN  --  15  --   --   --   --  16  --  27   CR  --  0.56  --   --   --  0.55 0.50*   < > 0.44*   ANIONGAP  --  3  --   --   --   --  4  --  9   SHERI  --  8.4*  --   --   --   --  7.9*  --  8.2*   GLC  --  97  --   --   --   --  85  --  133*   ALBUMIN  --   --   --   --   --   --   --   --  3.5   PROTTOTAL  --   --   --   --   --   --   --   --  6.9   BILITOTAL  --   --   --   --   --   --   --   --  0.6   ALKPHOS  --   --   --   --   --   --   --   --  45   ALT  --   --   --   --   --   --   --   --  31   AST  --   --   --   --   --   --   --   --  28    < > = values in this interval not displayed.     Recent Labs   Lab Test 01/30/22  0750 01/29/22  0655 01/28/22  1310 11/01/21  1745 09/12/21  1209 07/31/21  1238 08/19/19  1936 08/18/19  0743 08/17/19  0616 08/14/19  0536 08/12/19  2148 08/11/19  2256 08/11/19  1036   GLC 97 85 133* 140* 104*   < >  --    < >  --    < >  --   --   --    BGM  --   --   --   --   --   --  185*  --  90  --  135* 139* 110*    < > = values in this interval not displayed.     Recent Labs   Lab 01/30/22  1021 01/30/22  0750 01/29/22  0655 01/28/22  1952 01/28/22  1310   WBC  --  4.9 4.8  --  9.3   CRP  --   --   --  4.1  --    DD  --   --   --  1.33*  --    SCOOBY 27  --   --   --   --          No results found for this or any previous visit (from the past 24 hour(s)).    Medications   All medications were reviewed.      acetaminophen  1,000 mg Oral TID     calcium carbonate 500 mg (elemental)  1 tablet Oral BID     cefTRIAXone  1 g Intravenous Q24H     enoxaparin ANTICOAGULANT  30 mg Subcutaneous Q24H     ferrous sulfate  325 mg Oral BID     multivitamin, therapeutic  1 tablet Oral QPM     polyethylene glycol  17 g Oral Daily     QUEtiapine  25 mg Oral BID     risperiDONE  1 mg Oral Daily     risperiDONE  4 mg Oral QPM     sodium chloride (PF)  3 mL Intracatheter Q8H     lidocaine 4%, lidocaine (buffered  or not buffered), melatonin, ondansetron **OR** ondansetron, phenazopyridine, QUEtiapine, sodium chloride (PF)

## 2022-01-31 NOTE — PROGRESS NOTES
Care Management Discharge Note    Discharge Date: 01/31/2022       Discharge Disposition: [facility]    Discharge Services: None    Discharge DME: None    Discharge Transportation: Daughter Анна will transport patient    Private pay costs discussed: Not applicable    PAS Confirmation Code:  NA  Patient/family educated on Medicare website which has current facility and service quality ratings: no    Education Provided on the Discharge Plan:  yes  Persons Notified of Discharge Plans: Pt, her Daughter Анна, Bev Nurse at [facility] and Ns here  Patient/Family in Agreement with the Plan: yes    Handoff Referral Completed: Yes    Additional Information:  Pt's PCP is Dr Johns, [facility].  This physician makes rounds weekly at [facility].  Will update her chart with this PCP.  Pt's Daughter Анна will transport back to [facility] around 3:30 PM today, when she gets off work.  [facility] typically likes pts back by 1:00 PM.  They have approved this later time.   Have communicated with Bev, Nurse at [facility], this AM.  Will fax orders to ([phone]).  Bev will set-up home care.        Daisy Jha, RN   Inpatient Care Management  [phone]

## 2022-01-31 NOTE — DISCHARGE SUMMARY
[facility]  Discharge Summary        Angelica James MRN# [id number]   YOB: 1945 Age: 76 year old     Date of Admission: 1/28/2022  Date of Discharge: 1/31/2022  Admitting Physician: Ben Reyes MD  Discharge Physician: Storm Abdalla MD     Primary Provider: Kamini Michael  Primary Care Physician Phone Number: [phone]         Discharge Diagnoses:   1. Hyponatremia, question SIADH component or related to excessive free water intake.  2. Possible UTI.  3. Falls, suspect due to hyponatremia and UTI.  4. Hematuria, unclear etiology, question related to UTI or chronic from other cause.  5. Anemia, suspect chronic iron deficiency component, question related to hematuria.  6. Recent COVID-19 infection, now recovered.  7. D-dimer elevated, non-specific.  8. Weakness and physical deconditioning due to multiple acute and chronic medical issues.        Other Chronic Medical Problems:      1. Schizophrenia.  2. Dementia.  3. DM2.  4. Degenerative joint disease/OA.       Allergies:         Allergies   Allergen Reactions     Penicillins Hives     Tolerates cephalosporins           Discharge Medications:        Current Discharge Medication List      CONTINUE these medications which have NOT CHANGED    Details   acetaminophen (TYLENOL) 500 MG tablet Take 1,000 mg by mouth 3 times daily At 08:00, 14:00 & 20:00      calcium carbonate (OS-SHERI) 500 MG tablet Take 1 tablet by mouth 2 times daily At 08:00 & 16:00      ferrous sulfate (FEROSUL) 325 (65 Fe) MG tablet Take 325 mg by mouth 2 times daily      melatonin 3 MG tablet Take 1 mg by mouth At Bedtime      metFORMIN (GLUCOPHAGE) 500 MG tablet Take 500 mg by mouth daily (with breakfast)      multivitamin, therapeutic (THERA-VIT) TABS tablet Take 1 tablet by mouth every evening       Nutritional Supplements (ENSURE ORIGINAL) LIQD Take 1 Can by mouth daily Daily at 1000      PARoxetine (PAXIL) 20 MG tablet Take 20 mg  by mouth every morning      polyethylene glycol (MIRALAX) 17 g packet Take 1 packet by mouth daily      !! QUEtiapine (SEROQUEL) 25 MG tablet Take 25 mg by mouth 2 times daily Daily at 0800 and 1600.      !! QUEtiapine (SEROQUEL) 25 MG tablet Take 25 mg by mouth 2 times daily as needed (anxiety and agitation)      !! risperiDONE (RISPERDAL) 1 MG tablet Take 1 mg by mouth daily Daily at 1400      !! risperiDONE (RISPERDAL) 4 MG tablet Take 4 mg by mouth every evening At 20:00       !! - Potential duplicate medications found. Please discuss with provider.              Discharge Instructions and Follow-Up:      Discharge Orders      Home care nursing referral      Home Care PT Referral for Hospital Discharge      Home Care OT Referral for Hospital Discharge      Adult Urology Referral      Reason for your hospital stay    Hyponatremia.  UTI.  Falls, suspect related to above.     Activity    Your activity upon discharge: activity as tolerated     MD face to face encounter    Documentation of Face to Face and Certification for Home Health Services    I certify that patient: Angelica James is under my care and that I, or a nurse practitioner or physician's assistant working with me, had a face-to-face encounter that meets the physician face-to-face encounter requirements with this patient on: 1/31/2022.    This encounter with the patient was in whole, or in part, for the following medical condition, which is the primary reason for home health care: weakness and deconditioning.    I certify that, based on my findings, the following services are medically necessary home health services: Nursing, Occupational Therapy and Physical Therapy.    My clinical findings support the need for the above services because: Nurse is needed: To assess clinical status after changes in medications or other medical regimen. Occupational Therapy Services are needed to assess and treat cognitive ability and address ADL safety due to  impairment in strength and conditioning. Physical Therapy Services are needed to assess and treat the following functional impairments: weakness and deconditioning.    Further, I certify that my clinical findings support that this patient is homebound (i.e. absences from home require considerable and taxing effort and are for medical reasons or [employer] services or infrequently or of short duration when for other reasons) because: Leaving home is medically contraindicated for the following reason(s): Other physician ordered restriction: weakness and deconditioning.    Based on the above findings. I certify that this patient is confined to the home and needs intermittent skilled nursing care, physical therapy and/or speech therapy.  The patient is under my care, and I have initiated the establishment of the plan of care.  This patient will be followed by a physician who will periodically review the plan of care.  Physician/Provider to provide follow up care: Kamini Michael    Attending hospital physician (the Medicare certified [place] provider): Storm Abdalla MD  Physician Signature: See electronic signature associated with these discharge orders.  Date: 1/31/2022     Follow-up and recommended labs and tests    Follow-up with primary care provider, Kamini Michael, within 7 days for hospital follow-up.  The following labs/tests are recommended: BMP, CBC.  Referral to Urology for hematuria in 1-2 weeks.     Diet    Follow this diet upon discharge: Orders Placed This Encounter      Fluid restriction 2000 ML FLUID (approximately 8 cups/day)      Combination Diet Regular Diet Adult           Consultations This Hospital Stay:      N/A.        Admission History:      Please see the H&P by Ben Reyes MD on 1/28/2022 for complete details. Briefly, Angelica James is a 77 yo female with history including dementia, schizophrenia, DM2 and h/o hyponatremia, who presented 1/28/2022 with multiple falls and  found with sodium level of 118 and abnormal UA.        Problem Oriented Hospital Course:        Hyponatremia, question SIADH component or related to excessive free water intake.  * Sodium 118 on admit 1/28. Urine sodium 40, urine osmol 182 1/28. On admit, oral intake unclear. Not given any fluids on admit.  * Early am 1/29, sodium showed sodium increased to 131 and started on D5W.  * Given additional D5W 1/30.   * Fluid restriction ordered 1/31.  Recent Labs   Lab 01/30/22  0750 01/30/22  0138 01/29/22  2020 01/29/22  1559 01/29/22  1035 01/29/22  0655    134 133 131* 131* 130*   - Continue 2L fluid restriction.  - Monitor BMP outpatient.    Possible UTI.  * UA on admit 1/28 with 33 WBC, lg LE. Started on ceftriaxone 1/28. UC 1/28 grew only 10,000-50,000 CFU/mL mixture of urogenital melany .  * 1/30: Pt with dysuria, ordered PRN pyridium. Completed ceftriaxone.  * 1/31: Still with frequent attempts to urinate; per daughter this was not uncommon for pt.     Hematuria, unclear etiology, question related to UTI or chronic from other cause.  * Pt with moderate blood noted in UA 1/28. From chart review, has had moderate to large blood in urine last year as well. Per d/w daughter 1/30, she has not seen a Urologist.  - UTI treated as above.  - Outpatient Urology referral.    Anemia, suspect chronic iron deficiency component, question related to hematuria.  * PTA on iron.  * Hgb 7.8 on admit. No overt clinical signs of major bleeding. Iron studies form 7/2021 suggest iron deficiency component (low normal iron, iron saturation, ferritin; normal TIBC).  * 1/30: TSH normal. Iron studies suggest ACD + iron deficiency component.  Recent Labs   Lab 01/31/22  0842 01/30/22  0750 01/29/22  0655 01/28/22  1310   HGB 8.2* 7.7* 8.0* 7.8*   - Continue iron supplement.  - Monitor CBC outpatient.  - Hematuria evaluation as above, outpatient.    COVID-19 recovered.  D-dimer elevated, non-specific.  * No fever or cough PTA. On initial  evaluation was afebrile, not hypoxic. Routine screening COVID test 1/28 positive.   * WBC and CRP normal on admit. D-dimer 1.33, but nonspecific.  * On 1/29, notified that pt had positive test result for COVID 1/10; deemed COVID recovered.  - Monitor clinically.    Falls, suspect due to hyponatremia and UTI.  - Treat above issues.  - Home PT, OT.    Schizophrenia.  Dementia.  * PTA paroxetine held on admit due to hyponatremia.  - Continue quetiapine 25 mg BID; and risperidol 1 mg afternoon and 4 mg qpm.  - Continue PRN quetiapine 25 mg BID.    DM2.  [PTA: metformin 500 mg daily.]  * Hgb A1C 5.7 7/2021.  - Continue PTA metformin.    Degenerative joint disease/OA.  - Continue scheduled acetaminophen 1000 mg TID.     Weakness and physical deconditioning due to multiple acute and chronic medical issues.  * Lives in memory care.  - Continue home PT and OT.      COVID-19 testing.  COVID-19 PCR Results    COVID-19 PCR Results 7/31/21 9/11/21 1/28/22   SARS CoV2 PCR Negative Negative Positive (A)   (A) Abnormal value       Comments are available for some flowsheets but are not being displayed.         COVID-19 Antibody Results, Testing for Immunity    COVID-19 Antibody Results, Testing for Immunity   No data to display.                   Pending Results:        Unresulted Labs Ordered in the Past 30 Days of this Admission     Date and Time Order Name Status Description    1/31/2022 12:01 AM Basic metabolic panel In process                 Discharge Disposition:      Discharged to memory care unit.        Discharge Time:      Approximately 35 minutes.        Condition and Physical on Discharge:    See progress note on the same date as this discharge summary.          Key Imaging Studies, Lab Findings and Procedures/Surgeries:        Results for orders placed or performed during the hospital encounter of 01/28/22   Head CT w/o contrast    Narrative    CT SCAN OF THE HEAD WITHOUT CONTRAST   1/28/2022 3:46 PM     HISTORY: Head  trauma, minor (Age >= 65y).    TECHNIQUE:  Axial images of the head and coronal reformations without  IV contrast material. Radiation dose for this scan was reduced using  automated exposure control, adjustment of the mA and/or kV according  to patient size, or iterative reconstruction technique.    COMPARISON: CT head 11/1/2021.    FINDINGS: There is linear streak artifact projecting over the lower  aspect of the cerebral hemispheres and the posterior fossa structures,  somewhat limiting evaluation. There are also some mild motion-related  artifacts. The ventricles are normal in size and configuration.  Moderate generalized brain parenchymal volume loss. Moderate to severe  scattered nonspecific patchy and confluent hypodensity in the cerebral  white matter, as before, presumably due to chronic small vessel  ischemic disease. No definite CT findings of acute infarct. No  intracranial hemorrhage, acute extra-axial fluid collection, or  significant mass effect/herniation. Intracranial atherosclerotic  calcifications involving the carotid siphons again noted.    Probable small retention cysts in the posterior ethmoid region and  mild polypoid mucosal thickening in the right sphenoid sinus. The  mastoid and middle ear cavities appear clear where visualized. The  calvarium and skull base appear grossly intact.      Impression    IMPRESSION:  1. Exam is mildly limited.  2. No definite CT findings of acute intracranial process.  3. Brain atrophy and presumed chronic small vessel ischemic changes,  as described.     CHRISTIAN TORRES MD         SYSTEM ID:  [id number]   XR Pelvis 1/2 Views    Narrative    PELVIS ONE TO TWO VIEWS   1/28/2022 3:30 PM     HISTORY: Pelvic pain after fall.    COMPARISON: None.      Impression    IMPRESSION: No evidence of acute fracture. Hip joint spaces are  preserved. Degenerative changes in the lower lumbar spine. Sclerotic  changes adjacent to the inferior right SI joint.    TONI LUNA MD          SYSTEM ID:  [id number]   XR Sacrum and Coccyx 2 Views    Narrative    SACRUM AND COCCYX TWO VIEWS   1/28/2022 3:32 PM     HISTORY: Fall, pain.    COMPARISON: Pelvis radiographs dated 1/28/2022. CT of the abdomen and  pelvis 11/1/2021.      Impression    IMPRESSION: No definite displaced fracture of the visualized aspects  of the bony pelvis identified. Degenerative changes of bilateral  sacroiliac joints (right greater than left) and pubic symphysis are  better characterized on prior CT exam. Moderate to marked degenerative  disc space narrowing at L5-S1 with marginal endplate osteophytes and  relatively advanced lower lumbar degenerative facet arthropathy.  Scattered presumed calcified phleboliths and atherosclerotic vascular  calcifications in the pelvis.     CHRISTIAN TORRES MD         SYSTEM ID:  [id number]

## 2022-01-31 NOTE — PLAN OF CARE
Occupational Therapy Discharge Summary    Reason for therapy discharge:    Discharged to home.    Progress towards therapy goal(s). See goals on Care Plan in [facility] electronic health record for goal details.  Goals not met.  Barriers to achieving goals:   discharge from facility.    Therapy recommendation(s):    No further therapy is recommended.last notes indicate discharge back to [facility] with increased assist with ADLs as needed

## 2022-01-31 NOTE — PLAN OF CARE
Summary:  UTI, hyponatremia  DATE & TIME: 1/30/22 Evening shift  Cognitive Concerns/ Orientation : Alert, confused, oriented x2, disoriented to time, situation; knows she is in a hospital at times, is impulsive; Arabic and English speaking.   BEHAVIOR & AGGRESSION TOOL COLOR: Green   CIWA SCORE: n/a  ABNL VS/O2: VSS on RA  MOBILITY: Assist of 1, GB   PAIN MANAGMENT: Scheduled Tylenol - generalized pain  DIET: Regular- good appetite  BOWEL/BLADDER: No signs of incontinence, walked to bathroom. Extreme urgency and frequency- pyridium started  ABNL LAB/BG: Last Na 134 (WNL)  DRAIN/DEVICES: D5 running at 100 ml/hr- stopped this evening  TELEMETRY RHYTHM: n/a  SKIN: pale, scattered bruises and scabs. Bruising to coccyx.   TESTS/PROCEDURES:   D/C DAY/GOALS/PLACE: From memory care- waiting on urine culture; possible discharge tomorrow  OTHER IMPORTANT INFO: Covid recovered

## 2022-02-01 ENCOUNTER — PATIENT OUTREACH (OUTPATIENT)
Dept: CARE COORDINATION | Facility: CLINIC | Age: 77
End: 2022-02-01
Payer: COMMERCIAL

## 2022-02-01 DIAGNOSIS — Z71.89 OTHER SPECIFIED COUNSELING: ICD-10-CM

## 2022-02-01 NOTE — PROGRESS NOTES
Clinic Care Coordination Contact    Background: Care Coordination referral placed from [facility] discharge report for reason of patient meeting criteria for a TCM outreach call by [facility] team.    Assessment: Upon chart review, [facility] Team member will cancel/close the referral for TCM outreach due to reason below:    Patient is not established within [facility]. Upon chart review, [facility] Team member noted patient discharged to TCU/SNF    Plan: Care Coordination referral for TCM outreach canceled.    Clarissa Pike  Community Health Worker  [facility] [place], [facility]  Ph: [phone]

## 2022-02-15 ENCOUNTER — OFFICE VISIT (OUTPATIENT)
Dept: UROLOGY | Facility: CLINIC | Age: 77
End: 2022-02-15
Attending: INTERNAL MEDICINE
Payer: COMMERCIAL

## 2022-02-15 VITALS
SYSTOLIC BLOOD PRESSURE: 102 MMHG | HEART RATE: 68 BPM | HEIGHT: 62 IN | WEIGHT: 95 LBS | DIASTOLIC BLOOD PRESSURE: 82 MMHG | BODY MASS INDEX: 17.48 KG/M2 | OXYGEN SATURATION: 98 %

## 2022-02-15 DIAGNOSIS — R31.29 MICROSCOPIC HEMATURIA: ICD-10-CM

## 2022-02-15 DIAGNOSIS — R31.21 ASYMPTOMATIC MICROSCOPIC HEMATURIA: ICD-10-CM

## 2022-02-15 DIAGNOSIS — R31.29 MICROSCOPIC HEMATURIA: Primary | ICD-10-CM

## 2022-02-15 LAB
ALBUMIN UR-MCNC: NEGATIVE MG/DL
APPEARANCE UR: CLEAR
BILIRUB UR QL STRIP: NEGATIVE
COLOR UR AUTO: YELLOW
GLUCOSE UR STRIP-MCNC: NEGATIVE MG/DL
HGB UR QL STRIP: ABNORMAL
KETONES UR STRIP-MCNC: NEGATIVE MG/DL
LEUKOCYTE ESTERASE UR QL STRIP: ABNORMAL
NITRATE UR QL: NEGATIVE
PH UR STRIP: 6 [PH] (ref 5–7)
SP GR UR STRIP: 1.01 (ref 1–1.03)
UROBILINOGEN UR STRIP-ACNC: 0.2 E.U./DL

## 2022-02-15 PROCEDURE — 99214 OFFICE O/P EST MOD 30 MIN: CPT | Performed by: PHYSICIAN ASSISTANT

## 2022-02-15 PROCEDURE — 81003 URINALYSIS AUTO W/O SCOPE: CPT | Mod: QW | Performed by: PHYSICIAN ASSISTANT

## 2022-02-15 PROCEDURE — 88112 CYTOPATH CELL ENHANCE TECH: CPT | Performed by: PATHOLOGY

## 2022-02-15 ASSESSMENT — PAIN SCALES - GENERAL: PAINLEVEL: NO PAIN (0)

## 2022-02-15 ASSESSMENT — MIFFLIN-ST. JEOR: SCORE: 864.17

## 2022-02-15 NOTE — LETTER
2/15/2022       RE: Angelica James  [address]  [place] [address]     Dear Colleague,    Thank you for referring your patient, Angelica James, to the [facility] at [facility]. Please see a copy of my visit note below.    CC: Hematuria.    HPI: It is a pleasure to see Ms. Angeilca James, a pleasant 76 year old female, asked to be seen in consultation by Dr. Abdalla for evaluation of micro hematuria (neg UCs).     The patient denies any gross hematuria.  Ms. James voids without difficulty.  She currently denies any dysuria, pyuria, hesitancy, intermittency, feelings of incomplete emptying, or any recent history of urinary tract infections or stones.  CT w/ done in Nov 2021.     Hematuria Risk Factors:  Age >40: Yes     Smoking history: nonsmoker  Occupational exposure to chemicals or dyes (ie, benzenes, aromatic amines): no  History of urologic disorder or disease: no  History of irritative voiding symptoms: no  History of urinary tract infection: no  Analgesic abuse: no  History of pelvic irradiation: no    Past Medical History:   Diagnosis Date     Dementia (H)      Hypertension      Iron deficiency anemia      PUD (peptic ulcer disease)      Schizophrenia (H)      Type 2 diabetes mellitus (H)      Past Surgical History:   Procedure Laterality Date     colonoscopy       UPPER GI ENDOSCOPY       Current Outpatient Medications   Medication Sig Dispense Refill     acetaminophen (TYLENOL) 500 MG tablet Take 1,000 mg by mouth 3 times daily At 08:00, 14:00 & 20:00       calcium carbonate (OS-SHERI) 500 MG tablet Take 1 tablet by mouth 2 times daily At 08:00 & 16:00       ferrous sulfate (FEROSUL) 325 (65 Fe) MG tablet Take 325 mg by mouth 2 times daily       melatonin 3 MG tablet Take 1 mg by mouth At Bedtime       metFORMIN (GLUCOPHAGE) 500 MG tablet Take 500 mg by mouth daily (with breakfast)       multivitamin, therapeutic  (THERA-VIT) TABS tablet Take 1 tablet by mouth every evening        Nutritional Supplements (ENSURE ORIGINAL) LIQD Take 1 Can by mouth daily Daily at 1000       PARoxetine (PAXIL) 20 MG tablet Take 20 mg by mouth every morning       polyethylene glycol (MIRALAX) 17 g packet Take 1 packet by mouth daily       QUEtiapine (SEROQUEL) 25 MG tablet Take 25 mg by mouth 2 times daily Daily at 0800 and 1600.       QUEtiapine (SEROQUEL) 25 MG tablet Take 25 mg by mouth 2 times daily as needed (anxiety and agitation)       risperiDONE (RISPERDAL) 1 MG tablet Take 1 mg by mouth daily Daily at 1400       risperiDONE (RISPERDAL) 4 MG tablet Take 4 mg by mouth every evening At 20:00       Allergies   Allergen Reactions     Penicillins Hives     Tolerates cephalosporins     FAMILY HISTORY: There is no reported history of genitourinary carcinoma.  There is no history of urolithiasis.      Social History     Socioeconomic History     Marital status:      Spouse name: Not on file     Number of children: 4     Years of education: Not on file     Highest education level: Not on file   Occupational History     Not on file   Tobacco Use     Smoking status: Never Smoker     Smokeless tobacco: Never Used   Substance and Sexual Activity     Alcohol use: No     Drug use: No     Sexual activity: Not on file   Other Topics Concern     Not on file   Social History Narrative    , 4 children, lives a [facility].  Is DNR/DNI.  (last updated 9/11/2021)      Social Determinants of Health     Financial Resource Strain: Not on file   Food Insecurity: Not on file   Transportation Needs: Not on file   Physical Activity: Not on file   Stress: Not on file   Social Connections: Not on file   Intimate Partner Violence: Not on file   Housing Stability: Not on file       ROS: A comprehensive 10 point ROS was obtained and negative except for that outlined above in the HPI.    PHYSICAL EXAM:   There were no vitals filed for this  visit.  PSYCH: NAD  EYES: EOMI  MOUTH: MMM  NEURO: AAO x3    Admission on 01/28/2022, Discharged on 01/31/2022   Component Date Value Ref Range Status     Color Urine 01/28/2022 Straw  Colorless, Straw, Light Yellow, Yellow Final     Appearance Urine 01/28/2022 Cloudy* Clear Final     Glucose Urine 01/28/2022 Negative  Negative mg/dL Final     Bilirubin Urine 01/28/2022 Negative  Negative Final     Ketones Urine 01/28/2022 Negative  Negative mg/dL Final     Specific Gravity Urine 01/28/2022 1.019  1.003 - 1.035 Final     Blood Urine 01/28/2022 Moderate* Negative Final     pH Urine 01/28/2022 8.0* 5.0 - 7.0 Final     Protein Albumin Urine 01/28/2022 10 * Negative mg/dL Final     Urobilinogen Urine 01/28/2022 Normal  Normal, 2.0 mg/dL Final     Nitrite Urine 01/28/2022 Negative  Negative Final     Leukocyte Esterase Urine 01/28/2022 Large* Negative Final     RBC Urine 01/28/2022 5* <=2 /HPF Final     WBC Urine 01/28/2022 33* <=5 /HPF Final     Hold Specimen 01/28/2022 [facility]   Final     Hold Specimen 01/28/2022 [facility]   Final     Hold Specimen 01/28/2022 [facility]   Final     Culture 01/28/2022 10,000-50,000 CFU/mL Mixture of urogenital melany   Final     Sodium 01/28/2022 118* 133 - 144 mmol/L Final     Potassium 01/28/2022 4.4  3.4 - 5.3 mmol/L Final     Chloride 01/28/2022 81* 94 - 109 mmol/L Final     Carbon Dioxide (CO2) 01/28/2022 28  20 - 32 mmol/L Final     Anion Gap 01/28/2022 9  3 - 14 mmol/L Final     Urea Nitrogen 01/28/2022 27  7 - 30 mg/dL Final     Creatinine 01/28/2022 0.44* 0.52 - 1.04 mg/dL Final     Calcium 01/28/2022 8.2* 8.5 - 10.1 mg/dL Final     Glucose 01/28/2022 133* 70 - 99 mg/dL Final     Alkaline Phosphatase 01/28/2022 45  40 - 150 U/L Final     AST 01/28/2022 28  0 - 45 U/L Final     ALT 01/28/2022 31  0 - 50 U/L Final     Protein Total 01/28/2022 6.9  6.8 - 8.8 g/dL Final     Albumin 01/28/2022 3.5  3.4 - 5.0 g/dL Final     Bilirubin Total 01/28/2022 0.6  0.2 - 1.3 mg/dL Final     GFR Estimate  01/28/2022 >90  >60 mL/min/1.73m2 Final    Effective December 21, 2021 eGFRcr in adults is calculated using the 2021 CKD-EPI creatinine equation which includes age and gender (Brenton et al., [facility], DOI: 10.1056/FKKXex3566935)     WBC Count 01/28/2022 9.3  4.0 - 11.0 10e3/uL Final     RBC Count 01/28/2022 2.63* 3.80 - 5.20 10e6/uL Final     Hemoglobin 01/28/2022 7.8* 11.7 - 15.7 g/dL Final     Hematocrit 01/28/2022 23.3* 35.0 - 47.0 % Final     MCV 01/28/2022 89  78 - 100 fL Final     MCH 01/28/2022 29.7  26.5 - 33.0 pg Final     MCHC 01/28/2022 33.5  31.5 - 36.5 g/dL Final     RDW 01/28/2022 13.2  10.0 - 15.0 % Final     Platelet Count 01/28/2022 273  150 - 450 10e3/uL Final     % Neutrophils 01/28/2022 83  % Final     % Lymphocytes 01/28/2022 9  % Final     % Monocytes 01/28/2022 7  % Final     % Eosinophils 01/28/2022 0  % Final     % Basophils 01/28/2022 0  % Final     % Immature Granulocytes 01/28/2022 1  % Final     NRBCs per 100 WBC 01/28/2022 0  <1 /100 Final     Absolute Neutrophils 01/28/2022 7.8  1.6 - 8.3 10e3/uL Final     Absolute Lymphocytes 01/28/2022 0.8  0.8 - 5.3 10e3/uL Final     Absolute Monocytes 01/28/2022 0.7  0.0 - 1.3 10e3/uL Final     Absolute Eosinophils 01/28/2022 0.0  0.0 - 0.7 10e3/uL Final     Absolute Basophils 01/28/2022 0.0  0.0 - 0.2 10e3/uL Final     Absolute Immature Granulocytes 01/28/2022 0.1  <=0.4 10e3/uL Final     Absolute NRBCs 01/28/2022 0.0  10e3/uL Final     SARS CoV2 PCR 01/28/2022 Positive* Negative Final    POSITIVE: SARS-CoV-2 (COVID-19) RNA detected, presumed positive.     CRP Inflammation 01/28/2022 4.1  0.0 - 8.0 mg/L Final     D-Dimer Quantitative 01/28/2022 1.33* 0.00 - 0.50 ug/mL FEU Final     Osmolality Urine 01/28/2022 182  100-1,200 mmol/kg Final     Creatinine 01/28/2022 0.52  0.52 - 1.04 mg/dL Final     GFR Estimate 01/28/2022 >90  >60 mL/min/1.73m2 Final    Effective December 21, 2021 eGFRcr in adults is calculated using the 2021 CKD-EPI creatinine equation  which includes age and gender (Brenton robledo al., [facility], DOI: 10.1056/ANRVct0879399)     Sodium 01/28/2022 127* 133 - 144 mmol/L Final     Sodium 01/29/2022 131* 133 - 144 mmol/L Final     TSH 01/28/2022 3.54  0.40 - 4.00 mU/L Final     %FENA 01/28/2022 2.0  % Final    Adult:    <1 percent  Indicates prerenal azotemia    >3 percent  Suggests acute tubular necrosis    Neonates: <2.5 percent  Suggest prerenal azotemia    >2.5 percent  Suggest acute tubular necrosis     Sodium Urine mmol/L 01/28/2022 40  mmol/L Final     Creatinine Urine mg/dL 01/28/2022 8  mg/dL Final     Sodium 01/29/2022 131* 133 - 144 mmol/L Final     Creatinine 01/29/2022 0.55  0.52 - 1.04 mg/dL Final     Sodium 01/29/2022 130* 133 - 144 mmol/L Final     Potassium 01/29/2022 3.7  3.4 - 5.3 mmol/L Final     Chloride 01/29/2022 98  94 - 109 mmol/L Final     Carbon Dioxide (CO2) 01/29/2022 28  20 - 32 mmol/L Final     Anion Gap 01/29/2022 4  3 - 14 mmol/L Final     Urea Nitrogen 01/29/2022 16  7 - 30 mg/dL Final     Creatinine 01/29/2022 0.50* 0.52 - 1.04 mg/dL Final     Calcium 01/29/2022 7.9* 8.5 - 10.1 mg/dL Final     Glucose 01/29/2022 85  70 - 99 mg/dL Final     GFR Estimate 01/29/2022 >90  >60 mL/min/1.73m2 Final    Effective December 21, 2021 eGFRcr in adults is calculated using the 2021 CKD-EPI creatinine equation which includes age and gender (Brenton robledo al., [facility], DOI: 10.1056/EQNElg2327045)     WBC Count 01/29/2022 4.8  4.0 - 11.0 10e3/uL Final     RBC Count 01/29/2022 2.67* 3.80 - 5.20 10e6/uL Final     Hemoglobin 01/29/2022 8.0* 11.7 - 15.7 g/dL Final     Hematocrit 01/29/2022 23.6* 35.0 - 47.0 % Final     MCV 01/29/2022 88  78 - 100 fL Final     MCH 01/29/2022 30.0  26.5 - 33.0 pg Final     MCHC 01/29/2022 33.9  31.5 - 36.5 g/dL Final     RDW 01/29/2022 13.9  10.0 - 15.0 % Final     Platelet Count 01/29/2022 279  150 - 450 10e3/uL Final     Sodium 01/29/2022 131* 133 - 144 mmol/L Final     Sodium 01/29/2022 133  133 - 144 mmol/L Final      Sodium 01/30/2022 134  133 - 144 mmol/L Final     Sodium 01/30/2022 136  133 - 144 mmol/L Final     Potassium 01/30/2022 3.7  3.4 - 5.3 mmol/L Final     Chloride 01/30/2022 102  94 - 109 mmol/L Final     Carbon Dioxide (CO2) 01/30/2022 31  20 - 32 mmol/L Final     Anion Gap 01/30/2022 3  3 - 14 mmol/L Final     Urea Nitrogen 01/30/2022 15  7 - 30 mg/dL Final     Creatinine 01/30/2022 0.56  0.52 - 1.04 mg/dL Final     Calcium 01/30/2022 8.4* 8.5 - 10.1 mg/dL Final     Glucose 01/30/2022 97  70 - 99 mg/dL Final     GFR Estimate 01/30/2022 >90  >60 mL/min/1.73m2 Final    Effective December 21, 2021 eGFRcr in adults is calculated using the 2021 CKD-EPI creatinine equation which includes age and gender (Brenton et al., [facility], DOI: 10.[facility]6/[id number])     WBC Count 01/30/2022 4.9  4.0 - 11.0 10e3/uL Final     RBC Count 01/30/2022 2.57* 3.80 - 5.20 10e6/uL Final     Hemoglobin 01/30/2022 7.7* 11.7 - 15.7 g/dL Final     Hematocrit 01/30/2022 23.9* 35.0 - 47.0 % Final     MCV 01/30/2022 93  78 - 100 fL Final     MCH 01/30/2022 30.0  26.5 - 33.0 pg Final     MCHC 01/30/2022 32.2  31.5 - 36.5 g/dL Final     RDW 01/30/2022 14.2  10.0 - 15.0 % Final     Platelet Count 01/30/2022 285  150 - 450 10e3/uL Final     % Neutrophils 01/30/2022 54  % Final     % Lymphocytes 01/30/2022 30  % Final     % Monocytes 01/30/2022 12  % Final     % Eosinophils 01/30/2022 2  % Final     % Basophils 01/30/2022 1  % Final     % Immature Granulocytes 01/30/2022 1  % Final     NRBCs per 100 WBC 01/30/2022 0  <1 /100 Final     Absolute Neutrophils 01/30/2022 2.8  1.6 - 8.3 10e3/uL Final     Absolute Lymphocytes 01/30/2022 1.5  0.8 - 5.3 10e3/uL Final     Absolute Monocytes 01/30/2022 0.6  0.0 - 1.3 10e3/uL Final     Absolute Eosinophils 01/30/2022 0.1  0.0 - 0.7 10e3/uL Final     Absolute Basophils 01/30/2022 0.0  0.0 - 0.2 10e3/uL Final     Absolute Immature Granulocytes 01/30/2022 0.0  <=0.4 10e3/uL Final     Absolute NRBCs 01/30/2022 0.0  10e3/uL  Final     Iron 01/30/2022 33* 35 - 180 ug/dL Final     Iron Binding Capacity 01/30/2022 220* 240 - 430 ug/dL Final     Iron Sat Index 01/30/2022 15  15 - 46 % Final     Ferritin 01/30/2022 27  8 - 252 ng/mL Final     TSH 01/30/2022 2.03  0.40 - 4.00 mU/L Final     Platelet Count 01/31/2022 309  150 - 450 10e3/uL Final     Hemoglobin 01/31/2022 8.2* 11.7 - 15.7 g/dL Final     Sodium 01/31/2022 133  133 - 144 mmol/L Final     Potassium 01/31/2022 3.5  3.4 - 5.3 mmol/L Final     Chloride 01/31/2022 100  94 - 109 mmol/L Final     Carbon Dioxide (CO2) 01/31/2022 27  20 - 32 mmol/L Final     Anion Gap 01/31/2022 6  3 - 14 mmol/L Final     Urea Nitrogen 01/31/2022 13  7 - 30 mg/dL Final     Creatinine 01/31/2022 0.43* 0.52 - 1.04 mg/dL Final     Calcium 01/31/2022 8.6  8.5 - 10.1 mg/dL Final     Glucose 01/31/2022 264* 70 - 99 mg/dL Final     GFR Estimate 01/31/2022 >90  >60 mL/min/1.73m2 Final    Effective December 21, 2021 eGFRcr in adults is calculated using the 2021 CKD-EPI creatinine equation which includes age and gender (Brenton et al., [facility], DOI: 10.1056/NJRLop2601647)       IMAGING:   EXAM: CT ABDOMEN PELVIS W CONTRAST  LOCATION: [facility]  DATE/TIME: 11/1/2021 5:52 PM     INDICATION: Right lower quadrant pain.  COMPARISON: CT urogram performed 08/03/2021.  TECHNIQUE: CT scan of the abdomen and pelvis was performed following injection of IV contrast. Multiplanar reformats were obtained. Dose reduction techniques were used.  CONTRAST: 57 mL Isovue-370.     FINDINGS:   LOWER CHEST: The visualized lung bases are clear. There is a moderate to large hiatal hernia, partially included on this exam. Small amount of pericardial fluid is noted.     HEPATOBILIARY: Indeterminate low-density lesion in the posterior segment of the right hepatic lobe inferiorly measures 1.4 cm, unchanged. No other focal hepatic lesions are identified. The gallbladder appears mildly contracted.     PANCREAS:  Normal.     SPLEEN: Normal.     ADRENAL GLANDS: Normal.     KIDNEYS/BLADDER: Unremarkable. No hydronephrosis.     BOWEL: The stomach is mildly distended with gas and fluid. A small right inguinal hernia contains a knuckle of mildly prominent fluid-filled small bowel (series 6 image 151). No evidence for associated bowel obstruction. No evidence for colitis or   diverticulitis. The appendix is not visualized. Evaluation of the bowel is limited due to paucity of intra-abdominal fat.     LYMPH NODES: No lymphadenopathy.     VASCULATURE: Mild atherosclerotic aortoiliac calcification.     PELVIC ORGANS: A pessary device is again noted. Otherwise unremarkable.     MUSCULOSKELETAL: Lumbar curve, convex left. Degenerative changes are noted throughout the visualized thoracolumbar spine. Displaced fractures of the right lower ribs are again noted and are likely subacute or chronic.                                                                      IMPRESSION:   1.  A small right inguinal hernia contains a knuckle of mildly prominent fluid-filled small bowel, unchanged. No other evidence for bowel obstruction. Please clinically correlate.  2.  The stomach is mildly distended with gas and fluid.  3.  Moderate to large hiatal hernia is partially included on this exam.  4.  Indeterminate 1.4 cm low-density lesion in the right hepatic lobe is unchanged. This could represent a complex cyst or hemangioma, but is not definitively characterized. Consider liver MRI for further characterization.             ASSESSMENT and PLAN:    76 year old female with micro hematuria.  The differential diagnosis at this point includes stone disease, infection, vaginal contaminant, urothelial malignancy, renal disorder versus another yet unknown diagnosis.    At this time, recommend proceeding with comprehensive hematuria evaluation to include:  - Urine cytology to look for cells concerning for malignancy.  - CT urogram done.   - Cystoscopy with the  first available urologist to evaluate the interior of the bladder. Follow up for hematuria as recommended by urologist performing cystoscopic evaluation.    Thank you for allowing me to participate in Ms. James's care. I will keep you updated of her progress, but please do not hesitate to contact me with any questions.    Joana Nicole PA-C  [facility]  22 minutes spent on the date of the encounter doing chart review, review of outside records, review of test results, interpretation of tests, patient visit and documentation.

## 2022-02-15 NOTE — PATIENT INSTRUCTIONS
- Urine cytology to look for abnormal cells.  - CT scan of the kidneys (done in Nov).   - Cystoscopy with the  urologist to evaluate the interior of the bladder. Follow up as recommended by the urologist.    CYSTOSCOPY    What is a Cystoscopy?  This is a procedure done to check for problems inside the bladder.  Problems may include polyps (growths), tumors, inflammation (swelling and redness) and other concerns.    The Urologist inserts a thin tube (called a cystoscope) into the bladder.  The tube is about the size of a pencil.  We will give you numbing medicine to reduce the pain or discomfort you may feel.    The Urologist will be able to see inside the bladder by filling the bladder with water.  The water makes it easier to see any problems that may be present. You will have a sense to need to urinate and this is normal.       How should I get ready for the exam?  Nothing to do to prepare. You may eat normally the day of the exam. There is no sedation, so you may drive yourself to and from if you can drive.       Please tell your doctor if:  - You have a history of urinary tract infections.  - You know that you have a tumor in your bladder.  - You have bleeding problems.  - You have any allergies.  - You are or may be pregnant.      What happens after the exam?  You may go back to your normal diet and activity as you feel ready.    For the next two days after the exam, you may notice:  - Some blood in your urine.  - Some burning when you urinate (use the toilet).  - An urge to urinate more often.  - Bladder spasms.    These are normal after the procedure. They should go away on their own after a day or two.      - You can help to relieve the above listed symptoms by:  - Drinking 6 to 8 large glasses of water each day (includes drinks at meals).  This will help clear the urine.  - Take warm baths to relieve pain and bladder spasms.  Do not add anything to the bath water.  - You may take Tylenol (acetaminophen)  per label instructions for discomfort.

## 2022-02-15 NOTE — PROGRESS NOTES
CC: Hematuria.    HPI: It is a pleasure to see Ms. Angelica James, a pleasant 76 year old female, asked to be seen in consultation by Dr. Abdalla for evaluation of micro hematuria (neg UCs).     The patient denies any gross hematuria.  Ms. James voids without difficulty.  She currently denies any dysuria, pyuria, hesitancy, intermittency, feelings of incomplete emptying, or any recent history of urinary tract infections or stones.  CT w/ done in Nov 2021.     Hematuria Risk Factors:  Age >40: Yes     Smoking history: nonsmoker  Occupational exposure to chemicals or dyes (ie, benzenes, aromatic amines): no  History of urologic disorder or disease: no  History of irritative voiding symptoms: no  History of urinary tract infection: no  Analgesic abuse: no  History of pelvic irradiation: no    Past Medical History:   Diagnosis Date     Dementia (H)      Hypertension      Iron deficiency anemia      PUD (peptic ulcer disease)      Schizophrenia (H)      Type 2 diabetes mellitus (H)      Past Surgical History:   Procedure Laterality Date     colonoscopy       UPPER GI ENDOSCOPY       Current Outpatient Medications   Medication Sig Dispense Refill     acetaminophen (TYLENOL) 500 MG tablet Take 1,000 mg by mouth 3 times daily At 08:00, 14:00 & 20:00       calcium carbonate (OS-SHERI) 500 MG tablet Take 1 tablet by mouth 2 times daily At 08:00 & 16:00       ferrous sulfate (FEROSUL) 325 (65 Fe) MG tablet Take 325 mg by mouth 2 times daily       melatonin 3 MG tablet Take 1 mg by mouth At Bedtime       metFORMIN (GLUCOPHAGE) 500 MG tablet Take 500 mg by mouth daily (with breakfast)       multivitamin, therapeutic (THERA-VIT) TABS tablet Take 1 tablet by mouth every evening        Nutritional Supplements (ENSURE ORIGINAL) LIQD Take 1 Can by mouth daily Daily at 1000       PARoxetine (PAXIL) 20 MG tablet Take 20 mg by mouth every morning       polyethylene glycol (MIRALAX) 17 g packet Take 1 packet by mouth daily       QUEtiapine  (SEROQUEL) 25 MG tablet Take 25 mg by mouth 2 times daily Daily at 0800 and 1600.       QUEtiapine (SEROQUEL) 25 MG tablet Take 25 mg by mouth 2 times daily as needed (anxiety and agitation)       risperiDONE (RISPERDAL) 1 MG tablet Take 1 mg by mouth daily Daily at 1400       risperiDONE (RISPERDAL) 4 MG tablet Take 4 mg by mouth every evening At 20:00       Allergies   Allergen Reactions     Penicillins Hives     Tolerates cephalosporins     FAMILY HISTORY: There is no reported history of genitourinary carcinoma.  There is no history of urolithiasis.      Social History     Socioeconomic History     Marital status:      Spouse name: Not on file     Number of children: 4     Years of education: Not on file     Highest education level: Not on file   Occupational History     Not on file   Tobacco Use     Smoking status: Never Smoker     Smokeless tobacco: Never Used   Substance and Sexual Activity     Alcohol use: No     Drug use: No     Sexual activity: Not on file   Other Topics Concern     Not on file   Social History Narrative    , 4 children, lives a [facility].  Is DNR/DNI.  (last updated 9/11/2021)      Social Determinants of Health     Financial Resource Strain: Not on file   Food Insecurity: Not on file   Transportation Needs: Not on file   Physical Activity: Not on file   Stress: Not on file   Social Connections: Not on file   Intimate Partner Violence: Not on file   Housing Stability: Not on file       ROS: A comprehensive 10 point ROS was obtained and negative except for that outlined above in the HPI.    PHYSICAL EXAM:   There were no vitals filed for this visit.  PSYCH: NAD  EYES: EOMI  MOUTH: MMM  NEURO: AAO x3    Admission on 01/28/2022, Discharged on 01/31/2022   Component Date Value Ref Range Status     Color Urine 01/28/2022 Straw  Colorless, Straw, Light Yellow, Yellow Final     Appearance Urine 01/28/2022 Cloudy* Clear Final     Glucose Urine 01/28/2022 Negative   Negative mg/dL Final     Bilirubin Urine 01/28/2022 Negative  Negative Final     Ketones Urine 01/28/2022 Negative  Negative mg/dL Final     Specific Gravity Urine 01/28/2022 1.019  1.003 - 1.035 Final     Blood Urine 01/28/2022 Moderate* Negative Final     pH Urine 01/28/2022 8.0* 5.0 - 7.0 Final     Protein Albumin Urine 01/28/2022 10 * Negative mg/dL Final     Urobilinogen Urine 01/28/2022 Normal  Normal, 2.0 mg/dL Final     Nitrite Urine 01/28/2022 Negative  Negative Final     Leukocyte Esterase Urine 01/28/2022 Large* Negative Final     RBC Urine 01/28/2022 5* <=2 /HPF Final     WBC Urine 01/28/2022 33* <=5 /HPF Final     Hold Specimen 01/28/2022 [facility]   Final     Hold Specimen 01/28/2022 [facility]   Final     Hold Specimen 01/28/2022 [facility]   Final     Culture 01/28/2022 10,000-50,000 CFU/mL Mixture of urogenital melany   Final     Sodium 01/28/2022 118* 133 - 144 mmol/L Final     Potassium 01/28/2022 4.4  3.4 - 5.3 mmol/L Final     Chloride 01/28/2022 81* 94 - 109 mmol/L Final     Carbon Dioxide (CO2) 01/28/2022 28  20 - 32 mmol/L Final     Anion Gap 01/28/2022 9  3 - 14 mmol/L Final     Urea Nitrogen 01/28/2022 27  7 - 30 mg/dL Final     Creatinine 01/28/2022 0.44* 0.52 - 1.04 mg/dL Final     Calcium 01/28/2022 8.2* 8.5 - 10.1 mg/dL Final     Glucose 01/28/2022 133* 70 - 99 mg/dL Final     Alkaline Phosphatase 01/28/2022 45  40 - 150 U/L Final     AST 01/28/2022 28  0 - 45 U/L Final     ALT 01/28/2022 31  0 - 50 U/L Final     Protein Total 01/28/2022 6.9  6.8 - 8.8 g/dL Final     Albumin 01/28/2022 3.5  3.4 - 5.0 g/dL Final     Bilirubin Total 01/28/2022 0.6  0.2 - 1.3 mg/dL Final     GFR Estimate 01/28/2022 >90  >60 mL/min/1.73m2 Final    Effective December 21, 2021 eGFRcr in adults is calculated using the 2021 CKD-EPI creatinine equation which includes age and gender (Brenton et al., NEJ, DOI: 10.1056/SPCNpg9044740)     WBC Count 01/28/2022 9.3  4.0 - 11.0 10e3/uL Final     RBC Count 01/28/2022 2.63* 3.80 - 5.20  10e6/uL Final     Hemoglobin 01/28/2022 7.8* 11.7 - 15.7 g/dL Final     Hematocrit 01/28/2022 23.3* 35.0 - 47.0 % Final     MCV 01/28/2022 89  78 - 100 fL Final     MCH 01/28/2022 29.7  26.5 - 33.0 pg Final     MCHC 01/28/2022 33.5  31.5 - 36.5 g/dL Final     RDW 01/28/2022 13.2  10.0 - 15.0 % Final     Platelet Count 01/28/2022 273  150 - 450 10e3/uL Final     % Neutrophils 01/28/2022 83  % Final     % Lymphocytes 01/28/2022 9  % Final     % Monocytes 01/28/2022 7  % Final     % Eosinophils 01/28/2022 0  % Final     % Basophils 01/28/2022 0  % Final     % Immature Granulocytes 01/28/2022 1  % Final     NRBCs per 100 WBC 01/28/2022 0  <1 /100 Final     Absolute Neutrophils 01/28/2022 7.8  1.6 - 8.3 10e3/uL Final     Absolute Lymphocytes 01/28/2022 0.8  0.8 - 5.3 10e3/uL Final     Absolute Monocytes 01/28/2022 0.7  0.0 - 1.3 10e3/uL Final     Absolute Eosinophils 01/28/2022 0.0  0.0 - 0.7 10e3/uL Final     Absolute Basophils 01/28/2022 0.0  0.0 - 0.2 10e3/uL Final     Absolute Immature Granulocytes 01/28/2022 0.1  <=0.4 10e3/uL Final     Absolute NRBCs 01/28/2022 0.0  10e3/uL Final     SARS CoV2 PCR 01/28/2022 Positive* Negative Final    POSITIVE: SARS-CoV-2 (COVID-19) RNA detected, presumed positive.     CRP Inflammation 01/28/2022 4.1  0.0 - 8.0 mg/L Final     D-Dimer Quantitative 01/28/2022 1.33* 0.00 - 0.50 ug/mL FEU Final     Osmolality Urine 01/28/2022 182  100-1,200 mmol/kg Final     Creatinine 01/28/2022 0.52  0.52 - 1.04 mg/dL Final     GFR Estimate 01/28/2022 >90  >60 mL/min/1.73m2 Final    Effective December 21, 2021 eGFRcr in adults is calculated using the 2021 CKD-EPI creatinine equation which includes age and gender (Brenton et al., NEJM, DOI: 10.1056/AIJPnv1227637)     Sodium 01/28/2022 127* 133 - 144 mmol/L Final     Sodium 01/29/2022 131* 133 - 144 mmol/L Final     TSH 01/28/2022 3.54  0.40 - 4.00 mU/L Final     %FENA 01/28/2022 2.0  % Final    Adult:    <1 percent  Indicates prerenal azotemia    >3  percent  Suggests acute tubular necrosis    Neonates: <2.5 percent  Suggest prerenal azotemia    >2.5 percent  Suggest acute tubular necrosis     Sodium Urine mmol/L 01/28/2022 40  mmol/L Final     Creatinine Urine mg/dL 01/28/2022 8  mg/dL Final     Sodium 01/29/2022 131* 133 - 144 mmol/L Final     Creatinine 01/29/2022 0.55  0.52 - 1.04 mg/dL Final     Sodium 01/29/2022 130* 133 - 144 mmol/L Final     Potassium 01/29/2022 3.7  3.4 - 5.3 mmol/L Final     Chloride 01/29/2022 98  94 - 109 mmol/L Final     Carbon Dioxide (CO2) 01/29/2022 28  20 - 32 mmol/L Final     Anion Gap 01/29/2022 4  3 - 14 mmol/L Final     Urea Nitrogen 01/29/2022 16  7 - 30 mg/dL Final     Creatinine 01/29/2022 0.50* 0.52 - 1.04 mg/dL Final     Calcium 01/29/2022 7.9* 8.5 - 10.1 mg/dL Final     Glucose 01/29/2022 85  70 - 99 mg/dL Final     GFR Estimate 01/29/2022 >90  >60 mL/min/1.73m2 Final    Effective December 21, 2021 eGFRcr in adults is calculated using the 2021 CKD-EPI creatinine equation which includes age and gender (Brenton et al., [facility], DOI: 10.1056/DAQJyv8183443)     WBC Count 01/29/2022 4.8  4.0 - 11.0 10e3/uL Final     RBC Count 01/29/2022 2.67* 3.80 - 5.20 10e6/uL Final     Hemoglobin 01/29/2022 8.0* 11.7 - 15.7 g/dL Final     Hematocrit 01/29/2022 23.6* 35.0 - 47.0 % Final     MCV 01/29/2022 88  78 - 100 fL Final     MCH 01/29/2022 30.0  26.5 - 33.0 pg Final     MCHC 01/29/2022 33.9  31.5 - 36.5 g/dL Final     RDW 01/29/2022 13.9  10.0 - 15.0 % Final     Platelet Count 01/29/2022 279  150 - 450 10e3/uL Final     Sodium 01/29/2022 131* 133 - 144 mmol/L Final     Sodium 01/29/2022 133  133 - 144 mmol/L Final     Sodium 01/30/2022 134  133 - 144 mmol/L Final     Sodium 01/30/2022 136  133 - 144 mmol/L Final     Potassium 01/30/2022 3.7  3.4 - 5.3 mmol/L Final     Chloride 01/30/2022 102  94 - 109 mmol/L Final     Carbon Dioxide (CO2) 01/30/2022 31  20 - 32 mmol/L Final     Anion Gap 01/30/2022 3  3 - 14 mmol/L Final     Urea  Nitrogen 01/30/2022 15  7 - 30 mg/dL Final     Creatinine 01/30/2022 0.56  0.52 - 1.04 mg/dL Final     Calcium 01/30/2022 8.4* 8.5 - 10.1 mg/dL Final     Glucose 01/30/2022 97  70 - 99 mg/dL Final     GFR Estimate 01/30/2022 >90  >60 mL/min/1.73m2 Final    Effective December 21, 2021 eGFRcr in adults is calculated using the 2021 CKD-EPI creatinine equation which includes age and gender (Brenton et al., [facility], DOI: 10.1056/[id number])     WBC Count 01/30/2022 4.9  4.0 - 11.0 10e3/uL Final     RBC Count 01/30/2022 2.57* 3.80 - 5.20 10e6/uL Final     Hemoglobin 01/30/2022 7.7* 11.7 - 15.7 g/dL Final     Hematocrit 01/30/2022 23.9* 35.0 - 47.0 % Final     MCV 01/30/2022 93  78 - 100 fL Final     MCH 01/30/2022 30.0  26.5 - 33.0 pg Final     MCHC 01/30/2022 32.2  31.5 - 36.5 g/dL Final     RDW 01/30/2022 14.2  10.0 - 15.0 % Final     Platelet Count 01/30/2022 285  150 - 450 10e3/uL Final     % Neutrophils 01/30/2022 54  % Final     % Lymphocytes 01/30/2022 30  % Final     % Monocytes 01/30/2022 12  % Final     % Eosinophils 01/30/2022 2  % Final     % Basophils 01/30/2022 1  % Final     % Immature Granulocytes 01/30/2022 1  % Final     NRBCs per 100 WBC 01/30/2022 0  <1 /100 Final     Absolute Neutrophils 01/30/2022 2.8  1.6 - 8.3 10e3/uL Final     Absolute Lymphocytes 01/30/2022 1.5  0.8 - 5.3 10e3/uL Final     Absolute Monocytes 01/30/2022 0.6  0.0 - 1.3 10e3/uL Final     Absolute Eosinophils 01/30/2022 0.1  0.0 - 0.7 10e3/uL Final     Absolute Basophils 01/30/2022 0.0  0.0 - 0.2 10e3/uL Final     Absolute Immature Granulocytes 01/30/2022 0.0  <=0.4 10e3/uL Final     Absolute NRBCs 01/30/2022 0.0  10e3/uL Final     Iron 01/30/2022 33* 35 - 180 ug/dL Final     Iron Binding Capacity 01/30/2022 220* 240 - 430 ug/dL Final     Iron Sat Index 01/30/2022 15  15 - 46 % Final     Ferritin 01/30/2022 27  8 - 252 ng/mL Final     TSH 01/30/2022 2.03  0.40 - 4.00 mU/L Final     Platelet Count 01/31/2022 309  150 - 450 10e3/uL Final      Hemoglobin 01/31/2022 8.2* 11.7 - 15.7 g/dL Final     Sodium 01/31/2022 133  133 - 144 mmol/L Final     Potassium 01/31/2022 3.5  3.4 - 5.3 mmol/L Final     Chloride 01/31/2022 100  94 - 109 mmol/L Final     Carbon Dioxide (CO2) 01/31/2022 27  20 - 32 mmol/L Final     Anion Gap 01/31/2022 6  3 - 14 mmol/L Final     Urea Nitrogen 01/31/2022 13  7 - 30 mg/dL Final     Creatinine 01/31/2022 0.43* 0.52 - 1.04 mg/dL Final     Calcium 01/31/2022 8.6  8.5 - 10.1 mg/dL Final     Glucose 01/31/2022 264* 70 - 99 mg/dL Final     GFR Estimate 01/31/2022 >90  >60 mL/min/1.73m2 Final    Effective December 21, 2021 eGFRcr in adults is calculated using the 2021 CKD-EPI creatinine equation which includes age and gender (Brenton et al., NEJ, DOI: 10.1056/[id number])       IMAGING:   EXAM: CT ABDOMEN PELVIS W CONTRAST  LOCATION: [facility]  DATE/TIME: 11/1/2021 5:52 PM     INDICATION: Right lower quadrant pain.  COMPARISON: CT urogram performed 08/03/2021.  TECHNIQUE: CT scan of the abdomen and pelvis was performed following injection of IV contrast. Multiplanar reformats were obtained. Dose reduction techniques were used.  CONTRAST: 57 mL Isovue-370.     FINDINGS:   LOWER CHEST: The visualized lung bases are clear. There is a moderate to large hiatal hernia, partially included on this exam. Small amount of pericardial fluid is noted.     HEPATOBILIARY: Indeterminate low-density lesion in the posterior segment of the right hepatic lobe inferiorly measures 1.4 cm, unchanged. No other focal hepatic lesions are identified. The gallbladder appears mildly contracted.     PANCREAS: Normal.     SPLEEN: Normal.     ADRENAL GLANDS: Normal.     KIDNEYS/BLADDER: Unremarkable. No hydronephrosis.     BOWEL: The stomach is mildly distended with gas and fluid. A small right inguinal hernia contains a knuckle of mildly prominent fluid-filled small bowel (series 6 image 151). No evidence for associated bowel  obstruction. No evidence for colitis or   diverticulitis. The appendix is not visualized. Evaluation of the bowel is limited due to paucity of intra-abdominal fat.     LYMPH NODES: No lymphadenopathy.     VASCULATURE: Mild atherosclerotic aortoiliac calcification.     PELVIC ORGANS: A pessary device is again noted. Otherwise unremarkable.     MUSCULOSKELETAL: Lumbar curve, convex left. Degenerative changes are noted throughout the visualized thoracolumbar spine. Displaced fractures of the right lower ribs are again noted and are likely subacute or chronic.                                                                      IMPRESSION:   1.  A small right inguinal hernia contains a knuckle of mildly prominent fluid-filled small bowel, unchanged. No other evidence for bowel obstruction. Please clinically correlate.  2.  The stomach is mildly distended with gas and fluid.  3.  Moderate to large hiatal hernia is partially included on this exam.  4.  Indeterminate 1.4 cm low-density lesion in the right hepatic lobe is unchanged. This could represent a complex cyst or hemangioma, but is not definitively characterized. Consider liver MRI for further characterization.             ASSESSMENT and PLAN:    76 year old female with micro hematuria.  The differential diagnosis at this point includes stone disease, infection, vaginal contaminant, urothelial malignancy, renal disorder versus another yet unknown diagnosis.    At this time, recommend proceeding with comprehensive hematuria evaluation to include:  - Urine cytology to look for cells concerning for malignancy.  - CT urogram done.   - Cystoscopy with the first available urologist to evaluate the interior of the bladder. Follow up for hematuria as recommended by urologist performing cystoscopic evaluation.    Thank you for allowing me to participate in Ms. James's care. I will keep you updated of her progress, but please do not hesitate to contact me with any  questions.    Joana Nicole PA-C  [facility]  22 minutes spent on the date of the encounter doing chart review, review of outside records, review of test results, interpretation of tests, patient visit and documentation.

## 2022-02-16 LAB
PATH REPORT.COMMENTS IMP SPEC: NORMAL
PATH REPORT.FINAL DX SPEC: NORMAL
PATH REPORT.GROSS SPEC: NORMAL
PATH REPORT.MICROSCOPIC SPEC OTHER STN: NORMAL
PATH REPORT.RELEVANT HX SPEC: NORMAL

## 2022-04-04 ENCOUNTER — OFFICE VISIT (OUTPATIENT)
Dept: UROLOGY | Facility: CLINIC | Age: 77
End: 2022-04-04
Payer: COMMERCIAL

## 2022-04-04 VITALS
DIASTOLIC BLOOD PRESSURE: 70 MMHG | SYSTOLIC BLOOD PRESSURE: 115 MMHG | BODY MASS INDEX: 17.48 KG/M2 | HEIGHT: 62 IN | WEIGHT: 95 LBS

## 2022-04-04 DIAGNOSIS — N35.92 STRICTURE OF FEMALE URETHRA, UNSPECIFIED STRICTURE TYPE: ICD-10-CM

## 2022-04-04 DIAGNOSIS — R31.0 GROSS HEMATURIA: ICD-10-CM

## 2022-04-04 DIAGNOSIS — R31.29 MICROSCOPIC HEMATURIA: Primary | ICD-10-CM

## 2022-04-04 DIAGNOSIS — N81.9 FEMALE GENITAL PROLAPSE, UNSPECIFIED TYPE: ICD-10-CM

## 2022-04-04 LAB
ALBUMIN UR-MCNC: NEGATIVE MG/DL
APPEARANCE UR: CLEAR
BILIRUB UR QL STRIP: NEGATIVE
COLOR UR AUTO: YELLOW
GLUCOSE UR STRIP-MCNC: NEGATIVE MG/DL
HGB UR QL STRIP: ABNORMAL
KETONES UR STRIP-MCNC: NEGATIVE MG/DL
LEUKOCYTE ESTERASE UR QL STRIP: ABNORMAL
NITRATE UR QL: NEGATIVE
PH UR STRIP: 5.5 [PH] (ref 5–7)
SP GR UR STRIP: 1.01 (ref 1–1.03)
UROBILINOGEN UR STRIP-ACNC: 0.2 E.U./DL

## 2022-04-04 PROCEDURE — 99214 OFFICE O/P EST MOD 30 MIN: CPT | Performed by: STUDENT IN AN ORGANIZED HEALTH CARE EDUCATION/TRAINING PROGRAM

## 2022-04-04 PROCEDURE — 81003 URINALYSIS AUTO W/O SCOPE: CPT | Mod: QW | Performed by: STUDENT IN AN ORGANIZED HEALTH CARE EDUCATION/TRAINING PROGRAM

## 2022-04-04 ASSESSMENT — PAIN SCALES - GENERAL: PAINLEVEL: EXTREME PAIN (8)

## 2022-04-04 NOTE — LETTER
"4/4/2022       RE: Angelica James  [address]  [place] [address]     Dear Colleague,    Thank you for referring your patient, Angelica James, to the [facility] [place] at [facility]. Please see a copy of my visit note below.    CHIEF COMPLAINT   Angelica James who is a 76 year old female returns today for follow-up of microhematuria.      HPI   Angelica James is a 76 year old female who presents with a history of microhematuria. Seen by Joana Nicole PA-C in initial consult see 2/15/2022 note    Here with daughter    Per daughter her assisted living facility has noted blood in her urine.    PHYSICAL EXAM  Patient is a 76 year old  female   Vitals: Blood pressure 115/70, height 1.575 m (5' 2\"), weight 43.1 kg (95 lb).  Body mass index is 17.38 kg/m .  General Appearance Adult:   Alert, no acute distress, oriented  HENT: throat/mouth:normal, good dentition  Lungs: no respiratory distress, or pursed lip breathing  Heart: No obvious jugular venous distension present  Abdomen: soft, nontender, no organomegaly or masses  Musculoskeltal: extremities normal, no peripheral edema  Skin: no suspicious lesions or rashes  Neuro: Alert, oriented, speech and mentation normal  Psych: affect and mood normal  Gait: Normal  : atrophic vaginitis  Pelvic organ prolapse    All pertinent imaging reviewed:    CT abd/pelvis 11/1/2021  IMPRESSION:   1.  A small right inguinal hernia contains a knuckle of mildly prominent fluid-filled small bowel, unchanged. No other evidence for bowel obstruction. Please clinically correlate.  2.  The stomach is mildly distended with gas and fluid.  3.  Moderate to large hiatal hernia is partially included on this exam.  4.  Indeterminate 1.4 cm low-density lesion in the right hepatic lobe is unchanged. This could represent a complex cyst or hemangioma, but is not definitively characterized. Consider liver MRI for " further characterization.      Reviewed images. No concerning renal masses    PRE-PROCEDURE DIAGNOSIS: hematuria    POST-PROCEDURE DIAGNOSIS: hematuria, urethral stenosis    PROCEDURE: attempted cystoscopy    DESCRIPTION OF PROCEDURE: After informed consent was obtained, the patient was brought to the procedure room where she was placed in the lithotomy position with all pressure points well padded.  The vulva was prepped and draped in sterile fashion.     She has stage III pelvic organ prolapse, with significant posterior bulge    Her urethral meatus is stenotic. I cannot pass the flexible cystoscope    The flexible cystoscope was removed and the findings were described to the patient.     ASSESSMENT AND PLAN: 76 year old female with h/o microhematuria (and gross hematuria per daughter), found to have stenotic urethral meatus on attempted office cystoscopy, tolerated this poorly. No obvious cause of hematuria on CT w/ IV contrast from 11/1/2021 but this was not a dedicated urogram. Needs CT urogram and return for cystoscopy, urethral dilation, possible bladder biopsy pending introperative findings. She has significant pelvic organ prolapse for which she has been treated with pessary       Riley Roach MD   [facility]  [phone] clinic phone

## 2022-04-04 NOTE — PROGRESS NOTES
"CHIEF COMPLAINT   Angelica James who is a 76 year old female returns today for follow-up of microhematuria.      HPI   Angelica James is a 76 year old female who presents with a history of microhematuria. Seen by Joana Nicole PA-C in initial consult see 2/15/2022 note    Here with daughter    Per daughter her assisted living facility has noted blood in her urine.    PHYSICAL EXAM  Patient is a 76 year old  female   Vitals: Blood pressure 115/70, height 1.575 m (5' 2\"), weight 43.1 kg (95 lb).  Body mass index is 17.38 kg/m .  General Appearance Adult:   Alert, no acute distress, oriented  HENT: throat/mouth:normal, good dentition  Lungs: no respiratory distress, or pursed lip breathing  Heart: No obvious jugular venous distension present  Abdomen: soft, nontender, no organomegaly or masses  Musculoskeltal: extremities normal, no peripheral edema  Skin: no suspicious lesions or rashes  Neuro: Alert, oriented, speech and mentation normal  Psych: affect and mood normal  Gait: Normal  : atrophic vaginitis  Pelvic organ prolapse    All pertinent imaging reviewed:    CT abd/pelvis 11/1/2021  IMPRESSION:   1.  A small right inguinal hernia contains a knuckle of mildly prominent fluid-filled small bowel, unchanged. No other evidence for bowel obstruction. Please clinically correlate.  2.  The stomach is mildly distended with gas and fluid.  3.  Moderate to large hiatal hernia is partially included on this exam.  4.  Indeterminate 1.4 cm low-density lesion in the right hepatic lobe is unchanged. This could represent a complex cyst or hemangioma, but is not definitively characterized. Consider liver MRI for further characterization.      Reviewed images. No concerning renal masses    PRE-PROCEDURE DIAGNOSIS: hematuria    POST-PROCEDURE DIAGNOSIS: hematuria, urethral stenosis    PROCEDURE: attempted cystoscopy    DESCRIPTION OF PROCEDURE: After informed consent was obtained, the patient was brought to the procedure " room where she was placed in the lithotomy position with all pressure points well padded.  The vulva was prepped and draped in sterile fashion.     She has stage III pelvic organ prolapse, with significant posterior bulge    Her urethral meatus is stenotic. I cannot pass the flexible cystoscope    The flexible cystoscope was removed and the findings were described to the patient.     ASSESSMENT AND PLAN: 76 year old female with h/o microhematuria (and gross hematuria per daughter), found to have stenotic urethral meatus on attempted office cystoscopy, tolerated this poorly. No obvious cause of hematuria on CT w/ IV contrast from 11/1/2021 but this was not a dedicated urogram. Needs CT urogram and return for cystoscopy, urethral dilation, possible bladder biopsy pending introperative findings. She has significant pelvic organ prolapse for which she has been treated with pessary       Riley Roach MD   [facility]  [phone] clinic phone

## 2022-04-04 NOTE — NURSING NOTE
Chief Complaint   Patient presents with     Microscopic hematuria     Here for a in office cystoscopy      Prior to the start of the procedure and with procedural staff participation, I verbally confirmed the patient s identity using two indicators, relevant allergies, that the procedure was appropriate and matched the consent or emergent situation, and that the correct equipment/implants were available. Immediately prior to starting the procedure I conducted the Time Out with the procedural staff and re-confirmed the patient s name, procedure, and site/side. I have wiped the patient off with the povidone-Iodine solution, draped them,  used Lidocaine hydrochloride jelly, and instilled sterile water into the bladder. (The Joint Commission universal protocol was followed.)  Yes    Sedation (Moderate or Deep): None    Clarissa Gabriel

## 2022-04-04 NOTE — PATIENT INSTRUCTIONS

## 2022-04-05 ENCOUNTER — HOSPITAL ENCOUNTER (OUTPATIENT)
Facility: CLINIC | Age: 77
End: 2022-04-05
Attending: STUDENT IN AN ORGANIZED HEALTH CARE EDUCATION/TRAINING PROGRAM | Admitting: STUDENT IN AN ORGANIZED HEALTH CARE EDUCATION/TRAINING PROGRAM
Payer: COMMERCIAL

## 2022-04-19 ENCOUNTER — PREP FOR PROCEDURE (OUTPATIENT)
Dept: UROLOGY | Facility: CLINIC | Age: 77
End: 2022-04-19
Payer: COMMERCIAL

## 2022-04-19 DIAGNOSIS — R31.0 GROSS HEMATURIA: Primary | ICD-10-CM

## 2022-04-19 DIAGNOSIS — N35.92 STRICTURE OF FEMALE URETHRA, UNSPECIFIED STRICTURE TYPE: ICD-10-CM

## 2022-04-21 DIAGNOSIS — Z11.59 ENCOUNTER FOR SCREENING FOR OTHER VIRAL DISEASES: Primary | ICD-10-CM

## 2022-04-22 ENCOUNTER — TRANSFERRED RECORDS (OUTPATIENT)
Dept: HEALTH INFORMATION MANAGEMENT | Facility: CLINIC | Age: 77
End: 2022-04-22
Payer: COMMERCIAL

## 2022-04-22 LAB
ALT SERPL-CCNC: 14 U/L (ref 0–55)
AST SERPL-CCNC: 14 U/L (ref 10–40)
CREATININE (EXTERNAL): 0.63 MG/DL (ref 0.55–1.02)
GFR ESTIMATED (EXTERNAL): >60 ML/MIN/1.73M2
GLUCOSE (EXTERNAL): 89 MG/DL (ref 71–100)
HBA1C MFR BLD: 5.7 %
POTASSIUM (EXTERNAL): 4 MMOL/L (ref 3.5–5.1)
TSH SERPL-ACNC: 1.6 UIU/ML (ref 0.3–4.5)

## 2022-04-27 ENCOUNTER — TRANSFERRED RECORDS (OUTPATIENT)
Dept: HEALTH INFORMATION MANAGEMENT | Facility: CLINIC | Age: 77
End: 2022-04-27
Payer: COMMERCIAL

## 2022-05-01 ENCOUNTER — ANESTHESIA EVENT (OUTPATIENT)
Dept: SURGERY | Facility: CLINIC | Age: 77
End: 2022-05-01
Payer: COMMERCIAL

## 2022-05-01 ASSESSMENT — ENCOUNTER SYMPTOMS: DYSRHYTHMIAS: 1

## 2022-05-02 ENCOUNTER — ANESTHESIA (OUTPATIENT)
Dept: SURGERY | Facility: CLINIC | Age: 77
End: 2022-05-02
Payer: COMMERCIAL

## 2022-05-02 ENCOUNTER — HOSPITAL ENCOUNTER (OUTPATIENT)
Facility: CLINIC | Age: 77
Discharge: OTHER INSTITUTION | End: 2022-05-02
Attending: STUDENT IN AN ORGANIZED HEALTH CARE EDUCATION/TRAINING PROGRAM | Admitting: STUDENT IN AN ORGANIZED HEALTH CARE EDUCATION/TRAINING PROGRAM
Payer: COMMERCIAL

## 2022-05-02 VITALS
TEMPERATURE: 98 F | DIASTOLIC BLOOD PRESSURE: 95 MMHG | SYSTOLIC BLOOD PRESSURE: 150 MMHG | HEIGHT: 61 IN | WEIGHT: 96 LBS | RESPIRATION RATE: 16 BRPM | HEART RATE: 65 BPM | BODY MASS INDEX: 18.12 KG/M2 | OXYGEN SATURATION: 95 %

## 2022-05-02 DIAGNOSIS — N95.2 ATROPHIC VAGINITIS: Primary | ICD-10-CM

## 2022-05-02 LAB
GLUCOSE BLDC GLUCOMTR-MCNC: 103 MG/DL (ref 70–99)
GLUCOSE BLDC GLUCOMTR-MCNC: 96 MG/DL (ref 70–99)

## 2022-05-02 PROCEDURE — 272N000001 HC OR GENERAL SUPPLY STERILE: Performed by: STUDENT IN AN ORGANIZED HEALTH CARE EDUCATION/TRAINING PROGRAM

## 2022-05-02 PROCEDURE — 710N000012 HC RECOVERY PHASE 2, PER MINUTE: Performed by: STUDENT IN AN ORGANIZED HEALTH CARE EDUCATION/TRAINING PROGRAM

## 2022-05-02 PROCEDURE — 82962 GLUCOSE BLOOD TEST: CPT

## 2022-05-02 PROCEDURE — 370N000017 HC ANESTHESIA TECHNICAL FEE, PER MIN: Performed by: STUDENT IN AN ORGANIZED HEALTH CARE EDUCATION/TRAINING PROGRAM

## 2022-05-02 PROCEDURE — 250N000025 HC SEVOFLURANE, PER MIN: Performed by: STUDENT IN AN ORGANIZED HEALTH CARE EDUCATION/TRAINING PROGRAM

## 2022-05-02 PROCEDURE — 250N000009 HC RX 250: Performed by: STUDENT IN AN ORGANIZED HEALTH CARE EDUCATION/TRAINING PROGRAM

## 2022-05-02 PROCEDURE — 999N000141 HC STATISTIC PRE-PROCEDURE NURSING ASSESSMENT: Performed by: STUDENT IN AN ORGANIZED HEALTH CARE EDUCATION/TRAINING PROGRAM

## 2022-05-02 PROCEDURE — 710N000009 HC RECOVERY PHASE 1, LEVEL 1, PER MIN: Performed by: STUDENT IN AN ORGANIZED HEALTH CARE EDUCATION/TRAINING PROGRAM

## 2022-05-02 PROCEDURE — 250N000011 HC RX IP 250 OP 636: Performed by: REGISTERED NURSE

## 2022-05-02 PROCEDURE — 250N000009 HC RX 250: Performed by: REGISTERED NURSE

## 2022-05-02 PROCEDURE — 250N000011 HC RX IP 250 OP 636: Performed by: STUDENT IN AN ORGANIZED HEALTH CARE EDUCATION/TRAINING PROGRAM

## 2022-05-02 PROCEDURE — 258N000003 HC RX IP 258 OP 636: Performed by: ANESTHESIOLOGY

## 2022-05-02 PROCEDURE — 258N000003 HC RX IP 258 OP 636: Performed by: REGISTERED NURSE

## 2022-05-02 PROCEDURE — 52281 CYSTOSCOPY AND TREATMENT: CPT | Performed by: STUDENT IN AN ORGANIZED HEALTH CARE EDUCATION/TRAINING PROGRAM

## 2022-05-02 PROCEDURE — 360N000075 HC SURGERY LEVEL 2, PER MIN: Performed by: STUDENT IN AN ORGANIZED HEALTH CARE EDUCATION/TRAINING PROGRAM

## 2022-05-02 PROCEDURE — 258N000003 HC RX IP 258 OP 636: Performed by: STUDENT IN AN ORGANIZED HEALTH CARE EDUCATION/TRAINING PROGRAM

## 2022-05-02 RX ORDER — CLINDAMYCIN PHOSPHATE 900 MG/50ML
900 INJECTION, SOLUTION INTRAVENOUS SEE ADMIN INSTRUCTIONS
Status: DISCONTINUED | OUTPATIENT
Start: 2022-05-02 | End: 2022-05-02 | Stop reason: HOSPADM

## 2022-05-02 RX ORDER — OXYCODONE HYDROCHLORIDE 5 MG/1
5 TABLET ORAL EVERY 4 HOURS PRN
Status: DISCONTINUED | OUTPATIENT
Start: 2022-05-02 | End: 2022-05-02 | Stop reason: HOSPADM

## 2022-05-02 RX ORDER — FENTANYL CITRATE 0.05 MG/ML
25 INJECTION, SOLUTION INTRAMUSCULAR; INTRAVENOUS
Status: CANCELLED | OUTPATIENT
Start: 2022-05-02

## 2022-05-02 RX ORDER — HYDROMORPHONE HCL IN WATER/PF 6 MG/30 ML
0.2 PATIENT CONTROLLED ANALGESIA SYRINGE INTRAVENOUS EVERY 5 MIN PRN
Status: DISCONTINUED | OUTPATIENT
Start: 2022-05-02 | End: 2022-05-02 | Stop reason: HOSPADM

## 2022-05-02 RX ORDER — ONDANSETRON 4 MG/1
4 TABLET, ORALLY DISINTEGRATING ORAL EVERY 30 MIN PRN
Status: DISCONTINUED | OUTPATIENT
Start: 2022-05-02 | End: 2022-05-02 | Stop reason: HOSPADM

## 2022-05-02 RX ORDER — FENTANYL CITRATE 0.05 MG/ML
25 INJECTION, SOLUTION INTRAMUSCULAR; INTRAVENOUS EVERY 5 MIN PRN
Status: DISCONTINUED | OUTPATIENT
Start: 2022-05-02 | End: 2022-05-02 | Stop reason: HOSPADM

## 2022-05-02 RX ORDER — FENTANYL CITRATE 50 UG/ML
INJECTION, SOLUTION INTRAMUSCULAR; INTRAVENOUS PRN
Status: DISCONTINUED | OUTPATIENT
Start: 2022-05-02 | End: 2022-05-02

## 2022-05-02 RX ORDER — MEPERIDINE HYDROCHLORIDE 25 MG/ML
12.5 INJECTION INTRAMUSCULAR; INTRAVENOUS; SUBCUTANEOUS
Status: DISCONTINUED | OUTPATIENT
Start: 2022-05-02 | End: 2022-05-02 | Stop reason: HOSPADM

## 2022-05-02 RX ORDER — ONDANSETRON 2 MG/ML
INJECTION INTRAMUSCULAR; INTRAVENOUS PRN
Status: DISCONTINUED | OUTPATIENT
Start: 2022-05-02 | End: 2022-05-02

## 2022-05-02 RX ORDER — SODIUM CHLORIDE 9 MG/ML
INJECTION, SOLUTION INTRAVENOUS CONTINUOUS
Status: DISCONTINUED | OUTPATIENT
Start: 2022-05-02 | End: 2022-05-02 | Stop reason: HOSPADM

## 2022-05-02 RX ORDER — SODIUM CHLORIDE 9 MG/ML
INJECTION, SOLUTION INTRAVENOUS CONTINUOUS PRN
Status: DISCONTINUED | OUTPATIENT
Start: 2022-05-02 | End: 2022-05-02

## 2022-05-02 RX ORDER — SODIUM CHLORIDE, SODIUM LACTATE, POTASSIUM CHLORIDE, CALCIUM CHLORIDE 600; 310; 30; 20 MG/100ML; MG/100ML; MG/100ML; MG/100ML
INJECTION, SOLUTION INTRAVENOUS CONTINUOUS
Status: DISCONTINUED | OUTPATIENT
Start: 2022-05-02 | End: 2022-05-02 | Stop reason: HOSPADM

## 2022-05-02 RX ORDER — ESTRADIOL 0.1 MG/G
2 CREAM VAGINAL
Qty: 42.5 G | Refills: 6 | Status: SHIPPED | OUTPATIENT
Start: 2022-05-02

## 2022-05-02 RX ORDER — SODIUM CHLORIDE, SODIUM LACTATE, POTASSIUM CHLORIDE, CALCIUM CHLORIDE 600; 310; 30; 20 MG/100ML; MG/100ML; MG/100ML; MG/100ML
INJECTION, SOLUTION INTRAVENOUS CONTINUOUS PRN
Status: DISCONTINUED | OUTPATIENT
Start: 2022-05-02 | End: 2022-05-02

## 2022-05-02 RX ORDER — EPHEDRINE SULFATE 50 MG/ML
INJECTION, SOLUTION INTRAMUSCULAR; INTRAVENOUS; SUBCUTANEOUS PRN
Status: DISCONTINUED | OUTPATIENT
Start: 2022-05-02 | End: 2022-05-02

## 2022-05-02 RX ORDER — LIDOCAINE HYDROCHLORIDE 20 MG/ML
INJECTION, SOLUTION INFILTRATION; PERINEURAL PRN
Status: DISCONTINUED | OUTPATIENT
Start: 2022-05-02 | End: 2022-05-02

## 2022-05-02 RX ORDER — PROPOFOL 10 MG/ML
INJECTION, EMULSION INTRAVENOUS PRN
Status: DISCONTINUED | OUTPATIENT
Start: 2022-05-02 | End: 2022-05-02

## 2022-05-02 RX ORDER — ONDANSETRON 2 MG/ML
4 INJECTION INTRAMUSCULAR; INTRAVENOUS EVERY 30 MIN PRN
Status: DISCONTINUED | OUTPATIENT
Start: 2022-05-02 | End: 2022-05-02 | Stop reason: HOSPADM

## 2022-05-02 RX ORDER — CLINDAMYCIN PHOSPHATE 900 MG/50ML
900 INJECTION, SOLUTION INTRAVENOUS
Status: COMPLETED | OUTPATIENT
Start: 2022-05-02 | End: 2022-05-02

## 2022-05-02 RX ADMIN — GENTAMICIN SULFATE 220 MG: 40 INJECTION, SOLUTION INTRAMUSCULAR; INTRAVENOUS at 09:50

## 2022-05-02 RX ADMIN — PHENYLEPHRINE HYDROCHLORIDE 100 MCG: 10 INJECTION INTRAVENOUS at 10:08

## 2022-05-02 RX ADMIN — PHENYLEPHRINE HYDROCHLORIDE 100 MCG: 10 INJECTION INTRAVENOUS at 10:07

## 2022-05-02 RX ADMIN — FENTANYL CITRATE 25 MCG: 50 INJECTION, SOLUTION INTRAMUSCULAR; INTRAVENOUS at 09:56

## 2022-05-02 RX ADMIN — PROPOFOL 120 MG: 10 INJECTION, EMULSION INTRAVENOUS at 09:59

## 2022-05-02 RX ADMIN — Medication 5 MG: at 10:07

## 2022-05-02 RX ADMIN — SODIUM CHLORIDE: 9 INJECTION, SOLUTION INTRAVENOUS at 09:50

## 2022-05-02 RX ADMIN — Medication 10 MG: at 10:04

## 2022-05-02 RX ADMIN — SODIUM CHLORIDE: 9 INJECTION, SOLUTION INTRAVENOUS at 09:27

## 2022-05-02 RX ADMIN — Medication 5 MG: at 10:06

## 2022-05-02 RX ADMIN — CLINDAMYCIN PHOSPHATE 900 MG: 900 INJECTION, SOLUTION INTRAVENOUS at 09:16

## 2022-05-02 RX ADMIN — PHENYLEPHRINE HYDROCHLORIDE 100 MCG: 10 INJECTION INTRAVENOUS at 10:06

## 2022-05-02 RX ADMIN — PHENYLEPHRINE HYDROCHLORIDE 100 MCG: 10 INJECTION INTRAVENOUS at 10:05

## 2022-05-02 RX ADMIN — ONDANSETRON 4 MG: 2 INJECTION INTRAMUSCULAR; INTRAVENOUS at 10:13

## 2022-05-02 RX ADMIN — PROPOFOL 10 MG: 10 INJECTION, EMULSION INTRAVENOUS at 09:56

## 2022-05-02 RX ADMIN — LIDOCAINE HYDROCHLORIDE 40 MG: 20 INJECTION, SOLUTION INFILTRATION; PERINEURAL at 09:59

## 2022-05-02 RX ADMIN — Medication 5 MG: at 10:05

## 2022-05-02 ASSESSMENT — ENCOUNTER SYMPTOMS: ORTHOPNEA: 0

## 2022-05-02 NOTE — ANESTHESIA PREPROCEDURE EVALUATION
Anesthesia Pre-Procedure Evaluation    Patient: Angelica James   MRN: [id number] : 1945        Procedure : Procedure(s):  CYSTOSCOPY, WITH URETHRAL DILATION  BLADDER BIOPSY          Past Medical History:   Diagnosis Date     Dementia (H)      Hypertension      Iron deficiency anemia      PUD (peptic ulcer disease)      Schizophrenia (H)      Type 2 diabetes mellitus (H)       Past Surgical History:   Procedure Laterality Date     colonoscopy       CYSTOSCOPY       UPPER GI ENDOSCOPY        Allergies   Allergen Reactions     Penicillins Hives     Tolerates cephalosporins      Social History     Tobacco Use     Smoking status: Never Smoker     Smokeless tobacco: Never Used   Substance Use Topics     Alcohol use: No      Wt Readings from Last 1 Encounters:   22 43.1 kg (95 lb)        Anesthesia Evaluation   Pt has not had prior anesthetic         ROS/MED HX  ENT/Pulmonary:    (-) sleep apnea   Neurologic: Comment: Falls, OXO, does not follow commands     (+) dementia,     Cardiovascular:     (+) hypertension-----dysrhythmias, a-fib,  (-) CHF, MARIA, orthopnea/PND and syncope   METS/Exercise Tolerance:     Hematologic:     (+) anemia,     Musculoskeletal:       GI/Hepatic:    (-) GERD   Renal/Genitourinary: Comment: Hyponatremia       Endo:     (+) type II DM,     Psychiatric/Substance Use:     (+) psychiatric history schizophrenia     Infectious Disease:       Malignancy:       Other:            Physical Exam    Airway        Mallampati: II   TM distance: > 3 FB   Neck ROM: full   Mouth opening: > 3 cm    Respiratory Devices and Support         Dental       (+) missing, chipped and loose      Cardiovascular          Rhythm and rate: regular     Pulmonary           breath sounds clear to auscultation           OUTSIDE LABS:  CBC:   Lab Results   Component Value Date    WBC 4.9 2022    WBC 4.8 2022    HGB 8.2 (L) 2022    HGB 7.7 (L) 2022    HCT 23.9 (L) 2022    HCT 23.6 (L)  01/29/2022     01/31/2022     01/30/2022     BMP:   Lab Results   Component Value Date     01/31/2022     01/30/2022    POTASSIUM 3.5 01/31/2022    POTASSIUM 3.7 01/30/2022    CHLORIDE 100 01/31/2022    CHLORIDE 102 01/30/2022    CO2 27 01/31/2022    CO2 31 01/30/2022    BUN 13 01/31/2022    BUN 15 01/30/2022    CR 0.43 (L) 01/31/2022    CR 0.56 01/30/2022     (H) 01/31/2022    GLC 97 01/30/2022     COAGS: No results found for: PTT, INR, FIBR  POC:   Lab Results   Component Value Date     (H) 08/19/2019     HEPATIC:   Lab Results   Component Value Date    ALBUMIN 3.5 01/28/2022    PROTTOTAL 6.9 01/28/2022    ALT 31 01/28/2022    AST 28 01/28/2022    ALKPHOS 45 01/28/2022    BILITOTAL 0.6 01/28/2022     OTHER:   Lab Results   Component Value Date    LACT 1.2 08/03/2021    A1C 5.7 (H) 07/31/2021    SHERI 8.6 01/31/2022    PHOS 2.9 08/04/2021    MAG 2.0 08/04/2021    TSH 2.03 01/30/2022    T4 1.09 12/07/2018    CRP 4.1 01/28/2022       Anesthesia Plan    ASA Status:  4      Anesthesia Type: General.     - Airway: LMA              Consents    Anesthesia Plan(s) and associated risks, benefits, and realistic alternatives discussed. Questions answered and patient/representative(s) expressed understanding.    - Discussed:     - Discussed with:  Patient (Daughter )         Postoperative Care       PONV prophylaxis: Ondansetron (or other 5HT-3)     Comments:    Other Comments: No midazolam            Prior to Admission medications    Medication Sig Start Date End Date Taking? Authorizing Provider   acetaminophen (TYLENOL) 500 MG tablet Take 1,000 mg by mouth 3 times daily At 08:00, 14:00 & 20:00   Yes Unknown, Entered By History   calcium carbonate (OS-SHERI) 500 MG tablet Take 1 tablet by mouth 2 times daily At 08:00 & 16:00   Yes Unknown, Entered By History   ferrous sulfate (FEROSUL) 325 (65 Fe) MG tablet Take 325 mg by mouth 2 times daily   Yes Unknown, Entered By History   melatonin 3  MG tablet Take 1 mg by mouth At Bedtime   Yes Unknown, Entered By History   metFORMIN (GLUCOPHAGE) 500 MG tablet Take 500 mg by mouth daily (with breakfast)   Yes Unknown, Entered By History   multivitamin, therapeutic (THERA-VIT) TABS tablet Take 1 tablet by mouth every evening    Yes Reported, Patient   Nutritional Supplements (ENSURE ORIGINAL) LIQD Take 1 Can by mouth daily Daily at 1000   Yes Unknown, Entered By History   PARoxetine (PAXIL) 20 MG tablet Take 20 mg by mouth every morning   Yes Unknown, Entered By History   polyethylene glycol (MIRALAX) 17 g packet Take 1 packet by mouth daily   Yes Unknown, Entered By History   QUEtiapine (SEROQUEL) 25 MG tablet Take 25 mg by mouth 2 times daily Daily at 0800 and 1600.   Yes Unknown, Entered By History   QUEtiapine (SEROQUEL) 25 MG tablet Take 25 mg by mouth 2 times daily as needed (anxiety and agitation)   Yes Unknown, Entered By History   risperiDONE (RISPERDAL) 1 MG tablet Take 1 mg by mouth daily Daily at 1400   Yes Unknown, Entered By History   risperiDONE (RISPERDAL) 4 MG tablet Take 4 mg by mouth every evening At 20:00   Yes Unknown, Entered By History     Current Facility-Administered Medications Ordered in Epic   Medication Dose Route Frequency Last Rate Last Admin     clindamycin (CLEOCIN) infusion 900 mg  900 mg Intravenous Pre-Op/Pre-procedure x 1 dose         clindamycin (CLEOCIN) infusion 900 mg  900 mg Intravenous See Admin Instructions         gentamicin (GARAMYCIN) 220 mg in sodium chloride 0.9 % 50 mL intermittent infusion  5 mg/kg Intravenous Pre-Op/Pre-procedure x 1 dose         No current [facility]-ordered outpatient medications on file.       No results for input(s): ABO, RH in the last 06753 hours.  No results for input(s): HCG in the last 14520 hours.  No results found for this or any previous visit (from the past 744 hour(s)).      Elisa Dinero MD

## 2022-05-02 NOTE — ANESTHESIA CARE TRANSFER NOTE
Patient: Angelica James    Procedure: Procedure(s):  CYSTOSCOPY, WITH URETHRAL DILATION       Diagnosis: Gross hematuria [R31.0]  Stricture of female urethra, unspecified stricture type [N35.92]  Diagnosis Additional Information: No value filed.    Anesthesia Type:   General     Note:    Oropharynx: oropharynx clear of all foreign objects  Level of Consciousness: drowsy  Oxygen Supplementation: face mask  Level of Supplemental Oxygen (L/min / FiO2): 6  Independent Airway: airway patency satisfactory and stable  Dentition: dentition unchanged  Vital Signs Stable: post-procedure vital signs reviewed and stable  Report to RN Given: handoff report given  Patient transferred to: PACU  Comments: Patient appears comfortable  Handoff Report: Identifed the Patient, Identified the Reponsible Provider, Reviewed the pertinent medical history, Discussed the surgical course, Reviewed Intra-OP anesthesia mangement and issues during anesthesia, Set expectations for post-procedure period and Allowed opportunity for questions and acknowledgement of understanding      Vitals:  Vitals Value Taken Time   /89 05/02/22 1027   Temp     Pulse 70 05/02/22 1031   Resp 20 05/02/22 1031   SpO2 100 % 05/02/22 1031   Vitals shown include unvalidated device data.    Electronically Signed By: RABIA Chaudhry CRNA  May 2, 2022  10:32 AM

## 2022-05-02 NOTE — ANESTHESIA POSTPROCEDURE EVALUATION
Patient: Angelica James    Procedure: Procedure(s):  CYSTOSCOPY, WITH URETHRAL DILATION       Anesthesia Type:  General    Note:  Anesthesia Post Evaluation    Last vitals:  Vitals Value Taken Time   /80 05/02/22 1123   Temp 36.7  C (98  F) 05/02/22 1115   Pulse 72 05/02/22 1126   Resp 13 05/02/22 1126   SpO2 96 % 05/02/22 1127   Vitals shown include unvalidated device data.    Electronically Signed By: Elisa Dinero MD  May 2, 2022  4:25 PM

## 2022-05-02 NOTE — OR NURSING
Daughter at bedside to interpret discharge instructions to patient.  Daughter will also deliver written discharge instructions and filled prescription to [facility].

## 2022-05-02 NOTE — DISCHARGE INSTRUCTIONS
POSTOPERATIVE INSTRUCTIONS    Diagnosis-------------------------------   Urethral meatal stenosis    Procedure-------------------------------  Procedure(s) (LRB):  CYSTOSCOPY, WITH URETHRAL DILATION (N/A)      Findings--------------------------------  Tight urethral meatal stenosis, estimate 10 Fr diameter, serially dilated to 30 Fr with Amilcar sounds starting at 12 Fr.  Unremarkable urethra and bladder on cystoscopy with no concerning urothelial lesions  Stage III pelvic organ prolapse with posterior bulge    Home-going instructions-----------------         Activity Limitation:     - No driving or operating heavy machinery while on narcotic pain medication.     FOLLOW THESE INSTRUCTIONS AS INDICATED BELOW:  - Observe operative area for signs of excessive bleeding.  - You may shower.  - Increase fluid intake to promote clear urine.  - Resume usual diet as tolerated    What to expect while recovering-----------  For the first few weeks after your procedure, you may notice that your urine is cloudy. Or you may have blood or blood clots in your urine. This is normal while your body rids itself of the treated tissue. These symptoms may begin to get better during the first few weeks. But it may take a few months before they go away. Your provider can tell you when you can have sex again and when you can go back to work.  You also may be told to avoid lifting anything over 10 pounds or bending over to lift things from the ground.  You should drink plenty of fluids to help flush out your bladder.  You may experience some intermittent bleeding that makes your urine pink or cherry colored. This is normal.  However, if you are unable to urinate, passing large amount of clots, have kit blood in your urine, or have a temperature >101 degrees, call the urology nurse on call, or present to your nearest emergency department.      When to call your healthcare provider  Contact your healthcare provider right away if:  You have a  fever of 100.4 F (38 C) or higher, or as directed by your provider  You have excessive bleeding  You have pain not relieved by medicines  You notice that no urine is draining from the catheter or the catheter falls out  You have a frequent or very strong urge to urinate  You re not able to urinate, or notice a decrease in urine flow    Discharge Medications/instructions:   - Take Tylenol 1000mg every 6 hours for pain  - start taking vaginal estrogen 3 times a day  - Take an over the counter stool softener to promote soft bowel movements    Questions/concerns------------------------  [facility]: [phone]    Future appointments  You will follow up with Dr. Huitron in about 3 months      Riley Roach        Same Day Surgery Discharge Instructions for  Sedation and General Anesthesia     It's not unusual to feel dizzy, light-headed or faint for up to 24 hours after surgery or while taking pain medication.  If you have these symptoms: sit for a few minutes before standing and have someone assist you when you get up to walk or use the bathroom.    You should rest and relax for the next 24 hours. We recommend you make arrangements to have an adult stay with you for at least 24 hours after your discharge.  Avoid hazardous and strenuous activity.    DO NOT DRIVE any vehicle or operate mechanical equipment for 24 hours following the end of your surgery.  Even though you may feel normal, your reactions may be affected by the medication you have received.    Do not drink alcoholic beverages for 24 hours following surgery.     Slowly progress to your regular diet as you feel able. It's not unusual to feel nauseated and/or vomit after receiving anesthesia.  If you develop these symptoms, drink clear liquids (apple juice, ginger ale, broth, 7-up, etc. ) until you feel better.  If your nausea and vomiting persists for 24 hours, please notify your surgeon.      All narcotic pain medications, along with  inactivity and anesthesia, can cause constipation. Drinking plenty of liquids and increasing fiber intake will help.    For any questions of a medical nature, call your surgeon.    Do not make important decisions for 24 hours.    If you had general anesthesia, you may have a sore throat for a couple of days related to the breathing tube used during surgery.  You may use Cepacol lozenges to help with this discomfort.  If it worsens or if you develop a fever, contact your surgeon.     If you feel your pain is not well managed with the pain medications prescribed by your surgeon, please contact your surgeon's office to let them know so they can address your concerns.       CoVid 19 Information    We want to give you information regarding Covid. Please consult your primary care provider with any questions you might have.     Patient who have symptoms (cough, fever, or shortness of breath), need to isolate for 7 days from when symptoms started OR 72 hours after fever resolves (without fever reducing medications) AND improvement of respiratory symptoms (whichever is longer).    Isolate yourself at home (in own room/own bathroom if possible)  Do Not allow any visitors  Do Not go to work or school  Do Not go to [employer],  centers, shopping, or other public places.  Do Not shake hands.  Avoid close and intimate contact with others (hugging, kissing).  Follow CDC recommendations for household cleaning of frequently touched services.     After the initial 7 days, continue to isolate yourself from household members as much as possible. To continue decrease the risk of community spread and exposure, you and any members of your household should limit activities in public for 14 days after starting home isolation.     You can reference the following CDC link for helpful home isolation/care tips:  https://www.cdc.gov/coronavirus/2019-ncov/downloads/10Things.pdf    Protect Others:  Cover Your Mouth and Nose with a mask,  disposable tissue or wash cloth to avoid spreading germs to others.  Wash your hands and face frequently with soap and water    Call Your Primary Doctor If: Breathing difficulty develops or you become worse.    For more information about COVID19 and options for caring for yourself at home, please visit the CDC website at https://www.cdc.gov/coronavirus/2019-ncov/about/steps-when-sick.html  For more options for care at [facility], please visit our website at [email or web address]         **If you have questions or concerns about your procedure,   call Dr. Roach [phone]**

## 2022-05-02 NOTE — OR NURSING
waiver signed. Using daughter for interpretor. Pt has dementia and does not understand why she is here.

## 2022-05-02 NOTE — OP NOTE
[facility]  Operative Note    Pre-operative diagnosis: Gross hematuria [R31.0]  Stricture of female urethra, unspecified stricture type [N35.92]   Post-operative diagnosis Gross hematuria [R31.0]  Stricture of female urethra, unspecified stricture type [N35.92]  Atrophic vaginitis  Pelvic organ prolapse   Procedure: Procedure(s):  CYSTOSCOPY, WITH URETHRAL DILATION   Surgeon: Riley Roach MD   Assistants(s): none   Anesthesia: General    Estimated blood loss: Minimal    Total IV fluids: (See anesthesia record)   Blood transfusion: No transfusion was given during surgery   Total urine output: Not measured   Drains: None   Specimens: None   Implants: None     Findings:   Tight urethral meatal stenosis, estimate 10 Fr diameter, serially dilated to 30 Fr with Magnolia sounds starting at 12 Fr.  Unremarkable urethra and bladder on cystoscopy with no concerning urothelial lesions  Stage III pelvic organ prolapse with posterior bulge   Complications: none   Condition: Stable             Description of procedure:  77-year-old with history of gross hematuria, found to have stenotic urethral meatus on attempted office cystoscopy, who returns today for cystoscopy with urethral dilation under anesthesia with possible bladder biopsy.  I discussed risks including pain, bleeding, infection with the patient and her daughter.  The patient was initially very reluctant to proceed eventually informed consent was obtained and she consented to the procedure.    The patient was brought operating room #19.  Clindamycin and gentamicin antibiotics were given prior to procedure.  General anesthesia was induced, she was placed in dorsal lithotomy position, prepped and draped in standard sterile fashion.  A timeout was performed.    There was very tight urethral meatus stenosis, estimate 10 [place]. Vaginal mucosa very atrophic. Significant posterior pelvic organ prolapse.  Magnolia sounds were used to dilate the  urethral meatus from 12 Citizen of [place] up to 30 Citizen of [place].  A 22 Citizen of [place] Storz cystoscope was assembled and passed through the urethra and into the bladder.  The bladder was unremarkable without any concerning urothelial lesions.  There were no bladder stones.  There was no significant trabeculation.  Bilateral single orthotopic ureteral orifice were identified.  Pullout cystoscopy revealed the urethra to be normal in appearance without any masses or tumors.  Cystoscope was removed.  The bladder was then catheterized and emptied.  Catheter was removed.  Patient was cleaned up, awoken from anesthesia and brought to PACU in stable condition.     Will plan to start vaginal estrogen three times a week for atrophic vaginitis    Notably the patient has a pessary in place and the patient's daughter is unclear on who has been managing it or indeed when it was last placed, will refer to Dr. Huitron for further management    Riley Roach MD   [facility]  [phone] clinic phone

## 2022-05-02 NOTE — ANESTHESIA PROCEDURE NOTES
Airway       Patient location during procedure: OR       Procedure Start/Stop Times: 5/2/2022 10:01 AM  Staff -        Anesthesiologist:  Elisa Dinero MD       CRNA: Emily Blanchard APRN CRNA       Other Anesthesia Staff: Kang, So Yeon       Performed By: MARY JANE and with CRNAs       Procedure performed by resident/fellow/CRNA in presence of a teaching physician.    Consent for Airway        Urgency: elective  Indications and Patient Condition       Indications for airway management: sindhu-procedural       Induction type:intravenous       Mask difficulty assessment: 1 - vent by mask    Final Airway Details       Final airway type: supraglottic airway    Supraglottic Airway Details        Type: LMA       Brand: Ambu AuraGain       LMA size: 4    Post intubation assessment        Placement verified by: capnometry, equal breath sounds and chest rise        Number of attempts at approach: 1       Number of other approaches attempted: 0       Secured with: silk tape       Ease of procedure: easy       Dentition: Intact and Unchanged    Medication(s) Administered   Medication Administration Time: 5/2/2022 10:01 AM

## 2022-05-03 ENCOUNTER — TELEPHONE (OUTPATIENT)
Dept: UROLOGY | Facility: CLINIC | Age: 77
End: 2022-05-03
Payer: COMMERCIAL

## 2022-05-03 NOTE — TELEPHONE ENCOUNTER
----- Message from Riley Roach MD sent at 5/2/2022 10:41 AM CDT -----  Regarding: follow up w/ [place] in 3 months  Follow up in [place] in 3 months, for pelvic organ prolapse managed with pessary

## 2022-08-31 ENCOUNTER — OFFICE VISIT (OUTPATIENT)
Dept: UROLOGY | Facility: CLINIC | Age: 77
End: 2022-08-31
Payer: COMMERCIAL

## 2022-08-31 VITALS
BODY MASS INDEX: 18.85 KG/M2 | WEIGHT: 96 LBS | OXYGEN SATURATION: 94 % | DIASTOLIC BLOOD PRESSURE: 72 MMHG | SYSTOLIC BLOOD PRESSURE: 130 MMHG | HEART RATE: 81 BPM | HEIGHT: 60 IN

## 2022-08-31 DIAGNOSIS — N81.9 FEMALE GENITAL PROLAPSE, UNSPECIFIED TYPE: Primary | ICD-10-CM

## 2022-08-31 DIAGNOSIS — F03.91 DEMENTIA WITH BEHAVIORAL DISTURBANCE, UNSPECIFIED DEMENTIA TYPE: ICD-10-CM

## 2022-08-31 DIAGNOSIS — R35.0 URINARY FREQUENCY: ICD-10-CM

## 2022-08-31 LAB
ALBUMIN UR-MCNC: NEGATIVE MG/DL
APPEARANCE UR: CLEAR
BILIRUB UR QL STRIP: NEGATIVE
COLOR UR AUTO: YELLOW
GLUCOSE UR STRIP-MCNC: NEGATIVE MG/DL
HGB UR QL STRIP: ABNORMAL
KETONES UR STRIP-MCNC: NEGATIVE MG/DL
LEUKOCYTE ESTERASE UR QL STRIP: ABNORMAL
NITRATE UR QL: NEGATIVE
PH UR STRIP: 8 [PH] (ref 5–7)
RESIDUAL VOLUME (RV) (EXTERNAL): 15
SP GR UR STRIP: 1.02 (ref 1–1.03)
UROBILINOGEN UR STRIP-ACNC: 0.2 E.U./DL

## 2022-08-31 PROCEDURE — 51798 US URINE CAPACITY MEASURE: CPT | Performed by: UROLOGY

## 2022-08-31 PROCEDURE — 81003 URINALYSIS AUTO W/O SCOPE: CPT | Performed by: UROLOGY

## 2022-08-31 PROCEDURE — 87088 URINE BACTERIA CULTURE: CPT | Performed by: UROLOGY

## 2022-08-31 PROCEDURE — 99213 OFFICE O/P EST LOW 20 MIN: CPT | Mod: 25 | Performed by: UROLOGY

## 2022-08-31 PROCEDURE — 87086 URINE CULTURE/COLONY COUNT: CPT | Performed by: UROLOGY

## 2022-08-31 ASSESSMENT — PAIN SCALES - GENERAL: PAINLEVEL: NO PAIN (0)

## 2022-08-31 NOTE — PATIENT INSTRUCTIONS
She will have some vaginal bleeding that should get better over the next week or so    Please return in 2 months for a repeat exam, sooner if having issues with urinating or bleeding does not resolve    It was a pleasure meeting with you today.  Thank you for allowing me and my team the privilege of caring for you today.  YOU are the reason we are here, and I truly hope we provided you with the excellent service you deserve.  Please let us know if there is anything else we can do for you so that we can be sure you are leaving completely satisfied with your care experience.

## 2022-08-31 NOTE — LETTER
8/31/2022       RE: Angelica James  [address]  [facility] [address]     Dear Colleague,    Thank you for referring your patient, Angelica James, to the [facility] at [facility]. Please see a copy of my visit note below.    August 31, 2022    Angelica was seen today for pelvic organ prolapse.    Diagnoses and all orders for this visit:    Female genital prolapse, unspecified type  -     MEASURE POST-VOID RESIDUAL URINE/BLADDER CAPACITY, US NON-IMAGING (21481)  -     Urinalysis Macroscopic; Future  -     Urinalysis Macroscopic    Urinary frequency  -     Urine Culture Aerobic Bacterial [[id number]]; Future  -     Urine Culture Aerobic Bacterial [[id number]]    Dementia with behavioral disturbance, unspecified dementia type (H)     Pessary removed and left out.  Discussed she will continue to have some bleeding as things heal and we are unable to use estrogen cream.  Given patient's dementia we discussed that we may not be able to replace the pessary or may need to consider estring    UCx given the frequency    RTC 2 months for repeat exam.      10 minutes were spent today on the day of the encounter in reviewing the EMR including prior urology notes, direct patient care including ordering urine cultur, coordination of care and documentation    Alicia Huitron MD MPH  (she/her/hers)   of Urology  [facility]      Subjective    Patient is here today with her daughter who also acts as  (forms signed).  Patient has issues with dementia and is unable to provide any history.      Daughter unclear how long the pessary has been in place.  States that her mother has not allowed pelvic exams for a long time.  Reports that there has been vaginal bleeding for some time.  States that mother is unable to use estrogen cream because she will not let the staff at the home help her apply.    Patient also with urinary  frequency    She denies any other changes in health since last visit, last seen by my colleague Dr Roach for cystoscopy in the OR in May for microscopic hematuria    /72 (BP Location: Left arm, Patient Position: Sitting, Cuff Size: Adult Regular)   Pulse 81   Ht 1.524 m (5')   Wt 43.5 kg (96 lb)   SpO2 94%   BMI 18.75 kg/m    GENERAL: healthy, alert and no distress  EYES: Eyes grossly normal to inspection, conjunctivae and sclerae normal  HENT: normal cephalic/atraumatic.  External ears, nose and mouth without ulcers or lesions.  RESP: no audible wheeze, cough, or visible cyanosis.  No visible retractions or increased work of breathing.  Able to speak fully in complete sentences.  NEURO: Cranial nerves grossly intact, mentation intact and speech normal  PSYCH: mentation appears normal, affect normal/bright, judgement and insight intact, normal speech and appearance well-groomed  : Normal external genitalia.  A bloody #6 ring with support was removed without difficulty.  Speculum and bimanual exam were remarkable for erosion and the pessary was left out    CC  Patient Care Team:  No Ref-Primary, Physician as PCP - General  Jemima Lama NP as Assigned Neuroscience Provider  Joana Nicole PA-C as Physician Assistant (Urology)  Joana Nicole PA-C as Assigned OBGYN Provider  Riley Roach MD as Assigned Surgical Provider  SELF, REFERRED    '      Again, thank you for allowing me to participate in the care of your patient.      Sincerely,    Alicia Huitron MD

## 2022-08-31 NOTE — PROGRESS NOTES
August 31, 2022    Angelica was seen today for pelvic organ prolapse.    Diagnoses and all orders for this visit:    Female genital prolapse, unspecified type  -     MEASURE POST-VOID RESIDUAL URINE/BLADDER CAPACITY, US NON-IMAGING (86536)  -     Urinalysis Macroscopic; Future  -     Urinalysis Macroscopic    Urinary frequency  -     Urine Culture Aerobic Bacterial [CRG488]; Future  -     Urine Culture Aerobic Bacterial [TOR610]    Dementia with behavioral disturbance, unspecified dementia type (H)     Pessary removed and left out.  Discussed she will continue to have some bleeding as things heal and we are unable to use estrogen cream.  Given patient's dementia we discussed that we may not be able to replace the pessary or may need to consider estring    UCx given the frequency    RTC 2 months for repeat exam.      10 minutes were spent today on the day of the encounter in reviewing the EMR including prior urology notes, direct patient care including ordering urine cultur, coordination of care and documentation    Alicia Huitron MD MPH  (she/her/hers)   of Urology  [facility]      Subjective    Patient is here today with her daughter who also acts as  (forms signed).  Patient has issues with dementia and is unable to provide any history.      Daughter unclear how long the pessary has been in place.  States that her mother has not allowed pelvic exams for a long time.  Reports that there has been vaginal bleeding for some time.  States that mother is unable to use estrogen cream because she will not let the staff at the home help her apply.    Patient also with urinary frequency    She denies any other changes in health since last visit, last seen by my colleague Dr Roach for cystoscopy in the OR in May for microscopic hematuria    /72 (BP Location: Left arm, Patient Position: Sitting, Cuff Size: Adult Regular)   Pulse 81   Ht 1.524 m (5')   Wt 43.5 kg (96 lb)   SpO2  94%   BMI 18.75 kg/m    GENERAL: healthy, alert and no distress  EYES: Eyes grossly normal to inspection, conjunctivae and sclerae normal  HENT: normal cephalic/atraumatic.  External ears, nose and mouth without ulcers or lesions.  RESP: no audible wheeze, cough, or visible cyanosis.  No visible retractions or increased work of breathing.  Able to speak fully in complete sentences.  NEURO: Cranial nerves grossly intact, mentation intact and speech normal  PSYCH: mentation appears normal, affect normal/bright, judgement and insight intact, normal speech and appearance well-groomed  : Normal external genitalia.  A bloody #6 ring with support was removed without difficulty.  Speculum and bimanual exam were remarkable for erosion and the pessary was left out    CC  Patient Care Team:  No Ref-Primary, Physician as PCP - General  Jemima Lama NP as Assigned Neuroscience Provider  Joana Nicole PA-C as Physician Assistant (Urology)  Joana Nicole PA-C as Assigned OBGYN Provider  Riley Roach MD as Assigned Surgical Provider  SELF, REFERRED    '

## 2022-08-31 NOTE — NURSING NOTE
Chief Complaint   Patient presents with     Pelvic organ prolapse   Patients bladder scan was 15 ml today. Patients daughter states she has some lower abdomen pain at times.  Talya Cuba LPN

## 2022-09-04 LAB
BACTERIA UR CULT: ABNORMAL

## 2022-10-25 ENCOUNTER — OFFICE VISIT (OUTPATIENT)
Dept: UROLOGY | Facility: CLINIC | Age: 77
End: 2022-10-25
Payer: COMMERCIAL

## 2022-10-25 VITALS
HEART RATE: 60 BPM | SYSTOLIC BLOOD PRESSURE: 136 MMHG | HEIGHT: 60 IN | BODY MASS INDEX: 18.85 KG/M2 | WEIGHT: 96 LBS | DIASTOLIC BLOOD PRESSURE: 70 MMHG

## 2022-10-25 DIAGNOSIS — F03.918 DEMENTIA WITH BEHAVIORAL DISTURBANCE (H): ICD-10-CM

## 2022-10-25 DIAGNOSIS — N95.2 ATROPHIC VAGINITIS: ICD-10-CM

## 2022-10-25 DIAGNOSIS — R35.0 URINARY FREQUENCY: ICD-10-CM

## 2022-10-25 DIAGNOSIS — N81.10 VAGINAL PROLAPSE: Primary | ICD-10-CM

## 2022-10-25 LAB — RESIDUAL VOLUME (RV) (EXTERNAL): 0

## 2022-10-25 PROCEDURE — 57160 INSERT PESSARY/OTHER DEVICE: CPT | Performed by: UROLOGY

## 2022-10-25 PROCEDURE — 99212 OFFICE O/P EST SF 10 MIN: CPT | Mod: 25 | Performed by: UROLOGY

## 2022-10-25 PROCEDURE — 51798 US URINE CAPACITY MEASURE: CPT | Performed by: UROLOGY

## 2022-10-25 ASSESSMENT — PAIN SCALES - GENERAL: PAINLEVEL: NO PAIN (0)

## 2022-10-25 NOTE — PROGRESS NOTES
October 25, 2022    Angelica was seen today for follow up.    Diagnoses and all orders for this visit:    Vaginal prolapse  -     Fit/Insert Intravaginal Support Device/Pessary (28275)    Urinary frequency  -     MEASURE POST-VOID RESIDUAL URINE/BLADDER CAPACITY, US NON-IMAGING ([id number])    Atrophic vaginitis    Moderate Dementia w behavioral disturb    At this time patient is not having bleeding, UTIs or difficulty emptying her bladder.  Given the discomfort with the pessary today and the prior problem with the retained pessary at this time will observe the prolapse.      Plan to follow up for repeat exam in 6 months with Herlinda    10 minutes were spent today on the day of the encounter in reviewing the EMR, direct patient care, coordination of care and documentation in addition to pessary fitting    Alicia Huitron MD MPH  (she/her/hers)   of Urology  [facility]      Subjective    Here for repeat pelvic exam.  At last visit she had a pessary in place for unknown period of time and had an erosion so the pessary was removed.  She is with her daughter today who is also acting as a  (waiver signed at previous visit)      Deny bleeding, UTIs, difficulty with urination.  States that the urinary urgency frequency seems to come and go, may not have any issues if out with her family on an outing, othertimes really bad    She denies any changes in health since last visit.     /70   Pulse 60   Ht 1.524 m (5')   Wt 43.5 kg (96 lb)   BMI 18.75 kg/m    GENERAL: healthy, alert and no distress  EYES: Eyes grossly normal to inspection, conjunctivae and sclerae normal  HENT: normal cephalic/atraumatic.  External ears, nose and mouth without ulcers or lesions.  RESP: no audible wheeze, cough, or visible cyanosis.  No visible retractions or increased work of breathing.  Able to speak fully in complete sentences.  NEURO: Cranial nerves grossly intact, mentation intact and speech  normal  PSYCH: mentation appears normal, affect normal/bright, judgement and insight intact, normal speech and appearance well-groomed  : She has atrophic tissues and stage IV prolapse.  #3 ring with support uncomfortable so they elected to forgo a pessary at this time    PVR 0 mL by bladder scan    CC  Patient Care Team:  No Ref-Primary, Physician as PCP - General  Jemima Lama NP as Assigned Neuroscience Provider  Joana Nicole PA-C as Physician Assistant (Urology)  Joana Nicole PA-C as Assigned OBGYN Provider  Alicia Huitron MD as Assigned Surgical Provider

## 2022-10-25 NOTE — NURSING NOTE
Here with daughter . She missed the collection hat  When trying to collect urine . Daughter reports hert facility  Has not told her of any more vaginal bleeding

## 2022-10-25 NOTE — LETTER
10/25/2022       RE: Angelica James  [address]  [facility] [address]     Dear Colleague,    Thank you for referring your patient, Angelica James, to the [facility] at [facility]. Please see a copy of my visit note below.    October 25, 2022    Angelica was seen today for follow up.    Diagnoses and all orders for this visit:    Vaginal prolapse  -     Fit/Insert Intravaginal Support Device/Pessary (11546)    Urinary frequency  -     MEASURE POST-VOID RESIDUAL URINE/BLADDER CAPACITY, US NON-IMAGING (12525)    Atrophic vaginitis    Moderate Dementia w behavioral disturb    At this time patient is not having bleeding, UTIs or difficulty emptying her bladder.  Given the discomfort with the pessary today and the prior problem with the retained pessary at this time will observe the prolapse.      Plan to follow up for repeat exam in 6 months with Herlinda    10 minutes were spent today on the day of the encounter in reviewing the EMR, direct patient care, coordination of care and documentation in addition to pessary fitting    Alicia Huitron MD MPH  (she/her/hers)   of Urology  [facility]      Subjective    Here for repeat pelvic exam.  At last visit she had a pessary in place for unknown period of time and had an erosion so the pessary was removed.  She is with her daughter today who is also acting as a  (waiver signed at previous visit)      Deny bleeding, UTIs, difficulty with urination.  States that the urinary urgency frequency seems to come and go, may not have any issues if out with her family on an outing, othertimes really bad    She denies any changes in health since last visit.     /70   Pulse 60   Ht 1.524 m (5')   Wt 43.5 kg (96 lb)   BMI 18.75 kg/m    GENERAL: healthy, alert and no distress  EYES: Eyes grossly normal to inspection, conjunctivae and sclerae normal  HENT: normal  cephalic/atraumatic.  External ears, nose and mouth without ulcers or lesions.  RESP: no audible wheeze, cough, or visible cyanosis.  No visible retractions or increased work of breathing.  Able to speak fully in complete sentences.  NEURO: Cranial nerves grossly intact, mentation intact and speech normal  PSYCH: mentation appears normal, affect normal/bright, judgement and insight intact, normal speech and appearance well-groomed  : She has atrophic tissues and stage IV prolapse.  #3 ring with support uncomfortable so they elected to forgo a pessary at this time    PVR 0 mL by bladder scan    CC  Patient Care Team:  No Ref-Primary, Physician as PCP - General  Jemima Lama NP as Assigned Neuroscience Provider  Joana Nicole PA-C as Physician Assistant (Urology)  Joana Nicole PA-C as Assigned OBGYN Provider  Alicia Huitron MD as Assigned Surgical Provider              Again, thank you for allowing me to participate in the care of your patient.      Sincerely,    Alicia Huitron MD       none

## 2022-10-25 NOTE — LETTER
Date:November 3, 2022      Provider requested that no letter be sent. Do not send.       [facility]

## 2022-10-25 NOTE — PATIENT INSTRUCTIONS
Websites with free information:    [employer] patient website: www.voicesforpfd.org    Total Control Program: www.totalcontrolprogram.com    Please let us know if she is starting to have UTIs, trouble with emptying her bladder or bother from the prolapse    It was a pleasure meeting with you today.  Thank you for allowing me and my team the privilege of caring for you today.  YOU are the reason we are here, and I truly hope we provided you with the excellent service you deserve.  Please let us know if there is anything else we can do for you so that we can be sure you are leaving completely satisfied with your care experience.

## 2023-07-14 NOTE — PLAN OF CARE
Discharge Planner PT 5B  Patient plan for discharge: Home  Current status: Pt completed bed mobility and transfers IND. Used bathroom x2 IND. Ambulated 2 x 250 ft with CGA and completed 4 x 3 stairs with CGA and railing on L. VSS on RA.   Barriers to return to prior living situation: impaired cognition/memory, medical status  Recommendations for discharge: Home with 24/7 assistance or memory care  Rationale for recommendations: Pt has mobility to navigate home setting but is not safe to be home alone due to cognition/memory and does not have enough assist at home.       Entered by: Gabriella Mehta 08/06/2019 11:46 AM        PAST MEDICAL HISTORY:  Atrial Fibrillation     Bradycardia     CAD (coronary artery disease)     DM (Diabetes Mellitus)     HTN (Hypertension)     Hypercholesteremia     Kidney stones

## 2023-12-08 NOTE — PROGRESS NOTES
-Resume home amlodipine  -Labetalol/hydralazine PRN SBP > 160   Care Management Discharge Note    Discharge Date: 08/04/2021       Discharge Disposition: Transitional Care    Discharge Services: [facility]    Discharge DME: None    Discharge Transportation:   Wheelchair at 11:00    Private pay costs discussed: Not applicable    PAS Confirmation Code:    Patient/family educated on Medicare website which has current facility and service quality ratings: yes    Education Provided on the Discharge Plan:  Yes  Persons Notified of Discharge Plans: Margareth Jj  Patient/Family in Agreement with the Plan: yes    Handoff Referral Completed: No    Additional Information:  SW received call from [facility] and they are able to accept patient to their facility tomorrow. They will have an  arranged for admission.  SW placed call to  Transport for a wheelchair transport and ride is arranged for 11:00. Updated patient's daughter Анна with discharge plan.     SUSANA Alonso

## 2023-12-15 NOTE — PLAN OF CARE
"Primary Care Behavioral Health Integration: Follow-up  Date:  12/18/2023  Patient Name: Harsha García  Referral Source:  Barbie Velazquez MD  Type of Visit:  Video Session  Site:  [facility]    Visit type: Virtual visit with synchronous audio and video  The patient location is:  [place] [address]  The patient location [place] is:  [facility]  The patient phone number is: [phone]    Each patient to whom he or she provides medical services by telemedicine is:  (1) informed of the relationship between the physician and patient and the respective role of any other health care provider with respect to management of the patient; and (2) notified that he or she may decline to receive medical services by telemedicine and may withdraw from such care at any time.    History of Present Illness:  Harsha García, a 42 y.o.  male with history of Anxiety disorders; generalized anxiety disorder [F41.1] referred by Barbie Velazquez MD.  Patient was seen, examined and chart was reviewed.    Met with patient.       Patient began this session by stating he had an appointment with his psychiatrist.  Patient stated his psychiatrist stopped prescribing him Abilify 5 mg.  He is now prescribed Vraylar 3 mg.  Noted he has been doing well on Vraylar.  Patient's Adderall prescription remains the same.  Noted he continues to have difficulty focusing and completing tasks at work.  Stated his relationship with daughter has improved somewhat.  Acknowledged he has been more aware of his tone, delivery, and the possibility of reacting towards her.  [facility] and patient reviewed the "Reaction leads to Toxicity" concept.  Reported he no longer wants to be a .  Admitted he has not been able to find an area of interest or a job that will make him happy.  Patient will follow-up with [facility] on January 26, 2024.             11/16/2023    12:21 PM 10/30/2023    10:51 AM 10/4/2023    11:39 AM 9/18/2023     9:35 " "Pt's VSS. Disoriented to place and time. Denies pain. Pt refused to take PO vancomycin at first, and then agreed to take it when writer said \"doctor wants you to take it.\" Swallows fine. Good appetite. Up with stand by assist to bathroom. Sitter at bedside at all times for impulsiveness. Continue with plan of care.   " AM 8/29/2023     4:16 PM 8/15/2023    11:58 AM 7/31/2023     9:53 AM   Results of the PHQ8   Little interest or pleasure in doing things More than half the days More than half the days Several days More than half the days More than half the days More than half the days Several days   Feeling down, depressed, or hopeless Nearly every day Nearly every day More than half the days Nearly every day More than half the days Nearly every day Several days   Trouble falling or staying asleep, or sleeping too much Nearly every day More than half the days More than half the days Several days More than half the days Not at all Several days   Feeling tired or having little energy Nearly every day Nearly every day Nearly every day Nearly every day Nearly every day More than half the days More than half the days   Poor appetite or overeating Not at all Not at all Not at all Not at all Not at all Not at all Not at all   Feeling bad about yourself - or that you are a failure or have let yourself or your family down Nearly every day Nearly every day Nearly every day Nearly every day Nearly every day Nearly every day Nearly every day   Trouble concentrating on things, such as reading the newspaper or watching television Nearly every day More than half the days Several days More than half the days Several days More than half the days Several days   Moving or speaking so slowly that other people could have noticed. Or the opposite - being so fidgety or restless that you have been moving around a lot more than usual Not at all Not at all Not at all Not at all Not at all Not at all Not at all   Total Score  17 15 12 14 13 12 9           11/16/2023    12:21 PM 10/30/2023    10:51 AM 10/4/2023    11:39 AM   GAD7   1. Feeling nervous, anxious, or on edge? 2 2 1   2. Not being able to stop or control worrying? 2 2 1   3. Worrying too much about different things? 2 2 2   4. Trouble relaxing? 1 2 1   5. Being so restless that it is hard to sit  still? 1 1 0   6. Becoming easily annoyed or irritable? 2 3 1   7. Feeling afraid as if something awful might happen? 1 1 0   CRIS-7 Score 11 13 6       Mental Status Exam  General Appearance:  unremarkable, age appropriate     Speech: rapid         Level of Cooperation: cooperative        Thought Processes: racing      Mood: anxious        Thought Content: normal, no suicidality, no homicidality, delusions, or paranoia     Affect: anxious    Orientation: Oriented x3     Memory: recent >  intact, remote >  intact     Attention Span & Concentration: intact     Fund of General Knowledge: intact and appropriate to age and level of education   Abstract Reasoning: interpretation of similarities was concrete   Judgment & Insight: fair       Language:  intact       Treatment plan:  Target symptoms: anxiety , work stress  Why chosen therapy is appropriate versus another modality: relevant to diagnosis  Outcome monitoring methods: self-report  Therapeutic intervention type: insight oriented psychotherapy, behavior modifying psychotherapy, supportive psychotherapy    Risk parameters:  Patient reports no suicidal ideation  Patient reports no homicidal ideation  Patient reports no self-injurious behavior  Patient reports no violent behavior    Verbal deficits: None    Patient's response to intervention:  The patient's response to intervention is accepting.    Progress toward goals and other mental status changes:  The patient's progress toward goals is limited.    Patient advised to call [phone] or present the the nearest ED if they experience suicidal or homicidal ideation, plan or intent.       Impression:  My diagnostic impression is patient continues to experience excessive worrying, depressed mood, difficulty relaxing, difficulty concentrating, and feeling on edge as evidenced by fear of uncertainty, racing and intrusive thoughts, and irritability.  These symptoms are making it difficult for patient to function  effectively.      Diagnosis:   Anxiety disorders; generalized anxiety disorder [F41.1] and Major Depressive Disorder, Recurrent, Moderate (F33.1)    Treatment Goals and Plan: Pt plans to continue CBT, Problem-solving Therapy, and Solution-focused Therapy    Future treatment will utilize CBT, Problem-solving Therapy, and Solution-focused Therapy    Return to Clinic: 1 month, 3 months    Plan to discuss case with [facility] consulting psychiatrist and further recommendations to be potentially be made at that time.  Refer to psychiatry    Length of Appointment:  25 minutes spent face-to-face and 12 minutes spent in non face-to-face clinical care.

## 2024-12-06 ENCOUNTER — APPOINTMENT (OUTPATIENT)
Dept: CT IMAGING | Facility: CLINIC | Age: 79
End: 2024-12-06
Attending: EMERGENCY MEDICINE
Payer: COMMERCIAL

## 2024-12-06 ENCOUNTER — HOSPITAL ENCOUNTER (EMERGENCY)
Facility: CLINIC | Age: 79
Discharge: HOME | End: 2024-12-06
Attending: EMERGENCY MEDICINE | Admitting: EMERGENCY MEDICINE
Payer: COMMERCIAL

## 2024-12-06 VITALS
RESPIRATION RATE: 13 BRPM | OXYGEN SATURATION: 91 % | SYSTOLIC BLOOD PRESSURE: 170 MMHG | HEART RATE: 72 BPM | DIASTOLIC BLOOD PRESSURE: 103 MMHG | TEMPERATURE: 97.9 F

## 2024-12-06 DIAGNOSIS — R03.0 ELEVATED BLOOD PRESSURE READING: ICD-10-CM

## 2024-12-06 DIAGNOSIS — R11.2 NAUSEA AND VOMITING, UNSPECIFIED VOMITING TYPE: ICD-10-CM

## 2024-12-06 LAB
ALBUMIN SERPL BCG-MCNC: 4.8 G/DL (ref 3.5–5.2)
ALP SERPL-CCNC: 63 U/L (ref 40–150)
ALT SERPL W P-5'-P-CCNC: 20 U/L (ref 0–50)
ANION GAP SERPL CALCULATED.3IONS-SCNC: 15 MMOL/L (ref 7–15)
AST SERPL W P-5'-P-CCNC: 21 U/L (ref 0–45)
ATRIAL RATE - MUSE: 68 BPM
BASOPHILS # BLD AUTO: 0 10E3/UL (ref 0–0.2)
BASOPHILS NFR BLD AUTO: 0 %
BILIRUB SERPL-MCNC: 0.6 MG/DL
BUN SERPL-MCNC: 31 MG/DL (ref 8–23)
CALCIUM SERPL-MCNC: 10.2 MG/DL (ref 8.8–10.4)
CHLORIDE SERPL-SCNC: 97 MMOL/L (ref 98–107)
CREAT SERPL-MCNC: 0.81 MG/DL (ref 0.51–0.95)
DIASTOLIC BLOOD PRESSURE - MUSE: NORMAL MMHG
EGFRCR SERPLBLD CKD-EPI 2021: 73 ML/MIN/1.73M2
EOSINOPHIL # BLD AUTO: 0 10E3/UL (ref 0–0.7)
EOSINOPHIL NFR BLD AUTO: 0 %
ERYTHROCYTE [DISTWIDTH] IN BLOOD BY AUTOMATED COUNT: 13.5 % (ref 10–15)
GLUCOSE SERPL-MCNC: 216 MG/DL (ref 70–99)
HCO3 SERPL-SCNC: 36 MMOL/L (ref 22–29)
HCT VFR BLD AUTO: 37.7 % (ref 35–47)
HGB BLD-MCNC: 12.8 G/DL (ref 11.7–15.7)
HOLD SPECIMEN: NORMAL
IMM GRANULOCYTES # BLD: 0.1 10E3/UL
IMM GRANULOCYTES NFR BLD: 1 %
INTERPRETATION ECG - MUSE: NORMAL
LIPASE SERPL-CCNC: 61 U/L (ref 13–60)
LYMPHOCYTES # BLD AUTO: 0.8 10E3/UL (ref 0.8–5.3)
LYMPHOCYTES NFR BLD AUTO: 7 %
MCH RBC QN AUTO: 31.2 PG (ref 26.5–33)
MCHC RBC AUTO-ENTMCNC: 34 G/DL (ref 31.5–36.5)
MCV RBC AUTO: 92 FL (ref 78–100)
MONOCYTES # BLD AUTO: 0.6 10E3/UL (ref 0–1.3)
MONOCYTES NFR BLD AUTO: 5 %
NEUTROPHILS # BLD AUTO: 10.9 10E3/UL (ref 1.6–8.3)
NEUTROPHILS NFR BLD AUTO: 88 %
NRBC # BLD AUTO: 0 10E3/UL
NRBC BLD AUTO-RTO: 0 /100
P AXIS - MUSE: 68 DEGREES
PLATELET # BLD AUTO: 259 10E3/UL (ref 150–450)
POTASSIUM SERPL-SCNC: 3.1 MMOL/L (ref 3.4–5.3)
PR INTERVAL - MUSE: 138 MS
PROT SERPL-MCNC: 8.5 G/DL (ref 6.4–8.3)
QRS DURATION - MUSE: 76 MS
QT - MUSE: 410 MS
QTC - MUSE: 435 MS
R AXIS - MUSE: 40 DEGREES
RBC # BLD AUTO: 4.1 10E6/UL (ref 3.8–5.2)
SODIUM SERPL-SCNC: 148 MMOL/L (ref 135–145)
SYSTOLIC BLOOD PRESSURE - MUSE: NORMAL MMHG
T AXIS - MUSE: 21 DEGREES
TROPONIN T SERPL HS-MCNC: 24 NG/L
TROPONIN T SERPL HS-MCNC: 25 NG/L
VENTRICULAR RATE- MUSE: 68 BPM
WBC # BLD AUTO: 12.4 10E3/UL (ref 4–11)

## 2024-12-06 PROCEDURE — 250N000011 HC RX IP 250 OP 636: Performed by: EMERGENCY MEDICINE

## 2024-12-06 PROCEDURE — 84484 ASSAY OF TROPONIN QUANT: CPT | Performed by: EMERGENCY MEDICINE

## 2024-12-06 PROCEDURE — 84075 ASSAY ALKALINE PHOSPHATASE: CPT | Performed by: EMERGENCY MEDICINE

## 2024-12-06 PROCEDURE — 74177 CT ABD & PELVIS W/CONTRAST: CPT

## 2024-12-06 PROCEDURE — 96374 THER/PROPH/DIAG INJ IV PUSH: CPT | Mod: 59

## 2024-12-06 PROCEDURE — 250N000009 HC RX 250: Performed by: EMERGENCY MEDICINE

## 2024-12-06 PROCEDURE — 84155 ASSAY OF PROTEIN SERUM: CPT | Performed by: EMERGENCY MEDICINE

## 2024-12-06 PROCEDURE — 70450 CT HEAD/BRAIN W/O DYE: CPT

## 2024-12-06 PROCEDURE — 93005 ELECTROCARDIOGRAM TRACING: CPT

## 2024-12-06 PROCEDURE — 83690 ASSAY OF LIPASE: CPT | Performed by: EMERGENCY MEDICINE

## 2024-12-06 PROCEDURE — 99285 EMERGENCY DEPT VISIT HI MDM: CPT | Mod: 25

## 2024-12-06 PROCEDURE — 85041 AUTOMATED RBC COUNT: CPT | Performed by: EMERGENCY MEDICINE

## 2024-12-06 PROCEDURE — 85004 AUTOMATED DIFF WBC COUNT: CPT | Performed by: EMERGENCY MEDICINE

## 2024-12-06 PROCEDURE — 36415 COLL VENOUS BLD VENIPUNCTURE: CPT | Performed by: EMERGENCY MEDICINE

## 2024-12-06 RX ORDER — IOPAMIDOL 755 MG/ML
50 INJECTION, SOLUTION INTRAVASCULAR ONCE
Status: COMPLETED | OUTPATIENT
Start: 2024-12-06 | End: 2024-12-06

## 2024-12-06 RX ORDER — ONDANSETRON 4 MG/1
4 TABLET, ORALLY DISINTEGRATING ORAL EVERY 8 HOURS PRN
Qty: 10 TABLET | Refills: 0 | Status: SHIPPED | OUTPATIENT
Start: 2024-12-06

## 2024-12-06 RX ORDER — ONDANSETRON 2 MG/ML
4 INJECTION INTRAMUSCULAR; INTRAVENOUS EVERY 30 MIN PRN
Status: DISCONTINUED | OUTPATIENT
Start: 2024-12-06 | End: 2024-12-06 | Stop reason: HOSPADM

## 2024-12-06 RX ADMIN — ONDANSETRON 4 MG: 2 INJECTION INTRAMUSCULAR; INTRAVENOUS at 05:39

## 2024-12-06 RX ADMIN — IOPAMIDOL 50 ML: 755 INJECTION, SOLUTION INTRAVENOUS at 05:46

## 2024-12-06 RX ADMIN — SODIUM CHLORIDE 60 ML: 9 INJECTION, SOLUTION INTRAVENOUS at 05:47

## 2024-12-06 ASSESSMENT — ACTIVITIES OF DAILY LIVING (ADL)
ADLS_ACUITY_SCORE: 51

## 2024-12-06 NOTE — ED NOTES
[facility]  ED Nurse Handoff Report    ED Chief complaint: Vomiting      ED Diagnosis:   Final diagnoses:   None       Code Status: DNR / DNI    Allergies:   Allergies   Allergen Reactions    Penicillins Hives     Tolerates cephalosporins       Patient Story: Patient presents to the ED via EMS from assisted living for vomiting. Per EMS reports patient had 2 episodes of emesis in the last 24 hours. Pre hospital blood sugar 205. Patient also hypertensive 190s systolic for EMS.   Focused Assessment:  Aox1 (Requires ), baseline dementia    Treatments and/or interventions provided: PIV, labs, imaging, meds (see MAR)  Patient's response to treatments and/or interventions: nausea improved    To be done/followed up on inpatient unit:  n/a    Does this patient have any cognitive concerns?: Baseline dementia    Activity level - Baseline/Home:  Stand with Assist and Walker  Activity Level - Current:   Total Care    Patient's Preferred language: Mongolian   Needed?: Yes    Isolation: None  Infection: Not Applicable  Patient tested for COVID 19 prior to admission: NO  Bariatric?: No    Vital Signs:   Vitals:    12/06/24 0440 12/06/24 0504 12/06/24 0505 12/06/24 0600   BP: (!) 202/109 (!) 195/76  (!) 196/95   Pulse: 66 77 64 61   Resp: 18  28 23   Temp: 97.9  F (36.6  C)      TempSrc: Oral      SpO2: 93%  93% 91%       Cardiac Rhythm:     Was the PSS-3 completed:   Yes  What interventions are required if any?               Family Comments: Daughter reachable by phone      For the majority of the shift this patient's behavior was Green.   Behavioral interventions performed were n/a.    ED NURSE PHONE NUMBER: *[phone]

## 2024-12-06 NOTE — ED PROVIDER NOTES
Emergency Department Note      History of Present Illness     Chief Complaint   Vomiting      HPI   Aneglica James is a 79 year old female with a history of type 2 diabetes, hypertension, dementia who presents via EMS with vomiting. Patient endorses dizziness that she developed yesterday at 1200. EMS reports that the patient had two episodes of vomiting within the past 24 hours at her assisted care facility. EMS states that the patient had a blood sugar of 205 and blood pressure in the 190's en route to the emergency department. She notes that she has been unable to sleep tonight due to her nausea and dizziness. She denies a history of a similar episode of dizziness. She denies any bodily pain. She denies a headache. She denies chest pain. She denies abdominal pain.      Used.     Independent Historian   EMS and Assisted Living Facility as detailed above.    Review of External Notes   I reviewed patient's discharge summary from 09/11/2021 regarding her subdural hematoma.      Past Medical History     Medical History and Problem List   Dementia (H)  Hypertension  Iron deficiency anemia  PUD (peptic ulcer disease)  Schizophrenia (H)  Type 2 diabetes mellitus (H)    Medications   Risperdal  Seroquel  Oscal  Paxil  Glucophage    Surgical History   Past Surgical History:   Procedure Laterality Date    colonoscopy      CYSTOSCOPY      CYSTOSCOPY, DILATE URETHRA, COMBINED N/A 5/2/2022    Procedure: CYSTOSCOPY, WITH URETHRAL DILATION;  Surgeon: Riley Roach MD;  Location:  OR    CYSTOSCOPY, DILATE URETHRA, COMBINED  5/2/2022    Procedure: ;  Surgeon: Riley Roach MD;  Location:  OR    UPPER GI ENDOSCOPY         Physical Exam     Patient Vitals for the past 24 hrs:   BP Temp Temp src Pulse Resp SpO2   12/06/24 0700 (!) 170/103 -- -- 72 13 --   12/06/24 0600 (!) 196/95 -- -- 61 23 91 %   12/06/24 0505 -- -- -- 64 28 93 %   12/06/24 0504 (!) 195/76 -- -- 77 -- --   12/06/24 0440 (!) 202/109  97.9  F (36.6  C) Oral 66 18 93 %     Physical Exam  General: Does not appear in acute distress  Head: No signs of trauma.   Mouth/Throat: Oropharynx is clear and moist.   Eyes: Conjunctivae are normal.   Neck: Normal range of motion. No nuchal rigidity.   CV: Normal rate and regular rhythm.    Resp: Effort normal and breath sounds normal. No respiratory distress.   GI: Soft. There is no tenderness.  No rebound or guarding.  Normal bowel sounds.  No CVA tenderness.  MSK: Normal range of motion. no edema. No Calf tenderness.  Neuro: The patient is alert to name and place.  PERRLA, EOMI, strength in upper/lower extremities normal and symmetrical. Sensation normal. Speech normal.   Skin: Skin is warm and dry. No rash noted.   Psych: normal mood and affect. behavior is normal.       Diagnostics     Lab Results   Labs Ordered and Resulted from Time of ED Arrival to Time of ED Departure   COMPREHENSIVE METABOLIC PANEL - Abnormal       Result Value    Sodium 148 (*)     Potassium 3.1 (*)     Carbon Dioxide (CO2) 36 (*)     Anion Gap 15      Urea Nitrogen 31.0 (*)     Creatinine 0.81      GFR Estimate 73      Calcium 10.2      Chloride 97 (*)     Glucose 216 (*)     Alkaline Phosphatase 63      AST 21      ALT 20      Protein Total 8.5 (*)     Albumin 4.8      Bilirubin Total 0.6     CBC WITH PLATELETS AND DIFFERENTIAL - Abnormal    WBC Count 12.4 (*)     RBC Count 4.10      Hemoglobin 12.8      Hematocrit 37.7      MCV 92      MCH 31.2      MCHC 34.0      RDW 13.5      Platelet Count 259      % Neutrophils 88      % Lymphocytes 7      % Monocytes 5      % Eosinophils 0      % Basophils 0      % Immature Granulocytes 1      NRBCs per 100 WBC 0      Absolute Neutrophils 10.9 (*)     Absolute Lymphocytes 0.8      Absolute Monocytes 0.6      Absolute Eosinophils 0.0      Absolute Basophils 0.0      Absolute Immature Granulocytes 0.1      Absolute NRBCs 0.0     LIPASE - Abnormal    Lipase 61 (*)    TROPONIN T, HIGH SENSITIVITY  - Abnormal    Troponin T, High Sensitivity 24 (*)    TROPONIN T, HIGH SENSITIVITY - Abnormal    Troponin T, High Sensitivity 25 (*)        Imaging   CT Head w/o Contrast   Final Result   IMPRESSION:   1.  No CT evidence for acute intracranial process.   2.  Brain atrophy and presumed chronic microvascular ischemic changes as above.      CT Abdomen Pelvis w Contrast   Final Result   IMPRESSION:    1.  No evidence for bowel obstruction.   2.  Right inguinal hernia containing a small loop of small bowel without obstruction.   3.  Large hiatal hernia.   4.  Stable indeterminate low-density lesion in the right hepatic lobe.             EKG   ECG results from 12/06/24   EKG 12-lead, tracing only     Value    Systolic Blood Pressure     Diastolic Blood Pressure     Ventricular Rate 68    Atrial Rate 68    WV Interval 138    QRS Duration 76        QTc 435    P Axis 68    R AXIS 40    T Axis 21    Interpretation ECG      Sinus rhythm with Premature supraventricular complexes  Nonspecific T wave abnormality  When compared with ECG of 11-Sep-2021 07:51,  No significant changes  Interpreted by me at 0523         Independent Interpretation   On my independent interpretation of head CT there is no large ICH noted   ED Course      Medications Administered   Medications   ondansetron (ZOFRAN) injection 4 mg (4 mg Intravenous $Given 12/6/24 0539)   iopamidol (ISOVUE-370) solution 50 mL (50 mLs Intravenous $Given 12/6/24 0546)   Saline Flush (60 mLs Intravenous $Given 12/6/24 0547)       Procedures   Procedures       ED Course   ED Course as of 12/06/24 0713   Fri Dec 06, 2024   0440 I obtained history and examined the patient as noted above   0451 I spoke with the patient's assisted facility to gather additional history regarding the patient's condition. Noted that the patient developed nausea, vomiting a 2200 and is otherwise at her baseline demented status.   0614 I rechecked the patient and explained findings. Patient notes  that she is feeling mildly better.   0628 I spoke with the patient's daughter to discuss my findings and subsequent plan of care.   0638 I spoke with the patient's assisted living facility who noted that the patient's blood pressure on 11/29 was 116/70.       Additional Documentation  None    Medical Decision Making / Diagnosis       MDM   Angelica James is a 79 year old female presents due to nausea and vomiting and elevated blood pressure.  History is somewhat limited from the patient that she does have a history of dementia.  She apparently developed nausea and vomiting this evening and when the staff nursing home checked her blood pressure was elevated.  When this persisted she was sent to the hospital.  Patient did have an episode of vomiting shortly after arrival.  Exam was otherwise reassuring other than the elevated blood pressure.  EKG did not show any any ST segment changes.  Blood work was obtained that was overall unremarkable with only very minimal elevation of her white blood cell count as expected.  CT scan of the head and abdomen pelvis did not show any signs of acute process.  There was a hiatal hernia and inguinal hernia which was discussed with the patient's daughter.  I was able to confirm with the nursing home staff that the patient is not on blood pressure medication.  She does have a history of hypertension on chart and is not clear if she had been on blood pressure medication, but it seems like it may have been sometime.  Patient was given Zofran and fluids and repeat evaluation she had resolution of her symptoms and just felt tired.  She is able to tolerate p.o. without any difficulty.  Patient's blood pressure was still somewhat elevated but had improved.  This may be more of a acute reaction with the nausea and vomiting.  There is no clear stroke symptoms or other concerning acute process.  If the repeat troponin is negative, I feel the patient can appropriately be discharged back to the  nursing home where they can recheck her blood pressure after she had a chance to rest.  I did discuss this with the patient's daughter along with follow-up return precautions.    Disposition   Care of the patient was transferred to my colleague Dr. Mehta pending troponin.     Diagnosis     ICD-10-CM    1. Nausea and vomiting, unspecified vomiting type  R11.2       2. Elevated blood pressure reading  R03.0            Discharge Medications   New Prescriptions    ONDANSETRON (ZOFRAN ODT) 4 MG ODT TAB    Take 1 tablet (4 mg) by mouth every 8 hours as needed.         Scribe Disclosure:  Rochelle ROTHMAN, am serving as a scribe at 4:32 AM on 12/6/2024 to document services personally performed by Santhosh Rothman MD based on my observations and the provider's statements to me.        Santhosh Rothman MD  12/06/24 0732

## 2024-12-06 NOTE — ED PROVIDER NOTES
Sign Out Note    I took over care of this patient from Dr. Rothman    Briefly, patient presented to the ED for: vomiting, HTN    Plan at time of sign out: discharge pending repeat Trop    Events during my shift: repeat troponin now resulted, 25, not significantly changed compared with 2 hours prior when it was 24.  Plan for discharge.  I spoke with patient's nurse at 0715, patient will be discharged back to prior residence.    MD Janine Mantilla, Jason Keating MD  12/06/24 0776

## 2024-12-06 NOTE — ED NOTES
Bed: ED02  Expected date:   Expected time:   Means of arrival:   Comments:  Rosa Elena 2 79F vomiting

## 2024-12-06 NOTE — ED TRIAGE NOTES
Patient presents to the ED via EMS from assisted living for vomiting. Per EMS reports patient had 2 episodes of emesis in the last 24 hours. Pre hospital blood sugar 205. Patient also hypertensive 190s systolic for EMS.     Triage Assessment (Adult)       Row Name 12/06/24 0431          Triage Assessment    Airway WDL WDL        Respiratory WDL    Respiratory WDL WDL        Cardiac WDL    Cardiac WDL X  hypertension        Cognitive/Neuro/Behavioral WDL    Cognitive/Neuro/Behavioral WDL X     Orientation disoriented to;place;time;situation

## 2024-12-06 NOTE — DISCHARGE INSTRUCTIONS
Please use the Zofran as needed for nausea.  Please recheck the blood pressure later in the day once Angelica has had a chance to rest and recover.  If it is still elevated please speak with the primary care group.  Please return to the hospital for worsening vomiting or any other new or concerning symptoms.

## 2024-12-11 ENCOUNTER — DOCUMENTATION ONLY (OUTPATIENT)
Dept: OTHER | Facility: CLINIC | Age: 79
End: 2024-12-11
Payer: COMMERCIAL

## 2025-02-15 NOTE — INTERVAL H&P NOTE
I have reviewed the surgical (or preoperative) H&P that is linked to this encounter, and examined the patient. There are no significant changes       No
